# Patient Record
Sex: MALE | Race: WHITE | NOT HISPANIC OR LATINO | Employment: FULL TIME | URBAN - METROPOLITAN AREA
[De-identification: names, ages, dates, MRNs, and addresses within clinical notes are randomized per-mention and may not be internally consistent; named-entity substitution may affect disease eponyms.]

---

## 2019-08-12 ENCOUNTER — HOSPITAL ENCOUNTER (INPATIENT)
Facility: CLINIC | Age: 34
LOS: 6 days | Discharge: HOME | End: 2019-08-19
Attending: EMERGENCY MEDICINE | Admitting: INTERNAL MEDICINE
Payer: COMMERCIAL

## 2019-08-12 DIAGNOSIS — K85.20 ALCOHOL-INDUCED ACUTE PANCREATITIS, UNSPECIFIED COMPLICATION STATUS: ICD-10-CM

## 2019-08-12 DIAGNOSIS — K59.00 CONSTIPATION, UNSPECIFIED CONSTIPATION TYPE: Primary | ICD-10-CM

## 2019-08-12 DIAGNOSIS — F10.930 UNCOMPLICATED ALCOHOL WITHDRAWAL (H): ICD-10-CM

## 2019-08-12 DIAGNOSIS — S90.512D ABRASION OF LEFT ANKLE, SUBSEQUENT ENCOUNTER: ICD-10-CM

## 2019-08-12 DIAGNOSIS — F32.9 MAJOR DEPRESSIVE DISORDER WITH SINGLE EPISODE, REMISSION STATUS UNSPECIFIED: ICD-10-CM

## 2019-08-12 DIAGNOSIS — F10.229 ACUTE ALCOHOLIC INTOXICATION IN ALCOHOLISM WITH COMPLICATION, CONTINUOUS DRINKING BEHAVIOR (H): ICD-10-CM

## 2019-08-12 LAB — ALCOHOL BREATH TEST: 0.29 (ref 0–0.01)

## 2019-08-12 PROCEDURE — 82075 ASSAY OF BREATH ETHANOL: CPT | Performed by: EMERGENCY MEDICINE

## 2019-08-12 PROCEDURE — 99285 EMERGENCY DEPT VISIT HI MDM: CPT | Mod: Z6 | Performed by: EMERGENCY MEDICINE

## 2019-08-12 PROCEDURE — 96365 THER/PROPH/DIAG IV INF INIT: CPT | Performed by: EMERGENCY MEDICINE

## 2019-08-12 PROCEDURE — 99285 EMERGENCY DEPT VISIT HI MDM: CPT | Mod: 25 | Performed by: EMERGENCY MEDICINE

## 2019-08-12 PROCEDURE — 96361 HYDRATE IV INFUSION ADD-ON: CPT | Performed by: EMERGENCY MEDICINE

## 2019-08-12 PROCEDURE — HZ2ZZZZ DETOXIFICATION SERVICES FOR SUBSTANCE ABUSE TREATMENT: ICD-10-PCS | Performed by: EMERGENCY MEDICINE

## 2019-08-12 PROCEDURE — 96375 TX/PRO/DX INJ NEW DRUG ADDON: CPT | Performed by: EMERGENCY MEDICINE

## 2019-08-12 RX ORDER — GABAPENTIN 400 MG/1
400 CAPSULE ORAL 3 TIMES DAILY
Status: ON HOLD | COMMUNITY
Start: 2019-07-01 | End: 2019-08-18

## 2019-08-12 RX ORDER — DULOXETIN HYDROCHLORIDE 30 MG/1
30 CAPSULE, DELAYED RELEASE ORAL DAILY
Status: ON HOLD | COMMUNITY
Start: 2019-07-01 | End: 2019-08-18

## 2019-08-12 RX ORDER — MIRTAZAPINE 15 MG/1
15 TABLET, FILM COATED ORAL AT BEDTIME
Status: ON HOLD | COMMUNITY
Start: 2019-07-01 | End: 2019-08-18

## 2019-08-13 PROBLEM — K85.90 PANCREATITIS: Status: ACTIVE | Noted: 2019-08-13

## 2019-08-13 LAB
ALBUMIN SERPL-MCNC: 2.7 G/DL (ref 3.4–5)
ALP SERPL-CCNC: 156 U/L (ref 40–150)
ALT SERPL W P-5'-P-CCNC: 152 U/L (ref 0–70)
AMPHETAMINES UR QL SCN: NEGATIVE
ANION GAP SERPL CALCULATED.3IONS-SCNC: 10 MMOL/L (ref 3–14)
ANION GAP SERPL CALCULATED.3IONS-SCNC: 12 MMOL/L (ref 3–14)
ANION GAP SERPL CALCULATED.3IONS-SCNC: 12 MMOL/L (ref 3–14)
ANION GAP SERPL CALCULATED.3IONS-SCNC: 6 MMOL/L (ref 3–14)
AST SERPL W P-5'-P-CCNC: ABNORMAL U/L (ref 0–45)
BARBITURATES UR QL: NEGATIVE
BASOPHILS # BLD AUTO: 0 10E9/L (ref 0–0.2)
BASOPHILS NFR BLD AUTO: 0.2 %
BENZODIAZ UR QL: POSITIVE
BILIRUB SERPL-MCNC: 1.4 MG/DL (ref 0.2–1.3)
BUN SERPL-MCNC: 14 MG/DL (ref 7–30)
BUN SERPL-MCNC: 15 MG/DL (ref 7–30)
BUN SERPL-MCNC: 15 MG/DL (ref 7–30)
BUN SERPL-MCNC: 16 MG/DL (ref 7–30)
CALCIUM SERPL-MCNC: 7 MG/DL (ref 8.5–10.1)
CALCIUM SERPL-MCNC: 7.1 MG/DL (ref 8.5–10.1)
CALCIUM SERPL-MCNC: 7.4 MG/DL (ref 8.5–10.1)
CALCIUM SERPL-MCNC: 7.4 MG/DL (ref 8.5–10.1)
CANNABINOIDS UR QL SCN: NEGATIVE
CHLORIDE SERPL-SCNC: 101 MMOL/L (ref 94–109)
CHLORIDE SERPL-SCNC: 101 MMOL/L (ref 94–109)
CHLORIDE SERPL-SCNC: 103 MMOL/L (ref 94–109)
CHLORIDE SERPL-SCNC: 104 MMOL/L (ref 94–109)
CO2 SERPL-SCNC: 19 MMOL/L (ref 20–32)
CO2 SERPL-SCNC: 19 MMOL/L (ref 20–32)
CO2 SERPL-SCNC: 22 MMOL/L (ref 20–32)
CO2 SERPL-SCNC: 24 MMOL/L (ref 20–32)
COCAINE UR QL: NEGATIVE
CREAT SERPL-MCNC: 0.82 MG/DL (ref 0.66–1.25)
CREAT SERPL-MCNC: 0.91 MG/DL (ref 0.66–1.25)
CREAT SERPL-MCNC: ABNORMAL MG/DL (ref 0.66–1.25)
CREAT SERPL-MCNC: ABNORMAL MG/DL (ref 0.66–1.25)
DIFFERENTIAL METHOD BLD: ABNORMAL
EOSINOPHIL # BLD AUTO: 0 10E9/L (ref 0–0.7)
EOSINOPHIL NFR BLD AUTO: 0.2 %
ERYTHROCYTE [DISTWIDTH] IN BLOOD BY AUTOMATED COUNT: 13.6 % (ref 10–15)
ETHANOL UR QL SCN: POSITIVE
GFR SERPL CREATININE-BSD FRML MDRD: >90 ML/MIN/{1.73_M2}
GFR SERPL CREATININE-BSD FRML MDRD: >90 ML/MIN/{1.73_M2}
GFR SERPL CREATININE-BSD FRML MDRD: ABNORMAL ML/MIN/{1.73_M2}
GFR SERPL CREATININE-BSD FRML MDRD: ABNORMAL ML/MIN/{1.73_M2}
GLUCOSE SERPL-MCNC: 101 MG/DL (ref 70–99)
GLUCOSE SERPL-MCNC: 113 MG/DL (ref 70–99)
GLUCOSE SERPL-MCNC: 114 MG/DL (ref 70–99)
GLUCOSE SERPL-MCNC: 114 MG/DL (ref 70–99)
HCT VFR BLD AUTO: 45 % (ref 40–53)
HGB BLD-MCNC: 16 G/DL (ref 13.3–17.7)
IMM GRANULOCYTES # BLD: 0 10E9/L (ref 0–0.4)
IMM GRANULOCYTES NFR BLD: 0.2 %
INR PPP: 1 (ref 0.86–1.14)
LACTATE BLD-SCNC: 1.6 MMOL/L (ref 0.7–2)
LACTATE BLD-SCNC: 2 MMOL/L (ref 0.7–2)
LACTATE BLD-SCNC: 2.4 MMOL/L (ref 0.7–2)
LIPASE SERPL-CCNC: 2835 U/L (ref 73–393)
LYMPHOCYTES # BLD AUTO: 1.9 10E9/L (ref 0.8–5.3)
LYMPHOCYTES NFR BLD AUTO: 15.3 %
MAGNESIUM SERPL-MCNC: 2.2 MG/DL (ref 1.6–2.3)
MCH RBC QN AUTO: 30.4 PG (ref 26.5–33)
MCHC RBC AUTO-ENTMCNC: 35.6 G/DL (ref 31.5–36.5)
MCV RBC AUTO: 86 FL (ref 78–100)
MONOCYTES # BLD AUTO: 0.3 10E9/L (ref 0–1.3)
MONOCYTES NFR BLD AUTO: 2.7 %
NEUTROPHILS # BLD AUTO: 10.1 10E9/L (ref 1.6–8.3)
NEUTROPHILS NFR BLD AUTO: 81.4 %
NRBC # BLD AUTO: 0.1 10*3/UL
NRBC BLD AUTO-RTO: 1 /100
OPIATES UR QL SCN: NEGATIVE
PHOSPHATE SERPL-MCNC: 1.7 MG/DL (ref 2.5–4.5)
PHOSPHATE SERPL-MCNC: 2 MG/DL (ref 2.5–4.5)
PHOSPHATE SERPL-MCNC: NORMAL MG/DL (ref 2.5–4.5)
PLATELET # BLD AUTO: 198 10E9/L (ref 150–450)
POTASSIUM SERPL-SCNC: 4.6 MMOL/L (ref 3.4–5.3)
POTASSIUM SERPL-SCNC: 4.7 MMOL/L (ref 3.4–5.3)
POTASSIUM SERPL-SCNC: 6.2 MMOL/L (ref 3.4–5.3)
POTASSIUM SERPL-SCNC: ABNORMAL MMOL/L (ref 3.4–5.3)
PROT SERPL-MCNC: 8.3 G/DL (ref 6.8–8.8)
RBC # BLD AUTO: 5.26 10E12/L (ref 4.4–5.9)
SODIUM SERPL-SCNC: 131 MMOL/L (ref 133–144)
SODIUM SERPL-SCNC: 133 MMOL/L (ref 133–144)
SODIUM SERPL-SCNC: 134 MMOL/L (ref 133–144)
SODIUM SERPL-SCNC: 135 MMOL/L (ref 133–144)
WBC # BLD AUTO: 12.4 10E9/L (ref 4–11)

## 2019-08-13 PROCEDURE — 25000128 H RX IP 250 OP 636: Performed by: PHYSICIAN ASSISTANT

## 2019-08-13 PROCEDURE — 90791 PSYCH DIAGNOSTIC EVALUATION: CPT

## 2019-08-13 PROCEDURE — 82247 BILIRUBIN TOTAL: CPT

## 2019-08-13 PROCEDURE — 25000125 ZZHC RX 250: Performed by: PHYSICIAN ASSISTANT

## 2019-08-13 PROCEDURE — 84155 ASSAY OF PROTEIN SERUM: CPT

## 2019-08-13 PROCEDURE — 87040 BLOOD CULTURE FOR BACTERIA: CPT | Performed by: INTERNAL MEDICINE

## 2019-08-13 PROCEDURE — 83605 ASSAY OF LACTIC ACID: CPT | Performed by: INTERNAL MEDICINE

## 2019-08-13 PROCEDURE — 25000132 ZZH RX MED GY IP 250 OP 250 PS 637: Performed by: EMERGENCY MEDICINE

## 2019-08-13 PROCEDURE — 12000001 ZZH R&B MED SURG/OB UMMC

## 2019-08-13 PROCEDURE — 25000131 ZZH RX MED GY IP 250 OP 636 PS 637: Performed by: PHYSICIAN ASSISTANT

## 2019-08-13 PROCEDURE — 25000132 ZZH RX MED GY IP 250 OP 250 PS 637: Performed by: PHYSICIAN ASSISTANT

## 2019-08-13 PROCEDURE — 83735 ASSAY OF MAGNESIUM: CPT | Performed by: EMERGENCY MEDICINE

## 2019-08-13 PROCEDURE — 82947 ASSAY GLUCOSE BLOOD QUANT: CPT

## 2019-08-13 PROCEDURE — 84460 ALANINE AMINO (ALT) (SGPT): CPT

## 2019-08-13 PROCEDURE — 80048 BASIC METABOLIC PNL TOTAL CA: CPT | Performed by: EMERGENCY MEDICINE

## 2019-08-13 PROCEDURE — 83690 ASSAY OF LIPASE: CPT | Performed by: EMERGENCY MEDICINE

## 2019-08-13 PROCEDURE — 82565 ASSAY OF CREATININE: CPT

## 2019-08-13 PROCEDURE — 84100 ASSAY OF PHOSPHORUS: CPT | Performed by: EMERGENCY MEDICINE

## 2019-08-13 PROCEDURE — 25000132 ZZH RX MED GY IP 250 OP 250 PS 637: Performed by: INTERNAL MEDICINE

## 2019-08-13 PROCEDURE — 36415 COLL VENOUS BLD VENIPUNCTURE: CPT | Performed by: PHYSICIAN ASSISTANT

## 2019-08-13 PROCEDURE — 84295 ASSAY OF SERUM SODIUM: CPT

## 2019-08-13 PROCEDURE — 84100 ASSAY OF PHOSPHORUS: CPT | Performed by: PHYSICIAN ASSISTANT

## 2019-08-13 PROCEDURE — 25000128 H RX IP 250 OP 636: Performed by: EMERGENCY MEDICINE

## 2019-08-13 PROCEDURE — 84520 ASSAY OF UREA NITROGEN: CPT

## 2019-08-13 PROCEDURE — 85610 PROTHROMBIN TIME: CPT | Performed by: PHYSICIAN ASSISTANT

## 2019-08-13 PROCEDURE — 84075 ASSAY ALKALINE PHOSPHATASE: CPT

## 2019-08-13 PROCEDURE — 82374 ASSAY BLOOD CARBON DIOXIDE: CPT

## 2019-08-13 PROCEDURE — 83605 ASSAY OF LACTIC ACID: CPT | Performed by: PHYSICIAN ASSISTANT

## 2019-08-13 PROCEDURE — 25800030 ZZH RX IP 258 OP 636: Performed by: PHYSICIAN ASSISTANT

## 2019-08-13 PROCEDURE — 99223 1ST HOSP IP/OBS HIGH 75: CPT | Mod: AI | Performed by: INTERNAL MEDICINE

## 2019-08-13 PROCEDURE — 82310 ASSAY OF CALCIUM: CPT

## 2019-08-13 PROCEDURE — 36415 COLL VENOUS BLD VENIPUNCTURE: CPT | Performed by: EMERGENCY MEDICINE

## 2019-08-13 PROCEDURE — C9113 INJ PANTOPRAZOLE SODIUM, VIA: HCPCS | Performed by: EMERGENCY MEDICINE

## 2019-08-13 PROCEDURE — 80320 DRUG SCREEN QUANTALCOHOLS: CPT | Performed by: FAMILY MEDICINE

## 2019-08-13 PROCEDURE — C9113 INJ PANTOPRAZOLE SODIUM, VIA: HCPCS | Performed by: PHYSICIAN ASSISTANT

## 2019-08-13 PROCEDURE — 99207 ZZC APP CREDIT; MD BILLING SHARED VISIT: CPT | Performed by: PHYSICIAN ASSISTANT

## 2019-08-13 PROCEDURE — 84100 ASSAY OF PHOSPHORUS: CPT | Performed by: INTERNAL MEDICINE

## 2019-08-13 PROCEDURE — 82435 ASSAY OF BLOOD CHLORIDE: CPT

## 2019-08-13 PROCEDURE — 84132 ASSAY OF SERUM POTASSIUM: CPT

## 2019-08-13 PROCEDURE — 85025 COMPLETE CBC W/AUTO DIFF WBC: CPT | Performed by: EMERGENCY MEDICINE

## 2019-08-13 PROCEDURE — 82040 ASSAY OF SERUM ALBUMIN: CPT

## 2019-08-13 PROCEDURE — 80053 COMPREHEN METABOLIC PANEL: CPT | Performed by: EMERGENCY MEDICINE

## 2019-08-13 PROCEDURE — 36415 COLL VENOUS BLD VENIPUNCTURE: CPT | Performed by: INTERNAL MEDICINE

## 2019-08-13 PROCEDURE — 80307 DRUG TEST PRSMV CHEM ANLYZR: CPT | Performed by: FAMILY MEDICINE

## 2019-08-13 RX ORDER — DIAZEPAM 5 MG
5-20 TABLET ORAL EVERY 30 MIN PRN
Status: DISCONTINUED | OUTPATIENT
Start: 2019-08-13 | End: 2019-08-17

## 2019-08-13 RX ORDER — AMOXICILLIN 250 MG
1 CAPSULE ORAL 2 TIMES DAILY
Status: DISCONTINUED | OUTPATIENT
Start: 2019-08-13 | End: 2019-08-19 | Stop reason: HOSPADM

## 2019-08-13 RX ORDER — LANOLIN ALCOHOL/MO/W.PET/CERES
100 CREAM (GRAM) TOPICAL DAILY
Status: DISCONTINUED | OUTPATIENT
Start: 2019-08-14 | End: 2019-08-19 | Stop reason: HOSPADM

## 2019-08-13 RX ORDER — ALBUTEROL SULFATE 90 UG/1
2 AEROSOL, METERED RESPIRATORY (INHALATION) EVERY 6 HOURS PRN
Status: ON HOLD | COMMUNITY
End: 2023-07-05

## 2019-08-13 RX ORDER — ONDANSETRON 2 MG/ML
4 INJECTION INTRAMUSCULAR; INTRAVENOUS EVERY 6 HOURS PRN
Status: DISCONTINUED | OUTPATIENT
Start: 2019-08-13 | End: 2019-08-19 | Stop reason: HOSPADM

## 2019-08-13 RX ORDER — LANOLIN ALCOHOL/MO/W.PET/CERES
100 CREAM (GRAM) TOPICAL DAILY
Status: DISCONTINUED | OUTPATIENT
Start: 2019-08-13 | End: 2019-08-13

## 2019-08-13 RX ORDER — ONDANSETRON 2 MG/ML
4 INJECTION INTRAMUSCULAR; INTRAVENOUS EVERY 6 HOURS PRN
Status: DISCONTINUED | OUTPATIENT
Start: 2019-08-13 | End: 2019-08-13

## 2019-08-13 RX ORDER — SODIUM CHLORIDE, SODIUM LACTATE, POTASSIUM CHLORIDE, CALCIUM CHLORIDE 600; 310; 30; 20 MG/100ML; MG/100ML; MG/100ML; MG/100ML
INJECTION, SOLUTION INTRAVENOUS CONTINUOUS
Status: ACTIVE | OUTPATIENT
Start: 2019-08-13 | End: 2019-08-13

## 2019-08-13 RX ORDER — SODIUM CHLORIDE, SODIUM LACTATE, POTASSIUM CHLORIDE, CALCIUM CHLORIDE 600; 310; 30; 20 MG/100ML; MG/100ML; MG/100ML; MG/100ML
INJECTION, SOLUTION INTRAVENOUS CONTINUOUS
Status: DISCONTINUED | OUTPATIENT
Start: 2019-08-13 | End: 2019-08-16

## 2019-08-13 RX ORDER — ONDANSETRON 4 MG/1
4 TABLET, ORALLY DISINTEGRATING ORAL EVERY 6 HOURS PRN
Status: DISCONTINUED | OUTPATIENT
Start: 2019-08-13 | End: 2019-08-19 | Stop reason: HOSPADM

## 2019-08-13 RX ORDER — HYDROMORPHONE HYDROCHLORIDE 1 MG/ML
0.3 INJECTION, SOLUTION INTRAMUSCULAR; INTRAVENOUS; SUBCUTANEOUS
Status: DISCONTINUED | OUTPATIENT
Start: 2019-08-13 | End: 2019-08-13

## 2019-08-13 RX ORDER — NALOXONE HYDROCHLORIDE 0.4 MG/ML
.1-.4 INJECTION, SOLUTION INTRAMUSCULAR; INTRAVENOUS; SUBCUTANEOUS
Status: DISCONTINUED | OUTPATIENT
Start: 2019-08-13 | End: 2019-08-19 | Stop reason: HOSPADM

## 2019-08-13 RX ORDER — DIAZEPAM 5 MG
5 TABLET ORAL ONCE
Status: COMPLETED | OUTPATIENT
Start: 2019-08-13 | End: 2019-08-13

## 2019-08-13 RX ORDER — MIRTAZAPINE 15 MG/1
15 TABLET, FILM COATED ORAL AT BEDTIME
Status: DISCONTINUED | OUTPATIENT
Start: 2019-08-13 | End: 2019-08-19 | Stop reason: HOSPADM

## 2019-08-13 RX ORDER — MULTIPLE VITAMINS W/ MINERALS TAB 9MG-400MCG
1 TAB ORAL DAILY
Status: DISCONTINUED | OUTPATIENT
Start: 2019-08-14 | End: 2019-08-19 | Stop reason: HOSPADM

## 2019-08-13 RX ORDER — AMOXICILLIN 250 MG
2 CAPSULE ORAL 2 TIMES DAILY
Status: DISCONTINUED | OUTPATIENT
Start: 2019-08-13 | End: 2019-08-16

## 2019-08-13 RX ORDER — ACETAMINOPHEN 325 MG/1
650 TABLET ORAL EVERY 4 HOURS PRN
Status: DISCONTINUED | OUTPATIENT
Start: 2019-08-13 | End: 2019-08-19 | Stop reason: HOSPADM

## 2019-08-13 RX ORDER — HYDROMORPHONE HYDROCHLORIDE 1 MG/ML
.3-.5 INJECTION, SOLUTION INTRAMUSCULAR; INTRAVENOUS; SUBCUTANEOUS
Status: DISCONTINUED | OUTPATIENT
Start: 2019-08-13 | End: 2019-08-13

## 2019-08-13 RX ORDER — FOLIC ACID 1 MG/1
1 TABLET ORAL DAILY
Status: DISCONTINUED | OUTPATIENT
Start: 2019-08-13 | End: 2019-08-13

## 2019-08-13 RX ORDER — POLYETHYLENE GLYCOL 3350 17 G/17G
17 POWDER, FOR SOLUTION ORAL DAILY PRN
Status: DISCONTINUED | OUTPATIENT
Start: 2019-08-13 | End: 2019-08-15

## 2019-08-13 RX ORDER — MULTIVITAMIN,THERAPEUTIC
1 TABLET ORAL DAILY
Status: DISCONTINUED | OUTPATIENT
Start: 2019-08-13 | End: 2019-08-13

## 2019-08-13 RX ORDER — DIAZEPAM 5 MG
5-20 TABLET ORAL EVERY 30 MIN PRN
Status: DISCONTINUED | OUTPATIENT
Start: 2019-08-13 | End: 2019-08-13

## 2019-08-13 RX ORDER — THIAMINE HYDROCHLORIDE 100 MG/ML
100 INJECTION, SOLUTION INTRAMUSCULAR; INTRAVENOUS ONCE
Status: COMPLETED | OUTPATIENT
Start: 2019-08-13 | End: 2019-08-13

## 2019-08-13 RX ORDER — HYDROMORPHONE HYDROCHLORIDE 1 MG/ML
0.3 INJECTION, SOLUTION INTRAMUSCULAR; INTRAVENOUS; SUBCUTANEOUS
Status: DISCONTINUED | OUTPATIENT
Start: 2019-08-13 | End: 2019-08-14

## 2019-08-13 RX ORDER — PROCHLORPERAZINE 25 MG
25 SUPPOSITORY, RECTAL RECTAL EVERY 12 HOURS PRN
Status: DISCONTINUED | OUTPATIENT
Start: 2019-08-13 | End: 2019-08-19 | Stop reason: HOSPADM

## 2019-08-13 RX ORDER — PANTOPRAZOLE SODIUM 40 MG/1
40 TABLET, DELAYED RELEASE ORAL DAILY
COMMUNITY
End: 2020-02-15

## 2019-08-13 RX ORDER — FOLIC ACID 1 MG/1
1 TABLET ORAL DAILY
Status: DISCONTINUED | OUTPATIENT
Start: 2019-08-14 | End: 2019-08-19 | Stop reason: HOSPADM

## 2019-08-13 RX ORDER — GINSENG 100 MG
CAPSULE ORAL 2 TIMES DAILY
Status: DISCONTINUED | OUTPATIENT
Start: 2019-08-13 | End: 2019-08-19 | Stop reason: HOSPADM

## 2019-08-13 RX ORDER — SODIUM CHLORIDE, SODIUM LACTATE, POTASSIUM CHLORIDE, CALCIUM CHLORIDE 600; 310; 30; 20 MG/100ML; MG/100ML; MG/100ML; MG/100ML
INJECTION, SOLUTION INTRAVENOUS CONTINUOUS
Status: DISCONTINUED | OUTPATIENT
Start: 2019-08-13 | End: 2019-08-13

## 2019-08-13 RX ORDER — MAGNESIUM SULFATE HEPTAHYDRATE 40 MG/ML
4 INJECTION, SOLUTION INTRAVENOUS EVERY 4 HOURS PRN
Status: DISCONTINUED | OUTPATIENT
Start: 2019-08-13 | End: 2019-08-18

## 2019-08-13 RX ORDER — ALBUTEROL SULFATE 90 UG/1
2 AEROSOL, METERED RESPIRATORY (INHALATION) EVERY 6 HOURS
Status: DISCONTINUED | OUTPATIENT
Start: 2019-08-13 | End: 2019-08-13

## 2019-08-13 RX ORDER — OXYCODONE HYDROCHLORIDE 5 MG/1
5-10 TABLET ORAL
Status: CANCELLED | OUTPATIENT
Start: 2019-08-13

## 2019-08-13 RX ORDER — BISACODYL 10 MG
10 SUPPOSITORY, RECTAL RECTAL DAILY PRN
Status: DISCONTINUED | OUTPATIENT
Start: 2019-08-13 | End: 2019-08-18

## 2019-08-13 RX ORDER — ONDANSETRON 2 MG/ML
4 INJECTION INTRAMUSCULAR; INTRAVENOUS ONCE
Status: COMPLETED | OUTPATIENT
Start: 2019-08-13 | End: 2019-08-13

## 2019-08-13 RX ORDER — LIDOCAINE 40 MG/G
CREAM TOPICAL
Status: DISCONTINUED | OUTPATIENT
Start: 2019-08-13 | End: 2019-08-19 | Stop reason: HOSPADM

## 2019-08-13 RX ORDER — FOLIC ACID 5 MG/ML
1 INJECTION, SOLUTION INTRAMUSCULAR; INTRAVENOUS; SUBCUTANEOUS ONCE
Status: COMPLETED | OUTPATIENT
Start: 2019-08-13 | End: 2019-08-13

## 2019-08-13 RX ORDER — SODIUM CHLORIDE 9 MG/ML
1000 INJECTION, SOLUTION INTRAVENOUS CONTINUOUS
Status: DISCONTINUED | OUTPATIENT
Start: 2019-08-13 | End: 2019-08-13

## 2019-08-13 RX ORDER — PROCHLORPERAZINE MALEATE 5 MG
10 TABLET ORAL EVERY 6 HOURS PRN
Status: DISCONTINUED | OUTPATIENT
Start: 2019-08-13 | End: 2019-08-19 | Stop reason: HOSPADM

## 2019-08-13 RX ORDER — MORPHINE SULFATE 4 MG/ML
4 INJECTION, SOLUTION INTRAMUSCULAR; INTRAVENOUS
Status: DISCONTINUED | OUTPATIENT
Start: 2019-08-13 | End: 2019-08-13

## 2019-08-13 RX ORDER — MULTIPLE VITAMINS W/ MINERALS TAB 9MG-400MCG
1 TAB ORAL DAILY
Status: DISCONTINUED | OUTPATIENT
Start: 2019-08-13 | End: 2019-08-13

## 2019-08-13 RX ORDER — ALBUTEROL SULFATE 90 UG/1
2 AEROSOL, METERED RESPIRATORY (INHALATION) EVERY 6 HOURS PRN
Status: DISCONTINUED | OUTPATIENT
Start: 2019-08-13 | End: 2019-08-19 | Stop reason: HOSPADM

## 2019-08-13 RX ORDER — NALTREXONE HYDROCHLORIDE 50 MG/1
50 TABLET, FILM COATED ORAL DAILY
Status: ON HOLD | COMMUNITY
End: 2021-03-07

## 2019-08-13 RX ORDER — CARBAMAZEPINE 200 MG/1
200 TABLET, EXTENDED RELEASE ORAL AT BEDTIME
Status: ON HOLD | COMMUNITY
End: 2019-08-18

## 2019-08-13 RX ADMIN — DIAZEPAM 5 MG: 5 TABLET ORAL at 13:27

## 2019-08-13 RX ADMIN — DIAZEPAM 5 MG: 5 TABLET ORAL at 19:57

## 2019-08-13 RX ADMIN — SODIUM CHLORIDE, POTASSIUM CHLORIDE, SODIUM LACTATE AND CALCIUM CHLORIDE 1000 ML: 600; 310; 30; 20 INJECTION, SOLUTION INTRAVENOUS at 13:13

## 2019-08-13 RX ADMIN — SENNOSIDES AND DOCUSATE SODIUM 1 TABLET: 8.6; 5 TABLET ORAL at 19:40

## 2019-08-13 RX ADMIN — ACETAMINOPHEN 650 MG: 325 TABLET, FILM COATED ORAL at 20:59

## 2019-08-13 RX ADMIN — ONDANSETRON 4 MG: 2 INJECTION INTRAMUSCULAR; INTRAVENOUS at 09:38

## 2019-08-13 RX ADMIN — HYDROMORPHONE HYDROCHLORIDE 0.3 MG: 1 INJECTION, SOLUTION INTRAMUSCULAR; INTRAVENOUS; SUBCUTANEOUS at 20:59

## 2019-08-13 RX ADMIN — SODIUM CHLORIDE, POTASSIUM CHLORIDE, SODIUM LACTATE AND CALCIUM CHLORIDE: 600; 310; 30; 20 INJECTION, SOLUTION INTRAVENOUS at 18:34

## 2019-08-13 RX ADMIN — ONDANSETRON 4 MG: 4 TABLET, ORALLY DISINTEGRATING ORAL at 19:56

## 2019-08-13 RX ADMIN — MORPHINE SULFATE 4 MG: 4 INJECTION INTRAVENOUS at 11:37

## 2019-08-13 RX ADMIN — BACITRACIN: 500 OINTMENT TOPICAL at 19:47

## 2019-08-13 RX ADMIN — SODIUM CHLORIDE, POTASSIUM CHLORIDE, SODIUM LACTATE AND CALCIUM CHLORIDE: 600; 310; 30; 20 INJECTION, SOLUTION INTRAVENOUS at 15:41

## 2019-08-13 RX ADMIN — SODIUM PHOSPHATE, MONOBASIC, MONOHYDRATE AND SODIUM PHOSPHATE, DIBASIC, ANHYDROUS 20 MMOL: 276; 142 INJECTION, SOLUTION INTRAVENOUS at 15:45

## 2019-08-13 RX ADMIN — POLYETHYLENE GLYCOL 3350 17 G: 17 POWDER, FOR SOLUTION ORAL at 19:40

## 2019-08-13 RX ADMIN — PANTOPRAZOLE SODIUM 40 MG: 40 INJECTION, POWDER, LYOPHILIZED, FOR SOLUTION INTRAVENOUS at 19:41

## 2019-08-13 RX ADMIN — THIAMINE HYDROCHLORIDE 100 MG: 100 INJECTION, SOLUTION INTRAMUSCULAR; INTRAVENOUS at 13:42

## 2019-08-13 RX ADMIN — FOLIC ACID 1 MG: 5 INJECTION, SOLUTION INTRAMUSCULAR; INTRAVENOUS; SUBCUTANEOUS at 13:46

## 2019-08-13 RX ADMIN — MIRTAZAPINE 15 MG: 15 TABLET, FILM COATED ORAL at 21:01

## 2019-08-13 RX ADMIN — SODIUM CHLORIDE 1000 ML: 9 INJECTION, SOLUTION INTRAVENOUS at 11:36

## 2019-08-13 RX ADMIN — PANTOPRAZOLE SODIUM 40 MG: 40 INJECTION, POWDER, FOR SOLUTION INTRAVENOUS at 09:38

## 2019-08-13 RX ADMIN — THERA TABS 1 TABLET: TAB at 13:42

## 2019-08-13 ASSESSMENT — ENCOUNTER SYMPTOMS
COLOR CHANGE: 0
ARTHRALGIAS: 0
DYSPHORIC MOOD: 1
SHORTNESS OF BREATH: 0
CONFUSION: 0
DIFFICULTY URINATING: 0
EYE REDNESS: 0
NECK STIFFNESS: 0
NERVOUS/ANXIOUS: 1
ABDOMINAL PAIN: 0
FEVER: 0
HEADACHES: 0

## 2019-08-13 ASSESSMENT — ACTIVITIES OF DAILY LIVING (ADL)
RETIRED_COMMUNICATION: 0-->UNDERSTANDS/COMMUNICATES WITHOUT DIFFICULTY
BATHING: 0-->INDEPENDENT
COGNITION: 0 - NO COGNITION ISSUES REPORTED
RETIRED_EATING: 0-->INDEPENDENT
FALL_HISTORY_WITHIN_LAST_SIX_MONTHS: NO
TRANSFERRING: 0-->INDEPENDENT
ADLS_ACUITY_SCORE: 12
TOILETING: 0-->INDEPENDENT
DRESS: 0-->INDEPENDENT
AMBULATION: 0-->INDEPENDENT
SWALLOWING: 0-->SWALLOWS FOODS/LIQUIDS WITHOUT DIFFICULTY

## 2019-08-13 NOTE — ED NOTES
"Patient reports coming in due to \"mom told me to.\" Patient not sure what he wants from the ED. Calm and cooperative with RN.   "

## 2019-08-13 NOTE — PROGRESS NOTES
[facility], [place]    Sepsis Evaluation Progress Note    Date of Service: 08/13/2019    I was called to see Xavier Odell due to abnormal vital signs triggering the Sepsis SIRS screening alert. He is not known to have an infection.     Physical Exam    Vital Signs:  Temp: 99.2  F (37.3  C) Temp src: Oral BP: (!) 158/97 Pulse: 100 Heart Rate: 102 Resp: 18 SpO2: 97 % O2 Device: None (Room air)      Lab:  Lactic Acid   Date Value Ref Range Status   08/13/2019 2.4 (H) 0.7 - 2.0 mmol/L Final       The patient is at baseline mental status.  CVS: Normal Heart sounds   RS: Clear breath sounds bilaterally   Abdomen: Soft . Tenderness on deep palpation . Normal bowel sounds.   euro: Alert and orientated X 3     The rest of their physical exam is significant for signs of alcohol withdrawal     Assessment and Plan    The SIRS and exam findings are likely due to acute pancreatitis and alcohol withdrawal , there is no sign of sepsis at this time.    Continue IV fluids currently running at at total of 200 mls/ hr  Recheck lactate at 2100 hrs     Disposition: The patient will remain on the current unit. We will continue to monitor this patient closely.    Brian Pagan MD
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
## 2019-08-13 NOTE — ED NOTES
Patient was signed out to overnight physician with plan for behavioral health assessment when he is more sober.      Will Rock, DO  08/13/19 0201
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
## 2019-08-13 NOTE — ED NOTES
Signout received from overnight doc.  Please see Dr. Martínez note for original HPI and physical exam.  After being observed in the ER, patient cleared and was clinically sober in the morning and was able to have a mental health assessment.  Per the mental health  in the past he has lost job due to alcohol, new to [place] (via [place]) to be with family; recently quit his job a week ago. Feels worthless, hopeless, helpless, isolating himself, guily but denies suicidal/homicidal ideations. No hx of inpatient for mental health, but yes for alcohol withdrawal.  Patient is amenable to outpatient anxiety and chem dep treatment, the mental health  is trying to make arrangements for this. Initally patient was interested in detox however while in the ER developed epigastric abdominal pain.      On assessment this morning patient endorses some epigastric abdominal pain.  Does report he has a history of gastric ulcers denies any hematemesis, black or tarry stools.  Reports he has not been taking any of his PPIs.  On exam he has epigastric tenderness but no guarding or peritoneal signs.  We will add on LFTs, lipase and a basic CBC and electrolyte.  Patient is feeling nauseated, was given Zofran and IV Protonix at this time.    Lipase significantly elevated, patient also has elevated LFTs.  Of note he has an elevated potassium but this is hemolyzed will recheck a potassium.  Patient was made n.p.o.  He was given morphine and IV fluids and will be admitted to the hospitalist for alcoholic pancreatitis.  Of note he also required 1 dose of Valium for alcohol withdrawals.    Marcos Navarro, Marcos Mcmahan MD  08/13/19 3651
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
## 2019-08-13 NOTE — PHARMACY-ADMISSION MEDICATION HISTORY
"Admission medication history for the August 13, 2019 admission is complete.     Interview sources:  patient, SureScripts    Reliability of source: good - patient knew names of medications, doses confirmed with SureScripts    Medication compliance: poor - patient states he stopped taking his medications 4-5 days ago because he started drinking. In general, he doesn't take medications when he drinks.    Changes made to PTA medication list (reason)  Added (per pt, confirmed with SureScripts):   - naltrexone 50 mg daily  - pantoprazole 40 mg daily  - carbamazepine  mg at bedtime (see additional information below)  Deleted: N/A  Changed (clarified frequency, doses unchanged):   - duloxetine (added once daily directions)  - gabapentin (added TID directions)  - mirtazapine (add HS directions)    Additional medication history information:   - Patient states he thinks he was prescribed carbamazepine to prevent withdrawal seizures. Rx written on 7/3/19 for 30 days supply and has been filling regularly since 5/28/19 - patient states he hasn't taken it in \"at least a couple weeks.\" He states that he has never had a seizure before.    Per [place] :  - diazepam 10 mg tabs #5 for 5 days supply filled 8/1/19 (patient states prescribed PRN alcohol withdrawal, but hasn't taken it so opted not to added to PTA medication list).  - gabapentin 400 mg caps #90 for 30 days supply last filled 7/2/19    Prior to Admission medications    Medication Sig Last Dose Taking? Auth Provider   albuterol (PROAIR HFA/PROVENTIL HFA/VENTOLIN HFA) 108 (90 Base) MCG/ACT inhaler Inhale 2 puffs into the lungs every 6 hours Past Week Yes Unknown, Entered By History   carBAMazepine (TEGRETOL XR) 200 MG 12 hr tablet Take 200 mg by mouth At Bedtime  >2 weeks Yes Unknown, Entered By History   DULoxetine (CYMBALTA) 30 MG capsule Take 30 mg by mouth daily  Past Week Yes Reported, Patient   gabapentin (NEURONTIN) 400 MG capsule Take 400 mg by mouth 3 times daily "  Past Week Yes Reported, Patient   mirtazapine (REMERON) 15 MG tablet Take 15 mg by mouth At Bedtime  Past Week Yes Reported, Patient   naltrexone (DEPADE/REVIA) 50 MG tablet Take 50 mg by mouth daily Past Week Yes Unknown, Entered By History   pantoprazole (PROTONIX) 40 MG EC tablet Take 40 mg by mouth daily Past Week Yes Unknown, Entered By History       Time spent: 30 minutes    Medication history completed by:   Christa HerreraD

## 2019-08-13 NOTE — ED NOTES
Mom reports patient has increased depression and unable to care for self. Mom went and brought patient back to [place] in May due to friend reporting patient not taking care of self, apartment full or trash including dog feces, not taking care of animal. Patient quite his job yesterday per mom. Per mom patient only wants to sleep and has told mom that he drinks until he sleeps because that is the only time he is at peace. Patient has not showered in days per mom. Per mom patient will talk his way out of this and she is highly concerned. Dr. Rock informed of this information.

## 2019-08-13 NOTE — ED NOTES
Pt's mom, Venessa Carlson, called asking about the pt. Pt gave verbal permission to speak with her. Pt's mom stated that she is very concerned with his mental health and that pt had a massive GI bleed back in May while in [place], [place].      Venessa Carlson said she can be reached at [phone] if a provider would like to speak with her. She stated that pt is not always very forth coming with his medical issues.

## 2019-08-14 LAB
ALBUMIN SERPL-MCNC: 2.5 G/DL (ref 3.4–5)
ALP SERPL-CCNC: 171 U/L (ref 40–150)
ALT SERPL W P-5'-P-CCNC: 100 U/L (ref 0–70)
ANION GAP SERPL CALCULATED.3IONS-SCNC: 11 MMOL/L (ref 3–14)
AST SERPL W P-5'-P-CCNC: ABNORMAL U/L (ref 0–45)
BASOPHILS # BLD AUTO: 0 10E9/L (ref 0–0.2)
BASOPHILS NFR BLD AUTO: 0.1 %
BILIRUB SERPL-MCNC: 2.5 MG/DL (ref 0.2–1.3)
BUN SERPL-MCNC: 7 MG/DL (ref 7–30)
CALCIUM SERPL-MCNC: 7.4 MG/DL (ref 8.5–10.1)
CHLORIDE SERPL-SCNC: 99 MMOL/L (ref 94–109)
CO2 SERPL-SCNC: 21 MMOL/L (ref 20–32)
CREAT SERPL-MCNC: 0.87 MG/DL (ref 0.66–1.25)
DIFFERENTIAL METHOD BLD: ABNORMAL
EOSINOPHIL # BLD AUTO: 0 10E9/L (ref 0–0.7)
EOSINOPHIL NFR BLD AUTO: 0.3 %
ERYTHROCYTE [DISTWIDTH] IN BLOOD BY AUTOMATED COUNT: 13 % (ref 10–15)
GFR SERPL CREATININE-BSD FRML MDRD: >90 ML/MIN/{1.73_M2}
GLUCOSE SERPL-MCNC: 144 MG/DL (ref 70–99)
HCT VFR BLD AUTO: 41.4 % (ref 40–53)
HGB BLD-MCNC: 13.8 G/DL (ref 13.3–17.7)
IMM GRANULOCYTES # BLD: 0 10E9/L (ref 0–0.4)
IMM GRANULOCYTES NFR BLD: 0.3 %
INTERPRETATION ECG - MUSE: NORMAL
LYMPHOCYTES # BLD AUTO: 1.1 10E9/L (ref 0.8–5.3)
LYMPHOCYTES NFR BLD AUTO: 14.3 %
MAGNESIUM SERPL-MCNC: 1.4 MG/DL (ref 1.6–2.3)
MAGNESIUM SERPL-MCNC: 2.6 MG/DL (ref 1.6–2.3)
MCH RBC QN AUTO: 28.8 PG (ref 26.5–33)
MCHC RBC AUTO-ENTMCNC: 33.3 G/DL (ref 31.5–36.5)
MCV RBC AUTO: 86 FL (ref 78–100)
MONOCYTES # BLD AUTO: 0.2 10E9/L (ref 0–1.3)
MONOCYTES NFR BLD AUTO: 2.7 %
NEUTROPHILS # BLD AUTO: 6.5 10E9/L (ref 1.6–8.3)
NEUTROPHILS NFR BLD AUTO: 82.3 %
NRBC # BLD AUTO: 0 10*3/UL
NRBC BLD AUTO-RTO: 0 /100
PHOSPHATE SERPL-MCNC: 2 MG/DL (ref 2.5–4.5)
PLATELET # BLD AUTO: 75 10E9/L (ref 150–450)
POTASSIUM SERPL-SCNC: 3.6 MMOL/L (ref 3.4–5.3)
PROT SERPL-MCNC: 6.9 G/DL (ref 6.8–8.8)
RBC # BLD AUTO: 4.8 10E12/L (ref 4.4–5.9)
SODIUM SERPL-SCNC: 131 MMOL/L (ref 133–144)
WBC # BLD AUTO: 7.9 10E9/L (ref 4–11)

## 2019-08-14 PROCEDURE — 84100 ASSAY OF PHOSPHORUS: CPT | Performed by: PHYSICIAN ASSISTANT

## 2019-08-14 PROCEDURE — 83735 ASSAY OF MAGNESIUM: CPT | Performed by: INTERNAL MEDICINE

## 2019-08-14 PROCEDURE — 36415 COLL VENOUS BLD VENIPUNCTURE: CPT | Performed by: PHYSICIAN ASSISTANT

## 2019-08-14 PROCEDURE — G0463 HOSPITAL OUTPT CLINIC VISIT: HCPCS | Mod: 25

## 2019-08-14 PROCEDURE — 25800030 ZZH RX IP 258 OP 636: Performed by: PHYSICIAN ASSISTANT

## 2019-08-14 PROCEDURE — C9113 INJ PANTOPRAZOLE SODIUM, VIA: HCPCS | Performed by: PHYSICIAN ASSISTANT

## 2019-08-14 PROCEDURE — 25000128 H RX IP 250 OP 636: Performed by: PHYSICIAN ASSISTANT

## 2019-08-14 PROCEDURE — 25000132 ZZH RX MED GY IP 250 OP 250 PS 637: Performed by: INTERNAL MEDICINE

## 2019-08-14 PROCEDURE — 99221 1ST HOSP IP/OBS SF/LOW 40: CPT | Performed by: PSYCHIATRY & NEUROLOGY

## 2019-08-14 PROCEDURE — 99233 SBSQ HOSP IP/OBS HIGH 50: CPT | Performed by: INTERNAL MEDICINE

## 2019-08-14 PROCEDURE — 83735 ASSAY OF MAGNESIUM: CPT | Performed by: PHYSICIAN ASSISTANT

## 2019-08-14 PROCEDURE — 80053 COMPREHEN METABOLIC PANEL: CPT | Performed by: PHYSICIAN ASSISTANT

## 2019-08-14 PROCEDURE — 25000132 ZZH RX MED GY IP 250 OP 250 PS 637: Performed by: PHYSICIAN ASSISTANT

## 2019-08-14 PROCEDURE — 36415 COLL VENOUS BLD VENIPUNCTURE: CPT | Performed by: INTERNAL MEDICINE

## 2019-08-14 PROCEDURE — 85025 COMPLETE CBC W/AUTO DIFF WBC: CPT | Performed by: PHYSICIAN ASSISTANT

## 2019-08-14 PROCEDURE — 12000001 ZZH R&B MED SURG/OB UMMC

## 2019-08-14 PROCEDURE — 25000125 ZZHC RX 250: Performed by: PHYSICIAN ASSISTANT

## 2019-08-14 PROCEDURE — 97602 WOUND(S) CARE NON-SELECTIVE: CPT

## 2019-08-14 RX ORDER — METOPROLOL TARTRATE 25 MG/1
25 TABLET, FILM COATED ORAL 2 TIMES DAILY
Status: DISCONTINUED | OUTPATIENT
Start: 2019-08-14 | End: 2019-08-18

## 2019-08-14 RX ORDER — POLYETHYLENE GLYCOL 3350 17 G/17G
17 POWDER, FOR SOLUTION ORAL 2 TIMES DAILY
Status: DISCONTINUED | OUTPATIENT
Start: 2019-08-14 | End: 2019-08-16

## 2019-08-14 RX ORDER — DULOXETIN HYDROCHLORIDE 20 MG/1
20 CAPSULE, DELAYED RELEASE ORAL DAILY
Status: DISCONTINUED | OUTPATIENT
Start: 2019-08-14 | End: 2019-08-19 | Stop reason: HOSPADM

## 2019-08-14 RX ORDER — SENNOSIDES 8.6 MG
8.6 TABLET ORAL 2 TIMES DAILY PRN
Status: DISCONTINUED | OUTPATIENT
Start: 2019-08-14 | End: 2019-08-19 | Stop reason: HOSPADM

## 2019-08-14 RX ADMIN — MIRTAZAPINE 15 MG: 15 TABLET, FILM COATED ORAL at 21:32

## 2019-08-14 RX ADMIN — BACITRACIN: 500 OINTMENT TOPICAL at 11:25

## 2019-08-14 RX ADMIN — DULOXETINE HYDROCHLORIDE 20 MG: 20 CAPSULE, DELAYED RELEASE ORAL at 11:34

## 2019-08-14 RX ADMIN — HYDROMORPHONE HYDROCHLORIDE 0.3 MG: 1 INJECTION, SOLUTION INTRAMUSCULAR; INTRAVENOUS; SUBCUTANEOUS at 00:17

## 2019-08-14 RX ADMIN — HYDROMORPHONE HYDROCHLORIDE 0.3 MG: 1 INJECTION, SOLUTION INTRAMUSCULAR; INTRAVENOUS; SUBCUTANEOUS at 08:43

## 2019-08-14 RX ADMIN — THIAMINE HCL TAB 100 MG 100 MG: 100 TAB at 08:42

## 2019-08-14 RX ADMIN — METOPROLOL TARTRATE 25 MG: 25 TABLET, FILM COATED ORAL at 11:21

## 2019-08-14 RX ADMIN — DIAZEPAM 5 MG: 5 TABLET ORAL at 08:42

## 2019-08-14 RX ADMIN — DIAZEPAM 5 MG: 5 TABLET ORAL at 15:47

## 2019-08-14 RX ADMIN — PROCHLORPERAZINE EDISYLATE 10 MG: 5 INJECTION INTRAMUSCULAR; INTRAVENOUS at 00:57

## 2019-08-14 RX ADMIN — MULTIPLE VITAMINS W/ MINERALS TAB 1 TABLET: TAB at 08:42

## 2019-08-14 RX ADMIN — DIAZEPAM 5 MG: 5 TABLET ORAL at 00:17

## 2019-08-14 RX ADMIN — DIAZEPAM 5 MG: 5 TABLET ORAL at 11:34

## 2019-08-14 RX ADMIN — SODIUM PHOSPHATE, MONOBASIC, MONOHYDRATE AND SODIUM PHOSPHATE, DIBASIC, ANHYDROUS 15 MMOL: 276; 142 INJECTION, SOLUTION INTRAVENOUS at 00:43

## 2019-08-14 RX ADMIN — OMEPRAZOLE 40 MG: 20 CAPSULE, DELAYED RELEASE ORAL at 15:47

## 2019-08-14 RX ADMIN — ACETAMINOPHEN 650 MG: 325 TABLET, FILM COATED ORAL at 11:34

## 2019-08-14 RX ADMIN — POLYETHYLENE GLYCOL 3350 17 G: 17 POWDER, FOR SOLUTION ORAL at 19:16

## 2019-08-14 RX ADMIN — DIAZEPAM 5 MG: 5 TABLET ORAL at 19:16

## 2019-08-14 RX ADMIN — ONDANSETRON 4 MG: 2 INJECTION INTRAMUSCULAR; INTRAVENOUS at 00:16

## 2019-08-14 RX ADMIN — HYDROMORPHONE HYDROCHLORIDE 0.3 MG: 1 INJECTION, SOLUTION INTRAMUSCULAR; INTRAVENOUS; SUBCUTANEOUS at 04:52

## 2019-08-14 RX ADMIN — PANTOPRAZOLE SODIUM 40 MG: 40 INJECTION, POWDER, LYOPHILIZED, FOR SOLUTION INTRAVENOUS at 08:44

## 2019-08-14 RX ADMIN — ACETAMINOPHEN 650 MG: 325 TABLET, FILM COATED ORAL at 15:47

## 2019-08-14 RX ADMIN — SENNOSIDES AND DOCUSATE SODIUM 1 TABLET: 8.6; 5 TABLET ORAL at 08:41

## 2019-08-14 RX ADMIN — SODIUM CHLORIDE, POTASSIUM CHLORIDE, SODIUM LACTATE AND CALCIUM CHLORIDE: 600; 310; 30; 20 INJECTION, SOLUTION INTRAVENOUS at 00:43

## 2019-08-14 RX ADMIN — SENNOSIDES AND DOCUSATE SODIUM 2 TABLET: 8.6; 5 TABLET ORAL at 19:16

## 2019-08-14 RX ADMIN — DIAZEPAM 5 MG: 5 TABLET ORAL at 04:52

## 2019-08-14 RX ADMIN — POLYETHYLENE GLYCOL 3350 17 G: 17 POWDER, FOR SOLUTION ORAL at 11:21

## 2019-08-14 RX ADMIN — SODIUM PHOSPHATE, MONOBASIC, MONOHYDRATE AND SODIUM PHOSPHATE, DIBASIC, ANHYDROUS 15 MMOL: 276; 142 INJECTION, SOLUTION INTRAVENOUS at 11:35

## 2019-08-14 RX ADMIN — FOLIC ACID 1 MG: 1 TABLET ORAL at 08:42

## 2019-08-14 RX ADMIN — METOPROLOL TARTRATE 25 MG: 25 TABLET, FILM COATED ORAL at 19:15

## 2019-08-14 RX ADMIN — MAGNESIUM SULFATE IN WATER 4 G: 40 INJECTION, SOLUTION INTRAVENOUS at 09:06

## 2019-08-14 RX ADMIN — BACITRACIN: 500 OINTMENT TOPICAL at 19:20

## 2019-08-14 RX ADMIN — ACETAMINOPHEN 650 MG: 325 TABLET, FILM COATED ORAL at 19:15

## 2019-08-14 ASSESSMENT — ACTIVITIES OF DAILY LIVING (ADL)
ADLS_ACUITY_SCORE: 10
ADLS_ACUITY_SCORE: 11
ADLS_ACUITY_SCORE: 10
ADLS_ACUITY_SCORE: 10

## 2019-08-14 NOTE — CONSULTS
Psychiatry consult:  Patient seen; full note to follow.    Dx: Major Depressive Disorder - recurrent, moderate  Generalized Anxiety Disorder  Alcohol use disorder, severe    Recommendations:   1. Continue MSSA protocol with valium to minimize withdrawal symptoms. He was educated regrding additional treatment options including medications that could help optimize his recovery plan (naltrexone, Campral,  antabuse). He will consider these options if needed however is not interested in starting them at this time. A chemical health assessment is pending and he was encouraged to cooperate with the assessment. He is not seeking residential treatment however may be open to an outpatient MI/CD treatment program.     2. Restart Cymbalta at 20mg daily and Remeron at 15mg at bedtime to address mood/anxiety symptoms. After 3 days of compliance, increase Cymbalta to 30mg/daily. He will follow up with his outpatient psychiatrist for future medication management. Unit SW consult placed to assist him in gaining referrals for individual psychotherapy to augment medication treatment.     3. His acute suicide risk is currently low and he may be discharged home once medically stable.

## 2019-08-14 NOTE — PROGRESS NOTES
CD consult acknowledged. Due to limited availability, there is no time frame on CD assessment at current time.  Writer will attempt to meet with pt tomorrow (8/15) afternoon however, pt may not be seen until early next week.          Aziza Fernandes [facility]  [phone]

## 2019-08-14 NOTE — CONSULTS
Social Work: Assessment with Discharge Plan    Patient Name:  Xavier Odell  :  1985  Age:  34 year old  MRN:  [id number]  Risk/Complexity Score:  Filed Complexity Screen Score: 2  Completed assessment with:  Pt, chart review    Presenting Information   Reason for Referral:  Substance abuse concerns  Date of Intake:  2019  Referral Source:  Physician  Decision Maker:  Patient  Alternate Decision Maker:  Mother per [facility] policy  Health Care Directive:  Provided education and Declined completing  Living Situation:  House  Previous Functional Status:  Independent  Patient and family understanding of hospitalization:  Alcohol use  Cultural/Language/Spiritual Considerations:  None identified  Adjustment to Illness:  Contemplation phase of change    Physical Health  Reason for Admission:    1. Alcohol-induced acute pancreatitis, unspecified complication status    2. Uncomplicated alcohol withdrawal (H)    3. Acute alcoholic intoxication in alcoholism with complication, continuous drinking behavior (H)    4. Major depressive disorder with single episode, remission status unspecified      Services Needed/Recommended:  Other:  substance use treatment. Pt wants to follow up outpatient.    Mental Health/Chemical Dependency  Diagnosis:  Alcohol use disorder, Major Depressive Disorder, Generalized Anxiety Disorder  Support/Services in Place:  Medication management  Services Needed/Recommended:  Outpatient treatment    Support System  Significant relationship at present time:  Mother  Family of origin is available for support:  Yes  Other support available:  None identified  Gaps in support system:  None identified  Patient is caregiver to:  None     Provider Information   Primary Care Physician:  Keron Silva Jr.   [phone]   Clinic:  [address] / [place] [address]      :  N/A    Financial   Income Source:  Pt currently living off savings  Financial Concerns:  None. Pt states he  "doesn't think he'll have trouble finding a new job.  Insurance:    Payor/Plan Subscriber Name Rel Member # Group #   [employer] - [employer] MAGO MATHEWS [facility] [id number] [address]       Discharge Plan   Patient and family discharge goal:  Home with outpatient substance use support. Pt had a DEC assessment in the ED and DEC contacted [facility] to provide contact number for pt to schedule outpatient treatment. [facility] provided number ([phone]) to pt and pt stated he would call to make appointments.  Provided education on discharge plan:  YES  Patient agreeable to discharge plan:  YES  Barriers to discharge:  Medical readiness.    Discharge Recommendations   Anticipated Disposition:  Home, no needs identified  Transportation Needs:  Family:  Mom  Name of Transportation Company and Phone:  N/A    Additional comments   [facility] met with pt to discuss discharge planning. Pt appears in good spirits, pleasant and cooperative, some minimizing of substance use and suicidal statements made previously. Denies current SI. Denies interest in inpatient CD treatment, requests \"an addictions counselor\" at discharge.  provided pt with contact number for DEC, who can assist with appointments. No additional psychosocial concerns identified at this time,  will remain available for resources and support while pt is admitted.    KIKO Chandler, [facility]  Float   Covering 8A & 10A  P: [phone]  Pager: [phone] or [phone]      "

## 2019-08-14 NOTE — PLAN OF CARE
ED admit at 1700. Pt is alert and oriented X4. MSSA 7, 11 and 7 during this shift. Has low grade fever. Lungs- Clear bilaterally with both anterior and posterior. IS encouraged. Bowels- active in all four quadrants, LBM this shift. CMS and Neuro's are intact to baseline. Denies numbness and tingling in all extremities. Denies shortness of breath and chest pain. Given zofran for nausea X1, partially effetive. Has pain in the lower abdomen and given dilaudid, tolerating with discomfort. Voids spontaneously without difficulty in toilet. Is on a clear liquid diet and appetite was poor this shift. Abrasion to left calf, scant yellow drainage, site care done per POC, new dressing applied. Pt refused full skin check. PIV is patent in the left hand and LR infusing @150 ml/hr. Pt is able to make needs known and the call light is within the pt's reach. Continue to monitor.
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
## 2019-08-14 NOTE — PLAN OF CARE
VS:   BP (!) 166/95 (BP Location: Left arm)   Pulse 100   Temp 98.4  F (36.9  C) (Oral)   Resp 16   Wt 95.9 kg (211 lb 6.4 oz)   SpO2 96%  .Telemonitor showing sinus start of shift,now NSR with HR on the 75-95 range.MSSA scored 11,was given 5mg valium.Rescored for 5.   Output:   Voiding spontaneously.Passing gas,last BM last night.+ smear blood on wiping after BM per pt.No active bleeding overnight.   Activity:   Up SBA1 to BR.   Skin: L calf drsg,CDI.   Pain:   Lower abdominal pain managed with IV dilaudid.   Neuro/CMS:   intact   Dressing(s):   intact   Diet:   Clear liquid.Still with nausea inspite IV zofran.IV compazine given,effective.   LDA:   PIV line L hand for IVF at 125ml/hr   Equipment:   Bed alarm.Tele   Plan:   Phos replaced this shift.Recheck level this AM.Continue with POC.   Additional Info:

## 2019-08-14 NOTE — PROGRESS NOTES
Patient was seen, course reviewed with nursing staff.  Discussed with .    Patient states he feels a little better this morning.  Notes decreased nausea, would like to progress his diet.  He has mild upper abdominal pain he primarily has bilateral lower abdominal pain, worse with coughing and movements.  Has been receiving IV Dilaudid for this.  He did have one hard black stool last night, continues to feel bloated and constipated.  He remains interested in seeing psychiatry and chemical dependency assessment.      Low-grade temperature  Blood pressure 150s to 160s over 90s to 100s  Heart rate 70s to low 100s  O2 saturations upper 90s room air      Alert, fully oriented, pleasant and cooperative.  Oral mucosa moist  Neck supple  Lungs clear  CV RRR  Abdomen soft, protuberant.  Mild epigastric tenderness without rebound or guarding.  Moderate tenderness bilateral lower abdominal area, increased with coughing.  Rectal exam not performed  Neuro: Alert, fully oriented, pleasant.  Slight tremors both hands.      Results for MAGO BATEMAN (MRN [id number]) as of 8/14/2019 10:30   Ref. Range 8/14/2019 06:25   Sodium Latest Ref Range: 133 - 144 mmol/L 131 (L)   Potassium Latest Ref Range: 3.4 - 5.3 mmol/L 3.6   Chloride Latest Ref Range: 94 - 109 mmol/L 99   Carbon Dioxide Latest Ref Range: 20 - 32 mmol/L 21   Urea Nitrogen Latest Ref Range: 7 - 30 mg/dL 7   Creatinine Latest Ref Range: 0.66 - 1.25 mg/dL 0.87   GFR Estimate Latest Ref Range: >60 mL/min/1.73_m2 >90   GFR Estimate If Black Latest Ref Range: >60 mL/min/1.73_m2 >90   Calcium Latest Ref Range: 8.5 - 10.1 mg/dL 7.4 (L)   Anion Gap Latest Ref Range: 3 - 14 mmol/L 11   Magnesium Latest Ref Range: 1.6 - 2.3 mg/dL 1.4 (L)   Phosphorus Latest Ref Range: 2.5 - 4.5 mg/dL 2.0 (L)   Albumin Latest Ref Range: 3.4 - 5.0 g/dL 2.5 (L)   Protein Total Latest Ref Range: 6.8 - 8.8 g/dL 6.9   Bilirubin Total Latest Ref Range: 0.2 - 1.3 mg/dL 2.5 (H)   Alkaline  Phosphatase Latest Ref Range: 40 - 150 U/L 171 (H)   ALT Latest Ref Range: 0 - 70 U/L 100 (H)   AST Latest Ref Range: 0 - 45 U/L Unsatisfactory specimen - hemolyzed   Glucose Latest Ref Range: 70 - 99 mg/dL 144 (H)   WBC Latest Ref Range: 4.0 - 11.0 10e9/L 7.9   Hemoglobin Latest Ref Range: 13.3 - 17.7 g/dL 13.8   Hematocrit Latest Ref Range: 40.0 - 53.0 % 41.4   Platelet Count Latest Ref Range: 150 - 450 10e9/L 75 (L)   RBC Count Latest Ref Range: 4.4 - 5.9 10e12/L 4.80   MCV Latest Ref Range: 78 - 100 fl 86   MCH Latest Ref Range: 26.5 - 33.0 pg 28.8   MCHC Latest Ref Range: 31.5 - 36.5 g/dL 33.3   RDW Latest Ref Range: 10.0 - 15.0 % 13.0   Diff Method Unknown Automated Method   % Neutrophils Latest Units: % 82.3         Assessment    Alcohol dependency, with continuous use prior to admission.  Patient presented with abdominal pain nausea vomiting.  Recent history of gastric ulcer with bleeding.  Lab studies remarkable for mild transaminase elevation which is stable, as well as elevated lipase, consistent with alcoholic pancreatitis.  Nausea and upper abdominal pain improved this morning.  Main complaint of lower abdominal discomfort which seems to be muscular in etiology.  Abdominal exam is benign.  He has received fairly small amounts of diazepam since admission, appears to have mild withdrawal on exam manifested by tremors, anxiety and elevated blood pressure     Acute alcoholic pancreatitis, clinically stable    Elevated transaminases, consistent with alcoholic hepatitis, mild    Recent hospitalization for bleeding gastric ulcer in the setting of alcohol use.  GI status appears stable in this regard.    Patient report of hard black stool, consistent with constipation, doubt ongoing GI bleed clinically with stable hemoglobin    Thrombocytopenia, likely secondary to alcohol use    Chronic depression and anxiety.  Patient has been started back on Remeron, psychiatry will be seeing him later today as  well.    Plan  Continue [facility] protocol.  Metoprolol for hypertension for at least the short-term  Oral twice daily PPI therapy  Bowel regimen  Monitor liver function tests  Mobilize  Tylenol for lower abdominal discomfort skeletal  Pneumatic cuff device for DVT prophylaxis.  Psychiatry and CD consultation  Anticipate 1 to 3-day long stay in the hospital, based on clinical status and disposition.

## 2019-08-14 NOTE — CONSULTS
[facility], [place]  Psychiatry Consultation      Patient name: Xavier Odell   MRN: [id number]    Age: 34 year old    YOB: 1985    Identifying information:   The patient is a 34 year old  male who is currently admitted to the medical unit on 10 A.    Reason for consult: Mental health evaluation to assess mood symptoms, comanage alcohol withdrawal symptoms, and further evaluate reports of suicidal ideation.    History of present illness:   The patient is a history of major depressive disorder, generalized anxiety disorder, and alcohol use disorder. The patient presented to the emergency room with family seeking detox from alcohol.  He was transferred to the medical unit after noting elevated lipase and LFTs prompting further evaluation of possible alcoholic pancreatitis.  He was also noted to have a history of gastric ulcers and hematemesis in the past.  Psychiatry consultation was requested to assist in managing his mood symptoms while noting that he had also endorsed passive suicidal ideation and feelings of helplessness on initial examinations.  On examination today, the patient explains that he was residing in [place] however was experiencing various psychosocial stressors in the context of baseline mood and anxiety symptoms which were further complicated by frequent alcohol usage.  He was also going through a relational discord with his girlfriend at the time.  He was working in the aviation field and hesitated to seek mental health treatment until symptom severity have intensified.  His parents noted that the patient was isolating to himself and not caring for himself.  They assisted in transporting him to [place] in hopes of regaining stability and sobriety.  Shortly after his arrival, he was able to maintain approximately 2 months of sobriety after detoxing himself at home.  Approximately 1 month ago, he relapsed on alcohol and stopped taking his  psychotropic medications intended to manage his mood and anxiety symptoms.  As his mental health symptoms worsen, his usage of alcohol increased in hopes of alleviating the intensity he was experiencing.  He began to experience more prominent withdrawal symptoms from alcohol further adding to a sense of helplessness and hopelessness and eventually giving rise to passive suicidal thoughts which she admitted to in the emergency room.  He confidently states that he has never harbored any actual plan or intent for suicide and currently denies any suicidal ideation.  He admits that when he is compliant with his medications and abstains from alcohol, he is able to maintain mood stability and adequate functionality.  Today, his treatment goals are to safely detox from alcohol, resume psychotropic medications, acquire resources to help maintain sobriety in the community, and exploreproviders for individual psychotherapy.    Psychiatric Review of Systems:    -- Depressive episode: Mood is been depressed, characterized as moderate, accompanied with low energy, low appetite, anhedonia, moments where he feels helpless and hopeless although he does not feel this way today.  He has experienced fleeting suicidal thoughts with no active plan or intent.  He denied any active suicidal ideation today.  No homicidal ideation reported.  -- Marjan:  denies symptoms  -- Psychosis:  denies symptoms  -- Anxiety: He identified himself as a worrier, generalized, present as far back as he can recall, with rare escalation to mild panic without agoraphobia.  -- PTSD: denies symptoms  -- OCD: denies  symptoms  -- Eating disorder: denies symptoms    Psychiatric History:    No prior inpatient hospitalizations reported.  No prior suicide attempts.  No history of involuntary civil commitments.  Over the past year, he has been pursuing treatment of mood and anxiety symptoms, having tried various antidepressant medications until finding that a  combination of Cymbalta and Remeron provided him with the most benefit.  His outpatient care is managed by his psychiatrist who has recommended that he see a therapist however he has hesitated to accept the referral.  He is now interested in pursuing psychotherapy.    Substance Use History:    Drug of choice is alcohol with pattern of usage corresponding to dependence further reporting progressive use, loss of control over usage, withdrawal symptoms, and medical complications related to his use.  He identified functional, occupational, and relational complications related to his use.  No history of withdrawal seizures or DTs.  No history of admission to detox or treatment.  No reported illicit substance usage.    Medical History: History of gastric ulcers thought to be secondary to heavy alcohol usage.      Current Facility-Administered Medications:      acetaminophen (TYLENOL) tablet 650 mg, 650 mg, Oral, Q4H PRN, Alex Rojas MD, 650 mg at 19 1547     albuterol (PROAIR HFA/PROVENTIL HFA/VENTOLIN HFA) 108 (90 Base) MCG/ACT inhaler 2 puff, 2 puff, Inhalation, Q6H PRN, Mary Ellen Morel PA-C     bacitracin ointment, , Topical, BID, Mary Ellen Morel PA-C     bisacodyl (DULCOLAX) Suppository 10 mg, 10 mg, Rectal, Daily PRN, Mary Ellen Morel PA-C     diazepam (VALIUM) tablet 5-20 mg, 5-20 mg, Oral, Q30 Min PRN, Mary Ellen Morel PA-C, 5 mg at 19 1547     DULoxetine (CYMBALTA) DR capsule 20 mg, 20 mg, Oral, Daily, Jimbo Jin MD, 20 mg at 19 1134     folic acid (FOLVITE) tablet 1 mg, 1 mg, Oral, Daily, Mary Ellen Morel PA-C, 1 mg at 19 0842     [] lactated ringers infusion, , Intravenous, Continuous, Last Rate: 150 mL/hr at 19 1834 **FOLLOWED BY** lactated ringers infusion, , Intravenous, Continuous, Jimbo Jin MD, Last Rate: 75 mL/hr at 19 0850     lidocaine (LMX4) cream, , Topical, Q1H PRN, Mary Ellen Morel PA-C     lidocaine 1 %  0.1-1 mL, 0.1-1 mL, Other, Q1H PRN, Mary Ellen Morel PA-C     magnesium sulfate 4 g in 100 mL sterile water (premade), 4 g, Intravenous, Q4H PRN, Mary Ellen Morel PA-C, Last Rate: 50 mL/hr at 08/14/19 0906, 4 g at 08/14/19 0906     melatonin tablet 1 mg, 1 mg, Oral, At Bedtime PRN, Mary Ellen Morel PA-C     metoprolol tartrate (LOPRESSOR) tablet 25 mg, 25 mg, Oral, BID, Jimbo Jin MD, 25 mg at 08/14/19 1121     mirtazapine (REMERON) tablet 15 mg, 15 mg, Oral, At Bedtime, Mary Ellen Morel PA-C, 15 mg at 08/13/19 2101     multivitamin w/minerals (THERA-VIT-M) tablet 1 tablet, 1 tablet, Oral, Daily, Mary Ellen Morel PA-C, 1 tablet at 08/14/19 0842     naloxone (NARCAN) injection 0.1-0.4 mg, 0.1-0.4 mg, Intravenous, Q2 Min PRN, Mary Ellen Morel PA-C     omeprazole (priLOSEC) CR capsule 40 mg, 40 mg, Oral, BID AC, Jimbo Jin MD, 40 mg at 08/14/19 1547     ondansetron (ZOFRAN-ODT) ODT tab 4 mg, 4 mg, Oral, Q6H PRN, 4 mg at 08/13/19 1956 **OR** ondansetron (ZOFRAN) injection 4 mg, 4 mg, Intravenous, Q6H PRN, Mary Ellen Morel PA-C, 4 mg at 08/14/19 0016     polyethylene glycol (MIRALAX/GLYCOLAX) Packet 17 g, 17 g, Oral, BID, Jimbo Jin MD, 17 g at 08/14/19 1121     polyethylene glycol (MIRALAX/GLYCOLAX) Packet 17 g, 17 g, Oral, Daily PRN, Mary Ellen Morel PA-C, 17 g at 08/13/19 1940     prochlorperazine (COMPAZINE) injection 10 mg, 10 mg, Intravenous, Q6H PRN, 10 mg at 08/14/19 0057 **OR** prochlorperazine (COMPAZINE) tablet 10 mg, 10 mg, Oral, Q6H PRN **OR** prochlorperazine (COMPAZINE) Suppository 25 mg, 25 mg, Rectal, Q12H PRN, Mary Ellen Morel PA-C     senna-docusate (SENOKOT-S/PERICOLACE) 8.6-50 MG per tablet 1 tablet, 1 tablet, Oral, BID, 1 tablet at 08/14/19 0841 **OR** senna-docusate (SENOKOT-S/PERICOLACE) 8.6-50 MG per tablet 2 tablet, 2 tablet, Oral, BID, Mary Ellen Morel PA-C     sennosides (SENOKOT) tablet 8.6 mg, 8.6 mg, Oral, BID PRN, Jimbo Jin,  MD     sodium chloride (PF) 0.9% PF flush 3 mL, 3 mL, Intracatheter, q1 min prn, Mary Ellen Morel PA-C     sodium chloride (PF) 0.9% PF flush 3 mL, 3 mL, Intracatheter, Q8H, Mary Ellen Morel PA-C, 3 mL at 08/14/19 0849     sodium phosphate 15 mmol in D5W intermittent infusion, 15 mmol, Intravenous, Daily PRN, Mary Ellen Morel PA-CHANELL, Last Rate: 62.5 mL/hr at 08/14/19 1135, 15 mmol at 08/14/19 1135     sodium phosphate 20 mmol in D5W intermittent infusion, 20 mmol, Intravenous, Q6H PRN, Mary Ellen Morel PA-C     sodium phosphate 25 mmol in D5W intermittent infusion, 25 mmol, Intravenous, Q8H PRN, Mary Ellen Morel PA-C     vitamin B1 (THIAMINE) tablet 100 mg, 100 mg, Oral, Daily, Mary Ellen Morel PA-CHANELL, 100 mg at 08/14/19 0842     Social History:  Refer to the psychosocial assessment completed by the .       Family History:    He suspects that depression and anxiety run in the family.No knowledge of bipolar disorder or suicides in the family.      Vital Signs:    B/P: 155/88, T: 100.2, P: 94, R: 18  There is no height or weight on file to calculate BMI.       Mental status examination:  Appearance:  Alert, fair hygiene, no acute distress  Attitude:  Attempts to be cooperative  Eye Contact: Fair  Mood: Mild to moderately depressed  Affect: Mood congruent and blunted  Speech:  Normal rates, tone, latency, volume. Not pushed or pressured.  Psychomotor Behavior:  No psychomotor abnormalities noted  Thought Process: Linear and logical; not tangential or circumstantial or disorganized  Associations:  Logical; no loose associations Noted  Thought Content:  No obvious paranoia, delusions, ideas of reference, or grandiosity noted. Denies auditory or visual hallucinations. Denies suicidal Ideations. Denies homicidal ideations.  Insight:  Fair  Judgment:  Fair  Oriented to:  time, person, and place  Attention Span and Concentration:  Intact  Recent and Remote Memory: Intact based on interviewing and details  provided  Language: Appropriate based on interviewing  Fund of Knowledge: Appropriate based on interviewing  Muscle Strength and Tone: Normal upon visual inspection  Gait and Station: Normal upon visual inspection            Diagnoses:    Major Depressive Disorder - recurrent, moderate  Generalized Anxiety Disorder  Alcohol use disorder, severe     Recommendations:  1. Continue MSSA protocol with valium to minimize withdrawal symptoms. He was educated regrding additional treatment options including medications that could help optimize his recovery plan (naltrexone, Campral,  antabuse). He will consider these options if needed however is not interested in starting them at this time. A chemical health assessment is pending and he was encouraged to cooperate with the assessment. He is not seeking residential treatment however may be open to an outpatient MI/CD treatment program.      2. Restart Cymbalta at 20mg daily and Remeron at 15mg at bedtime to address mood/anxiety symptoms. After 3 days of compliance, increase Cymbalta to 30mg/daily. He will follow up with his outpatient psychiatrist for future medication management.  Social work consultation placed to assist him in gaining referrals for individual psychotherapy to augment medication treatment.      3. His acute suicide risk is currently low and he may be discharged home once medically stable.     Please reconsult with psychiatry as needed.

## 2019-08-14 NOTE — PLAN OF CARE
VS:   BP (!) 160/99   Pulse 96   Temp 100.5  F (38.1  C) (Oral)   Resp 17   Wt 95.9 kg (211 lb 6.4 oz)   SpO2 97%   Hypertensive and tachycardic- Metoprolol initiated  Low grade fever-Tylenol given  LS Clear. Denies chest pain and SOB.    Output:   Voiding without difficulty in bathroom. Passing gas. LBM 8/13   Activity:   Pt ambulating independently to bathroom, steady on feet.       Skin: Intact ex for abrasion/burn on LLE.    Pain:   Pt received one dose of IV dilaudid for pain this morning, the order has since been discontinued.    Neuro/CMS:   Intact. Denies n/t. A&Ox4   Dressing(s):   New dressing applied, Polymem with 4x4 in place.    Diet:   Regular diet, tolerating well.    LDA:   PIV infusing.    Equipment:      Plan:   Continue to monitor. Pt is able to make needs known, call light is within reach.    Additional Info:   Replaced MG (rechecked after 2 hours was 2.6) and phos, rechecks for next am.   MSSA scores  0830-14- 5 mg Valium given  1130- 12 5 mg Valium given

## 2019-08-15 LAB
ALBUMIN SERPL-MCNC: 2.4 G/DL (ref 3.4–5)
ALP SERPL-CCNC: 173 U/L (ref 40–150)
ALT SERPL W P-5'-P-CCNC: 81 U/L (ref 0–70)
ANION GAP SERPL CALCULATED.3IONS-SCNC: 7 MMOL/L (ref 3–14)
AST SERPL W P-5'-P-CCNC: 151 U/L (ref 0–45)
BILIRUB SERPL-MCNC: 2.5 MG/DL (ref 0.2–1.3)
BUN SERPL-MCNC: 2 MG/DL (ref 7–30)
CALCIUM SERPL-MCNC: 8.3 MG/DL (ref 8.5–10.1)
CHLORIDE SERPL-SCNC: 102 MMOL/L (ref 94–109)
CO2 SERPL-SCNC: 26 MMOL/L (ref 20–32)
CREAT SERPL-MCNC: 0.8 MG/DL (ref 0.66–1.25)
ERYTHROCYTE [DISTWIDTH] IN BLOOD BY AUTOMATED COUNT: 13.2 % (ref 10–15)
GFR SERPL CREATININE-BSD FRML MDRD: >90 ML/MIN/{1.73_M2}
GLUCOSE SERPL-MCNC: 135 MG/DL (ref 70–99)
HCT VFR BLD AUTO: 39 % (ref 40–53)
HGB BLD-MCNC: 12.9 G/DL (ref 13.3–17.7)
MAGNESIUM SERPL-MCNC: 2 MG/DL (ref 1.6–2.3)
MCH RBC QN AUTO: 28.6 PG (ref 26.5–33)
MCHC RBC AUTO-ENTMCNC: 33.1 G/DL (ref 31.5–36.5)
MCV RBC AUTO: 87 FL (ref 78–100)
PHOSPHATE SERPL-MCNC: 1.2 MG/DL (ref 2.5–4.5)
PLATELET # BLD AUTO: 66 10E9/L (ref 150–450)
POTASSIUM SERPL-SCNC: 3.3 MMOL/L (ref 3.4–5.3)
PROT SERPL-MCNC: 6.9 G/DL (ref 6.8–8.8)
RBC # BLD AUTO: 4.51 10E12/L (ref 4.4–5.9)
SODIUM SERPL-SCNC: 135 MMOL/L (ref 133–144)
WBC # BLD AUTO: 11.1 10E9/L (ref 4–11)

## 2019-08-15 PROCEDURE — 99232 SBSQ HOSP IP/OBS MODERATE 35: CPT | Performed by: INTERNAL MEDICINE

## 2019-08-15 PROCEDURE — 25000125 ZZHC RX 250: Performed by: PHYSICIAN ASSISTANT

## 2019-08-15 PROCEDURE — 25000132 ZZH RX MED GY IP 250 OP 250 PS 637: Performed by: INTERNAL MEDICINE

## 2019-08-15 PROCEDURE — 25000132 ZZH RX MED GY IP 250 OP 250 PS 637: Performed by: PHYSICIAN ASSISTANT

## 2019-08-15 PROCEDURE — 83735 ASSAY OF MAGNESIUM: CPT | Performed by: INTERNAL MEDICINE

## 2019-08-15 PROCEDURE — 84100 ASSAY OF PHOSPHORUS: CPT | Performed by: INTERNAL MEDICINE

## 2019-08-15 PROCEDURE — 25800030 ZZH RX IP 258 OP 636: Performed by: PHYSICIAN ASSISTANT

## 2019-08-15 PROCEDURE — 25000131 ZZH RX MED GY IP 250 OP 636 PS 637: Performed by: PHYSICIAN ASSISTANT

## 2019-08-15 PROCEDURE — 36415 COLL VENOUS BLD VENIPUNCTURE: CPT | Performed by: INTERNAL MEDICINE

## 2019-08-15 PROCEDURE — 80053 COMPREHEN METABOLIC PANEL: CPT | Performed by: INTERNAL MEDICINE

## 2019-08-15 PROCEDURE — 25800030 ZZH RX IP 258 OP 636: Performed by: INTERNAL MEDICINE

## 2019-08-15 PROCEDURE — 85027 COMPLETE CBC AUTOMATED: CPT | Performed by: INTERNAL MEDICINE

## 2019-08-15 PROCEDURE — 12000001 ZZH R&B MED SURG/OB UMMC

## 2019-08-15 RX ORDER — POTASSIUM CHLORIDE 750 MG/1
40 TABLET, EXTENDED RELEASE ORAL ONCE
Status: COMPLETED | OUTPATIENT
Start: 2019-08-15 | End: 2019-08-15

## 2019-08-15 RX ORDER — POLYETHYLENE GLYCOL 3350 17 G/17G
17 POWDER, FOR SOLUTION ORAL 2 TIMES DAILY
Status: DISCONTINUED | OUTPATIENT
Start: 2019-08-15 | End: 2019-08-15

## 2019-08-15 RX ORDER — BISACODYL 10 MG
10 SUPPOSITORY, RECTAL RECTAL DAILY PRN
Status: DISCONTINUED | OUTPATIENT
Start: 2019-08-15 | End: 2019-08-15

## 2019-08-15 RX ADMIN — Medication 10 MG: at 08:34

## 2019-08-15 RX ADMIN — DIAZEPAM 5 MG: 5 TABLET ORAL at 08:34

## 2019-08-15 RX ADMIN — BACITRACIN: 500 OINTMENT TOPICAL at 08:25

## 2019-08-15 RX ADMIN — DIAZEPAM 5 MG: 5 TABLET ORAL at 16:08

## 2019-08-15 RX ADMIN — ONDANSETRON 4 MG: 4 TABLET, ORALLY DISINTEGRATING ORAL at 02:06

## 2019-08-15 RX ADMIN — THIAMINE HCL TAB 100 MG 100 MG: 100 TAB at 08:23

## 2019-08-15 RX ADMIN — SENNOSIDES AND DOCUSATE SODIUM 2 TABLET: 8.6; 5 TABLET ORAL at 20:32

## 2019-08-15 RX ADMIN — DIAZEPAM 5 MG: 5 TABLET ORAL at 13:35

## 2019-08-15 RX ADMIN — POTASSIUM CHLORIDE 40 MEQ: 10 TABLET, EXTENDED RELEASE ORAL at 08:34

## 2019-08-15 RX ADMIN — METOPROLOL TARTRATE 25 MG: 25 TABLET, FILM COATED ORAL at 08:23

## 2019-08-15 RX ADMIN — OMEPRAZOLE 40 MG: 20 CAPSULE, DELAYED RELEASE ORAL at 08:23

## 2019-08-15 RX ADMIN — SODIUM PHOSPHATE, MONOBASIC, MONOHYDRATE AND SODIUM PHOSPHATE, DIBASIC, ANHYDROUS 20 MMOL: 276; 142 INJECTION, SOLUTION INTRAVENOUS at 08:27

## 2019-08-15 RX ADMIN — FOLIC ACID 1 MG: 1 TABLET ORAL at 08:23

## 2019-08-15 RX ADMIN — DULOXETINE HYDROCHLORIDE 20 MG: 20 CAPSULE, DELAYED RELEASE ORAL at 08:23

## 2019-08-15 RX ADMIN — MULTIPLE VITAMINS W/ MINERALS TAB 1 TABLET: TAB at 08:23

## 2019-08-15 RX ADMIN — MIRTAZAPINE 15 MG: 15 TABLET, FILM COATED ORAL at 20:32

## 2019-08-15 RX ADMIN — DIAZEPAM 5 MG: 5 TABLET ORAL at 20:32

## 2019-08-15 RX ADMIN — METOPROLOL TARTRATE 25 MG: 25 TABLET, FILM COATED ORAL at 20:32

## 2019-08-15 RX ADMIN — ACETAMINOPHEN 650 MG: 325 TABLET, FILM COATED ORAL at 13:35

## 2019-08-15 RX ADMIN — SENNOSIDES AND DOCUSATE SODIUM 2 TABLET: 8.6; 5 TABLET ORAL at 08:22

## 2019-08-15 RX ADMIN — SODIUM CHLORIDE, POTASSIUM CHLORIDE, SODIUM LACTATE AND CALCIUM CHLORIDE: 600; 310; 30; 20 INJECTION, SOLUTION INTRAVENOUS at 04:45

## 2019-08-15 RX ADMIN — BACITRACIN: 500 OINTMENT TOPICAL at 20:33

## 2019-08-15 RX ADMIN — POLYETHYLENE GLYCOL 3350 17 G: 17 POWDER, FOR SOLUTION ORAL at 20:32

## 2019-08-15 RX ADMIN — POLYETHYLENE GLYCOL 3350 17 G: 17 POWDER, FOR SOLUTION ORAL at 08:25

## 2019-08-15 RX ADMIN — ACETAMINOPHEN 650 MG: 325 TABLET, FILM COATED ORAL at 22:55

## 2019-08-15 RX ADMIN — OMEPRAZOLE 40 MG: 20 CAPSULE, DELAYED RELEASE ORAL at 16:08

## 2019-08-15 RX ADMIN — SODIUM CHLORIDE, POTASSIUM CHLORIDE, SODIUM LACTATE AND CALCIUM CHLORIDE: 600; 310; 30; 20 INJECTION, SOLUTION INTRAVENOUS at 22:55

## 2019-08-15 RX ADMIN — DIAZEPAM 5 MG: 5 TABLET ORAL at 02:06

## 2019-08-15 ASSESSMENT — ACTIVITIES OF DAILY LIVING (ADL)
ADLS_ACUITY_SCORE: 10

## 2019-08-15 NOTE — CONSULTS
Writer spoke with [facility]. Pt is following up with outpatient treatment on his own, CD consult no longer needed.    Aziza Fernandes, [facility]  [phone]

## 2019-08-15 NOTE — PROGRESS NOTES
Pt was seen, case reviewed with team.    Pt notes continued abd distension, nausea, decreased appetite. No flatus, no BM  No cough or SOB.  He notes left lower abdominal and left side pain, worsened with coughing.    He is used approximately 30 mg of diazepam over the last 24 hours.    He remains interested in pursuing outpatient chemical dependency and counseling.  He was seen by psychiatry yesterday who recommended the resumption of Cymbalta in addition to Remeron.    Low-grade temperature  Blood pressure 150s to 160s over 90s  Heart rate 80s to 100s  O2 sats upper 90s room air    Alert, fully oriented, no distress.  Oral mucosa moist  Lungs clear  CV RRR  Abdomen soft, protuberant, mild left lower abdominal tenderness without rebound or guarding.  Faint bowel sounds.      Results for MAGO BATEMAN (MRN [id number]) as of 8/15/2019 11:44   Ref. Range 8/15/2019 06:29   Sodium Latest Ref Range: 133 - 144 mmol/L 135   Potassium Latest Ref Range: 3.4 - 5.3 mmol/L 3.3 (L)   Chloride Latest Ref Range: 94 - 109 mmol/L 102   Carbon Dioxide Latest Ref Range: 20 - 32 mmol/L 26   Urea Nitrogen Latest Ref Range: 7 - 30 mg/dL 2 (L)   Creatinine Latest Ref Range: 0.66 - 1.25 mg/dL 0.80   GFR Estimate Latest Ref Range: >60 mL/min/1.73_m2 >90   GFR Estimate If Black Latest Ref Range: >60 mL/min/1.73_m2 >90   Calcium Latest Ref Range: 8.5 - 10.1 mg/dL 8.3 (L)   Anion Gap Latest Ref Range: 3 - 14 mmol/L 7   Magnesium Latest Ref Range: 1.6 - 2.3 mg/dL 2.0   Phosphorus Latest Ref Range: 2.5 - 4.5 mg/dL 1.2 (L)   Albumin Latest Ref Range: 3.4 - 5.0 g/dL 2.4 (L)   Protein Total Latest Ref Range: 6.8 - 8.8 g/dL 6.9   Bilirubin Total Latest Ref Range: 0.2 - 1.3 mg/dL 2.5 (H)   Alkaline Phosphatase Latest Ref Range: 40 - 150 U/L 173 (H)   ALT Latest Ref Range: 0 - 70 U/L 81 (H)   AST Latest Ref Range: 0 - 45 U/L 151 (H)   Glucose Latest Ref Range: 70 - 99 mg/dL 135 (H)   WBC Latest Ref Range: 4.0 - 11.0 10e9/L 11.1 (H)   Hemoglobin  Latest Ref Range: 13.3 - 17.7 g/dL 12.9 (L)   Hematocrit Latest Ref Range: 40.0 - 53.0 % 39.0 (L)   Platelet Count Latest Ref Range: 150 - 450 10e9/L 66 (L)   RBC Count Latest Ref Range: 4.4 - 5.9 10e12/L 4.51   MCV Latest Ref Range: 78 - 100 fl 87   MCH Latest Ref Range: 26.5 - 33.0 pg 28.6   MCHC Latest Ref Range: 31.5 - 36.5 g/dL 33.1   RDW Latest Ref Range: 10.0 - 15.0 % 13.2     Assessment    Alcohol dependency, with continuous use prior to admission.  Patient presented with abdominal pain nausea vomiting.  Recent history of gastric ulcer with bleeding.  Lab studies remarkable for mild transaminase elevation, consistent with alcoholic hepatitis,  elevated lipase, consistent with alcoholic pancreatitis.  Patient continues to have nausea, abdominal distention, likely secondary to alcoholic gastritis versus pancreatitis.  The etiology of his lower abdominal pain is not clear, seems clinically most likely to be muscular.  It may also be secondary to constipation, less likely a  etiology    Patient has received moderate amounts of diazepam since admission, appears to have mild withdrawal on exam manifested by tremors, anxiety and elevated blood pressure    Elevated transaminases, consistent with alcoholic hepatitis, mild, stable     Recent hospitalization for bleeding gastric ulcer in the setting of alcohol use.  Hemoglobin decreased from 16-12.9 since admission, likely based on dilution.    Constipation, may be contributing to patient's abdominal complaints    Hypertension, no formal history of such, likely related to withdrawal.  Metoprolol has been started.    Thrombocytopenia, likely secondary to alcohol use     Chronic depression and anxiety.  Patient has been started back on Remeron and Cymbalta.  Will need outpatient follow-up for depression as well as chemical dependency concerns.    Plan  Bowel regimen  Monitor abdominal exam  Continue PPI  Urinalysis  If persistent or worsening abdominal pain, will  obtain abdominal flatplate consider further radiographic evaluation..  Mobilize  Continue MSSA protocol  Continue metoprolol for hypertension.  Repeat platelet count  Discharge in 2 to 3 days based on clinical status.

## 2019-08-15 NOTE — PLAN OF CARE
VS:   BP (!) 163/100 (BP Location: Left arm)   Pulse 83   Temp 99.3  F (37.4  C) (Oral)   Resp 17   Wt 95.9 kg (211 lb 6.4 oz)   SpO2 98%      Output:   Using bathroom, loose BM 8/15   Activity:   Up independently in room   Skin: Burn on LLE   Pain:   Complains of abdominal pain    Neuro/CMS:   A&Ox4, CMS intact   Dressing(s):   LLE CDI   Diet:   Regular- intermittent nausea    LDA:   PIV infusing   Plan:   Continue with plan of care   Additional Info:   MSSA score 9- 5 mg valium given

## 2019-08-15 NOTE — PLAN OF CARE
VS:    BP (!) 156/97 (BP Location: Left arm)   Pulse 83   Temp 99.3  F (37.4  C) (Oral)   Resp 16   Wt 95.9 kg (211 lb 6.4 oz)   SpO2 96%   Hypertensive and tachycardic- Metoprolol initiated yesterday  LS Clear. Denies chest pain and SOB.    Output:    Voiding without difficulty in bathroom. Passing gas. LBM 8/15 (small)x2   Activity:    Pt ambulating independently to bathroom, steady on feet.       Skin: Intact ex for abrasion/burn on LLE.    Pain:    Pain in abdomen tolerable with tylenol PRN.    Neuro/CMS:    Intact. Denies n/t. A&Ox4   Dressing(s):    New dressing applied, Polymem with 4x4 in place.    Diet:    Regular diet, tolerating well.    LDA:    PIV infusing.    Equipment:        Plan:    Continue to monitor. Pt is able to make needs known, call light is within reach.    Additional Info:    Replaced phos, recheck for next am.   MSSA scores  0830: 15 - 5 mg Valium given  12:30: 14- 5 mg Valium given

## 2019-08-16 LAB
ALBUMIN UR-MCNC: NEGATIVE MG/DL
ANION GAP SERPL CALCULATED.3IONS-SCNC: 6 MMOL/L (ref 3–14)
APPEARANCE UR: CLEAR
BILIRUB UR QL STRIP: NEGATIVE
BUN SERPL-MCNC: 2 MG/DL (ref 7–30)
CALCIUM SERPL-MCNC: 8.5 MG/DL (ref 8.5–10.1)
CHLORIDE SERPL-SCNC: 105 MMOL/L (ref 94–109)
CO2 SERPL-SCNC: 28 MMOL/L (ref 20–32)
COLOR UR AUTO: YELLOW
CREAT SERPL-MCNC: 0.95 MG/DL (ref 0.66–1.25)
GFR SERPL CREATININE-BSD FRML MDRD: >90 ML/MIN/{1.73_M2}
GLUCOSE SERPL-MCNC: 112 MG/DL (ref 70–99)
GLUCOSE UR STRIP-MCNC: NEGATIVE MG/DL
HGB UR QL STRIP: NEGATIVE
KETONES UR STRIP-MCNC: NEGATIVE MG/DL
LEUKOCYTE ESTERASE UR QL STRIP: NEGATIVE
NITRATE UR QL: NEGATIVE
PH UR STRIP: 7.5 PH (ref 5–7)
PHOSPHATE SERPL-MCNC: 2.5 MG/DL (ref 2.5–4.5)
PLATELET # BLD AUTO: 78 10E9/L (ref 150–450)
POTASSIUM SERPL-SCNC: 3.7 MMOL/L (ref 3.4–5.3)
SODIUM SERPL-SCNC: 139 MMOL/L (ref 133–144)
SOURCE: ABNORMAL
SP GR UR STRIP: 1 (ref 1–1.03)
UROBILINOGEN UR STRIP-MCNC: NORMAL MG/DL (ref 0–2)

## 2019-08-16 PROCEDURE — 80048 BASIC METABOLIC PNL TOTAL CA: CPT | Performed by: INTERNAL MEDICINE

## 2019-08-16 PROCEDURE — 81003 URINALYSIS AUTO W/O SCOPE: CPT | Performed by: INTERNAL MEDICINE

## 2019-08-16 PROCEDURE — 84100 ASSAY OF PHOSPHORUS: CPT | Performed by: INTERNAL MEDICINE

## 2019-08-16 PROCEDURE — 12000001 ZZH R&B MED SURG/OB UMMC

## 2019-08-16 PROCEDURE — 25000132 ZZH RX MED GY IP 250 OP 250 PS 637: Performed by: INTERNAL MEDICINE

## 2019-08-16 PROCEDURE — 25000132 ZZH RX MED GY IP 250 OP 250 PS 637: Performed by: PHYSICIAN ASSISTANT

## 2019-08-16 PROCEDURE — 99232 SBSQ HOSP IP/OBS MODERATE 35: CPT | Performed by: INTERNAL MEDICINE

## 2019-08-16 PROCEDURE — 36415 COLL VENOUS BLD VENIPUNCTURE: CPT | Performed by: INTERNAL MEDICINE

## 2019-08-16 PROCEDURE — 85049 AUTOMATED PLATELET COUNT: CPT | Performed by: INTERNAL MEDICINE

## 2019-08-16 RX ORDER — POLYETHYLENE GLYCOL 3350 17 G/17G
17 POWDER, FOR SOLUTION ORAL DAILY
Status: DISCONTINUED | OUTPATIENT
Start: 2019-08-17 | End: 2019-08-19 | Stop reason: HOSPADM

## 2019-08-16 RX ADMIN — OMEPRAZOLE 40 MG: 20 CAPSULE, DELAYED RELEASE ORAL at 16:21

## 2019-08-16 RX ADMIN — POLYETHYLENE GLYCOL 3350 17 G: 17 POWDER, FOR SOLUTION ORAL at 08:01

## 2019-08-16 RX ADMIN — METOPROLOL TARTRATE 25 MG: 25 TABLET, FILM COATED ORAL at 08:00

## 2019-08-16 RX ADMIN — BACITRACIN: 500 OINTMENT TOPICAL at 20:00

## 2019-08-16 RX ADMIN — DIAZEPAM 5 MG: 5 TABLET ORAL at 08:00

## 2019-08-16 RX ADMIN — SENNOSIDES AND DOCUSATE SODIUM 2 TABLET: 8.6; 5 TABLET ORAL at 08:02

## 2019-08-16 RX ADMIN — DIAZEPAM 5 MG: 5 TABLET ORAL at 01:10

## 2019-08-16 RX ADMIN — MULTIPLE VITAMINS W/ MINERALS TAB 1 TABLET: TAB at 08:00

## 2019-08-16 RX ADMIN — FOLIC ACID 1 MG: 1 TABLET ORAL at 08:01

## 2019-08-16 RX ADMIN — THIAMINE HCL TAB 100 MG 100 MG: 100 TAB at 08:00

## 2019-08-16 RX ADMIN — METOPROLOL TARTRATE 25 MG: 25 TABLET, FILM COATED ORAL at 21:00

## 2019-08-16 RX ADMIN — SENNOSIDES AND DOCUSATE SODIUM 1 TABLET: 8.6; 5 TABLET ORAL at 21:00

## 2019-08-16 RX ADMIN — MIRTAZAPINE 15 MG: 15 TABLET, FILM COATED ORAL at 21:00

## 2019-08-16 RX ADMIN — OMEPRAZOLE 40 MG: 20 CAPSULE, DELAYED RELEASE ORAL at 10:22

## 2019-08-16 RX ADMIN — DIAZEPAM 5 MG: 5 TABLET ORAL at 12:26

## 2019-08-16 RX ADMIN — DULOXETINE HYDROCHLORIDE 20 MG: 20 CAPSULE, DELAYED RELEASE ORAL at 08:00

## 2019-08-16 ASSESSMENT — ACTIVITIES OF DAILY LIVING (ADL)
ADLS_ACUITY_SCORE: 10

## 2019-08-16 NOTE — PLAN OF CARE
VS:    BP (!) 153/93 (BP Location: Right arm)   Pulse 83   Temp 98.5  F (36.9  C)   Resp 18   Wt 95.9 kg (211 lb 6.4 oz)   SpO2 97%      Output:    Using bathroom independently   Activity:    Up independently in room   Skin: Burn on LLE   Pain:    Complains of abdominal pain    Neuro/CMS:    A&Ox4, CMS intact   Dressing(s):    LLE CDI   Diet:    Regular- intermittent nausea    LDA:    PIV infusing   Plan:    Continue with plan of care   Additional Info:    MSSA score 12- 5 mg valium given

## 2019-08-16 NOTE — PROGRESS NOTES
Pt was seen, course reviewed with team    Pt notes some improvement in lower abd pain. Nausea improved. Feels less bloated. Continues to feel shaky. + small BM and loose stools yesterday. Appetite improved.    Intermittent low grade T  BP 130s-150s/90s  HR 90s  02 sats upper 90s RA    Alert, fully oriented, appears comfortable, in good spirits  Lungs clear  CV rrr  Abd soft, sl distended, mild bilateral lower abd tenderness with palpation, appears less than yesterday  No LE edema  Sl tremors of hands    Results for MAGO BATEMAN (MRN [id number]) as of 8/16/2019 15:18   Ref. Range 8/16/2019 07:41   Sodium Latest Ref Range: 133 - 144 mmol/L 139   Potassium Latest Ref Range: 3.4 - 5.3 mmol/L 3.7   Chloride Latest Ref Range: 94 - 109 mmol/L 105   Carbon Dioxide Latest Ref Range: 20 - 32 mmol/L 28   Urea Nitrogen Latest Ref Range: 7 - 30 mg/dL 2 (L)   Creatinine Latest Ref Range: 0.66 - 1.25 mg/dL 0.95   GFR Estimate Latest Ref Range: >60 mL/min/1.73_m2 >90   GFR Estimate If Black Latest Ref Range: >60 mL/min/1.73_m2 >90   Calcium Latest Ref Range: 8.5 - 10.1 mg/dL 8.5   Anion Gap Latest Ref Range: 3 - 14 mmol/L 6   Glucose Latest Ref Range: 70 - 99 mg/dL 112 (H)   Platelet Count Latest Ref Range: 150 - 450 10e9/L 78 (L)       Assessment       Alcohol dependency, with continuous use prior to admission.  Patient presented with abdominal pain nausea vomiting.  Recent history of gastric ulcer with bleeding.  Lab studies remarkable for mild transaminase elevation, consistent with alcoholic hepatitis,  elevated lipase, consistent with alcoholic pancreatitis. Nausea, abd bloating improved. Lower abd pain may be secondary to constipation vs abd wall. Improved this am with improvement in constipation.  source unlikely given symptoms, exam, neg UA. Mild withdrawal, Pt continuing to require diazepam     Elevated transaminases, consistent with alcoholic hepatitis, mild, stable    Low grade T, likely secondary to ETOH  withdrawal, ETOH hepatitis, doubt infection.     Recent hospitalization for bleeding gastric ulcer in the setting of alcohol use.  Hemoglobin stable on PPI.    Hypertension, no formal history of such, likely related to withdrawal.  Metoprolol 25 mg bid ordered.     Thrombocytopenia, likely secondary to alcohol use, sl improved.     Chronic depression and anxiety.  Patient has been started back on Remeron and Cymbalta    Plan  Continue MSSA  Continue bowel regimen  Monitor GI status, no indication to image in view of interval improvement  Mobilize  Continue Metoprolol 25 mg bid for now  Discharge in 1-2 days to home
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
## 2019-08-16 NOTE — PLAN OF CARE
A/O x 4. VSS. Tolerating diet, no nausea. Continues to c/o lower abd discomfort, especially on the L side, declined intervention. BS +, passing flatus. Up indep in room, ambulated x 1 in [place]. Dsg to LLE shin CDI. MSSA = 10/10, medicated per protocol. Plan for discharge to home when medically stable. Continue POC.

## 2019-08-17 PROCEDURE — 12000001 ZZH R&B MED SURG/OB UMMC

## 2019-08-17 PROCEDURE — 25000132 ZZH RX MED GY IP 250 OP 250 PS 637: Performed by: INTERNAL MEDICINE

## 2019-08-17 PROCEDURE — 99232 SBSQ HOSP IP/OBS MODERATE 35: CPT | Performed by: INTERNAL MEDICINE

## 2019-08-17 PROCEDURE — 25000132 ZZH RX MED GY IP 250 OP 250 PS 637: Performed by: PHYSICIAN ASSISTANT

## 2019-08-17 RX ORDER — DIAZEPAM 5 MG
5 TABLET ORAL EVERY 6 HOURS PRN
Status: DISCONTINUED | OUTPATIENT
Start: 2019-08-17 | End: 2019-08-18

## 2019-08-17 RX ADMIN — MULTIPLE VITAMINS W/ MINERALS TAB 1 TABLET: TAB at 09:34

## 2019-08-17 RX ADMIN — SENNOSIDES AND DOCUSATE SODIUM 1 TABLET: 8.6; 5 TABLET ORAL at 09:33

## 2019-08-17 RX ADMIN — METOPROLOL TARTRATE 25 MG: 25 TABLET, FILM COATED ORAL at 09:33

## 2019-08-17 RX ADMIN — DULOXETINE HYDROCHLORIDE 20 MG: 20 CAPSULE, DELAYED RELEASE ORAL at 09:34

## 2019-08-17 RX ADMIN — BACITRACIN: 500 OINTMENT TOPICAL at 21:39

## 2019-08-17 RX ADMIN — POLYETHYLENE GLYCOL 3350 17 G: 17 POWDER, FOR SOLUTION ORAL at 09:33

## 2019-08-17 RX ADMIN — SENNOSIDES AND DOCUSATE SODIUM 1 TABLET: 8.6; 5 TABLET ORAL at 21:37

## 2019-08-17 RX ADMIN — MIRTAZAPINE 15 MG: 15 TABLET, FILM COATED ORAL at 21:37

## 2019-08-17 RX ADMIN — OMEPRAZOLE 40 MG: 20 CAPSULE, DELAYED RELEASE ORAL at 09:33

## 2019-08-17 RX ADMIN — DIAZEPAM 5 MG: 5 TABLET ORAL at 17:06

## 2019-08-17 RX ADMIN — METOPROLOL TARTRATE 25 MG: 25 TABLET, FILM COATED ORAL at 21:37

## 2019-08-17 RX ADMIN — FOLIC ACID 1 MG: 1 TABLET ORAL at 09:33

## 2019-08-17 RX ADMIN — DIAZEPAM 5 MG: 5 TABLET ORAL at 23:27

## 2019-08-17 RX ADMIN — SENNOSIDES 8.6 MG: 8.6 TABLET, FILM COATED ORAL at 09:34

## 2019-08-17 RX ADMIN — OMEPRAZOLE 40 MG: 20 CAPSULE, DELAYED RELEASE ORAL at 17:06

## 2019-08-17 RX ADMIN — THIAMINE HCL TAB 100 MG 100 MG: 100 TAB at 09:34

## 2019-08-17 ASSESSMENT — ACTIVITIES OF DAILY LIVING (ADL)
ADLS_ACUITY_SCORE: 10

## 2019-08-17 ASSESSMENT — PAIN DESCRIPTION - DESCRIPTORS: DESCRIPTORS: SORE

## 2019-08-17 NOTE — PROGRESS NOTES
Patient was seen, case reviewed with nursing staff and with patient's mother who was called by me, with patient's consent.    Patient continues to feel that he is gradually improving.  Appetite is much improved.  He still has some sense of abdominal bloating.  He continues to have pain in his lower abdomen, left worse than right, though these is improving.  He is ambulating more on the unit.  Last bowel movement was yesterday and was loose.  He did have an episode of incontinence of urine during the night.    Patient acknowledges that his mother is very concerned that he will not follow through with his intention to pursue CD treatment as an outpatient without assistance.  Patient acknowledges a history of not following through with these plans as well.  While he does not necessarily desired inpatient CD treatment, he does agree that he should have more firm plans for CD treatment in hand, before he discharges from the hospital.    Minimal diazepam use with last 48 hours    Intermittent low-grade temperature, T-max 99.4 yesterday afternoon  Blood pressure 130s to 140s over 70s to 80s  Room air sats upper 90s    Sleepy, easily alerted, fully oriented, pleasant, appropriate  Oral mucosa moist  Lungs clear  CV RRR  Abdomen soft, protuberant.  No right upper quadrant or epigastric pain.  Mild to moderate lower abdominal pain, left worse than right.  No rebound or guarding.  No tremors of hands.  No LE edema      Assessment    Alcohol dependency, with continuous use prior to admission.  Patient presented with abdominal pain nausea vomiting.  Recent history of gastric ulcer with bleeding.  Lab studies remarkable for mild transaminase elevation, consistent with alcoholic hepatitis,  elevated lipase, consistent with alcoholic pancreatitis. Nausea, abd bloating and lower abd pain improved, though Pt still with some lower abd discomfort.  Minimal diazepam use.    Elevated transaminases, consistent with alcoholic hepatitis,  mild, stable     Low grade T, likely secondary to ETOH withdrawal, ETOH hepatitis, doubt infection.     Recent hospitalization for bleeding gastric ulcer in the setting of alcohol use.  Hemoglobin stable on PPI.     Hypertension, no formal history of such, likely related to withdrawal.  Metoprolol 25 mg bid ordered.     Thrombocytopenia, likely secondary to alcohol use, sl improved.     Chronic depression and anxiety.  Patient has been started back on Remeron and Cymbalta    CD, history of frequent relapses and noncompliance with plans to pursue CD treatment.  Patient concurs with mother's concerns in this regard.  He agrees to meet with the CD counselor again to hopefully establish more firm plans for inpatient or outpatient treatment does not click    Plan  Continue supportive treatments consistent with  Continue bowel regimen.  Reduce diazepam  Anticipate discharge to home in 2 days, after CD reassessment.
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
## 2019-08-17 NOTE — PLAN OF CARE
Nurinsg    Pancreatits, ETOH  S- States feels etoh withdrawal sypmpt much bettr,  Minimal tremor, yady to eat, steady when uip , vss  States still has L at abd and down along low ad tenderness soreness,  Tender to touch, sre feeling full. Had BM yest- states feels like needs to have more BM.   tolerated early brfst well- end pt to try hot beverage, prune denise, and activity.    Bowel meds given, has not drank all the miralx yet.   Pt walked gorman x1 then slept the rest of am til awakened at 1330.      Pt going to take a shower now.   feeling sl sweaty  Plans to try lunch after.   Pt states would like to talk w CD  Or SW about getting 1 on 1 CD treatment,  States he is nervous about inpt treatment. States he did not like AA meeting he went to once.    States he knows his mother thinks he should have inpt so he wont relapse.     A- stable, minimal etoh withdrawal sypt,  Still some abd discomfort  R- enc activity,  Enc po fluids,   SW can not see til monday

## 2019-08-17 NOTE — PLAN OF CARE
VSS, Lungs CTA, Patient reports abdominal pain which is better today than in days past. BS+, Abdomen is distended. Patient would like to se a suppository again today. Patient woke up after 0600 after being incontinent of urine. Patient reported this has never happened before and he initially thought he was just sweaty. Patient also reported he had not slept for 3 days prior to last evening. Patient slept throughout night shift. Patient reminded to change positions slowly from laying to sitting to standing. Patient able to make needs known.

## 2019-08-18 LAB
ALBUMIN SERPL-MCNC: 2.3 G/DL (ref 3.4–5)
ALP SERPL-CCNC: 164 U/L (ref 40–150)
ALT SERPL W P-5'-P-CCNC: 73 U/L (ref 0–70)
ANION GAP SERPL CALCULATED.3IONS-SCNC: 6 MMOL/L (ref 3–14)
AST SERPL W P-5'-P-CCNC: 83 U/L (ref 0–45)
BILIRUB SERPL-MCNC: 0.9 MG/DL (ref 0.2–1.3)
BUN SERPL-MCNC: 8 MG/DL (ref 7–30)
CALCIUM SERPL-MCNC: 8.7 MG/DL (ref 8.5–10.1)
CHLORIDE SERPL-SCNC: 105 MMOL/L (ref 94–109)
CO2 SERPL-SCNC: 27 MMOL/L (ref 20–32)
CREAT SERPL-MCNC: 0.83 MG/DL (ref 0.66–1.25)
ERYTHROCYTE [DISTWIDTH] IN BLOOD BY AUTOMATED COUNT: 14 % (ref 10–15)
GFR SERPL CREATININE-BSD FRML MDRD: >90 ML/MIN/{1.73_M2}
GLUCOSE SERPL-MCNC: 115 MG/DL (ref 70–99)
HCT VFR BLD AUTO: 37.6 % (ref 40–53)
HGB BLD-MCNC: 11.9 G/DL (ref 13.3–17.7)
MCH RBC QN AUTO: 28.6 PG (ref 26.5–33)
MCHC RBC AUTO-ENTMCNC: 31.6 G/DL (ref 31.5–36.5)
MCV RBC AUTO: 90 FL (ref 78–100)
PLATELET # BLD AUTO: 157 10E9/L (ref 150–450)
POTASSIUM SERPL-SCNC: 4 MMOL/L (ref 3.4–5.3)
PROT SERPL-MCNC: 7 G/DL (ref 6.8–8.8)
RBC # BLD AUTO: 4.16 10E12/L (ref 4.4–5.9)
SODIUM SERPL-SCNC: 138 MMOL/L (ref 133–144)
WBC # BLD AUTO: 5.6 10E9/L (ref 4–11)

## 2019-08-18 PROCEDURE — 25000132 ZZH RX MED GY IP 250 OP 250 PS 637: Performed by: PHYSICIAN ASSISTANT

## 2019-08-18 PROCEDURE — 85027 COMPLETE CBC AUTOMATED: CPT | Performed by: INTERNAL MEDICINE

## 2019-08-18 PROCEDURE — 99232 SBSQ HOSP IP/OBS MODERATE 35: CPT | Performed by: INTERNAL MEDICINE

## 2019-08-18 PROCEDURE — 12000001 ZZH R&B MED SURG/OB UMMC

## 2019-08-18 PROCEDURE — 36415 COLL VENOUS BLD VENIPUNCTURE: CPT | Performed by: INTERNAL MEDICINE

## 2019-08-18 PROCEDURE — 80053 COMPREHEN METABOLIC PANEL: CPT | Performed by: INTERNAL MEDICINE

## 2019-08-18 PROCEDURE — 25000132 ZZH RX MED GY IP 250 OP 250 PS 637: Performed by: INTERNAL MEDICINE

## 2019-08-18 RX ORDER — GINSENG 100 MG
CAPSULE ORAL 2 TIMES DAILY
Qty: 15 G | Refills: 0 | Status: ON HOLD | OUTPATIENT
Start: 2019-08-18 | End: 2021-03-07

## 2019-08-18 RX ORDER — MIRTAZAPINE 15 MG/1
15 TABLET, FILM COATED ORAL AT BEDTIME
Qty: 30 TABLET | Refills: 1 | Status: ON HOLD | OUTPATIENT
Start: 2019-08-18 | End: 2021-03-08

## 2019-08-18 RX ORDER — MULTIPLE VITAMINS W/ MINERALS TAB 9MG-400MCG
1 TAB ORAL DAILY
Qty: 30 TABLET | Refills: 0 | Status: ON HOLD | OUTPATIENT
Start: 2019-08-19 | End: 2021-03-07

## 2019-08-18 RX ORDER — FOLIC ACID 1 MG/1
1 TABLET ORAL DAILY
Qty: 30 TABLET | Refills: 0 | Status: ON HOLD | OUTPATIENT
Start: 2019-08-19 | End: 2021-03-07

## 2019-08-18 RX ORDER — POLYETHYLENE GLYCOL 3350 17 G/17G
17 POWDER, FOR SOLUTION ORAL DAILY
Qty: 30 PACKET | Refills: 3 | Status: ON HOLD | OUTPATIENT
Start: 2019-08-19 | End: 2021-03-07

## 2019-08-18 RX ORDER — DULOXETIN HYDROCHLORIDE 30 MG/1
30 CAPSULE, DELAYED RELEASE ORAL DAILY
Qty: 30 CAPSULE | Refills: 1 | Status: ON HOLD | OUTPATIENT
Start: 2019-08-18 | End: 2021-03-08

## 2019-08-18 RX ORDER — LANOLIN ALCOHOL/MO/W.PET/CERES
100 CREAM (GRAM) TOPICAL DAILY
Qty: 30 TABLET | Refills: 1 | Status: ON HOLD | OUTPATIENT
Start: 2019-08-19 | End: 2021-03-07

## 2019-08-18 RX ADMIN — SENNOSIDES AND DOCUSATE SODIUM 1 TABLET: 8.6; 5 TABLET ORAL at 22:23

## 2019-08-18 RX ADMIN — MULTIPLE VITAMINS W/ MINERALS TAB 1 TABLET: TAB at 10:19

## 2019-08-18 RX ADMIN — FOLIC ACID 1 MG: 1 TABLET ORAL at 10:19

## 2019-08-18 RX ADMIN — THIAMINE HCL TAB 100 MG 100 MG: 100 TAB at 10:19

## 2019-08-18 RX ADMIN — BACITRACIN: 500 OINTMENT TOPICAL at 11:30

## 2019-08-18 RX ADMIN — OMEPRAZOLE 40 MG: 20 CAPSULE, DELAYED RELEASE ORAL at 16:38

## 2019-08-18 RX ADMIN — METOPROLOL TARTRATE 25 MG: 25 TABLET, FILM COATED ORAL at 10:19

## 2019-08-18 RX ADMIN — MIRTAZAPINE 15 MG: 15 TABLET, FILM COATED ORAL at 22:23

## 2019-08-18 RX ADMIN — OMEPRAZOLE 40 MG: 20 CAPSULE, DELAYED RELEASE ORAL at 10:19

## 2019-08-18 RX ADMIN — BACITRACIN: 500 OINTMENT TOPICAL at 22:24

## 2019-08-18 RX ADMIN — DULOXETINE HYDROCHLORIDE 20 MG: 20 CAPSULE, DELAYED RELEASE ORAL at 10:19

## 2019-08-18 RX ADMIN — SENNOSIDES AND DOCUSATE SODIUM 1 TABLET: 8.6; 5 TABLET ORAL at 10:19

## 2019-08-18 RX ADMIN — POLYETHYLENE GLYCOL 3350 17 G: 17 POWDER, FOR SOLUTION ORAL at 10:18

## 2019-08-18 ASSESSMENT — ACTIVITIES OF DAILY LIVING (ADL)
ADLS_ACUITY_SCORE: 10
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
## 2019-08-18 NOTE — PLAN OF CARE
Pt up independently. Abd pain improving. Passing flatus. Tolerating regular diet. MSSA discontinued. Showered this morning and dressing changed. Plan for CD reassessment tomorrow to help determine discharge plan. Pt able to make needs known.

## 2019-08-18 NOTE — PROGRESS NOTES
"Patient was seen, case reviewed with nursing staff.    Patient continues to feel improved.  Left lower abdominal pain significantly improved.  He still having \"normal\" bowel movements.  Appetite is good.  He is anxious to discharge tomorrow after meeting with  and chemical dependency counselor.    He denies cough, chest pain, shortness of breath, abdominal pain, nausea.    No diazepam use for over 24 hours.    Vital signs stable  Blood pressure 130s to 140s over 80s.  Heart rate 80s.  Afebrile    Alert, fully oriented, mildly anxious  Lungs clear  CV RRR  Abdomen soft protuberant very mild left lower abdominal tenderness without rebound or guarding.  No tremors.  Abrasion left shin covered with dressing.      Results for MAGO BATEMAN (MRN [id number]) as of 8/18/2019 11:06   Ref. Range 8/18/2019 06:23   Sodium Latest Ref Range: 133 - 144 mmol/L 138   Potassium Latest Ref Range: 3.4 - 5.3 mmol/L 4.0   Chloride Latest Ref Range: 94 - 109 mmol/L 105   Carbon Dioxide Latest Ref Range: 20 - 32 mmol/L 27   Urea Nitrogen Latest Ref Range: 7 - 30 mg/dL 8   Creatinine Latest Ref Range: 0.66 - 1.25 mg/dL 0.83   GFR Estimate Latest Ref Range: >60 mL/min/1.73_m2 >90   GFR Estimate If Black Latest Ref Range: >60 mL/min/1.73_m2 >90   Calcium Latest Ref Range: 8.5 - 10.1 mg/dL 8.7   Anion Gap Latest Ref Range: 3 - 14 mmol/L 6   Albumin Latest Ref Range: 3.4 - 5.0 g/dL 2.3 (L)   Protein Total Latest Ref Range: 6.8 - 8.8 g/dL 7.0   Bilirubin Total Latest Ref Range: 0.2 - 1.3 mg/dL 0.9   Alkaline Phosphatase Latest Ref Range: 40 - 150 U/L 164 (H)   ALT Latest Ref Range: 0 - 70 U/L 73 (H)   AST Latest Ref Range: 0 - 45 U/L 83 (H)   Glucose Latest Ref Range: 70 - 99 mg/dL 115 (H)   WBC Latest Ref Range: 4.0 - 11.0 10e9/L 5.6   Hemoglobin Latest Ref Range: 13.3 - 17.7 g/dL 11.9 (L)   Hematocrit Latest Ref Range: 40.0 - 53.0 % 37.6 (L)   Platelet Count Latest Ref Range: 150 - 450 10e9/L 157   RBC Count Latest Ref " Range: 4.4 - 5.9 10e12/L 4.16 (L)   MCV Latest Ref Range: 78 - 100 fl 90   MCH Latest Ref Range: 26.5 - 33.0 pg 28.6   MCHC Latest Ref Range: 31.5 - 36.5 g/dL 31.6   RDW Latest Ref Range: 10.0 - 15.0 % 14.0         Assessment       Alcohol dependency, with continuous use prior to admission.  Patient presented with abdominal pain nausea vomiting.  Recent history of gastric ulcer with bleeding.  Lab studies remarkable for mild transaminase elevation, consistent with alcoholic hepatitis,  elevated lipase, consistent with alcoholic pancreatitis.  Abdominal discomfort markedly improved.  Bowel movements now regular.  Patient is eating well.    Elevated transaminases, consistent with alcoholic hepatitis, mild, improved     Low grade T, likely secondary to ETOH withdrawal, ETOH hepatitis, doubt infection.  Has been afebrile for over 24 hours.     Recent hospitalization for bleeding gastric ulcer in the setting of alcohol use.  Hemoglobin stable on PPI.     Hypertension, no formal history of such, likely related to withdrawal.  Metoprolol 25 mg bid has been utilized for the last 48 hours.     Thrombocytopenia, likely secondary to alcohol use, sl improved.     Chronic depression and anxiety.  Patient has been started back on Remeron and Cymbalta     CD, history of frequent relapses and noncompliance with plans to pursue CD treatment.  Patient concurs with mother's concerns in this regard.  He agrees to meet with the CD counselor again to hopefully establish more firm plans for inpatient or outpatient treatment.    Plan  Continue supportive treatments.  Discontinue metoprolol, monitor blood pressures with next 24 hours.  Increase Cymbalta to 30 mg daily on discharge, per psychiatry recommendations.  Resume naltrexone on discharge  Anticipate discharge tomorrow, likely to home, after he is seen by  and by becomes dependency counselor.  Patient acknowledges that he would need to have firm plans for comprehensive  follow-up prior to discharge in the hospital.    Discharge summary dictated.
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
## 2019-08-19 VITALS
TEMPERATURE: 97.9 F | WEIGHT: 211.4 LBS | SYSTOLIC BLOOD PRESSURE: 124 MMHG | OXYGEN SATURATION: 99 % | DIASTOLIC BLOOD PRESSURE: 78 MMHG | RESPIRATION RATE: 16 BRPM | HEART RATE: 89 BPM

## 2019-08-19 LAB
BACTERIA SPEC CULT: NO GROWTH
BACTERIA SPEC CULT: NO GROWTH
BASOPHILS # BLD AUTO: 0.1 10E9/L (ref 0–0.2)
BASOPHILS NFR BLD AUTO: 1.8 %
DIFFERENTIAL METHOD BLD: ABNORMAL
EOSINOPHIL # BLD AUTO: 0.2 10E9/L (ref 0–0.7)
EOSINOPHIL NFR BLD AUTO: 2.6 %
ERYTHROCYTE [DISTWIDTH] IN BLOOD BY AUTOMATED COUNT: 13.9 % (ref 10–15)
HCT VFR BLD AUTO: 36.7 % (ref 40–53)
HGB BLD-MCNC: 11.8 G/DL (ref 13.3–17.7)
LYMPHOCYTES # BLD AUTO: 1.4 10E9/L (ref 0.8–5.3)
LYMPHOCYTES NFR BLD AUTO: 20.2 %
Lab: NORMAL
Lab: NORMAL
MCH RBC QN AUTO: 28.9 PG (ref 26.5–33)
MCHC RBC AUTO-ENTMCNC: 32.2 G/DL (ref 31.5–36.5)
MCV RBC AUTO: 90 FL (ref 78–100)
METAMYELOCYTES # BLD: 0.1 10E9/L
METAMYELOCYTES NFR BLD MANUAL: 0.9 %
MONOCYTES # BLD AUTO: 0.8 10E9/L (ref 0–1.3)
MONOCYTES NFR BLD AUTO: 12.3 %
MYELOCYTES # BLD: 0.1 10E9/L
MYELOCYTES NFR BLD MANUAL: 1.8 %
NEUTROPHILS # BLD AUTO: 4.1 10E9/L (ref 1.6–8.3)
NEUTROPHILS NFR BLD AUTO: 60.4 %
PLATELET # BLD AUTO: 250 10E9/L (ref 150–450)
PLATELET # BLD EST: NORMAL 10*3/UL
PROCALCITONIN SERPL-MCNC: 0.32 NG/ML
RBC # BLD AUTO: 4.09 10E12/L (ref 4.4–5.9)
RBC MORPH BLD: NORMAL
SPECIMEN SOURCE: NORMAL
SPECIMEN SOURCE: NORMAL
WBC # BLD AUTO: 6.8 10E9/L (ref 4–11)

## 2019-08-19 PROCEDURE — 99231 SBSQ HOSP IP/OBS SF/LOW 25: CPT | Performed by: PSYCHIATRY & NEUROLOGY

## 2019-08-19 PROCEDURE — 36415 COLL VENOUS BLD VENIPUNCTURE: CPT | Performed by: INTERNAL MEDICINE

## 2019-08-19 PROCEDURE — 25000132 ZZH RX MED GY IP 250 OP 250 PS 637: Performed by: INTERNAL MEDICINE

## 2019-08-19 PROCEDURE — 25000132 ZZH RX MED GY IP 250 OP 250 PS 637: Performed by: PHYSICIAN ASSISTANT

## 2019-08-19 PROCEDURE — 84145 PROCALCITONIN (PCT): CPT | Performed by: INTERNAL MEDICINE

## 2019-08-19 PROCEDURE — 99233 SBSQ HOSP IP/OBS HIGH 50: CPT | Performed by: INTERNAL MEDICINE

## 2019-08-19 PROCEDURE — 85025 COMPLETE CBC W/AUTO DIFF WBC: CPT | Performed by: INTERNAL MEDICINE

## 2019-08-19 RX ADMIN — THIAMINE HCL TAB 100 MG 100 MG: 100 TAB at 08:24

## 2019-08-19 RX ADMIN — FOLIC ACID 1 MG: 1 TABLET ORAL at 08:24

## 2019-08-19 RX ADMIN — DULOXETINE HYDROCHLORIDE 20 MG: 20 CAPSULE, DELAYED RELEASE ORAL at 08:24

## 2019-08-19 RX ADMIN — BACITRACIN: 500 OINTMENT TOPICAL at 08:23

## 2019-08-19 RX ADMIN — OMEPRAZOLE 40 MG: 20 CAPSULE, DELAYED RELEASE ORAL at 08:24

## 2019-08-19 RX ADMIN — SENNOSIDES AND DOCUSATE SODIUM 1 TABLET: 8.6; 5 TABLET ORAL at 08:24

## 2019-08-19 RX ADMIN — MULTIPLE VITAMINS W/ MINERALS TAB 1 TABLET: TAB at 08:24

## 2019-08-19 RX ADMIN — POLYETHYLENE GLYCOL 3350 17 G: 17 POWDER, FOR SOLUTION ORAL at 08:24

## 2019-08-19 ASSESSMENT — ACTIVITIES OF DAILY LIVING (ADL)
ADLS_ACUITY_SCORE: 10

## 2019-08-19 NOTE — DISCHARGE INSTRUCTIONS
[facility], [place] [phone]  [address] [place] [address]    CD Assessment 8/20/19 @ 8am with Boo. Please arrive a few minutes early. Please bring insurance card with appointment.     Carlos Lugo   [address] [place] [address], [address]  [place] [address]  [phone]    [facility] [phone]    [facility] ([facility]) [phone] (Evening outpatient program in [place], [phone])
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
## 2019-08-19 NOTE — CONSULTS
[facility], [place]   Psychiatry Consultation - Follow-up note      Interim History:   Reason for consultation: Follow-up to assess response to treatment and mood stability.    Depression severity scale 0-10 (10=most severe):  Today: 2    Since our last meeting, he reports resolution of withdrawal symptoms and improvement in his mood.  Energy is improving, concentration is better, appetite is normal.  He is sleeping well at night.  He denied suicidal and homicidal thoughts.  He denied psychotic symptoms.  Tolerating medications without side effects.  He is looking forward to completing his chemical health assessment and is comfortable doing this on an outpatient basis.  No cravings for alcohol reported.  No intent or plan to relapse on alcohol or substances reported.  He anticipates being discharged home today.           Medications:       bacitracin   Topical BID     DULoxetine  20 mg Oral Daily     folic acid  1 mg Oral Daily     mirtazapine  15 mg Oral At Bedtime     multivitamin w/minerals  1 tablet Oral Daily     omeprazole  40 mg Oral BID AC     polyethylene glycol  17 g Oral Daily     senna-docusate  1 tablet Oral BID     vitamin B1  100 mg Oral Daily          Allergies:     Allergies   Allergen Reactions     Banana      Poss Anaphlaxis     Chicken Allergy      Anaphalxis     Peanut (Diagnostic)      Anaphalxis          Labs:     Recent Results (from the past 24 hour(s))   CBC with platelets differential    Collection Time: 08/19/19  9:52 AM   Result Value Ref Range    WBC 6.8 4.0 - 11.0 10e9/L    RBC Count 4.09 (L) 4.4 - 5.9 10e12/L    Hemoglobin 11.8 (L) 13.3 - 17.7 g/dL    Hematocrit 36.7 (L) 40.0 - 53.0 %    MCV 90 78 - 100 fl    MCH 28.9 26.5 - 33.0 pg    MCHC 32.2 31.5 - 36.5 g/dL    RDW 13.9 10.0 - 15.0 %    Platelet Count 250 150 - 450 10e9/L    Diff Method Manual Differential     % Neutrophils 60.4 %    % Lymphocytes 20.2 %    % Monocytes 12.3 %    % Eosinophils 2.6 %     % Basophils 1.8 %    % Metamyelocytes 0.9 %    % Myelocytes 1.8 %    Absolute Neutrophil 4.1 1.6 - 8.3 10e9/L    Absolute Lymphocytes 1.4 0.8 - 5.3 10e9/L    Absolute Monocytes 0.8 0.0 - 1.3 10e9/L    Absolute Eosinophils 0.2 0.0 - 0.7 10e9/L    Absolute Basophils 0.1 0.0 - 0.2 10e9/L    Absolute Metamyelocytes 0.1 (H) 0 10e9/L    Absolute Myelocytes 0.1 (H) 0 10e9/L    RBC Morphology Normal     Platelet Estimate Normal    Procalcitonin    Collection Time: 08/19/19  9:52 AM   Result Value Ref Range    Procalcitonin 0.32 ng/ml          Psychiatric Examination:     /78 (BP Location: Right arm)   Pulse 89   Temp 97.9  F (36.6  C) (Oral)   Resp 16   Wt 95.9 kg (211 lb 6.4 oz)   SpO2 99%   Weight is 211 lbs 6.4 oz  There is no height or weight on file to calculate BMI.  Orthostatic Vitals     None            Appearance: awake, alert  Attitude:  cooperative  Eye Contact:  fair  Mood:  better  Affect:  appropriate and in normal range  Speech:  clear, coherent  Psychomotor Behavior:  no evidence of tardive dyskinesia, dystonia, or tics  Throught Process:  logical and linear  Associations:  no loose associations  Thought Content:  no evidence of suicidal ideation or homicidal ideation and no evidence of psychotic thought  Insight:  fair  Judgement:  intact  Oriented to:  time, person, and place  Attention Span and Concentration:  intact  Recent and Remote Memory:  intact           DIagnoses:     Major Depressive Disorder - recurrent, moderate  Generalized Anxiety Disorder  Alcohol use disorder, severe      Recommendations:     Continue current medications.  He appears to be tolerating Cymbalta and Remeron well with improvements noted in his mood.  He was educated regarding future treatment options such as optimizing the dosing of these medications if mood or anxiety symptoms were to recur.    He has a chemical health assessment scheduled and is looking forward to exploring residential versus outpatient options.   He feels confident returning home and feels capable of maintaining sobriety until then.  No cravings or intent to use reported.  He is not interested in additional pharmacological options to address alcohol addiction at this time.    The patient will be discharging home today.  Psychiatry will sign off.
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
## 2019-08-19 NOTE — PROGRESS NOTES
8/17/19 CD consult acknowledged. [facility] plans to meet with pt this afternoon.  If patient is appropriate for discharge prior to meeting with CD, and he has active insurance, an outpatient evaluation can be scheduled by calling [facility] at [phone].     Aziza Fernandes, [facility]  [phone]
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
## 2019-08-20 ENCOUNTER — TRANSFERRED RECORDS (OUTPATIENT)
Dept: HEALTH INFORMATION MANAGEMENT | Facility: CLINIC | Age: 34
End: 2019-08-20

## 2019-08-20 ENCOUNTER — PATIENT OUTREACH (OUTPATIENT)
Dept: CARE COORDINATION | Facility: CLINIC | Age: 34
End: 2019-08-20

## 2019-08-20 NOTE — PROGRESS NOTES
Attempted to make post discharge call and the phone number listed is disconnected  No further post discharge calls were made

## 2020-01-07 ENCOUNTER — TRANSFERRED RECORDS (OUTPATIENT)
Dept: HEALTH INFORMATION MANAGEMENT | Facility: CLINIC | Age: 35
End: 2020-01-07

## 2020-01-23 ENCOUNTER — HOSPITAL ENCOUNTER (OUTPATIENT)
Dept: BEHAVIORAL HEALTH | Facility: CLINIC | Age: 35
End: 2020-01-23
Attending: FAMILY MEDICINE
Payer: COMMERCIAL

## 2020-01-23 ENCOUNTER — BEH TREATMENT PLAN (OUTPATIENT)
Dept: BEHAVIORAL HEALTH | Facility: CLINIC | Age: 35
End: 2020-01-23
Attending: FAMILY MEDICINE

## 2020-01-23 VITALS — HEART RATE: 130 BPM | DIASTOLIC BLOOD PRESSURE: 89 MMHG | SYSTOLIC BLOOD PRESSURE: 139 MMHG

## 2020-01-23 DIAGNOSIS — F19.20 CHEMICAL DEPENDENCY (H): ICD-10-CM

## 2020-01-23 PROCEDURE — H2035 A/D TX PROGRAM, PER HOUR: HCPCS

## 2020-01-23 PROCEDURE — 10020000 ZZH LODGING PLUS FACILITY CHARGE ADULT

## 2020-01-23 RX ORDER — MAGNESIUM HYDROXIDE/ALUMINUM HYDROXICE/SIMETHICONE 120; 1200; 1200 MG/30ML; MG/30ML; MG/30ML
30 SUSPENSION ORAL EVERY 6 HOURS PRN
COMMUNITY
End: 2020-02-14

## 2020-01-23 RX ORDER — LANOLIN ALCOHOL/MO/W.PET/CERES
3 CREAM (GRAM) TOPICAL
COMMUNITY
End: 2020-02-14

## 2020-01-23 RX ORDER — AMOXICILLIN 250 MG
2 CAPSULE ORAL DAILY PRN
COMMUNITY
End: 2020-02-14

## 2020-01-23 RX ORDER — LISINOPRIL 10 MG/1
10 TABLET ORAL DAILY
COMMUNITY
End: 2020-02-15

## 2020-01-23 RX ORDER — ACETAMINOPHEN 325 MG/1
325-650 TABLET ORAL EVERY 4 HOURS PRN
COMMUNITY
End: 2020-02-14

## 2020-01-23 RX ORDER — IBUPROFEN 200 MG
200-400 TABLET ORAL EVERY 6 HOURS PRN
COMMUNITY
End: 2020-02-14

## 2020-01-23 RX ORDER — LORATADINE 10 MG/1
10 TABLET ORAL DAILY PRN
COMMUNITY
End: 2020-02-14

## 2020-01-23 ASSESSMENT — ANXIETY QUESTIONNAIRES
7. FEELING AFRAID AS IF SOMETHING AWFUL MIGHT HAPPEN: NEARLY EVERY DAY
IF YOU CHECKED OFF ANY PROBLEMS ON THIS QUESTIONNAIRE, HOW DIFFICULT HAVE THESE PROBLEMS MADE IT FOR YOU TO DO YOUR WORK, TAKE CARE OF THINGS AT HOME, OR GET ALONG WITH OTHER PEOPLE: EXTREMELY DIFFICULT
2. NOT BEING ABLE TO STOP OR CONTROL WORRYING: NEARLY EVERY DAY
5. BEING SO RESTLESS THAT IT IS HARD TO SIT STILL: NEARLY EVERY DAY
3. WORRYING TOO MUCH ABOUT DIFFERENT THINGS: NEARLY EVERY DAY
6. BECOMING EASILY ANNOYED OR IRRITABLE: NEARLY EVERY DAY
1. FEELING NERVOUS, ANXIOUS, OR ON EDGE: NEARLY EVERY DAY
GAD7 TOTAL SCORE: 21

## 2020-01-23 ASSESSMENT — PATIENT HEALTH QUESTIONNAIRE - PHQ9
SUM OF ALL RESPONSES TO PHQ QUESTIONS 1-9: 21
5. POOR APPETITE OR OVEREATING: NEARLY EVERY DAY

## 2020-01-23 NOTE — PROGRESS NOTES
Name: Xavier Odell  Date: 1/23/2020  Medical Record: [id number]    Envelope Number: [id number]    List of Contents (List each item separately in new row):   Cell phone     Admission:  I am responsible for any personal items that are not sent to the safe or pharmacy.  [facility] is not responsible for loss, theft or damage of any property in my possession.      Patient Signature:  ___________________________________________       Date/Time:__________________________    Staff Signature: __________________________________       Date/Time:__________________________    2nd Staff person, if patient is unable/unwilling to sign:      __________________________________________________________       Date/Time: __________________________      Discharge:  [facility] has returned all of my personal belongings:    Patient Signature: ________________________________________     Date/Time: ____________________________________    Staff Signature: ______________________________________     Date/Time:_____________________________________

## 2020-01-23 NOTE — PROGRESS NOTES
Name: Xavier Odell  Date: 1/23/2020  Medical Record: [id number]  Envelope Number: [id number]  List of Contents (List each item separately in new row): nicotine gum 4mg(20pcs)  Admission:  I am responsible for any personal items that are not sent to the safe or pharmacy.  [facility] is not responsible for loss, theft or damage of any property in my possession.  Patient Signature:  ___________________________________________       Date/Time:__________________________    Staff Signature: __________________________________       Date/Time:__________________________    2nd Staff person, if patient is unable/unwilling to sign:    _______________________________________________________       Date/Time: __________________________  Discharge:  [facility] has returned all of my personal belongings:    Patient Signature: ________________________________________     Date/Time: ____________________________________    Staff Signature: ______________________________________     Date/Time:_____________________________________

## 2020-01-23 NOTE — PROGRESS NOTES
[facility] Nursing Health Assessment      Vital signs:     There were no vitals taken for this visit.      Direct admission    Counselor: Allan  Drug of Choice: Alcohol  Last use: 1/22/2020  Home clinic/MD: [facility]  Dr. 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 Writer spoke with PCP [facility] asking for EPI pen for pt to have on his person at all times.  Pt understands instruction. Writer verified that dietary has this allergy on file  Mild reaction to banana.  PT denies reaction to peanuts    H&P Screen:  H&P within the last 90 days: Yes.  Date: 12/23/2019 Location: Emergency Room / Hospital      Mental Health diagnosis: anxiety and depression  Medication compliant?: yes (Remeron / Cymbalta)   Recent sucidal thoughts? no     When? na  Current thought of self-harm? no    Plan? na    Pain assessment:   Pt. Experiencing pain at this time?  No      Nursing Assessment Summary:  Pt has been prescribed Benzo's by his community primary provider.  Xanax.  Pt admitting to take 1-2 mg daily since 1/1/2020. Writer reaching out to Dr.Hubbell ELAM asking if it ok for the pt to stop abruptly for admission to [facility]?  Pt reports no hx of benzo withdrawal seizure.  DOC alcohol.  Will await Dr. Granados reply    2:32pm - Dr. Granados approved pt to stop using Xanax    Pt does not use tobacco products  Pt does not went to get the flu vaccination at this time.  Pt is informed that he can get flu vaccination here if he wants      On-going nursing intervention required?   No    Acute care visit recommended: no

## 2020-01-23 NOTE — PROGRESS NOTES
Progress Note    This patient had a Assessment and Placement Summary Update on 1/7/20 completed by Selwyn Haynes.  This patient was seen for a face to face update of the Assessment and Placement Summary Update on 1/23/2020 by GUMARO Armstrong.  A scanned copy of the Assessment and Placement Summary Update is in the patient's electronic medical record in Epic under the Media tab.    Alcohol/Drug use since the last CD evaluation (include date of last use):     1/22/20     Please note any other clinical changes since the last CD evaluation (such as medication changes, additional legal charges, detoxification admissions, overdoses, etc.)     No significant changes since the last CD evaluation       ASAM Dimensions Original scores Current Scores   I.) Intoxication and Withdrawal: 0 0   II.) Biomedical:  0 0   III.) Emotional and Behavioral:  2 2   IV.) Readiness to Change:  1 1   V.) Relapse Potential: 4 4   VI.) Recovery Environmental: 2 2     Please list clinical justifications for the above ASAM score changes since the original comprehensive assessment:     None of the ASAM scores on the six dimensions had changed since the Assessment and Placement Summary Update was completed on 1/7/20.       Current KATIA: Current UA:     0.000     Positive for Benzodiazepines and negative for all other screened drugs.       PHQ-9, ADINA-7   PHQ-9 on 1/23/2020 ADINA-7 on 1/23/2020   The patient's PHQ-9 score was 21 out of 27, indicating severe depression.   The patient's ADINA-7 score was 21 out of 21, indicating severe anxiety.       [place]-Suicide Severity Rating Scale Reassessment   Have you ever wished you were dead or that you could go to sleep and not wake up?  Past Month:  No     Have you actually had any thoughts of killing yourself?  Past Month:  No     Have you been thinking about how you might do this?     Past Month:  No   Lifetime:  No   Have you had these thoughts and had some intention of acting on them?     Past  Month:  No   Lifetime:  No   Have you started to work out the details of how to kill yourself?   Past Month:  No   Lifetime:  No   Do you intend to carry out this plan?   No     When you have the thoughts how long do they last?  The patient denied ever having any suicidal thoughts in life.     Are there things - anyone or anything (i.e. family, [employer], pain of death) that stopped you from wanting to die or acting on thoughts of suicide?  Does not apply       2008  [employer] [facility] [employer]  Used with permission by Cayla Sun, PhD.       Guide to C-SSRS Risk Ratings   NO IDEATION:  with no active thoughts IDEATION: with a wish to die. IDEATION: with active thoughts. Risk Ratings   If Yes No No 0 - Very Low Risk   If NA Yes No 1 - Low Risk   If NA Yes Yes 2 - Low/moderate risk   IDEATION: associated thoughts of methods without intent or plan INTENT: Intent to follow through on suicide PLAN: Plan to follow through on suicide Risk Ratings cont...   If Yes No No 3 - Moderate Risk   If Yes Yes No 4 - High Risk   If Yes Yes Yes 5 - High Risk   The patient's ADDITIONAL RISK FACTORS and lack of PROTECTIVE FACTORS may increase their overall suicide risk ratings.     Additional Risk Factors:    Significant history of having untreated or poorly treated mental health symptoms     Tendency to be socially isolated and/or cut off from the support of others     A recent loss that was significant to the patient, i.e. loss of job, loss of home, divorce, break-up, etc.   Protective Factors:    Having people in his/her life that would prevent the patient from considering a suicide attempt (i.e. young children, spouse, parents, etc.)     An absence of chronic health problems or stable and well treated chronic health issues     Having easy access to supportive family members     Having restricted access to highly lethal means of suicide     Risk Status   1. - Low Risk: Evaluation Counselors:  Document in [employer] /  "SBAR to counselor \"Low Risk\".      Treatment Counselors:  Reassess upon admission as applicable, assess weekly in progress notes under Dimension 3 and summarize in Discharge / Treatment summary under Dimension 3.     Additional information to support suicide risk rating: There was no additional information to provide at this time.     "

## 2020-01-23 NOTE — PROGRESS NOTES
"[address] [address]        Assessment and Placement Summary Update     Patient name:   Xavier Odell   Patient phone: [phone] (home)    Last 4 of #:   [id number]   : 1985      PMI #: [id number]   Patient address:   [address] [address]     Date of Original Assessment / Last Update: 21 Update Assessment Date: 2020   Updated by:   GUMARO Armstrong    phone number: [phone]   Referred by:   [facility] / phone number: [phone]   Referral to:   [facility] at [facility] in [place], [place]   NPI: Ham NPI #: [id number]   Summary:  This patient had a Assessment and Placement Summary Update on 20 at [facility] completed by Rigoberto Toussaint.  A scanned copy of the Assessment and Placement Summary Update is in the patient's electronic medical record in Epic under the Media tab.    Reason for today's update: Admission to [facility]       Substance Use History Update:           X = Primary Drug Used   Age of First Use Most Recent Pattern of Use and Duration   Need enough information to show pattern (both frequency and amounts) and to show tolerance for each chemical that has a diagnosis   Date of last use and time, if needed   Withdrawal Potential? Requiring special care Method of use  (oral, smoked, snort, IV, etc)      Alcohol     15 Recent use: 1 liter of whiskey/day.  Patient states that he quit his job and moved in with his mother \"because I couldn't maintain daily responsibilities\" due to his alcohol use.  He reports 8 prior hospitalizations for pancreatitis, GI bleeding and withdrawal complications from his alcohol use. 20 none     Marijuana/  Hashish    N/A Denies         Cocaine/Crack     N/A Denies         Meth/  Amphetamines   N/A Denies         Heroin     N/A Denies         Other Opiates/  Synthetics   N/A Denies         Inhalants     N/A Denies "         Benzodiazepines     26  1/2 mg/day. Both prescribed and obtained elsewhere. Uses daily. 21        Hallucinogens     N/A Denies         Barbiturates/  Sedatives/  Hypnotics N/A Denies         Over-the-Counter Drugs   N/A Denies         Other     N/A Denies         Nicotine     N/A Denies        Dimension: Severity Rating/ Reason for Changes from Previous Assessment:  Dimension I: Acute intoxication/Withdrawal potential     Previous ratin Current ratin   Comments:   No indications of intoxication or withdrawal.      Dimension II: Biomedical Conditions and Complications     Previous ratin Current ratin   Comments:   Patient reports no physical or medical issues which would impact full program participation.      Dimension III: Emotional/Behavioral/Cognitive     Previous ratin Current ratin   Comments:   Patient reports diagnosis for both depression and anxiety. PHQ-9 score was 21. ADINA-7 score was 21. Both scores are in the severe range. Patient would benefit from further clinical evaluation. He denies any SI or concerns for his safety.     Dimension IV: Readiness for Change     Previous ratin Current ratin   Comments:   Patient verbalizes his commitment to recovery but lacks insight and practice into supportive behaviors. Patient states that he has no prior treatment admissions.     Dimension V: Relapse/Continued Use/Continued problem potential     Previous ratin Current ratin   Comments:   Patient presents as a high risk for relapse. He has minimal insight into his personal relapse cues and effective prevention strategies. Patient continues to use despite serious medical issues and lifestyle consequences.     Dimension VI: Recovery environment    Previous ratin Current ratin   Comments:   Patient quit his job and moved in with his mother due to his continued alcohol use. Family is supportive and concerned for his safety/well being.  Patient expresses that he has become more isolative and lacks a sober support network.       Summary of Assessment Update and Recommendations:   What was the outcome of last referral?  Admit to residential CD treatment program     Reason for changes in the Risk Description since last assessment? No changes     Recommendation and rationale for current request and significant issues that need to be addressed: Remain abstinent.  Admit to and complete the [facility] program  Attend recovery support groups and work with a sponsor  Follow all recommendations of treatment care providers  Obtain a mental health evaluation.

## 2020-01-24 ENCOUNTER — HOSPITAL ENCOUNTER (OUTPATIENT)
Dept: BEHAVIORAL HEALTH | Facility: CLINIC | Age: 35
End: 2020-01-24
Attending: FAMILY MEDICINE
Payer: COMMERCIAL

## 2020-01-24 VITALS — BODY MASS INDEX: 32.58 KG/M2 | HEIGHT: 69 IN | WEIGHT: 220 LBS

## 2020-01-24 PROBLEM — F19.20 CHEMICAL DEPENDENCY (H): Status: ACTIVE | Noted: 2020-01-24

## 2020-01-24 PROCEDURE — 10020000 ZZH LODGING PLUS FACILITY CHARGE ADULT

## 2020-01-24 PROCEDURE — H2035 A/D TX PROGRAM, PER HOUR: HCPCS | Mod: HQ

## 2020-01-24 ASSESSMENT — MIFFLIN-ST. JEOR: SCORE: 1933.29

## 2020-01-24 ASSESSMENT — ANXIETY QUESTIONNAIRES: GAD7 TOTAL SCORE: 21
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
## 2020-01-24 NOTE — PROGRESS NOTES
Name: Xaveir Odell  Date: 1/24/2020  Medical Record: [id number]    Envelope Number: [id number]    List of Contents (List each item separately in new row):     Mirtazapine tablets USP-15 MG; Mirtazapine 30 mg tablets     Admission:  I am responsible for any personal items that are not sent to the safe or pharmacy.  [facility] is not responsible for loss, theft or damage of any property in my possession.      Patient Signature:  ___________________________________________       Date/Time:__________________________    Staff Signature: __________________________________       Date/Time:__________________________    2nd Staff person, if patient is unable/unwilling to sign:      __________________________________________________________       Date/Time: __________________________      Discharge:  [facility] has returned all of my personal belongings:    Patient Signature: ________________________________________     Date/Time: ____________________________________    Staff Signature: ______________________________________     Date/Time:_____________________________________
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
## 2020-01-24 NOTE — PROGRESS NOTES
First Group Note:    D. Pt attended their first group session on this date. They were briefed on group guidelines and gave a brief introduction of themself.   I. Counselor and group peers welcomed patient to the group and reminded them of the schedule and that a counselor will be following up with them 1:1 to begin tx planning.   A. Pt appears appropriate for group at this time.   P. Pt will continue to follow the CD programming and guidelines.

## 2020-01-24 NOTE — PROGRESS NOTES
Initial Services Plan        Service Initiation Date: 1/24/2020    Immediate health and/or safety concerns: No    Identify health and safety concern(s) below and include plan to address:    None Identified    Treatment suggestions for client during the time between intake (admit date) and completion of the individual treatment plan:     Look for a sober support network, i.e. 12 step, [facility], [facility], etc  Tour the treatment center or outpatient clinic  Introduce yourself to your treatment group. Spend time getting to know your peers  Review your patient or client handbook  Begin working on your treatment goal list    Completed by: Kunal Starr [facility]  Date completed: 1/24/2020 at 10:30 AM

## 2020-01-24 NOTE — PROGRESS NOTES
Comprehensive Assessment Summary     Based on client interview, review of previous assessments and   comprehensive assessment interview the following diagnosis and recommendations are:     Patient: Xavier Odell  MRN; [id number]   : 1985  Age: 34 year old Sex: male       Client meets criteria for:  303.90 Alcohol Dependence    Dimension One: Acute Intoxication/Withdrawal Potential     Ratin     (Consider the client's ability to cope with withdrawal symptoms and current state of intoxication)   No current withdrawal discomfort reported.     Dimension Two: Biomedical Condition and Complications    Ratin  (Consider the degree to which any physical disorder would interfere with treatment for substance abuse, and the client's ability to tolerate any related discomfort; determine the impact of continued chemical use on the unborn child if the client is pregnant)   Patient reports no physical or medical issues which would impact full program participation.     Dimension Three: Emotional/Behavioral/Cognitive Conditions & Complications  Ratin  (Determine the degree to which any condition or complications are likely to interfere with treatment for substance abuse or with functioning in significant life areas and the likelihood of risk of harm to self or others):  Patient reports diagnosis for both depression and anxiety. PHQ-9 score was 21. ADINA-7 score was 21. Both scores are in the severe range. Patient would benefit from further clinical evaluation. He denies any SI or concerns for his safety.    Dimension Four: Treatment Acceptance/Resistance     Ratin  (Consider the amount of support and encouragement necessary to keep the client involved in treatment):   Patient verbalizes his commitment to recovery but lacks insight and practice into supportive behaviors. Patient states that he has no prior treatment admissions.    Dimension Five: Continued Use/Relaspe Prevention     Ratin  (Consider  the degree to which the client's recognizes relapse issues and has the skills to prevent relapse of either substance use or mental health problems)  Patient presents as a high risk for relapse. He has minimal insight into his personal relapse cues and effective prevention strategies. Patient continues to use despite serious medical issues (8 hospitalizations due to alcohol reported) and lifestyle consequences.    Dimension Six: Recovery Environment     Rating:   3  (Consider the degree to which key areas of the client's life are supportive of or antagonistic to treatment participation and recovery)   Patient quit his job and moved in with his mother due to his continued alcohol use. Family is supportive and concerned for his safety/well being. Patient expresses that he has become more isolative and lacks a sober support network.    I have reviewed the information on the assessment, psychosocial and medical history and checklist:        it is current

## 2020-01-25 ENCOUNTER — HOSPITAL ENCOUNTER (OUTPATIENT)
Dept: BEHAVIORAL HEALTH | Facility: CLINIC | Age: 35
End: 2020-01-25
Attending: FAMILY MEDICINE
Payer: COMMERCIAL

## 2020-01-25 PROCEDURE — 10020000 ZZH LODGING PLUS FACILITY CHARGE ADULT

## 2020-01-25 PROCEDURE — H2035 A/D TX PROGRAM, PER HOUR: HCPCS | Mod: HQ

## 2020-01-26 ENCOUNTER — HOSPITAL ENCOUNTER (OUTPATIENT)
Dept: BEHAVIORAL HEALTH | Facility: CLINIC | Age: 35
End: 2020-01-26
Attending: FAMILY MEDICINE
Payer: COMMERCIAL

## 2020-01-26 PROCEDURE — 10020000 ZZH LODGING PLUS FACILITY CHARGE ADULT

## 2020-01-26 PROCEDURE — H2035 A/D TX PROGRAM, PER HOUR: HCPCS | Mod: HQ

## 2020-01-27 ENCOUNTER — HOSPITAL ENCOUNTER (OUTPATIENT)
Dept: BEHAVIORAL HEALTH | Facility: CLINIC | Age: 35
End: 2020-01-27
Attending: FAMILY MEDICINE
Payer: COMMERCIAL

## 2020-01-27 DIAGNOSIS — F17.200 NICOTINE DEPENDENCE: Primary | ICD-10-CM

## 2020-01-27 PROCEDURE — H2035 A/D TX PROGRAM, PER HOUR: HCPCS

## 2020-01-27 PROCEDURE — H2035 A/D TX PROGRAM, PER HOUR: HCPCS | Mod: HQ

## 2020-01-27 PROCEDURE — 10020000 ZZH LODGING PLUS FACILITY CHARGE ADULT

## 2020-01-27 NOTE — PROGRESS NOTES
Writer was helping pt with self admin of meds this am, pt reported he has run out of lisinopril and does not plan on continuing to take it. He reports his high blood pressure has only been associated with withdrawing from alcohol. He does not feel like he needs it at this time. Writer provided pt education around discontinuing  this medication and signs and symptoms of high blood pressure. Pt was instructed to come to staff if he was experiencing any signs and or symptoms of high blood pressure. Pt verbalized understanding.

## 2020-01-27 NOTE — PROGRESS NOTES
[]Kelly copied text    []Landon for details     [facility]  Adult Chemical Dependency Program  Treatment Plan Requirements     These services are provided by the facility for each patient/client according to the individual's treatment plan:    Individual and group counseling    Education    Transition services    Services to address any co-occurring mental illness    Service coordination     Initial Treatment Plan Goals:  1. Complete all the requirements of Program Orientation.  2. Maintain medication compliance throughout the program.  3. Complete requirements for workshop/skills groups based on identified issues on your problem list.  4. Complete the support group attendance feedback sheet weekly.  5. Gain family involvement in treatment process to address family issues from the problem list.  6. Attend and participate in all required groups per individual treatment plan.  7. Focus attention to individualized issues from the treatment plan.  8. Complete all requirements for UA's, alcohol screening tests and other testing.  9. Schedule a physical examination if recommended.     In addition to the above, complete all individual goals as specifically outlines on your treatment plan.     Criteria for discharge:  Patients/clients are discharged from the program following completion of the entire program including Phase I and II or acceptance of other post-treatment referrals such as [facility] house, or aftercare at other facilities.  Patients/clients may also be discharged for inappropriate behavior or chemical use.       Favorable Discharge - Patients/clients have completed agreed upon treatment goals, understand their diagnosis and appear motivated about the follow-up care.    Guarded Discharge - Patients/clients have demonstrated some understanding of their diagnosis and recovery process, and have completed some of their treatment goals.  This prognosis also includes patients/clients who  have completed some treatment goals but have not made commitment to community support or follow through with referrals.    Unfavorable Discharge - Patients/clients have not completed agreed upon treatment goals due to their own choice, have limited understanding of their diagnosis, and have shown minimal or inconsistent behavior conducive to recovery.  Those patients/clients discharged due to behavioral problems will also be unfavorable discharges.                                    Adult CD Treatment Plan      Xavier Odell    [id number]   1985       35 year old               -----------------------------------------------------------------------------------------------------------------           Acute Intoxication/Withdrawal Potential      DIMENSION 1  RISK FACTOR: 0             AssignmentDate Source Prob/Goal/  Intervention Target  Date Initials Outcome Completion  Date   1/24/20 Self -  Current, History -  Current and Assessment -  Current  Problem: Patient denies any withdrawal discomfort that would interfere with treatment at this time.   Goal: Be able to manage mild to moderate withdrawal symptoms.  Intervention: Report to nurse any increase in withdrawal symptoms.    1/24/20 DL                         Biomedical Conditions and Complaints               DIMENSION 2  RISK FACTOR: 0            AssignmentDate Source Prob/Goal/  Intervention Target  Date Initials Outcome Completion  Date   1/24/20 Self -  Current, History -  Current and Assessment -  Current  Problem: Pt denies any current biomedical issues.   Goal: None  Intervention: None 1/24/20 DL             -----------------------------------------------------------------------------------------------------------------               Emotional/Behavioral/Cognitive Conditions and Complications                 DIMENSION 3  RISK FACTOR: 1               AssignmentDate Source Prob/Goal/  Intervention Target  Date Initials Outcome Completion  Date   1/24/20  "Self -  Current, History -  Current and Assessment -  Current  Problem: Patient reports some anxiety, though he has no formal diagnosis.  Patient reports multiple stressors in the past that have correlated with his drinking/use.    Goal: To learn how effectively manage anxiety, and adopt healthy coping skills to help him in his recovery.   Intervention: A. Patient will complete and present \"My Anxiety Profile\" in group.   B. Patient will complete and present \"Coping with Stress\" in group.  A. 1/30     B. 2/3 DL          -------------------------------------------------------------------------------------------------------------------                Readiness to Change                  DIMENSION 4  RISK FACTOR: 2              AssignmentDate Source Prob/Goal/  Intervention Target  Date Initials Outcome Completion  Date   1/24/20 Self -  Current, History -  Current and Assessment -  Current  Problem: Patient has continued to use alcohol (benzos) despite consequences.   Goal: Understand the impact your substance use has had on you,  your family and significant relationships.  Intervention:   A. Patient will complete and present his \"First Step assignment\"  B. On the large white paper located in the group room, construct a \"Drug Use History\". Write the age, drug used, frequency, and consequences of use. Present it in group.                                            A. 1/28        B. 2/6       PABLO [place] Effective  A.1/27      -------------------------------------------------------------------------------------------------------------------                Relapse/Continues Use/Continues Problem Potential                  DIMENSION 5  RISK FACTOR: 4                  AssignmentDate Source Prob/Goal/  Intervention Target  Date Initials Outcome Completion  Date   1/24/20                                                                                                                                                                   " "                                                                                                                                                                                                                                                                                                                                                                                                                                                                                       Self -  Current, History -  Current and Assessment -  Current  Problem: Patient has had past issues with relapse.  Goal: Learn from the past and get acutely clear on what are your most likely relapse triggers/  warning signs.   Intervention: A. Patient will complete and present: A.   Relapse Prevention Planning.      B. Complete and present \"5 yrs sober versus 5 yrs using\"                                              A. 2/10        B. 2/12 DL                     Recovery Environment                  DIMENSION 6  RISK FACTOR: 4                  AssignmentDate Source Prob/Goal/  Intervention Target  Date Initials Outcome Completion  Date   1/24/20 Self -  Current, History -  Current and Assessment -  Current  Problem: Patient needs a sober support network and stay connected consistently.   Goal: Develop a sober support network.  Intervention:   A. Complete and present:   \"Building My Support Network\"  B. Attend 12 Step/recovery meetings and give thought to finding a recovery sponsor/  mentor. A. 2/14     B. ongoing DL          Individual abuse prevention plan (required for lodging plus) : specific actions, referral:   No additional protection measures required other than the Program Abuse Prevention Plan - No        Acknowledgement of Current Treatment Plan - Initial Treatment Plan      INITIAL TREATMENT PLAN:      1. I have participated in creating my treatment plan with my therapist / counselor on __________.     I agree with the plan as it is written in " the electronic health record.     Name Signature/Date   Patient      Name of Therapist / Counselor Signature/Date   Counselor/Therapist          2. I have completed and reviewed my Safety Plan with my counselor and signed this on _________. I have been given the hard copy of this plan.     Patient signature/date:       _____________________________________________________________________________     3. Last Use Date: __________     Patient signature/date:      _____________________________________________________________________________

## 2020-01-28 ENCOUNTER — HOSPITAL ENCOUNTER (OUTPATIENT)
Dept: BEHAVIORAL HEALTH | Facility: CLINIC | Age: 35
End: 2020-01-28
Attending: FAMILY MEDICINE
Payer: COMMERCIAL

## 2020-01-28 ENCOUNTER — BEH TREATMENT PLAN (OUTPATIENT)
Dept: BEHAVIORAL HEALTH | Facility: CLINIC | Age: 35
End: 2020-01-28

## 2020-01-28 PROCEDURE — H2035 A/D TX PROGRAM, PER HOUR: HCPCS

## 2020-01-28 PROCEDURE — H2035 A/D TX PROGRAM, PER HOUR: HCPCS | Mod: HQ

## 2020-01-28 PROCEDURE — 10020000 ZZH LODGING PLUS FACILITY CHARGE ADULT

## 2020-01-28 NOTE — PROGRESS NOTES
[facility]  Adult Chemical Dependency Program  Treatment Plan Requirements     These services are provided by the facility for each patient/client according to the individual's treatment plan:    Individual and group counseling    Education    Transition services    Services to address any co-occurring mental illness    Service coordination     Initial Treatment Plan Goals:  1. Complete all the requirements of Program Orientation.  2. Maintain medication compliance throughout the program.  3. Complete requirements for workshop/skills groups based on identified issues on your problem list.  4. Complete the support group attendance feedback sheet weekly.  5. Gain family involvement in treatment process to address family issues from the problem list.  6. Attend and participate in all required groups per individual treatment plan.  7. Focus attention to individualized issues from the treatment plan.  8. Complete all requirements for UA's, alcohol screening tests and other testing.  9. Schedule a physical examination if recommended.     In addition to the above, complete all individual goals as specifically outlines on your treatment plan.     Criteria for discharge:  Patients/clients are discharged from the program following completion of the entire program including Phase I and II or acceptance of other post-treatment referrals such as residential house, or aftercare at other facilities.  Patients/clients may also be discharged for inappropriate behavior or chemical use.       Favorable Discharge - Patients/clients have completed agreed upon treatment goals, understand their diagnosis and appear motivated about the follow-up care.    Guarded Discharge - Patients/clients have demonstrated some understanding of their diagnosis and recovery process, and have completed some of their treatment goals.  This prognosis also includes patients/clients who have completed some treatment goals but have not  made commitment to community support or follow through with referrals.    Unfavorable Discharge - Patients/clients have not completed agreed upon treatment goals due to their own choice, have limited understanding of their diagnosis, and have shown minimal or inconsistent behavior conducive to recovery.  Those patients/clients discharged due to behavioral problems will also be unfavorable discharges.                                    Adult CD Treatment Plan      Xavier Odell    [id number]   1985       35 year old               -----------------------------------------------------------------------------------------------------------------                                 Acute Intoxication/Withdrawal Potential      DIMENSION 1  RISK FACTOR: 0        AssignmentDate Source Prob/Goal/  Intervention Target  Date Initials Outcome Completion  Date   1/24/20 Self -  Current, History -  Current and Assessment -  Current  Problem: Patient denies any withdrawal discomfort that would interfere with treatment at this time.   Goal: Be able to manage mild to moderate withdrawal symptoms.  Intervention: Report to nurse any increase in withdrawal symptoms.    1/24/20 DL  Eff  2/20/20                                           Biomedical Conditions and Complaints                      DIMENSION 2  RISK FACTOR: 0            AssignmentDate Source Prob/Goal/  Intervention Target  Date Initials Outcome Completion  Date   1/24/20 Self -  Current, History -  Current and Assessment -  Current  Problem: Pt denies any current biomedical issues.   Goal: None  Intervention: None 1/24/20 DL  Eff   2/20/20         -----------------------------------------------------------------------------------------------------------------                                       Emotional/Behavioral/Cognitive Conditions and Complications                          DIMENSION 3  RISK FACTOR: 1                AssignmentDate Source Prob/Goal/  Intervention  "Target  Date Initials Outcome Completion  Date   1/24/20 Self -  Current, History -  Current and Assessment -  Current  Problem: Patient reports some anxiety, though he has no formal diagnosis.  Patient reports multiple stressors in the past that have correlated with his drinking/use.    Goal: To learn how effectively manage anxiety, and adopt healthy coping skills to help him in his recovery.   Intervention: A. Patient will complete and present \"My Anxiety Profile\" in group.   B. Patient will complete and present \"Coping with Stress\" in group.   C. Meet with therapist Neida Hernandez for 1:1.  A. 1/30     B. 2/3      C. 2/3  @ 3 pm PABLO [place]MAD Effective  A. 2/4/20      B 2/4/20      -------------------------------------------------------------------------------------------------------------------                                          Readiness to Change                            DIMENSION 4  RISK FACTOR: 2            AssignmentDate Source Prob/Goal/  Intervention Target  Date Initials Outcome Completion  Date   1/24/20 Self -  Current, History -  Current and Assessment -  Current  Problem: Patient has continued to use alcohol (benzos) despite consequences.   Goal: Understand the impact your substance use has had on you,  your family and significant relationships.  Intervention:   A. Patient will complete and present his \"First Step assignment\"  B. On the large white paper located in the group room, construct a \"Drug Use History\". Write the age, drug used, frequency, and consequences of use. Present it in group.                                            A. 1/28        B. 2/6       DL CASE Effective          B. Effective  A.1/28 B. 2/7      -------------------------------------------------------------------------------------------------------------------                           DIMENSION 5  RISK FACTOR: 4              AssignmentDate Source Prob/Goal/  Intervention Target  Date Initials Outcome " "Completion  Date   1/24/20                                                                                                                                                                                                                                                                                                                                                                                                                                                                                                                                                                                                                                                          Self -  Current, History -  Current and Assessment -  Current  Problem: Patient has had past issues with relapse.  Goal: Learn from the past and get acutely clear on what are your most likely relapse triggers/  warning signs.   Intervention: A. Patient will complete and present: A.   Relapse Prevention Planning.      B. Complete and present \"5 yrs sober versus 5 yrs using\"                                              A. 2/10        B. 2/12 DL  A. Eff      B. Eff  A.  2/20/20      B.  2/20/20                          Recovery Environment                            DIMENSION 6  RISK FACTOR: 4                AssignmentDate Source Prob/Goal/  Intervention Target  Date Initials Outcome Completion  Date   1/24/20 Self -  Current, History -  Current and Assessment -  Current  Problem: Patient needs a sober support network and stay connected consistently.   Goal: Develop a sober support network.  Intervention:   A. Complete and present:   \"Building My Support Network\"  B. Attend 12 Step/recovery meetings and give thought to finding a recovery sponsor/  mentor. A. 2/14     B. ongoing DL  A. Eff    B. Eff  A.  2/20/20    B.  2/20/20      Individual abuse prevention plan (required for lodging plus) : specific actions, referral:   No additional protection measures required other " than the Program Abuse Prevention Plan - No        Acknowledgement of Current Treatment Plan - Initial Treatment Plan      INITIAL TREATMENT PLAN:      1. I have participated in creating my treatment plan with my therapist / counselor on __________.     I agree with the plan as it is written in the electronic health record.     Name Signature/Date   Patient      Name of Therapist / Counselor Signature/Date   Counselor/Therapist          2. I have completed and reviewed my Safety Plan with my counselor and signed this on _________. I have been given the hard copy of this plan.     Patient signature/date:       _____________________________________________________________________________     3. Last Use Date: __________     Patient signature/date:      _____________________________________________________________________________            Individual abuse prevention plan (required for lodging plus) : specific actions, referral:   No additional protection measures required other than the Program Abuse Prevention Plan - No        Acknowledgement of Current Treatment Plan - Initial Treatment Plan      INITIAL TREATMENT PLAN:      1. I have participated in creating my treatment plan with my therapist / counselor on __________.     I agree with the plan as it is written in the electronic health record.     Name Signature/Date   Patient      Name of Therapist / Counselor Signature/Date   Counselor/Therapist          2. I have completed and reviewed my Safety Plan with my counselor and signed this on _________. I have been given the hard copy of this plan.     Patient signature/date:       _____________________________________________________________________________     3. Last Use Date: __________     Patient signature/date:      _____________________________________________________________________________

## 2020-01-28 NOTE — PROGRESS NOTES
Patient:  Xavier Odell            Adult CD Progress Note and Treatment Plan Review     Attendance  Please refer to [facility] CD Adult Attendance Record Documentation Flowsheet    Support group attended this week: yes    Reporting sobriety:  Yes    Treatment Plan     Treatment Plan Review competed on: 1/28/2020.  This review covers 1/20/2020-1/27/2020      Client preferred learning style: Visual  Verbal  Demonstration    Staff Members contributing: Will Short [facility]; Jimbo Ulloa [facility]; Mitra Arreguin [facility]     Received Supervision: Yes    Client: contributed to goals and plan.    Client received copy of plan/revised plan: Yes    Client agrees with plan/revised plan: Yes    Changes to Treatment Plan: No    New Goals added since last review No    Goals worked on since last review: Pt is new,  this is his first treatment plan review. Pt has completed his initial treatment worksheet. He is also working on developing  strategies to maintain  long term  soberity    Strategies effective: yes    Strategies need these changes: Continue to address goals.    1) Care Coordination Activities:  None  2) Medical, Mental Health and other appointments the client attended: None  3) Medication issues: None reported   4) Physical and mental health problems: see dimension 2/3  5) Any changes in Vulnerable Adult Status?  No.      6) Review and evaluation of the individual abuse prevention plan: yes      Guide to Risk Ratings for Suicidality:   IDEATION: Active thoughts of suicide? INTENT: Intent to follow on suicide? PLAN: Plan to follow through on suicide? Level of Risk:   IF Yes Yes Yes Patient = High Emergent   IF Yes Yes No Patient = High Urgent/Non-Emergent   IF Yes No No Patient = Moderate Non-Urgent   IF No No   No Patient = Low Risk   The patient's ADDITIONAL RISK FACTORS and lack of PROTECTIVE FACTORS may increase their overall suicide risk ratings.     Patient's/client's current risk rating:  Low Risk      ASAM Risk Rating:    " Dimension 1 Risk 0;  Pt reports last use as 1/21/2020.  Pt reports experiencing withdrawal symptoms of insomnia, anxiety, depression, shaking and boredom. this past week. Pt will be monitored.      Dimension 2 Risk 0;  Pt reports no medical problems this past week. Pt will attend lecture given by LP nurse on this weekend.       Dimension 3 Risk 2;  Patient reports diagnosis for both depression and anxiety.  Pt's suicide risk assessment on admission was \"Very low risk\".  Pt denies any thoughts of self-harm or suicide ideation at this time.  Pt reports feelings of low energy, higher anxiety, more irritability, feelings of hopelessness. Pt associate this feelings to withdrawal. Pt reports stress level has increased due to relationship issues, ex-gf's, family, faninacial and career,  coping skills to manage stress used were  \"Reading, and deep breathing\".      Dimension 4 Risk 1; Patient verbalizes his commitment to recovery but lacks insight and practice into supportive behaviors. Pt reports his motivation this week is \"Last liaison incident in [place]\". Pt has been attending programming.      Dimension 5 Risk  4;  Patient continues to use despite serious medical issues (8 hospitalizations due to alcohol reported) and lifestyle consequences. Pt reports craving as 2/10, ten being high this past week. The coping skill he used, he states, \"None, cold therapy\". Pt will participate in the spirituality group, facilitated by Fe Haywood and a staff counselor will be present during group.       Dimension 6  Risk   Patient quit his job and moved in with his mother due to his continued alcohol use. Pt is spending free time with male peers and attending at least 3, 12-step meetings weekly while in .  Pt will participate in weekend workshop on relationships on 2/1/2020, and complete all activities. Pt reports, his  aftercare plan, is to go for therapy. He is also requesting for referral for medication management and " therapist.    Family Involvement:   REJI signed none schedule this week    Data:   offered feedback client did participate    Intervention:   Counselor feedback  Education  Emotional management  Group feedback  Relapse prevention  Twelve Step facilitation  Client & counselor reviewed and signed ISP & assessment summary  Mental health education      Assessment:   Stages of Change Model  Preparation/Determination    Appears/Sounds:  Cooperative      Plan:  Monitor emotional/physical health  Continue to work on treatment plan goals.    Mitra Arreguin, [facility]

## 2020-01-29 ENCOUNTER — HOSPITAL ENCOUNTER (OUTPATIENT)
Dept: BEHAVIORAL HEALTH | Facility: CLINIC | Age: 35
End: 2020-01-29
Attending: FAMILY MEDICINE
Payer: COMMERCIAL

## 2020-01-29 PROCEDURE — H2035 A/D TX PROGRAM, PER HOUR: HCPCS

## 2020-01-29 PROCEDURE — H2035 A/D TX PROGRAM, PER HOUR: HCPCS | Mod: HQ

## 2020-01-29 PROCEDURE — 10020000 ZZH LODGING PLUS FACILITY CHARGE ADULT

## 2020-01-30 ENCOUNTER — HOSPITAL ENCOUNTER (OUTPATIENT)
Dept: BEHAVIORAL HEALTH | Facility: CLINIC | Age: 35
End: 2020-01-30
Attending: FAMILY MEDICINE
Payer: COMMERCIAL

## 2020-01-30 PROCEDURE — H2035 A/D TX PROGRAM, PER HOUR: HCPCS

## 2020-01-30 PROCEDURE — 10020000 ZZH LODGING PLUS FACILITY CHARGE ADULT

## 2020-01-30 PROCEDURE — H2035 A/D TX PROGRAM, PER HOUR: HCPCS | Mod: HQ

## 2020-01-31 ENCOUNTER — HOSPITAL ENCOUNTER (OUTPATIENT)
Dept: BEHAVIORAL HEALTH | Facility: CLINIC | Age: 35
End: 2020-01-31
Attending: FAMILY MEDICINE
Payer: COMMERCIAL

## 2020-01-31 PROCEDURE — H2035 A/D TX PROGRAM, PER HOUR: HCPCS | Mod: HQ

## 2020-01-31 PROCEDURE — 10020000 ZZH LODGING PLUS FACILITY CHARGE ADULT

## 2020-02-01 ENCOUNTER — HOSPITAL ENCOUNTER (OUTPATIENT)
Dept: BEHAVIORAL HEALTH | Facility: CLINIC | Age: 35
End: 2020-02-01
Attending: FAMILY MEDICINE
Payer: COMMERCIAL

## 2020-02-01 PROCEDURE — 10020000 ZZH LODGING PLUS FACILITY CHARGE ADULT

## 2020-02-01 PROCEDURE — H2035 A/D TX PROGRAM, PER HOUR: HCPCS | Mod: HQ

## 2020-02-02 ENCOUNTER — HOSPITAL ENCOUNTER (OUTPATIENT)
Dept: BEHAVIORAL HEALTH | Facility: CLINIC | Age: 35
End: 2020-02-02
Attending: FAMILY MEDICINE
Payer: COMMERCIAL

## 2020-02-02 PROCEDURE — 10020000 ZZH LODGING PLUS FACILITY CHARGE ADULT

## 2020-02-02 PROCEDURE — H2035 A/D TX PROGRAM, PER HOUR: HCPCS | Mod: HQ

## 2020-02-03 ENCOUNTER — HOSPITAL ENCOUNTER (OUTPATIENT)
Dept: BEHAVIORAL HEALTH | Facility: CLINIC | Age: 35
End: 2020-02-03
Attending: FAMILY MEDICINE
Payer: COMMERCIAL

## 2020-02-03 PROCEDURE — H2035 A/D TX PROGRAM, PER HOUR: HCPCS | Mod: HQ

## 2020-02-03 PROCEDURE — H2035 A/D TX PROGRAM, PER HOUR: HCPCS

## 2020-02-03 PROCEDURE — 10020000 ZZH LODGING PLUS FACILITY CHARGE ADULT

## 2020-02-04 ENCOUNTER — HOSPITAL ENCOUNTER (OUTPATIENT)
Dept: BEHAVIORAL HEALTH | Facility: CLINIC | Age: 35
End: 2020-02-04
Attending: FAMILY MEDICINE
Payer: COMMERCIAL

## 2020-02-04 PROCEDURE — H2035 A/D TX PROGRAM, PER HOUR: HCPCS

## 2020-02-04 PROCEDURE — 10020000 ZZH LODGING PLUS FACILITY CHARGE ADULT

## 2020-02-04 PROCEDURE — H2035 A/D TX PROGRAM, PER HOUR: HCPCS | Mod: HQ

## 2020-02-04 NOTE — PROGRESS NOTES
"Patient:  Xavier Odell            Adult CD Progress Note and Treatment Plan Review     Attendance  Please refer to OP BEH CD Adult Attendance Record Documentation Flowsheet    Support group attended this week: yes    Reporting sobriety:  Yes    Treatment Plan     Treatment Plan Review competed on: 2/4/2020     Client preferred learning style: Visual  Verbal  Demonstration    Staff Members contributing: Will Short [facility]; Jimbo Ulloa [facility]      Received Supervision: Yes    Client: contributed to goals and plan.    Client received copy of plan/revised plan: Yes    Client agrees with plan/revised plan: Yes    Changes to Treatment Plan: No    New Goals added since last review No    Goals worked on since last review:  Readiness, relapse prevention, building sober support    Strategies effective: yes    Strategies need these changes: Continue to address goals.    1) Care Coordination Activities:  None  2) Medical, Mental Health and other appointments the client attended: None  3) Medication issues: None reported   4) Physical and mental health problems: see dimension 2/3  5) Any changes in Vulnerable Adult Status?  No.      6) Review and evaluation of the individual abuse prevention plan: yes      Guide to Risk Ratings for Suicidality:   IDEATION: Active thoughts of suicide? INTENT: Intent to follow on suicide? PLAN: Plan to follow through on suicide? Level of Risk:   IF Yes Yes Yes Patient = High Emergent   IF Yes Yes No Patient = High Urgent/Non-Emergent   IF Yes No No Patient = Moderate Non-Urgent   IF No No   No Patient = Low Risk   The patient's ADDITIONAL RISK FACTORS and lack of PROTECTIVE FACTORS may increase their overall suicide risk ratings.     Patient's/client's current risk rating:  Low Risk      ASAM Risk Rating:    Dimension 1 Risk 0;  Pt reports last use as 1/21/2020.  Pt reports experiencing withdrawal symptoms of: \"shakes, and increased anxiety this past week. Pt will be monitored.      " "Dimension 2 Risk 0;  Pt reports no medical problems this past week. Pt will attended lecture given by LP nurse on this weekend on TB/HEP A, B, C       Dimension 3 Risk 2;  Patient reports diagnosis for both depression and anxiety.  Pt's suicide risk assessment on admission was \"Very low risk\".  Pt denies any thoughts of self-harm or suicide ideation at this time.  Pt reports no significant mood changes this past week.. Pt reports stress level has not increased this week.  Coping skills to manage stress used were  \"essential oils, Reading,  Breathing\". Pt attended 1:1 With Neida Hernandez this week.       Dimension 4 Risk 1; Patient verbalizes his commitment to recovery but lacks insight and practice into supportive behaviors. Pt reports his motivation this week is \"desire to make life changes\". Pt has been attending programming.      Dimension 5 Risk  4;  Patient continues to use despite serious medical issues (8 hospitalizations due to alcohol reported) and lifestyle consequences. Pt reports craving as 1/10, ten being high this past week. The coping skill he used, he states, \"reading, breathing\". Pt participated in the spirituality group, facilitated by Fe Haywood and a staff counselor will be present during group.       Dimension 6  Risk   Patient quit his job and moved in with his mother due to his continued alcohol use. Pt is spending free time with male peers and attending at least 3, 12-step meetings weekly while in .  Pt participated in weekend workshop on relationships on 2/1/2020, and complete all activities. Pt reports, his  aftercare plan, is to go for therapy. He is also requesting for referral for medication management and therapist.    Family Involvement:   REJI signed none schedule this week    Data:   offered feedback client did participate    Intervention:   Counselor feedback  Education  Emotional management  Group feedback  Relapse prevention  Twelve Step facilitation  Client & counselor " reviewed and signed ISP & assessment summary  Mental health education      Assessment:   Stages of Change Model  Preparation/Determination    Appears/Sounds:  Cooperative      Plan:  Monitor emotional/physical health  Continue to work on treatment plan goals.    GUMARO Slade

## 2020-02-04 NOTE — PROGRESS NOTES
Client did not show for his 10:00am session. Email sent to his counselors, Fanny.     BOBY Pollock

## 2020-02-05 ENCOUNTER — HOSPITAL ENCOUNTER (OUTPATIENT)
Dept: BEHAVIORAL HEALTH | Facility: CLINIC | Age: 35
End: 2020-02-05
Attending: FAMILY MEDICINE
Payer: COMMERCIAL

## 2020-02-05 PROCEDURE — H2035 A/D TX PROGRAM, PER HOUR: HCPCS

## 2020-02-05 PROCEDURE — H2035 A/D TX PROGRAM, PER HOUR: HCPCS | Mod: HQ

## 2020-02-05 PROCEDURE — 10020000 ZZH LODGING PLUS FACILITY CHARGE ADULT

## 2020-02-06 ENCOUNTER — HOSPITAL ENCOUNTER (OUTPATIENT)
Dept: BEHAVIORAL HEALTH | Facility: CLINIC | Age: 35
End: 2020-02-06
Attending: FAMILY MEDICINE
Payer: COMMERCIAL

## 2020-02-06 PROCEDURE — H2035 A/D TX PROGRAM, PER HOUR: HCPCS

## 2020-02-06 PROCEDURE — H2035 A/D TX PROGRAM, PER HOUR: HCPCS | Mod: HQ

## 2020-02-06 PROCEDURE — 10020000 ZZH LODGING PLUS FACILITY CHARGE ADULT

## 2020-02-07 ENCOUNTER — HOSPITAL ENCOUNTER (OUTPATIENT)
Dept: BEHAVIORAL HEALTH | Facility: CLINIC | Age: 35
End: 2020-02-07
Attending: FAMILY MEDICINE
Payer: COMMERCIAL

## 2020-02-07 PROCEDURE — H2035 A/D TX PROGRAM, PER HOUR: HCPCS | Mod: HQ

## 2020-02-07 PROCEDURE — 10020000 ZZH LODGING PLUS FACILITY CHARGE ADULT

## 2020-02-07 PROCEDURE — H2035 A/D TX PROGRAM, PER HOUR: HCPCS
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
## 2020-02-08 ENCOUNTER — HOSPITAL ENCOUNTER (OUTPATIENT)
Dept: BEHAVIORAL HEALTH | Facility: CLINIC | Age: 35
End: 2020-02-08
Attending: FAMILY MEDICINE
Payer: COMMERCIAL

## 2020-02-08 PROCEDURE — 10020000 ZZH LODGING PLUS FACILITY CHARGE ADULT

## 2020-02-08 PROCEDURE — H2035 A/D TX PROGRAM, PER HOUR: HCPCS | Mod: HQ

## 2020-02-09 ENCOUNTER — HOSPITAL ENCOUNTER (OUTPATIENT)
Dept: BEHAVIORAL HEALTH | Facility: CLINIC | Age: 35
End: 2020-02-09
Attending: FAMILY MEDICINE
Payer: COMMERCIAL

## 2020-02-09 PROCEDURE — 10020000 ZZH LODGING PLUS FACILITY CHARGE ADULT

## 2020-02-09 PROCEDURE — H2035 A/D TX PROGRAM, PER HOUR: HCPCS | Mod: HQ

## 2020-02-10 ENCOUNTER — HOSPITAL ENCOUNTER (OUTPATIENT)
Dept: BEHAVIORAL HEALTH | Facility: CLINIC | Age: 35
End: 2020-02-10
Attending: FAMILY MEDICINE
Payer: COMMERCIAL

## 2020-02-10 PROCEDURE — H2035 A/D TX PROGRAM, PER HOUR: HCPCS | Mod: HQ

## 2020-02-10 PROCEDURE — H2035 A/D TX PROGRAM, PER HOUR: HCPCS

## 2020-02-10 PROCEDURE — 10020000 ZZH LODGING PLUS FACILITY CHARGE ADULT

## 2020-02-10 NOTE — PROGRESS NOTES
Counselor talked to Shama at [facility]. She asked for pt to call her to set up admission for aftercare. She thought that there should be openings for the week of 2/24 at [place]. He is to call: Phone: [phone]
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
## 2020-02-11 ENCOUNTER — HOSPITAL ENCOUNTER (OUTPATIENT)
Dept: BEHAVIORAL HEALTH | Facility: CLINIC | Age: 35
End: 2020-02-11
Attending: FAMILY MEDICINE
Payer: COMMERCIAL

## 2020-02-11 PROCEDURE — H2035 A/D TX PROGRAM, PER HOUR: HCPCS

## 2020-02-11 PROCEDURE — H2035 A/D TX PROGRAM, PER HOUR: HCPCS | Mod: HQ

## 2020-02-11 PROCEDURE — 10020000 ZZH LODGING PLUS FACILITY CHARGE ADULT

## 2020-02-11 NOTE — ADDENDUM NOTE
Encounter addended by: Will Short LADC on: 2/11/2020 7:59 AM   Actions taken: Charge Capture section accepted

## 2020-02-11 NOTE — PROGRESS NOTES
"Patient:  Xavier Odell            Adult CD Progress Note and Treatment Plan Review     Attendance  Please refer to OP BEH CD Adult Attendance Record Documentation Flowsheet    Support group attended this week: Yes    Reporting sobriety: Yes     Treatment Plan     Treatment Plan Review competed on: 2/11/2020     Client preferred learning style:   Visual  Verbal  Demonstration    Staff Members contributing: Will Short [facility]; Jimbo Ulloa [facility]; Neida Hernandez LMFT                   Received Supervision: No    Client: Pt contributed to goals and plan.    Client received copy of plan/revised plan: Yes    Client agrees with plan/revised plan: Yes        Changes to Treatment Plan: None at this time.      New Goals added since last review: None.    Goals worked on since last review: Aftercare planning, sobriety, tx plan assignments, 1:1 therapy, group therapy, support network, psychoeducation.     Strategies effective: Yes    Strategies need these changes: No changes at this time.     1) Care Coordination Activities: Pt expressed interest in attending [facility] program after graduation from the [employer] program. Pt's information was faxed to [facility] on 2/7/2020.   2) Medical, Mental Health and other appointments the client attended: None  3) Medication issues: None reported   4) Physical and mental health problems: see dimension 2/3  5) Any changes in Vulnerable Adult Status?  No.      6) Review and evaluation of the individual abuse prevention plan: yes    ASAM Risk Rating:    Dimension 1 0: Pt reports last use as 1/21/2020.  Pt reports experiencing withdrawal symptoms of: \"mild shaking\" this past week. Pt will be monitored.     Dimension 2 0: Pt denied having medical or medication issues this past week. Pt did not attend any healthcare appointments and denied scheduling any appointments for the future.     Dimension 3 2: Pt denied having suicidal thoughts at this time. Pt reported no significant changes in " "his mood or changes in his stress level this past week. Pt reported using \"reading, breathing, and affirmations\" as his coping techniques for dealing with difficult emotions this past week.      Dimension 4 1: Pt expresses internal motivation for change. Pt is active in group process, accepts and provides feedback, has good insight, and appears engaged. Pt is supportive of his group peers. Pt reported that \"family and group\" are what motivated him to be sober and to stay in treatment this week.    Dimension 5 4: (Scale rating 1-10) Pt reported that his cravings were at a 1 this past week on a scale of 1 to 10, 10 being the highest/ most severe. Pt reported that \"breathing and reading\" have been his coping skills he used to manage cravings this past week.     Dimension 6 2: Pt expressed interest in attending [facility] program after graduation from the [employer] program. Pt's information was faxed to [facility] on 2/7/2020.     Guide to Risk Ratings for Suicidality:   IDEATION: Active thoughts of suicide? INTENT: Intent to follow on suicide? PLAN: Plan to follow through on suicide? Level of Risk:   IF Yes Yes Yes Patient = High Emergent   IF Yes Yes No Patient = High Urgent/Non-Emergent   IF Yes No No Patient = Moderate Non-Urgent   IF No No    No Patient = Low Risk   The patient's ADDITIONAL RISK FACTORS and lack of PROTECTIVE FACTORS may increase their overall suicide risk ratings.      Patient's/client's current risk rating:  Low Risk      Any changes in Vulnerable Adult Status? No.  If yes, add to treatment plan and individual abuse prevention plan.    Family Involvement:   Pt did not have family members or concerned persons participate in his tx programming this past week.    Data:   Pt offered feedback, had good insight, and patient actively participated in group. Pt shares openly during group check-in when asked otherwise he is quite guarded.       Intervention:   Counselor feedback  Group feedback  Relapse " prevention  Aftercare planning  Cognitive behavior therapy  Counselor feedback  Education  Emotional management  Motivational enhancement therapy   Twelve Step facilitation  Mental health education  Pt and counselor reviewed and signed ISP and assessment summary.      Assessment:   Stages of Change Model  Contemplation     Appears/ Sounds:  Cooperative  Motivated  Engaged          Plan:  Focus on recovery environment  Monitor emotional/physical health    Continue group therapy, go to AA/NA meetings, work with sponsor, build sober support network, engage in daily structured activities, and have sober fun.        GUMARO Alarcon

## 2020-02-12 ENCOUNTER — HOSPITAL ENCOUNTER (OUTPATIENT)
Dept: BEHAVIORAL HEALTH | Facility: CLINIC | Age: 35
End: 2020-02-12
Attending: FAMILY MEDICINE
Payer: COMMERCIAL

## 2020-02-12 PROCEDURE — H2035 A/D TX PROGRAM, PER HOUR: HCPCS | Mod: HQ

## 2020-02-12 PROCEDURE — 10020000 ZZH LODGING PLUS FACILITY CHARGE ADULT

## 2020-02-13 ENCOUNTER — HOSPITAL ENCOUNTER (OUTPATIENT)
Dept: BEHAVIORAL HEALTH | Facility: CLINIC | Age: 35
End: 2020-02-13
Attending: FAMILY MEDICINE
Payer: COMMERCIAL

## 2020-02-13 PROCEDURE — H2035 A/D TX PROGRAM, PER HOUR: HCPCS | Mod: HQ

## 2020-02-13 PROCEDURE — 10020000 ZZH LODGING PLUS FACILITY CHARGE ADULT

## 2020-02-14 ENCOUNTER — HOSPITAL ENCOUNTER (OUTPATIENT)
Dept: BEHAVIORAL HEALTH | Facility: CLINIC | Age: 35
End: 2020-02-14
Attending: FAMILY MEDICINE
Payer: COMMERCIAL

## 2020-02-14 PROCEDURE — 10020000 ZZH LODGING PLUS FACILITY CHARGE ADULT

## 2020-02-14 PROCEDURE — H2035 A/D TX PROGRAM, PER HOUR: HCPCS | Mod: HQ

## 2020-02-15 ENCOUNTER — HOSPITAL ENCOUNTER (OUTPATIENT)
Dept: BEHAVIORAL HEALTH | Facility: CLINIC | Age: 35
End: 2020-02-15
Attending: FAMILY MEDICINE
Payer: COMMERCIAL

## 2020-02-15 PROCEDURE — 10020000 ZZH LODGING PLUS FACILITY CHARGE ADULT

## 2020-02-15 PROCEDURE — H2035 A/D TX PROGRAM, PER HOUR: HCPCS | Mod: HQ

## 2020-02-15 NOTE — PROGRESS NOTES
Nursing Discharge Planning Meeting    Writer completed discharge planning meeting with patient. Discharge is planned for Thursday, 2/20/20.    Discussed appropriate follow up care to manage FLO, Medical , MH and to obtain medication refills. Patient given a copy of their current medications for reference. Questions were answered at this time and the patient verbalized an understanding of the post-discharge follow up plan.    Patient to schedule an appointment with their PCP:     [facility]    Dr. Girish Cevallos    Address: [address], [place] [address]     Phone: [phone]    Continue to support patient in discharge planning as needed to assure appropriate continuity of care.     Tobacco Cessation  Patient participated in the nicotine replacement therapy for tobacco cessation or reduction during their treatment programming: Yes.  Pt stopped using tobacco.  Uses nicorette gum    The patient was provided with community resources for follow-up to continue tobacco cessation support once in the community. Also the patient was encoruaged to discuss their tobacco cessation efforts with the primary care provider.

## 2020-02-16 ENCOUNTER — HOSPITAL ENCOUNTER (OUTPATIENT)
Dept: BEHAVIORAL HEALTH | Facility: CLINIC | Age: 35
End: 2020-02-16
Attending: FAMILY MEDICINE
Payer: COMMERCIAL

## 2020-02-16 PROCEDURE — 10020000 ZZH LODGING PLUS FACILITY CHARGE ADULT

## 2020-02-16 PROCEDURE — H2035 A/D TX PROGRAM, PER HOUR: HCPCS | Mod: HQ

## 2020-02-17 ENCOUNTER — OFFICE VISIT (OUTPATIENT)
Dept: BEHAVIORAL HEALTH | Facility: CLINIC | Age: 35
End: 2020-02-17
Payer: COMMERCIAL

## 2020-02-17 ENCOUNTER — HOSPITAL ENCOUNTER (OUTPATIENT)
Dept: BEHAVIORAL HEALTH | Facility: CLINIC | Age: 35
End: 2020-02-17
Attending: FAMILY MEDICINE
Payer: COMMERCIAL

## 2020-02-17 VITALS
RESPIRATION RATE: 16 BRPM | OXYGEN SATURATION: 96 % | SYSTOLIC BLOOD PRESSURE: 104 MMHG | HEART RATE: 91 BPM | BODY MASS INDEX: 34.78 KG/M2 | TEMPERATURE: 97.2 F | WEIGHT: 235.5 LBS | DIASTOLIC BLOOD PRESSURE: 76 MMHG

## 2020-02-17 DIAGNOSIS — F10.20 UNCOMPLICATED ALCOHOL DEPENDENCE (H): ICD-10-CM

## 2020-02-17 DIAGNOSIS — F41.1 GAD (GENERALIZED ANXIETY DISORDER): Primary | ICD-10-CM

## 2020-02-17 PROCEDURE — 99214 OFFICE O/P EST MOD 30 MIN: CPT | Performed by: NURSE PRACTITIONER

## 2020-02-17 PROCEDURE — H2035 A/D TX PROGRAM, PER HOUR: HCPCS

## 2020-02-17 PROCEDURE — 10020000 ZZH LODGING PLUS FACILITY CHARGE ADULT

## 2020-02-17 PROCEDURE — H2035 A/D TX PROGRAM, PER HOUR: HCPCS | Mod: HQ

## 2020-02-17 RX ORDER — HYDROXYZINE PAMOATE 25 MG/1
25-50 CAPSULE ORAL 3 TIMES DAILY PRN
Qty: 42 CAPSULE | Refills: 0 | Status: SHIPPED | OUTPATIENT
Start: 2020-02-17 | End: 2020-03-02

## 2020-02-17 NOTE — PROGRESS NOTES
SUBJECTIVE:                                                    Xavier Odell is a 35 year old male who presents to clinic today for the following health issues:     CC: Medication for anxiety and OCD.     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 Because of this his anxiety and OCD have been under-diagnosed and under-treated. For depression, he has been on duloxetine and mirtazapine for a couple of years. They work well for depression. Now he is requesting a medication adjustment to account for the OCD and anxiety. He would also like something for short-term anxiety relief such as hydroxyzine.      Looking back, anxiety and OCD have been a life-long problem although they have only recently been identified. Anxiety is at times unmanagable- shakiness, sweating, not thinking clearly- occurring almost daily but actually improving the more time he is off alcohol. To manage these episodes, drinking was the short term solution, otherwise he avoided situations that made him anxious.     Patient was admitted to  for alcohol use on 1/24/2020 and will be done on Thursday (02/20/2020). Plans to do outpatient treatment three days a week at [facility] in [place]. He plans to continue to see a therapist after discharge and he has been seeing one regularly in . He reports a strong support system, including a supportive family. He denies alcohol cravings. He feels confident that he can stay away from alcohol in the future. He is considering a career change. Patient appears motivated to stay well.    PCP is Girish [facility].     Mental Health Follow up: Anxiety, OCD.     See PHQ-9 for current symptoms.  Other associated symptoms: None    Complicating factors: treatment at   Significant life event:  No   Current substance abuse:  None  Anxiety or Panic symptoms:  Yes    Sleep - sleeping very well  Appetite - normal  Exercise - none, will rejoin [employer] when he gets  out    Smoking - no  Alcohol - last drink on 1/23/2020  Street drugs - none  Marijuana - none  Caffeine - 1 soda daily    PHQ-9  English PHQ-9   Any Language             Problems taking medications regularly: No    Medication side effects: none    Diet: regular (no restrictions)    Social History     Tobacco Use     Smoking status: Never Smoker     Smokeless tobacco: Never Used   Substance Use Topics     Alcohol use: Yes        Problem list and histories reviewed & adjusted, as indicated.  Additional history: as documented    Patient Active Problem List   Diagnosis     Pancreatitis     Chemical dependency (H)     Past Surgical History:   Procedure Laterality Date     ORTHOPEDIC SURGERY         Social History     Tobacco Use     Smoking status: Never Smoker     Smokeless tobacco: Never Used   Substance Use Topics     Alcohol use: Yes     No family history on file.        Current Outpatient Medications   Medication Sig Dispense Refill     albuterol (PROAIR HFA/PROVENTIL HFA/VENTOLIN HFA) 108 (90 Base) MCG/ACT inhaler Inhale 2 puffs into the lungs every 6 hours       bacitracin 500 UNIT/GM OINT Apply topically 2 times daily (Patient not taking: Reported on 1/23/2020) 15 g 0     DULoxetine (CYMBALTA) 30 MG capsule Take 1 capsule (30 mg) by mouth daily 30 capsule 1     folic acid (FOLVITE) 1 MG tablet Take 1 tablet (1 mg) by mouth daily (Patient not taking: Reported on 2/15/2020) 30 tablet 0     mirtazapine (REMERON) 15 MG tablet Take 1 tablet (15 mg) by mouth At Bedtime 30 tablet 1     multivitamin w/minerals (THERA-VIT-M) tablet Take 1 tablet by mouth daily (Patient not taking: Reported on 2/15/2020) 30 tablet 0     naltrexone (DEPADE/REVIA) 50 MG tablet Take 50 mg by mouth daily       nicotine (NICORETTE) 2 MG gum Place 1 each (2 mg) inside cheek as needed for smoking cessation 100 each 3     polyethylene glycol (MIRALAX/GLYCOLAX) packet Take 17 g by mouth daily 30 packet 3     vitamin B1 (THIAMINE) 100 MG tablet Take  1 tablet (100 mg) by mouth daily (Patient not taking: Reported on 2/15/2020) 30 tablet 1     Allergies   Allergen Reactions     Banana      Mild throat irritation per pt     Chicken Allergy      Anaphalxis     Peanut (Diagnostic)      Pt reports does not have allergy to this     Recent Labs   Lab Test 08/18/19  0623 08/16/19  0741 08/15/19  0629 08/14/19  0625   ALT 73*  --  81* 100*   CR 0.83 0.95 0.80 0.87   GFRESTIMATED >90 >90 >90 >90   GFRESTBLACK >90 >90 >90 >90   POTASSIUM 4.0 3.7 3.3* 3.6      BP Readings from Last 3 Encounters:   02/17/20 104/76   08/19/19 124/78    Wt Readings from Last 3 Encounters:   02/17/20 106.8 kg (235 lb 8 oz)   08/12/19 95.9 kg (211 lb 6.4 oz)          Labs reviewed in EPIC  Problem list, Medication list, Allergies, and Medical/Social/Surgical histories reviewed in [facility] and updated as appropriate.      ROS: ROS are negative, except as otherwise noted above in the HPI.    OBJECTIVE:                                                    /76   Pulse 91   Temp 97.2  F (36.2  C) (Temporal)   Resp 16   Wt 106.8 kg (235 lb 8 oz)   SpO2 96%   BMI 34.78 kg/m    Body mass index is 34.78 kg/m .    GENERAL: healthy, alert and no distress  RESP: lungs clear to auscultation - no rales, rhonchi or wheezes  CV: regular rate and rhythm, normal S1 S2, no S3 or S4, no murmur, click or rub  PSYCH: mentation appears normal, affect normal/bright    Mental Status Assessment:  Appearance:   Appropriate   Eye Contact:   Good   Psychomotor Behavior: Normal   Attitude:   Cooperative   Orientation:   All  Speech   Rate / Production: Normal    Volume:  Normal   Mood:    Normal  Affect:    Appropriate   Thought Content:  Clear   Thought Form:  Coherent  Logical   Insight:    Good   Attention Span and Concentration:  appropriate  Recent and Remote Memory:  intact  Fund of Knowledge: appropriate  Muscle Strength and Tone: normal   Suicidal Ideation: reports no thoughts, no intention  Panic-has anxiety  episodes of shakiness, sweatiness, mental fog  Self harm-No       ASSESSMENT/PLAN:                                                    1. ADINA (generalized anxiety disorder)  2. Uncomplicated alcohol dependence  Requesting prn anxiety medication for anxiety episodes. Explained to patient that to make long-term medication adjustments for anxiety and OCD he needs to speak with his PCP or psychiatry. Patient understands and is agreeable to follow-up after LP discharge.  Patient is planning to continue with outpatient treatment at [facility] and continue with supportive care to maintain his sobriety.  - hydrOXYzine (VISTARIL) 25 MG capsule; Take 1-2 capsules (25-50 mg) by mouth 3 times daily as needed for anxiety  Dispense: 42 capsule; Refill: 0      Patient Instructions:  Patient Instructions   - take hydroxyzine every 8 hours as needed for anxiety  - follow up with PCP right away   - see PCP or psychiatry to change meds    Shannon Abrahma, RN BSN  FNP student      Physician Attestation   I, AYSE Silver CNP, personally saw and evaluated Xavier CONTRERAS Cass as part of a shared visit.  I have reviewed and discussed with the advanced practice provider their plan of care.    AYSE Silver CNP  [facility]

## 2020-02-17 NOTE — PROGRESS NOTES
"INDIVIDUAL SESSION SUMMARY    D) Met with client on 2/17/20 from 1:30-1:15pm. Client reported that he will start an evening IOP group at [facility] and [facility] in [place] on 2/26. Client stated that he has not called the individual therapy referrals yet that he received last week but that he looked through them with his mother over the weekend. Client shared that he's been \"obsessing\" about sending a letter to an [employer] where he interviewed in 2019; that this [employer] offered him a job and then rescinded the offer when they learned he had recently walked off a job with no notice. 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 Client discussed an interest in going back to grad school, spending time researching other careers and wanting to find something that is stimulating and supportive of recovery. Client expressed a disinterest in AA.     I) Individual session with client. Provided client with verbal interventions including: validation, nurturing, compassion and support. Therapist pointed out black & white and magical thinking. Therapist offered a hand out on Smart Recovery meetings.     A) Client appears to lack skills for emotional regulation and stress management. Client appears to struggle with impulsive thinking when feeling discomfort. Client appears to understand the importance of a sober support network. Client appears to seek a daily routine, meaningful activities, and a sense of purpose. Client appears to have external and internal motivation at this time and would benefit from continuing support to help with processing past traumas, stress management, emotional regulation, impulse control, increasing resiliency and self-esteem.     P) No future sessions " scheduled. This therapist has provided the client with several therapist referrals to contact in the community.   Neida Hernandez, BOBY  2/17/2020

## 2020-02-17 NOTE — PATIENT INSTRUCTIONS
- take hydroxyzine every 8 hours as needed for anxiety  - follow up with PCP right away   - see PCP or psychiatry to change meds

## 2020-02-18 ENCOUNTER — HOSPITAL ENCOUNTER (OUTPATIENT)
Dept: BEHAVIORAL HEALTH | Facility: CLINIC | Age: 35
End: 2020-02-18
Attending: FAMILY MEDICINE
Payer: COMMERCIAL

## 2020-02-18 PROCEDURE — 10020000 ZZH LODGING PLUS FACILITY CHARGE ADULT

## 2020-02-18 PROCEDURE — H2035 A/D TX PROGRAM, PER HOUR: HCPCS | Mod: HQ

## 2020-02-18 NOTE — PROGRESS NOTES
"Patient:  Xavier Odell            Adult CD Progress Note and Treatment Plan Review     Attendance  Please refer to OP BEH CD Adult Attendance Record Documentation Flowsheet    Support group attended this week: Yes    Reporting sobriety: Yes     Treatment Plan     Treatment Plan Review competed on: 2/18/2020     Client preferred learning style:   Visual  Verbal  Demonstration    Staff Members contributing: Will Short [facility]; Jimbo Ulloa [facility]; Neida Hernandez LMFT                   Received Supervision: No    Client: Pt contributed to goals and plan.    Client received copy of plan/revised plan: Yes    Client agrees with plan/revised plan: Yes        Changes to Treatment Plan: None at this time.      New Goals added since last review: None.    Goals worked on since last review: Aftercare planning, sobriety, tx plan assignments, 1:1 therapy, group therapy, support network, psychoeducation.     Strategies effective: Yes    Strategies need these changes: No changes at this time.     1) Care Coordination Activities: Pt has been accepted to [facility] program after graduation from the [employer] program.   2) Medical, Mental Health and other appointments the client attended: None  3) Medication issues: None reported   4) Physical and mental health problems: see dimension 2/3  5) Any changes in Vulnerable Adult Status?  No.      6) Review and evaluation of the individual abuse prevention plan: yes    ASAM Risk Rating:    Dimension 1 0: Pt reports last use as 1/22/2020.  Pt reports denies withdrawal symptoms this past week. Pt will be monitored.     Dimension 2 0: Pt denied having medical or medication issues this past week. Pt did not attend any healthcare appointments and denied scheduling any appointments for the future.     Dimension 3 2: Pt denied having suicidal thoughts at this time. Pt reported no significant changes in his mood or changes in his stress level this past week. Pt reported using \"talking with " "peers\" as his coping techniques for dealing with difficult emotions this past week.      Dimension 4 1: Pt expresses internal motivation for change. Pt is active in group process, accepts and provides feedback, has good insight, and appears engaged. Pt is supportive of his group peers. Pt reported that \"the people around me on a daily basis\" are what motivated him to be sober and to stay in treatment this week.    Dimension 5 4: (Scale rating 1-10) Pt reported that his cravings were at a 1 this past week on a scale of 1 to 10, 10 being the highest/ most severe. Pt reported that \"talking with peers\" have been his coping skills he used to manage cravings this past week. Pt attended the Relapse prevention workshop.     Dimension 6 2: Pt is planning on attending [facility] program after graduation from the [employer] program. Pt's information was faxed to [facility] on 2/7/2020 and he has an admit date of 2/24.       Guide to Risk Ratings for Suicidality:   IDEATION: Active thoughts of suicide? INTENT: Intent to follow on suicide? PLAN: Plan to follow through on suicide? Level of Risk:   IF Yes Yes Yes Patient = High Emergent   IF Yes Yes No Patient = High Urgent/Non-Emergent   IF Yes No No Patient = Moderate Non-Urgent   IF No No    No Patient = Low Risk   The patient's ADDITIONAL RISK FACTORS and lack of PROTECTIVE FACTORS may increase their overall suicide risk ratings.      Patient's/client's current risk rating:  Low Risk      Any changes in Vulnerable Adult Status? No.  If yes, add to treatment plan and individual abuse prevention plan.    Family Involvement:   Pt did not have family members or concerned persons participate in his tx programming this past week.    Data:   Pt offered feedback, had good insight, and patient actively participated in group. Pt shares openly during group check-in when asked otherwise he is quite guarded.       Intervention:   Counselor feedback  Group feedback  Relapse " prevention  Aftercare planning  Cognitive behavior therapy  Counselor feedback  Education  Emotional management  Motivational enhancement therapy   Twelve Step facilitation  Mental health education  Pt and counselor reviewed and signed ISP and assessment summary.      Assessment:   Stages of Change Model  Contemplation     Appears/ Sounds:  Cooperative  Motivated  Engaged          Plan:  Focus on recovery environment  Monitor emotional/physical health    Continue group therapy, go to AA/NA meetings, work with sponsor, build sober support network, engage in daily structured activities, and have sober fun.        GUMARO Slade

## 2020-02-19 ENCOUNTER — HOSPITAL ENCOUNTER (OUTPATIENT)
Dept: BEHAVIORAL HEALTH | Facility: CLINIC | Age: 35
End: 2020-02-19
Attending: FAMILY MEDICINE
Payer: COMMERCIAL

## 2020-02-19 PROCEDURE — 10020000 ZZH LODGING PLUS FACILITY CHARGE ADULT

## 2020-02-19 PROCEDURE — H2035 A/D TX PROGRAM, PER HOUR: HCPCS | Mod: HQ

## 2020-02-19 NOTE — IP AVS SNAPSHOT
"                  MRN:[id number]                      After Visit Summary   2020    Xavier Odell    MRN: [id number]           Visit Information        Provider Department      2020  7:15 AM ADULT [facility]        MyChart Information    [email or web address] lets you send messages to your doctor, view your test results, renew your prescriptions, schedule appointments and more. To sign up, go to [email or web address] . Click on \"Log in\" on the left side of the screen, which will take you to the Welcome page. Then click on \"Sign up Now\" on the right side of the page.     You will be asked to enter the access code listed below, as well as some personal information. Please follow the directions to create your username and password.     Your access code is: [email or web address]  Expires: 2020  3:17 PM     Your access code will  in 60 days. If you need help or a new code, please call your [facility] clinic or [phone].       Care EveryWhere ID    This is your Care EveryWhere ID. This could be used by other organizations to access your [facility] medical records  [id number]       Equal Access to Services    DESIRAE JASMINE AH: Maynor Woods, wamartada elsa, qaybta kaalmada rachel, kyung cavazos. So [facility] [phone].    ATENCIÓN: Si habla español, tiene a ruiz disposición servicios gratuitos de asistencia lingüística. Alexy al [phone].    We comply with applicable federal and state civil rights laws, including the [place] Human Rights Act. We do not discriminate on the basis of race, color, creed, Mosque, national origin, marital status, age, disability, sex, sexual orientation, or gender identity.       "

## 2020-02-19 NOTE — IP AVS SNAPSHOT
Medication List       Patient:  MAGO BATEMAN   :  1985   Physician:  Keron Silva Jr., MD           This is your record.  Keep this with you and show to your community pharmacist(s) and physician(s) at each visit.     Allergies:  BANANA - (reactions not documented)     CHICKEN ALLERGY - (reactions not documented)     PEANUT (DIAGNOSTIC) - (reactions not documented)               Medications  Valid as of: 2020 -  7:53 AM    Generic Name Brand Name Tablet Size Instructions for use    Albuterol Sulfate PROAIR HFA/PROVENTIL HFA/VENTOLIN  (90 Base) MCG/ACT Inhale 2 puffs into the lungs every 6 hours        Bacitracin bacitracin 500 UNIT/GM Apply topically 2 times daily        DULoxetine HCl CYMBALTA 30 MG Take 1 capsule (30 mg) by mouth daily        Folic Acid FOLVITE 1 MG Take 1 tablet (1 mg) by mouth daily        hydrOXYzine Pamoate VISTARIL 25 MG Take 1-2 capsules (25-50 mg) by mouth 3 times daily as needed for anxiety        Mirtazapine REMERON 15 MG Take 1 tablet (15 mg) by mouth At Bedtime        Multiple Vitamins-Minerals THERA-VIT-M  Take 1 tablet by mouth daily        Naltrexone HCl DEPADE/REVIA 50 MG Take 50 mg by mouth daily        Nicotine Polacrilex NICORETTE 2 MG Place 1 each (2 mg) inside cheek as needed for smoking cessation        Polyethylene Glycol 3350 MIRALAX  Take 17 g by mouth daily        Thiamine HCl THIAMINE 100 MG Take 1 tablet (100 mg) by mouth daily        .           .           .           .

## 2020-02-20 NOTE — PROGRESS NOTES
[address]oors  [place] [address]              Xavier Odell,  1985, was admitted for evaluation/treatment of chemical dependency at [facility].  This person took part in these program(s):     ______ The Inpatient Program   ______ The Outpatient Program   ___X___ The Lodging Plus Program   ______ Lodging Day Outpatient         Date admitted: 1/23/2020  Date discharged: 2/20/2020     Type of discharge:   ___X___ Satisfactory - completed evaluation / treatment   __________ Discharged without completing   ______ Behavioral discharge   ______ Transferred to another chemical dependency program   ______ Transferred to another type of service   ______ Left against medical advice (AMA) / Eloped               Counselor:  GUMARO Castillo and GUMARO Alarcon      Date: 2/20/2020             Time: 7:08 AM

## 2020-02-20 NOTE — PROGRESS NOTES
CHEMICAL DEPENDENCY DISCHARGE SUMMARY    PATIENT NAME:  Xavier Odell  :  1985    EVALUATION COUNSELOR: EMMA García, [facility]  TREATMENT COUNSELORS: Will Short, [facility],  Jimbo Ulloa,  [facility]  REFERRAL SOURCE:  [facility]   PROGRAM:  [facility]  ADMISSION DATE: 2020  DATE OF LAST SESSION: 20  DISCHARGE DATE: 20  ADMISSION DIAGNOSIS:    303.90 Alcohol Dependence    DISCHARGE DIAGNOSIS:  303.90 Alcohol Dependence    DISCHARGE STATUS: Patient successfully completed [facility] program  LAST USE DATE: Patient reported last date of use as 2020.   DAYS OF TREATMENT COMPLETED:  Patient completed 28days of treatment    PRESENTING INFORMATION:  Xavier was accessed to be appropriate for the L[facility]ging Plus CD program.       SERVICES PROVIDED:  Our services included assessment, treatment planning and education regarding chemical dependency, mental illness, relationships, and relapse prevention.  The patient also participated in individual therapy, group therapy, recovery oriented workshops, spiritual care counseling, recovery skills training and aftercare planning.    ISSUES ADDRESS IN TREATMENT:    DIMENSION 1 - ACUTE INTOXICATION/WITHDRAWAL POTENTIAL  ADMISSION RISK RATIN  DISCHARGE RISK RATIN  Patient entered into [facility] on 20.  Patient reported last date of use as 2020. . Throughout treatment patient denied any withdrawal symptoms that would interfere with full participation in treatment programming.     DIMENSION 2 - BIOMEDICAL COMPLICATIONS AND CONDITIONS  ADMISSION RISK RATIN  DISCHARGE RISK RATIN  Upon admissions patient denied any medical concerns that would interfere with full participation in treatment programming. Patient maintain medication compliance throughout treatment. Upon discharge patient appeared able to access medical aid as needed.     DIMENSION 3 - EMOTIONAL, BEHAVIORAL, COGNITIVE  "CONDITIONS AND COMPLICATIONS  ADMISSION RISK RATIN  DISCHARGE RISK RATIN  Upon admission patient reported mental health diagnoses of depression and anxiety.To address this concern patient met with staff therapist Neida Hernandez. Patient presented his assignment, \"My Anxiety Profile\" while in group. He also looked at the ways addiction, anxiety, and depression go together. Upon discharge patient denied any suicidal thoughts or ideations.     DIMENSION 4 - READINESS FOR CHANGE  ADMISSION RISK RATIN  DISCHARGE RISK RATIN  Upon admission patient appeared to minimize his use of alcohol. During the course of treatment Xavier gained insight into the ways alcohol had resulted in consequences. He completed his first step assignment as well as his Drug use History assignment.    DIMENSION 5 - RELAPSE, CONTINUED USE AND CONTINUED PROBLEM POTENTIAL   ADMISSION RISK RATIN  DISCHARGE RISK RATIN  Patient entered [facility] as his first treatment experience. He initially was unaware of this relapse triggers and warning signs. He was able to identify his triggers through his \"relapse prevention planning\" assignment. He also was able to attend multiple relapse prevention workshops.     DIMENSION 6 - RECOVERY ENVIRONMENT  ADMISSION RISK RATING: 3  DISCHARGE RISK RATIN  Patient came into treatment after a period of isolation. While in treatment he was lonnie to start building a sober support network. He attended several Relationships workshops. He also completed his \"building my support network\" assignment. He attended several recovery support group meetings as well.     STRENGTHS: Patient also maintained a positive attitude while in treatment. Patient was an active participant in group therapy and was always open for feedback from their counselors and peers. Patient appears motivated for recovery at this time and willing to incorporate positive changes into their  life.     LIVING ARRANGEMENTS AT DISCHARGE: " with mom     PROGNOSIS:  Prognosis for this patient is favorable at this time.       CONTINUING CARE RECOMMENDATIONS AND REFERRALS:    1.  Abstain from all mood-altering chemicals unless prescribed by a licensed medical provider, and take all medications as prescribed.  2.  Attend a minimum of three AA/NA/Sober support groups weekly in the community.  3.  Obtain a permanent sponsor and maintain regular contact with them.  4.  Admit to [facility] program on 2/26 and follow all recommendations.  5.  Continue to invest in building a sober support network.  6.  Continue to monitor and understand relapse triggers and stressors through the use and development of healthy coping skills.  7.  Obtain a mental health therapist and attend all scheduled sessions.  8. Attend all scheduled medical appointments.  9. Take medication as prescribed       This information has been disclosed to you from records protected by Federal confidentiality rules (42 CFR part 2). The Federal rules prohibit you from making any further disclosure of this information unless further disclosure is expressly permitted by the written consent of the person to whom it pertains or as otherwise permitted by 42 CFR part 2. A general authorization for the release of medical or other information is NOT sufficient for this purpose. The Federal rules restrict any use of the information to criminally investigate or prosecute any alcohol or drug abuse patient.       GUMARO Castillo LADC

## 2020-02-20 NOTE — PROGRESS NOTES
[facility]     Patient: Xavier Odell  MRN: [id number]   : 1985 Age: 35 year old Sex: male   -  Focus of Treatment / Discharge Recommendations    Personal Safety/ Management of Symptoms    * Follow your safety plan.  Report increased symptoms to your care team and /or go to the nearest Emergency Department.    * Call crisis lines as needed    [facility] [place] [phone]                [facility] [phone]  [facility] [phone]              [facility]is [facility] [phone]  [facility] [phone]              [facility] [phone]  [facility] [phone]          National Suicide Prevention [phone]  [facility] [phone]            Suicide Prevention [phone]  [facility] [phone]    Abstinence/Relapse Prevention  * Take all medicines as directed.  Carry a current list of medicines with you.  * Use coping skills: verbalizing thoughts and feelings to other sober and supportive friends and family, exercising, meditation, reading, attending recovery meetings.   * Do not use illicit (street) drugs, controlled substances (narcotics) or alcohol.    Develop/Improve Independent Living/Socialization Skills: Continue to push outside your comfort zone, find balance, but also challenge tendency to isolate.     Community Resources/Supports and Discharge Planning:      Begin OP group therapy at [facility] the week of 20. Phone #[phone] to get a start date.     Find and begin working with a therapist, ie: ask for individual therapist at [facility]     Consult with medical doctor for med refills and as needed.        Client Signature:_______________________   Date / Time:___________  Staff Signature:________________________   Date / Time:___________

## 2021-03-05 ENCOUNTER — HOSPITAL ENCOUNTER (INPATIENT)
Facility: CLINIC | Age: 36
LOS: 2 days | Discharge: HOME | End: 2021-03-08
Attending: EMERGENCY MEDICINE | Admitting: PSYCHIATRY & NEUROLOGY
Payer: COMMERCIAL

## 2021-03-05 DIAGNOSIS — F10.120 ALCOHOL ABUSE WITH UNCOMPLICATED INTOXICATION (H): ICD-10-CM

## 2021-03-05 DIAGNOSIS — F32.9 MAJOR DEPRESSIVE DISORDER WITH SINGLE EPISODE, REMISSION STATUS UNSPECIFIED: ICD-10-CM

## 2021-03-05 DIAGNOSIS — F10.10 ALCOHOL ABUSE: ICD-10-CM

## 2021-03-05 DIAGNOSIS — Z11.52 ENCOUNTER FOR SCREENING LABORATORY TESTING FOR SEVERE ACUTE RESPIRATORY SYNDROME CORONAVIRUS 2 (SARS-COV-2): ICD-10-CM

## 2021-03-05 LAB
ALBUMIN SERPL-MCNC: 3.9 G/DL (ref 3.4–5)
ALCOHOL BREATH TEST: 0.22 (ref 0–0.01)
ALP SERPL-CCNC: 106 U/L (ref 40–150)
ALT SERPL W P-5'-P-CCNC: 168 U/L (ref 0–70)
AMPHETAMINES UR QL SCN: NEGATIVE
ANION GAP SERPL CALCULATED.3IONS-SCNC: 9 MMOL/L (ref 3–14)
AST SERPL W P-5'-P-CCNC: 70 U/L (ref 0–45)
BARBITURATES UR QL: NEGATIVE
BASOPHILS # BLD AUTO: 0.1 10E9/L (ref 0–0.2)
BASOPHILS NFR BLD AUTO: 0.9 %
BENZODIAZ UR QL: NEGATIVE
BILIRUB SERPL-MCNC: 0.3 MG/DL (ref 0.2–1.3)
BUN SERPL-MCNC: 20 MG/DL (ref 7–30)
CALCIUM SERPL-MCNC: 8.1 MG/DL (ref 8.5–10.1)
CANNABINOIDS UR QL SCN: NEGATIVE
CHLORIDE SERPL-SCNC: 104 MMOL/L (ref 94–109)
CO2 SERPL-SCNC: 27 MMOL/L (ref 20–32)
COCAINE UR QL: NEGATIVE
CREAT SERPL-MCNC: 1.06 MG/DL (ref 0.66–1.25)
DIFFERENTIAL METHOD BLD: ABNORMAL
EOSINOPHIL # BLD AUTO: 0 10E9/L (ref 0–0.7)
EOSINOPHIL NFR BLD AUTO: 0.5 %
ERYTHROCYTE [DISTWIDTH] IN BLOOD BY AUTOMATED COUNT: 14.6 % (ref 10–15)
ETHANOL UR QL SCN: POSITIVE
GFR SERPL CREATININE-BSD FRML MDRD: >90 ML/MIN/{1.73_M2}
GLUCOSE SERPL-MCNC: 129 MG/DL (ref 70–99)
HCT VFR BLD AUTO: 51.8 % (ref 40–53)
HGB BLD-MCNC: 16.7 G/DL (ref 13.3–17.7)
IMM GRANULOCYTES # BLD: 0 10E9/L (ref 0–0.4)
IMM GRANULOCYTES NFR BLD: 0.2 %
LYMPHOCYTES # BLD AUTO: 2.2 10E9/L (ref 0.8–5.3)
LYMPHOCYTES NFR BLD AUTO: 25.4 %
MCH RBC QN AUTO: 28.3 PG (ref 26.5–33)
MCHC RBC AUTO-ENTMCNC: 32.2 G/DL (ref 31.5–36.5)
MCV RBC AUTO: 88 FL (ref 78–100)
MONOCYTES # BLD AUTO: 0.5 10E9/L (ref 0–1.3)
MONOCYTES NFR BLD AUTO: 5.8 %
NEUTROPHILS # BLD AUTO: 5.8 10E9/L (ref 1.6–8.3)
NEUTROPHILS NFR BLD AUTO: 67.2 %
NRBC # BLD AUTO: 0 10*3/UL
NRBC BLD AUTO-RTO: 0 /100
OPIATES UR QL SCN: NEGATIVE
PLATELET # BLD AUTO: 492 10E9/L (ref 150–450)
POTASSIUM SERPL-SCNC: 4.2 MMOL/L (ref 3.4–5.3)
PROT SERPL-MCNC: 8.5 G/DL (ref 6.8–8.8)
RBC # BLD AUTO: 5.91 10E12/L (ref 4.4–5.9)
SODIUM SERPL-SCNC: 140 MMOL/L (ref 133–144)
WBC # BLD AUTO: 8.7 10E9/L (ref 4–11)

## 2021-03-05 PROCEDURE — 82075 ASSAY OF BREATH ETHANOL: CPT | Performed by: EMERGENCY MEDICINE

## 2021-03-05 PROCEDURE — C9803 HOPD COVID-19 SPEC COLLECT: HCPCS | Performed by: EMERGENCY MEDICINE

## 2021-03-05 PROCEDURE — 250N000013 HC RX MED GY IP 250 OP 250 PS 637: Performed by: EMERGENCY MEDICINE

## 2021-03-05 PROCEDURE — 80320 DRUG SCREEN QUANTALCOHOLS: CPT | Performed by: EMERGENCY MEDICINE

## 2021-03-05 PROCEDURE — 99285 EMERGENCY DEPT VISIT HI MDM: CPT | Mod: 25 | Performed by: EMERGENCY MEDICINE

## 2021-03-05 PROCEDURE — 90791 PSYCH DIAGNOSTIC EVALUATION: CPT

## 2021-03-05 PROCEDURE — 85025 COMPLETE CBC W/AUTO DIFF WBC: CPT | Performed by: EMERGENCY MEDICINE

## 2021-03-05 PROCEDURE — 80307 DRUG TEST PRSMV CHEM ANLYZR: CPT | Performed by: EMERGENCY MEDICINE

## 2021-03-05 PROCEDURE — 80053 COMPREHEN METABOLIC PANEL: CPT | Performed by: EMERGENCY MEDICINE

## 2021-03-05 PROCEDURE — 99285 EMERGENCY DEPT VISIT HI MDM: CPT | Performed by: EMERGENCY MEDICINE

## 2021-03-05 RX ORDER — DIAZEPAM 5 MG
5-20 TABLET ORAL EVERY 30 MIN PRN
Status: DISCONTINUED | OUTPATIENT
Start: 2021-03-05 | End: 2021-03-06

## 2021-03-05 RX ADMIN — DIAZEPAM 10 MG: 5 TABLET ORAL at 23:32

## 2021-03-06 PROBLEM — F10.10 ALCOHOL ABUSE: Status: ACTIVE | Noted: 2021-03-06

## 2021-03-06 LAB
LABORATORY COMMENT REPORT: NORMAL
SARS-COV-2 RNA RESP QL NAA+PROBE: NEGATIVE
SPECIMEN SOURCE: NORMAL

## 2021-03-06 PROCEDURE — 250N000013 HC RX MED GY IP 250 OP 250 PS 637: Performed by: PSYCHIATRY & NEUROLOGY

## 2021-03-06 PROCEDURE — 250N000013 HC RX MED GY IP 250 OP 250 PS 637: Performed by: EMERGENCY MEDICINE

## 2021-03-06 PROCEDURE — 99223 1ST HOSP IP/OBS HIGH 75: CPT | Mod: AI | Performed by: PSYCHIATRY & NEUROLOGY

## 2021-03-06 PROCEDURE — 99221 1ST HOSP IP/OBS SF/LOW 40: CPT | Performed by: PHYSICIAN ASSISTANT

## 2021-03-06 PROCEDURE — 250N000013 HC RX MED GY IP 250 OP 250 PS 637: Performed by: HOSPITALIST

## 2021-03-06 PROCEDURE — 87635 SARS-COV-2 COVID-19 AMP PRB: CPT | Performed by: EMERGENCY MEDICINE

## 2021-03-06 PROCEDURE — 99207 PR CONSULT E&M CHANGED TO INITIAL LEVEL: CPT | Performed by: PHYSICIAN ASSISTANT

## 2021-03-06 PROCEDURE — 250N000011 HC RX IP 250 OP 636: Performed by: PSYCHIATRY & NEUROLOGY

## 2021-03-06 PROCEDURE — 128N000004 HC R&B CD ADULT

## 2021-03-06 RX ORDER — DULOXETIN HYDROCHLORIDE 30 MG/1
30 CAPSULE, DELAYED RELEASE ORAL DAILY
Status: DISCONTINUED | OUTPATIENT
Start: 2021-03-06 | End: 2021-03-07

## 2021-03-06 RX ORDER — ALBUTEROL SULFATE 90 UG/1
2 AEROSOL, METERED RESPIRATORY (INHALATION) EVERY 6 HOURS PRN
Status: DISCONTINUED | OUTPATIENT
Start: 2021-03-06 | End: 2021-03-08 | Stop reason: HOSPADM

## 2021-03-06 RX ORDER — MIRTAZAPINE 15 MG/1
15 TABLET, FILM COATED ORAL AT BEDTIME
Status: DISCONTINUED | OUTPATIENT
Start: 2021-03-06 | End: 2021-03-08 | Stop reason: HOSPADM

## 2021-03-06 RX ORDER — MULTIPLE VITAMINS W/ MINERALS TAB 9MG-400MCG
1 TAB ORAL DAILY
Status: DISCONTINUED | OUTPATIENT
Start: 2021-03-06 | End: 2021-03-08 | Stop reason: HOSPADM

## 2021-03-06 RX ORDER — FOLIC ACID 1 MG/1
1 TABLET ORAL DAILY
Status: DISCONTINUED | OUTPATIENT
Start: 2021-03-06 | End: 2021-03-06

## 2021-03-06 RX ORDER — CLONIDINE HYDROCHLORIDE 0.1 MG/1
0.1 TABLET ORAL 3 TIMES DAILY PRN
Status: DISCONTINUED | OUTPATIENT
Start: 2021-03-06 | End: 2021-03-08 | Stop reason: HOSPADM

## 2021-03-06 RX ORDER — MAGNESIUM HYDROXIDE/ALUMINUM HYDROXICE/SIMETHICONE 120; 1200; 1200 MG/30ML; MG/30ML; MG/30ML
30 SUSPENSION ORAL EVERY 4 HOURS PRN
Status: DISCONTINUED | OUTPATIENT
Start: 2021-03-06 | End: 2021-03-08 | Stop reason: HOSPADM

## 2021-03-06 RX ORDER — IBUPROFEN 600 MG/1
600 TABLET, FILM COATED ORAL EVERY 6 HOURS PRN
Status: DISCONTINUED | OUTPATIENT
Start: 2021-03-06 | End: 2021-03-08 | Stop reason: HOSPADM

## 2021-03-06 RX ORDER — FOLIC ACID 1 MG/1
1 TABLET ORAL DAILY
Status: DISCONTINUED | OUTPATIENT
Start: 2021-03-06 | End: 2021-03-08 | Stop reason: HOSPADM

## 2021-03-06 RX ORDER — DIAZEPAM 5 MG
5-20 TABLET ORAL EVERY 30 MIN PRN
Status: DISCONTINUED | OUTPATIENT
Start: 2021-03-06 | End: 2021-03-06

## 2021-03-06 RX ORDER — DIAZEPAM 5 MG
5-20 TABLET ORAL EVERY 30 MIN PRN
Status: DISCONTINUED | OUTPATIENT
Start: 2021-03-06 | End: 2021-03-08 | Stop reason: HOSPADM

## 2021-03-06 RX ORDER — ONDANSETRON 4 MG/1
4 TABLET, ORALLY DISINTEGRATING ORAL EVERY 6 HOURS PRN
Status: DISCONTINUED | OUTPATIENT
Start: 2021-03-06 | End: 2021-03-08 | Stop reason: HOSPADM

## 2021-03-06 RX ORDER — TRAZODONE HYDROCHLORIDE 50 MG/1
50 TABLET, FILM COATED ORAL
Status: DISCONTINUED | OUTPATIENT
Start: 2021-03-06 | End: 2021-03-08 | Stop reason: HOSPADM

## 2021-03-06 RX ORDER — ATENOLOL 50 MG/1
50 TABLET ORAL DAILY PRN
Status: DISCONTINUED | OUTPATIENT
Start: 2021-03-06 | End: 2021-03-08 | Stop reason: HOSPADM

## 2021-03-06 RX ORDER — LANOLIN ALCOHOL/MO/W.PET/CERES
100 CREAM (GRAM) TOPICAL DAILY
Status: DISCONTINUED | OUTPATIENT
Start: 2021-03-06 | End: 2021-03-08 | Stop reason: HOSPADM

## 2021-03-06 RX ORDER — MULTIPLE VITAMINS W/ MINERALS TAB 9MG-400MCG
1 TAB ORAL DAILY
Status: DISCONTINUED | OUTPATIENT
Start: 2021-03-06 | End: 2021-03-06

## 2021-03-06 RX ADMIN — MULTIPLE VITAMINS W/ MINERALS TAB 1 TABLET: TAB at 10:16

## 2021-03-06 RX ADMIN — DIAZEPAM 10 MG: 5 TABLET ORAL at 07:37

## 2021-03-06 RX ADMIN — DIAZEPAM 10 MG: 5 TABLET ORAL at 02:32

## 2021-03-06 RX ADMIN — MIRTAZAPINE 15 MG: 15 TABLET, FILM COATED ORAL at 21:28

## 2021-03-06 RX ADMIN — IBUPROFEN 600 MG: 600 TABLET, FILM COATED ORAL at 10:28

## 2021-03-06 RX ADMIN — ONDANSETRON 4 MG: 4 TABLET, ORALLY DISINTEGRATING ORAL at 10:28

## 2021-03-06 RX ADMIN — DIAZEPAM 10 MG: 5 TABLET ORAL at 21:28

## 2021-03-06 RX ADMIN — DIAZEPAM 10 MG: 5 TABLET ORAL at 05:35

## 2021-03-06 RX ADMIN — DIAZEPAM 10 MG: 5 TABLET ORAL at 13:36

## 2021-03-06 RX ADMIN — FOLIC ACID 1 MG: 1 TABLET ORAL at 10:16

## 2021-03-06 RX ADMIN — DULOXETINE HYDROCHLORIDE 30 MG: 30 CAPSULE, DELAYED RELEASE ORAL at 10:16

## 2021-03-06 RX ADMIN — DIAZEPAM 10 MG: 5 TABLET ORAL at 10:16

## 2021-03-06 RX ADMIN — CLONIDINE HYDROCHLORIDE 0.1 MG: 0.1 TABLET ORAL at 18:12

## 2021-03-06 RX ADMIN — CLONIDINE HYDROCHLORIDE 0.1 MG: 0.1 TABLET ORAL at 21:28

## 2021-03-06 RX ADMIN — DIAZEPAM 10 MG: 5 TABLET ORAL at 16:13

## 2021-03-06 RX ADMIN — THIAMINE HCL TAB 100 MG 100 MG: 100 TAB at 10:16

## 2021-03-06 ASSESSMENT — ACTIVITIES OF DAILY LIVING (ADL)
ORAL_HYGIENE: INDEPENDENT
DRESS: INDEPENDENT
HYGIENE/GROOMING: INDEPENDENT

## 2021-03-06 ASSESSMENT — MIFFLIN-ST. JEOR: SCORE: 2009.48

## 2021-03-06 NOTE — ED NOTES
ED to Behavioral Floor Handoff    SITUATION  Xavier Odell is a 36 year old male who speaks English and lives in a home alone The patient arrived in the ED by private car from home with a complaint of Alcohol Problem (Pt seeking detox) and Suicidal (pt endorses SI)  .The patient's current symptoms started/worsened 1 Month ago and during this time the symptoms have increased.   In the ED, pt was diagnosed with   Final diagnoses:   Alcohol abuse        Initial vitals were: BP: (!) 142/80  Pulse: 134  Temp: 98.6  F (37  C)  Resp: 16  Weight: 108.9 kg (240 lb)  SpO2: 100 %   --------  Is the patient diabetic? No   If yes, last blood glucose? --     If yes, was this treated in the ED? --  --------  Is the patient inebriated (ETOH) Yes or Impaired on other substances? No  MSSA done? Yes  Last MSSA score: 10    Were withdrawal symptoms treated? Yes  Does the patient have a seizure history? No. If yes, date of most recent seizure--  --------  Is the patient patient experiencing suicidal ideation? reports the following suicide factors: Suicidal with a plan, no history of attempts    Homicidal ideation? denies current or recent homicidal ideation or behaviors.    Self-injurious behavior/urges? denies current or recent self injurious behavior or ideation.  ------  Was pt aggressive in the ED No  Was a code called No  Is the pt now cooperative? Yes  -------  Meds given in ED:   Medications   diazepam (VALIUM) tablet 5-20 mg (10 mg Oral Given 3/6/21 0082)      Family present during ED course? Yes  Family currently present? No    BACKGROUND  Does the patient have a cognitive impairment or developmental disability? No  Allergies:   Allergies   Allergen Reactions     Banana      Mild throat irritation per pt     Chicken Allergy      Anaphalxis     Peanut (Diagnostic)      Pt reports does not have allergy to this   .   Social demographics are   Social History     Socioeconomic History     Marital status: Single     Spouse name:  None     Number of children: None     Years of education: None     Highest education level: None   Occupational History     None   Social Needs     Financial resource strain: None     Food insecurity     Worry: None     Inability: None     Transportation needs     Medical: None     Non-medical: None   Tobacco Use     Smoking status: Never Smoker     Smokeless tobacco: Never Used   Substance and Sexual Activity     Alcohol use: Yes     Comment: pt drinking 10-12 alcoholic seltzers a day     Drug use: Not Currently     Sexual activity: None   Lifestyle     Physical activity     Days per week: None     Minutes per session: None     Stress: None   Relationships     Social connections     Talks on phone: None     Gets together: None     Attends Yazidi service: None     Active member of club or organization: None     Attends meetings of clubs or organizations: None     Relationship status: None     Intimate partner violence     Fear of current or ex partner: None     Emotionally abused: None     Physically abused: None     Forced sexual activity: None   Other Topics Concern     None   Social History Narrative     None        ASSESSMENT  Labs results   Labs Ordered and Resulted from Time of ED Arrival Up to the Time of Departure from the ED   CBC WITH PLATELETS DIFFERENTIAL - Abnormal; Notable for the following components:       Result Value    RBC Count 5.91 (*)     Platelet Count 492 (*)     All other components within normal limits   COMPREHENSIVE METABOLIC PANEL - Abnormal; Notable for the following components:    Glucose 129 (*)     Calcium 8.1 (*)      (*)     AST 70 (*)     All other components within normal limits   DRUG ABUSE SCREEN 6 CHEM DEP URINE ([facility]) - Abnormal; Notable for the following components:    Ethanol Qual Urine Positive (*)     All other components within normal limits   ALCOHOL BREATH TEST POCT - Abnormal; Notable for the following components:    Alcohol Breath Test 0.221 (*)     All  other components within normal limits   SARS-COV-2 (COVID-19) VIRUS RT-PCR   MSSA SCORE AND VS   NOTIFY      Imaging Studies: No results found for this or any previous visit (from the past 24 hour(s)).   Most recent vital signs BP (!) 142/80   Pulse 134   Temp 98.6  F (37  C) (Oral)   Resp 16   Wt 108.9 kg (240 lb)   SpO2 100%   BMI 35.44 kg/m     Abnormal labs/tests/findings requiring intervention:---   Pain control: pt had none  Nausea control: pt had none    RECOMMENDATION  Are any infection precautions needed (MRSA, VRE, etc.)? No If yes, what infection? --  ---  Does the patient have mobility issues? independently. If yes, what device does the pt use? ---  ---  Is patient on 72 hour hold or commitment? No If on 72 hour hold, have hold and rights been given to patient? N/A  Are admitting orders written if after 10 p.m. ?Yes  Tasks needing to be completed:---     Adis Alvarez RN   [facility]--    [phone] [facility]   [phone] [facility]

## 2021-03-06 NOTE — H&P
Xavier Odell is a 36 year old male who was referred by his mother     History was provided by ELIE who was a fair historian.   CHIEF COMPLAINT: Alcohol    HISTORY OF PRESENT ILLNESS:      Patient is a 36-year-old  male he has chronic alcoholism history of major depressive disorder generalized anxiety disorder gastric ulcer pancreatitis.  Patient came to the emergency room after he told his brother about his drinking who also involved his mother.  Patient began to drink at age of 16 x 19 it was a problem he went to treatment last year and was sober for a year and in January he relapsed he has been drinking daily from morning to evening and going to work worried about paying the bills.    He was having suicidal ideation it was just a thought he had access to gun the gun is removed but he came here to get help      Patient has tolerance, withdrawal, progressive use, loss of control, spending more time and more amount than intended. Patient has made attempts to quit, is experiencing cravings, and reports negative consequences.    He has no history of seizures but has some blotches that she is that he sees? Delirium tremens                          Denies thoughts of suicide or harming others at this time but before coming to the emergency room he had thought of suicidal ideation.      Denies auditory or visual hallucinations.     Patient smokes 0 cigarettes    Patient denied any gambling    Patient does not use any drugs now but in the past he did do several drugs he did not elaborate on them  PSYCHIATRIC REVIEW OF SYSTEMS:         Psychiatric Review of Systems:   Depression:    Patient reports he feels despair dread isolation poor sleep poor energy poor motivation and he had suicidal ideation passively with no plan or intent he had access to gun but the gun was removed as per him not confirmed by the treatment team  Marjan:    Denied sleeplessness, impulsiveness, racing thoughts, increased goal-directed  activities, pressured speech, increase in energy  Marjan Feeling euphoric,Distractible,Impulsive,Grandiose,Talking excessively,Have energy without sleeping,Mood swings,Irritability  Denies: sleeplessness, increased goal-directed activities, abrupt increase in energy, pressured speech  Psychosis:     Denies: visual hallucinations, auditory hallucinations, paranoia  Anxiety:   Reports: excessive worries that are difficult to control for the past 6 months,   Chronic anxiety , not able to stop worrying impacting sleep, poor conc, irritable , muscle tension    panic attacks sob, heart racing sweaty shaky , nausea    Denies: worries that are difficult to control for the past 6 months, panic attacks  PTSD:     Denies: re-experiencing past trauma, nightmares, increased arousal, avoidance of traumatic stimuli, impaired function.  OCD:     Denies: obsessions, checking, symmetry, cleaning, skin picking.  ED:     Denies: restriction, binging, purging.    Denied symptoms of attention deficit disorder include a failure to pay attention to detail, a pattern of careless mistakes, a pattern of inattentive listening, a failure to follow through with projects, poor personal organization, losing necessary objects, distractibility, forgetfulness.    Denied symptoms of borderline personality disorder include a fear of abandonment, unstable self-image, impulsive behavior, dissociative feeling, intense anger, unstable personal relationships, chronic feelings of boredom, periods of intense depressed mood.              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  Family Mental Health History-  DEPRESSION IN BOTH PARENTS   Substance Use Problems - fATHER HAS ALCOHOLISM              PTA Medications:     Medications Prior to Admission   Medication Sig Dispense Refill Last Dose     albuterol (PROAIR HFA/PROVENTIL HFA/VENTOLIN HFA) 108 (90 Base) MCG/ACT inhaler Inhale 2 puffs into the lungs every 6 hours   3/5/2021     bacitracin 500 UNIT/GM OINT Apply topically 2 times daily 15 g 0 3/5/2021     DULoxetine (CYMBALTA) 30 MG capsule Take 1 capsule (30 mg) by mouth daily 30 capsule 1 3/5/2021     folic acid (FOLVITE) 1 MG tablet Take 1 tablet (1 mg) by mouth daily 30 tablet 0 3/5/2021     mirtazapine (REMERON) 15 MG tablet Take 1 tablet (15 mg) by mouth At Bedtime 30 tablet 1 3/4/2021 at Unknown time     multivitamin w/minerals (THERA-VIT-M) tablet Take 1 tablet by mouth daily 30 tablet 0 3/5/2021     nicotine (NICORETTE) 2 MG gum Place 1 each (2 mg) inside cheek as needed for smoking cessation 100 each 3 3/5/2021     polyethylene glycol (MIRALAX/GLYCOLAX) packet Take 17 g by mouth daily 30 packet 3 3/5/2021     vitamin B1 (THIAMINE) 100 MG tablet Take 1 tablet (100 mg) by mouth daily 30 tablet 1 3/5/2021     naltrexone (DEPADE/REVIA) 50 MG tablet Take 50 mg by mouth daily   Unknown at Unknown time          Allergies:     Allergies   Allergen Reactions     Banana      Mild throat irritation per pt     Chicken Allergy      Anaphalxis     Peanut (Diagnostic)      Pt reports does not have allergy to this          Labs:     Recent Results (from the past 48 hour(s))   Drug abuse screen 6 urine (tox)    Collection Time: 03/05/21 10:31 PM   Result Value Ref Range    Amphetamine Qual Urine Negative NEG^Negative    Barbiturates Qual Urine Negative NEG^Negative    Benzodiazepine Qual Urine Negative NEG^Negative    Cannabinoids Qual Urine Negative NEG^Negative    Cocaine Qual Urine Negative NEG^Negative    Ethanol  Qual Urine Positive (A) NEG^Negative    Opiates Qualitative Urine Negative NEG^Negative   Alcohol breath test POCT    Collection Time: 03/05/21 11:06 PM   Result Value Ref Range    Alcohol Breath Test 0.221 (A) 0.00 - 0.01   CBC with platelets differential    Collection Time: 03/05/21 11:14 PM   Result Value Ref Range    WBC 8.7 4.0 - 11.0 10e9/L    RBC Count 5.91 (H) 4.4 - 5.9 10e12/L    Hemoglobin 16.7 13.3 - 17.7 g/dL    Hematocrit 51.8 40.0 - 53.0 %    MCV 88 78 - 100 fl    MCH 28.3 26.5 - 33.0 pg    MCHC 32.2 31.5 - 36.5 g/dL    RDW 14.6 10.0 - 15.0 %    Platelet Count 492 (H) 150 - 450 10e9/L    Diff Method Automated Method     % Neutrophils 67.2 %    % Lymphocytes 25.4 %    % Monocytes 5.8 %    % Eosinophils 0.5 %    % Basophils 0.9 %    % Immature Granulocytes 0.2 %    Nucleated RBCs 0 0 /100    Absolute Neutrophil 5.8 1.6 - 8.3 10e9/L    Absolute Lymphocytes 2.2 0.8 - 5.3 10e9/L    Absolute Monocytes 0.5 0.0 - 1.3 10e9/L    Absolute Eosinophils 0.0 0.0 - 0.7 10e9/L    Absolute Basophils 0.1 0.0 - 0.2 10e9/L    Abs Immature Granulocytes 0.0 0 - 0.4 10e9/L    Absolute Nucleated RBC 0.0    Comprehensive metabolic panel    Collection Time: 03/05/21 11:14 PM   Result Value Ref Range    Sodium 140 133 - 144 mmol/L    Potassium 4.2 3.4 - 5.3 mmol/L    Chloride 104 94 - 109 mmol/L    Carbon Dioxide 27 20 - 32 mmol/L    Anion Gap 9 3 - 14 mmol/L    Glucose 129 (H) 70 - 99 mg/dL    Urea Nitrogen 20 7 - 30 mg/dL    Creatinine 1.06 0.66 - 1.25 mg/dL    GFR Estimate >90 >60 mL/min/[1.73_m2]    GFR Estimate If Black >90 >60 mL/min/[1.73_m2]    Calcium 8.1 (L) 8.5 - 10.1 mg/dL    Bilirubin Total 0.3 0.2 - 1.3 mg/dL    Albumin 3.9 3.4 - 5.0 g/dL    Protein Total 8.5 6.8 - 8.8 g/dL    Alkaline Phosphatase 106 40 - 150 U/L     (H) 0 - 70 U/L    AST 70 (H) 0 - 45 U/L   Asymptomatic SARS-CoV-2 COVID-19 Virus (Coronavirus) by PCR    Collection Time: 03/06/21 12:48 AM    Specimen: Nasopharyngeal   Result Value Ref Range  "   SARS-CoV-2 Virus Specimen Source Nasopharyngeal     SARS-CoV-2 PCR Result NEGATIVE     SARS-CoV-2 PCR Comment (Note)          BP (!) 144/110 (BP Location: Left arm)   Pulse 122   Temp 97.7  F (36.5  C) (Temporal)   Resp 16   Ht 1.753 m (5' 9\")   Wt 108.4 kg (239 lb)   SpO2 97%   BMI 35.29 kg/m    Weight is 239 lbs 0 oz  Body mass index is 35.29 kg/m .    Physical Exam:     ROS: 10 point ROS neg other than the symptoms noted above in the HPI.            Past Medical History:   PAST MEDICAL HISTORY:   Past Medical History:   Diagnosis Date     Uncomplicated asthma        PAST SURGICAL HISTORY:   Past Surgical History:   Procedure Laterality Date     ORTHOPEDIC SURGERY         -    -           MENTAL STATUS EXAM:      Constitutional: General appearance of patient:      Appearance:  awake, alert, appeared as age stated, adequate groomed and slightly unkempt  Attitude:  cooperative  Eye Contact:  good  Mood:   Depressed  Affect:  congruent   Speech:  clear, coherent normal rate   Psychomotor Behavior:  no evidence of tardive dyskinesia, dystonia, or tics  Thought Process:  logical, linear and goal oriented  Associations:  no loose associations  Thought Content:  no evidence of psychotic thought and active suicidal ideation present  Denied any active suicidal /homicidation ideation plan intent   Insight:  fair  Judgment:  fair  Oriented to:  time, person, and place  Attention Span and Concentration:  intact  Recent and Remote Memory:  intact  Language:  english with appropriate syntax and vocabulary  Fund of Knowledge: appropriate  Muscle Strength and Tone: normal  Gait and Station: Normal     There are no abnormal or psychotic thoughts, no preoccupations, no overvalued ideas, no rumination, no obsessions, no compulsions, no somatic concerns, no hypochrondriasis, no ideas of reference, and no delusions.  Patient denies homicidal thoughts.   Patient denies suicidal thoughts.  Patient appears to have good judgment " and good insight.     Musculoskeletal: Patient shows no abnormalities of motor activity: there is no tremor, no tic, and no dystonia.  There is no apparent muscle atrophy, strength and tone appear normal, and there are no abnormal movements.  Patient has normal gait and stance.    DISCUSSION:         Assessment:   Inpatient psychiatric hospitalization is warranted at this time for safety, stabilization, and possible adjustment in medications.    Patient has a biological predisposition with family history positive for alcoholism in father suspects depression in both parents  Psychologically patient is experiencing alcohol use disorder with tolerance withdrawal progress loss of control increasing depression increasing anxiety having passive suicidal ideation  Patient has these particular stressors not able to go to work money problems he worked as a  and he says it was hard for him to do the job  Patient has chronic illness exacerbation leading to hospitalization progression as described.     Patient has been unable to stop using drugs in the community due to both physical and psychological symptoms.  Continued use will put the patient at risk for medical and/or psychiatric complications.       Diagnoses:    Alcohol use disorder severe  Alcohol withdrawal severe  Major depressive disorder moderate recurrent without psychosis  Suicidal ideation with access to gun no plan or intent          Plan:   Problem list  1#alcohol use disorder severe alcohol withdrawal severe     - [facility] protocol using Valium for management of alcohol withdrawal  Patient has a pulse of 125 blood pressure of 144/110 he has tremor sleep disturbance agitation sweats he has nausea vomiting diarrhea he is required 10 mg of diazepam's since he came on the unit since his admission he is required 40 mg of diazepam  - Continue thiamine, folate, and multivitamin daily    2#for his depression patient will continue Cymbalta 30 mg primary team  to decide if titration needs to occur after detox is complete    3#AST and ALT elevated 7168 atypical distribution for alcoholism but we'll put internal medicine consult  4#we'll order GGT  5#patient has suicidal ideation in the emergency room but denies at this right now he has no plan or intent no urge   to see if patient does not have an access to gun to confirm removal of access to guns removal      - Consider anti-craving medications prior to discharge. Pt willing to review additional information about both naltrexone and Antabuse.    Alcohol withdrawal nausea prn Zofran as needed for nausea     hydroxyzine 25 mg q4h prn for acute anxiety  Trazodone 50 mg at bedtime prn for sleep disturbances       Patient has been unable to stop using drugs in the community due to both physical and psychological symptoms.  Continued use will put the patient at risk for medical and/or psychiatric complications.    I HAVE REVIEWED LABS WITH PT AND TALKED ABOUT RESULTS WITH PT  I HAVE REVIEWED AND SUMMARIZED OLD RECORDS including his medication reconcilation of his home medications  and PDMP   I HAVE SPOKEN WITH RN ABOUT MEDICATIONS AND DETOX SCORES  I HAVE SPOKEN WITH CM ABOUT PTS TREATMETN OPTIONS   Laboratory/Imaging:    Liver Function Studies -   Recent Labs   Lab Test 03/05/21  2314   PROTTOTAL 8.5   ALBUMIN 3.9   BILITOTAL 0.3   ALKPHOS 106   AST 70*   *      Last Comprehensive Metabolic Panel:  Sodium   Date Value Ref Range Status   03/05/2021 140 133 - 144 mmol/L Final     Potassium   Date Value Ref Range Status   03/05/2021 4.2 3.4 - 5.3 mmol/L Final     Chloride   Date Value Ref Range Status   03/05/2021 104 94 - 109 mmol/L Final     Carbon Dioxide   Date Value Ref Range Status   03/05/2021 27 20 - 32 mmol/L Final     Anion Gap   Date Value Ref Range Status   03/05/2021 9 3 - 14 mmol/L Final     Glucose   Date Value Ref Range Status   03/05/2021 129 (H) 70 - 99 mg/dL Final     Urea Nitrogen   Date  "Value Ref Range Status   03/05/2021 20 7 - 30 mg/dL Final     Creatinine   Date Value Ref Range Status   03/05/2021 1.06 0.66 - 1.25 mg/dL Final     GFR Estimate   Date Value Ref Range Status   03/05/2021 >90 >60 mL/min/[1.73_m2] Final     Comment:     Non  GFR Calc  Starting 12/18/2018, serum creatinine based estimated GFR (eGFR) will be   calculated using the Chronic Kidney Disease Epidemiology Collaboration   (CKD-EPI) equation.       Calcium   Date Value Ref Range Status   03/05/2021 8.1 (L) 8.5 - 10.1 mg/dL Final     Bilirubin Total   Date Value Ref Range Status   03/05/2021 0.3 0.2 - 1.3 mg/dL Final     Alkaline Phosphatase   Date Value Ref Range Status   03/05/2021 106 40 - 150 U/L Final     ALT   Date Value Ref Range Status   03/05/2021 168 (H) 0 - 70 U/L Final     AST   Date Value Ref Range Status   03/05/2021 70 (H) 0 - 45 U/L Final                   Medical treatment/interventions:  Medical concerns: As above    - Consults: IM consult placed. Appreciate assistance.     Legal Status: Voluntary     Safety Assessment:   Checks: Status 15  Pt has not required locked seclusion or restraints in the past 24 hours to maintain safety, please refer to RN documentation for further details.    The risks, benefits, alternatives and side effects have been discussed and are understood by the patient.       Patient will be treated in therapeutic milieu with appropriate individual and group therapies as described.  Disposition: Pending clinical stabilization. Pt does  appear interested in outpatient CD treatment        \"Much or all of the text in this note was generated through the use of Dragon Dictate voice to text software. Errors in spelling or words which appear to be out of contact are unintentional, may be present due having escaped editing\"     "

## 2021-03-06 NOTE — CONSULTS
"[facility]    Internal Medicine Initial Consult       Date of Admission: 3/5/2021  Consult Requested by: Pamela Lala MD  Reason for Consult: Co-Management of Chronic Medical Conditions    Assessment & Recommendations  Xavier Odell is a 36 year old man with a past medical history of asthma, GERD, ADINA, MDD, and alcohol dependency who is admitted to station 3A with alcohol withdrawal.        Alcohol Withdrawal - Patient drinks 10-15 [place] daily. Patient previously sober for one year then relapsed January 2021  -CIWA per protocol   -Valium 5-20mg Q30min PRN  -Management per psychiatry team.     ADINA  MDD - Continue duloxetine 30mg daily and mirtazapine 15mg nightly.    -Management per psychiatry     Asthma - Well controlled with albuterol, continue while inpatient.     Transaminitis, mild - , AST 70.  Suspect elevation related to ongoing alcohol abuse vs underlying hepatic steatosis.   -Repeat hepatic panel tomorrow  -Recommend outpatient follow up with PCP    GERD  Hx of Upper GI Bleeding 2/2 Gastric Ulcer (5/2019) - Admitted 5/12-5/20/2019 for massive hematemesis and hemorrhagic shock due to a Grade D esophagitis with superficial gastric ulcers.  Was on a PPI BID for 8 weeks. No longer on GI prophylaxis. Patient currently denies abdominal pain, hematochezia, hematuria, melena.   -Low threshold to restart omeprazole vs pantoprazole should patient complain of GI symptoms       Medicine will sign off, please page with any additional concerns.     Estrellita Matthews PA-C  Hospitalist Service  Pager: [phone]  7a-6p M-F and 7a-3p weekends/holidays, through 3/7/21  Otherwise page job code [phone] (3B), [phone] (3A), or [phone] ([facility] and )  Text paging via Shutl is appreciated  ______________________________________________________________________    Reason for Admission  Alcohol withdrawal and suicidal ideations    Chief Complaint   \"Alcohol " "problem\"    History of Present Illness   History is obtained from the patient and medical record.     Xavier Odell is a 36 year old year old man with a PMH as listed above who is admitted to behavioral health for alcohol withdrawal and suicidal ideations.    Internal Medicine service was asked to see patient for a general medical evaluation. Currently, patient is resting in bed.  He states he's \"feeling okay but tired.\" He states he was only able to eat some of his breakfast secondary to nausea.  He states his anxiety is \"very high\" and notes it's been several hours since his last valium. Patient confirms history of GERD and GI bleeding.  Denies heartburn, abdominal pain, hematemesis, hematochezia, or melena.     Review of Systems   10 point ROS performed and negative unless otherwise noted in HPI     Past Medical History    I have reviewed this patient's medical history and updated it with pertinent information if needed.   Past Medical History:   Diagnosis Date     Uncomplicated asthma         Past Surgical History   I have reviewed this patient's surgical history and updated it with pertinent information if needed.  Past Surgical History:   Procedure Laterality Date     ORTHOPEDIC SURGERY          Social History   Social History     Tobacco Use     Smoking status: Never Smoker     Smokeless tobacco: Never Used   Substance Use Topics     Alcohol use: Yes     Comment: pt drinking 10-12 alcoholic seltzers a day     Drug use: Not Currently       Family History   Family history reviewed with patient and is noncontributory.    Medications   Medications Prior to Admission   Medication Sig Dispense Refill Last Dose     albuterol (PROAIR HFA/PROVENTIL HFA/VENTOLIN HFA) 108 (90 Base) MCG/ACT inhaler Inhale 2 puffs into the lungs every 6 hours   3/5/2021     bacitracin 500 UNIT/GM OINT Apply topically 2 times daily 15 g 0 3/5/2021     DULoxetine (CYMBALTA) 30 MG capsule Take 1 capsule (30 mg) by mouth daily 30 capsule 1 " "3/5/2021     folic acid (FOLVITE) 1 MG tablet Take 1 tablet (1 mg) by mouth daily 30 tablet 0 3/5/2021     mirtazapine (REMERON) 15 MG tablet Take 1 tablet (15 mg) by mouth At Bedtime 30 tablet 1 3/4/2021 at Unknown time     multivitamin w/minerals (THERA-VIT-M) tablet Take 1 tablet by mouth daily 30 tablet 0 3/5/2021     nicotine (NICORETTE) 2 MG gum Place 1 each (2 mg) inside cheek as needed for smoking cessation 100 each 3 3/5/2021     polyethylene glycol (MIRALAX/GLYCOLAX) packet Take 17 g by mouth daily 30 packet 3 3/5/2021     vitamin B1 (THIAMINE) 100 MG tablet Take 1 tablet (100 mg) by mouth daily 30 tablet 1 3/5/2021     naltrexone (DEPADE/REVIA) 50 MG tablet Take 50 mg by mouth daily   Unknown at Unknown time       Allergies      Allergies   Allergen Reactions     Banana      Mild throat irritation per pt     Chicken Allergy      Anaphalxis     Peanut (Diagnostic)      Pt reports does not have allergy to this.  Patient today, 3/6/2021, patient confirms no serious aversion to peanuts.  Therefore, no removal of peanut products from garcia.       Physical Exam   BP (!) 140/106   Pulse 120   Temp 97.7  F (36.5  C) (Temporal)   Resp 16   Ht 1.753 m (5' 9\")   Wt 108.4 kg (239 lb)   SpO2 97%   BMI 35.29 kg/m     GENERAL: Alert and oriented X3. Cooperative.   HEENT: Anicteric sclera. Mucous membranes moist.   CV: RRR. S1, S2. No murmurs appreciated.   RESPIRATORY: Effort normal on room air. Lungs CTAB with no wheezing, rales, rhonchi.   GI: Abdomen soft, non distended, non tender.   NEUROLOGICAL: No focal deficits. Moves all extremities. Essential tremor noted  EXTREMITIES: No peripheral edema. Warm and well perfused.   SKIN: No jaundice. No rashes.     Data   Data reviewed today: I reviewed all medications, new labs and imaging results over the last 24 hours.   Results for BHAVANA MAGO CHAIREZ (MRN [id number]) as of 3/6/2021 12:20   Ref. Range 3/5/2021 23:14   Sodium Latest Ref Range: 133 - 144 mmol/L 140 "   Potassium Latest Ref Range: 3.4 - 5.3 mmol/L 4.2   Chloride Latest Ref Range: 94 - 109 mmol/L 104   Carbon Dioxide Latest Ref Range: 20 - 32 mmol/L 27   Urea Nitrogen Latest Ref Range: 7 - 30 mg/dL 20   Creatinine Latest Ref Range: 0.66 - 1.25 mg/dL 1.06   GFR Estimate Latest Ref Range: >60 mL/min/1.73_m2 >90   GFR Estimate If Black Latest Ref Range: >60 mL/min/1.73_m2 >90   Calcium Latest Ref Range: 8.5 - 10.1 mg/dL 8.1 (L)   Anion Gap Latest Ref Range: 3 - 14 mmol/L 9   Albumin Latest Ref Range: 3.4 - 5.0 g/dL 3.9   Protein Total Latest Ref Range: 6.8 - 8.8 g/dL 8.5   Bilirubin Total Latest Ref Range: 0.2 - 1.3 mg/dL 0.3   Alkaline Phosphatase Latest Ref Range: 40 - 150 U/L 106   ALT Latest Ref Range: 0 - 70 U/L 168 (H)   AST Latest Ref Range: 0 - 45 U/L 70 (H)   Glucose Latest Ref Range: 70 - 99 mg/dL 129 (H)   WBC Latest Ref Range: 4.0 - 11.0 10e9/L 8.7   Hemoglobin Latest Ref Range: 13.3 - 17.7 g/dL 16.7   Hematocrit Latest Ref Range: 40.0 - 53.0 % 51.8   Platelet Count Latest Ref Range: 150 - 450 10e9/L 492 (H)   RBC Count Latest Ref Range: 4.4 - 5.9 10e12/L 5.91 (H)   MCV Latest Ref Range: 78 - 100 fl 88   MCH Latest Ref Range: 26.5 - 33.0 pg 28.3   MCHC Latest Ref Range: 31.5 - 36.5 g/dL 32.2

## 2021-03-06 NOTE — SAFE
"Xavier Odell  March 6, 2021    Pt.'s alcohol breath test was 0.221 upon arrival. Pt. given Valium in ED. Pt. endorses drinking 10 white claw seltzers per day for the past 6-8 weeks. Pt. states he will wake up and immediately have a hard seltzer while showering. Pt. describes withdrawal symptoms including shaking, nausea, vomiting, diarrhea, and skin feeling like it's \"on fire\". Pt. denies history of seizures. Pt. most recently drank at 2pm this afternoon prior to arrival.    Pt. endorses nicotine use, has 2x 80mg pouches in his mouth \"constantly for years\". Pt. endorses remote history of Xanax and Adderall abuse over 5 years ago, none since.     Pt. endorses passive suicidal ideation \"always\". Pt. states thoughts include, \"not waking up wouldn't be the worst thing\" and that he would \"rather be dead than deal with anguish\". Pt. denies intent, stating he would \"never act on it\". Pt. states he loves his mother and brother too much. Pt. denies any plan for suicide. Pt. did disclose having a firearm in his residence. Pt. states he gave the gun to his brother today as a \"preventative measure\" but denies any intent of using it for suicide. Pt. states he can keep himself safe on a detox unit.      Current Suicidal Ideation/Self-Injurious Concerns/Methods: Other ideation.    Inappropriate Sexual Behavior: No    Aggression/Homicidal Ideation: None - N/A      For additional details see full DEC assessment.       OMAR Mcdowell      "
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
## 2021-03-06 NOTE — ED PROVIDER NOTES
[facility] EMERGENCY DEPARTMENT ([facility])     March 5, 2021  History     Chief Complaint   Patient presents with     Alcohol Problem     Pt seeking detox     Suicidal     pt endorses SI     HPI  Xavier Odell is a 36 year old male with a PMH of alcohol abuse, gastric ulcer, pancreatitis, ADINA and MDD who presents to the ED today complaining of an alcohol problem and suicidal ideation.  Patient states that he drinks 10-15 white claw drinks a day.  He starts drinking in the morning until he goes to sleep.  This is been an ongoing thing since the end of January.  Patient has a long history of alcohol abuse with a year of sobriety.  He endorses some suicidal ideations and did have access to a gun this evening and requested for his family to have it removed.  He states that he is always passively suicidal but does not currently have a plan.  He denies any other drug use.  He denies any other significant acute medical issues but has had a history of GI bleeds in the past.  He states that his withdrawal symptoms are increasing anxiety, depression and panicky feeling.  He denies any seizure history in the past.    I have reviewed the Medications, Allergies, Past Medical and Surgical History, and Social History in the [facility] system.  PAST MEDICAL HISTORY:   Past Medical History:   Diagnosis Date     Uncomplicated asthma        PAST SURGICAL HISTORY:   Past Surgical History:   Procedure Laterality Date     ORTHOPEDIC SURGERY         Past medical history, past surgical history, medications, and allergies were reviewed with the patient. Additional pertinent items: None    FAMILY HISTORY: No family history on file.    SOCIAL HISTORY:   Social History     Tobacco Use     Smoking status: Never Smoker     Smokeless tobacco: Never Used   Substance Use Topics     Alcohol use: Yes     Comment: pt drinking 10-12 alcoholic seltzers a day     Social history was reviewed with the patient. Additional pertinent items:  None      Patient's Medications   New Prescriptions    No medications on file   Previous Medications    ALBUTEROL (PROAIR HFA/PROVENTIL HFA/VENTOLIN HFA) 108 (90 BASE) MCG/ACT INHALER    Inhale 2 puffs into the lungs every 6 hours    BACITRACIN 500 UNIT/GM OINT    Apply topically 2 times daily    DULOXETINE (CYMBALTA) 30 MG CAPSULE    Take 1 capsule (30 mg) by mouth daily    FOLIC ACID (FOLVITE) 1 MG TABLET    Take 1 tablet (1 mg) by mouth daily    MIRTAZAPINE (REMERON) 15 MG TABLET    Take 1 tablet (15 mg) by mouth At Bedtime    MULTIVITAMIN W/MINERALS (THERA-VIT-M) TABLET    Take 1 tablet by mouth daily    NALTREXONE (DEPADE/REVIA) 50 MG TABLET    Take 50 mg by mouth daily    NICOTINE (NICORETTE) 2 MG GUM    Place 1 each (2 mg) inside cheek as needed for smoking cessation    POLYETHYLENE GLYCOL (MIRALAX/GLYCOLAX) PACKET    Take 17 g by mouth daily    VITAMIN B1 (THIAMINE) 100 MG TABLET    Take 1 tablet (100 mg) by mouth daily   Modified Medications    No medications on file   Discontinued Medications    No medications on file          Allergies   Allergen Reactions     Banana      Mild throat irritation per pt     Chicken Allergy      Anaphalxis     Peanut (Diagnostic)      Pt reports does not have allergy to this        Review of Systems  A complete review of systems was performed with pertinent positives and negatives noted in the HPI, and all other systems negative.    Physical Exam   BP: (!) 142/80  Pulse: 134  Temp: 98.6  F (37  C)  Resp: 16  Weight: 108.9 kg (240 lb)  SpO2: 100 %      Physical Exam  Vitals signs and nursing note reviewed.   Constitutional:       General: He is not in acute distress.     Appearance: He is well-developed. He is not ill-appearing, toxic-appearing or diaphoretic.      Comments: Comfortably resting, lying in bed, NAD, nondiaphoretic, lucid, fully conversant, no  respiratory distress, alert and oriented.  Intoxicated with no signs of acute withdrawal at this time   HENT:       Head: Normocephalic and atraumatic.      Mouth/Throat:      Mouth: Mucous membranes are moist.   Eyes:      General: No scleral icterus.  Neck:      Musculoskeletal: Normal range of motion and neck supple.   Cardiovascular:      Rate and Rhythm: Tachycardia present.   Pulmonary:      Effort: Pulmonary effort is normal.   Skin:     General: Skin is warm and dry.      Findings: No rash.   Neurological:      Mental Status: He is alert and oriented to person, place, and time.         ED Course        Procedures                           Results for orders placed or performed during the hospital encounter of 03/05/21 (from the past 24 hour(s))   Drug abuse screen 6 urine (tox)   Result Value Ref Range    Amphetamine Qual Urine Negative NEG^Negative    Barbiturates Qual Urine Negative NEG^Negative    Benzodiazepine Qual Urine Negative NEG^Negative    Cannabinoids Qual Urine Negative NEG^Negative    Cocaine Qual Urine Negative NEG^Negative    Ethanol Qual Urine Positive (A) NEG^Negative    Opiates Qualitative Urine Negative NEG^Negative   Alcohol breath test POCT   Result Value Ref Range    Alcohol Breath Test 0.221 (A) 0.00 - 0.01   CBC with platelets differential   Result Value Ref Range    WBC 8.7 4.0 - 11.0 10e9/L    RBC Count 5.91 (H) 4.4 - 5.9 10e12/L    Hemoglobin 16.7 13.3 - 17.7 g/dL    Hematocrit 51.8 40.0 - 53.0 %    MCV 88 78 - 100 fl    MCH 28.3 26.5 - 33.0 pg    MCHC 32.2 31.5 - 36.5 g/dL    RDW 14.6 10.0 - 15.0 %    Platelet Count 492 (H) 150 - 450 10e9/L    Diff Method Automated Method     % Neutrophils 67.2 %    % Lymphocytes 25.4 %    % Monocytes 5.8 %    % Eosinophils 0.5 %    % Basophils 0.9 %    % Immature Granulocytes 0.2 %    Nucleated RBCs 0 0 /100    Absolute Neutrophil 5.8 1.6 - 8.3 10e9/L    Absolute Lymphocytes 2.2 0.8 - 5.3 10e9/L    Absolute Monocytes 0.5 0.0 - 1.3 10e9/L    Absolute Eosinophils 0.0 0.0 - 0.7 10e9/L    Absolute Basophils 0.1 0.0 - 0.2 10e9/L    Abs Immature Granulocytes 0.0 0  - 0.4 10e9/L    Absolute Nucleated RBC 0.0    Comprehensive metabolic panel   Result Value Ref Range    Sodium 140 133 - 144 mmol/L    Potassium 4.2 3.4 - 5.3 mmol/L    Chloride 104 94 - 109 mmol/L    Carbon Dioxide 27 20 - 32 mmol/L    Anion Gap 9 3 - 14 mmol/L    Glucose 129 (H) 70 - 99 mg/dL    Urea Nitrogen 20 7 - 30 mg/dL    Creatinine 1.06 0.66 - 1.25 mg/dL    GFR Estimate >90 >60 mL/min/[1.73_m2]    GFR Estimate If Black >90 >60 mL/min/[1.73_m2]    Calcium 8.1 (L) 8.5 - 10.1 mg/dL    Bilirubin Total 0.3 0.2 - 1.3 mg/dL    Albumin 3.9 3.4 - 5.0 g/dL    Protein Total 8.5 6.8 - 8.8 g/dL    Alkaline Phosphatase 106 40 - 150 U/L     (H) 0 - 70 U/L    AST 70 (H) 0 - 45 U/L     Medications   diazepam (VALIUM) tablet 5-20 mg (10 mg Oral Given 3/5/21 2332)             Assessments & Plan (with Medical Decision Making)   This is a 36-year-old male presenting to the emergency room requesting alcohol detox.  Patient states that for a little over a month he has been drinking approximately 15 white claws a day.  Currently he is intoxicated but is cooperative.  His labs are otherwise reviewed.  He is provided with the MSSA protocol.  The BEC evaluated the patient because of some passive suicidal comments.  Patient could contract for safety while here in the emergency room and during hospitalization.  At this time the patient can be safely admitted.    I have reviewed the nursing notes.    I have reviewed the findings, diagnosis, plan and need for follow up with the patient.    New Prescriptions    No medications on file       Final diagnoses:   Alcohol abuse     Enrique Michelle MD  3/5/2021   [facility] EMERGENCY DEPARTMENT     Ozzy Michelle MD  03/06/21 0057

## 2021-03-06 NOTE — PROGRESS NOTES
03/06/21 1046   Patient Belongings   Patient Belongings other (see comments)   Patient Belongings Put in Hospital Secure Location (Security or Locker, etc.) other (see comments)   Belongings Search Yes   Clothing Search Yes   Second Staff Zafar Thakur Associate   Storage - backpack, deodorant, toothpaste,    Med room bid - cell phone, multiple keys, $1.67 in change,     A               Admission:  I am responsible for any personal items that are not sent to the safe or pharmacy.  [facility] is not responsible for loss, theft or damage of any property in my possession.    Signature:  _________________________________ Date: _______  Time: _____                                              Staff Signature:  ____________________________ Date: ________  Time: _____      2nd Staff person, if patient is unable/unwilling to sign:    Signature: ________________________________ Date: ________  Time: _____     Discharge:  [place] has returned all of my personal belongings:    Signature: _________________________________ Date: ________  Time: _____                                          Staff Signature:  ____________________________ Date: ________  Time: _____

## 2021-03-06 NOTE — ED TRIAGE NOTES
Pt states he is in severe alcohol withdrawal. Pt states he didn't want to come but his mom brought him in. PT was sober for a year and relapsed recently. Pt has been drinking 10-12 alcoholic seltzers a day for a month. Pts last drink was 1400 today.

## 2021-03-06 NOTE — ED NOTES
0545: Writer received call from [facility] stating that they are unable to take pt at this time due to elevated HR of 125, but would be glad to take Pt during the day after HR has come down.

## 2021-03-07 LAB
ALBUMIN SERPL-MCNC: 3.6 G/DL (ref 3.4–5)
ALP SERPL-CCNC: 95 U/L (ref 40–150)
ALT SERPL W P-5'-P-CCNC: 121 U/L (ref 0–70)
AST SERPL W P-5'-P-CCNC: 56 U/L (ref 0–45)
BILIRUB DIRECT SERPL-MCNC: 0.2 MG/DL (ref 0–0.2)
BILIRUB SERPL-MCNC: 0.9 MG/DL (ref 0.2–1.3)
PROT SERPL-MCNC: 7.9 G/DL (ref 6.8–8.8)

## 2021-03-07 PROCEDURE — 128N000004 HC R&B CD ADULT

## 2021-03-07 PROCEDURE — 99233 SBSQ HOSP IP/OBS HIGH 50: CPT | Performed by: PSYCHIATRY & NEUROLOGY

## 2021-03-07 PROCEDURE — 36415 COLL VENOUS BLD VENIPUNCTURE: CPT | Performed by: PHYSICIAN ASSISTANT

## 2021-03-07 PROCEDURE — 250N000013 HC RX MED GY IP 250 OP 250 PS 637: Performed by: PSYCHIATRY & NEUROLOGY

## 2021-03-07 PROCEDURE — 80076 HEPATIC FUNCTION PANEL: CPT | Performed by: PHYSICIAN ASSISTANT

## 2021-03-07 RX ORDER — DULOXETIN HYDROCHLORIDE 20 MG/1
20 CAPSULE, DELAYED RELEASE ORAL 2 TIMES DAILY
Status: DISCONTINUED | OUTPATIENT
Start: 2021-03-08 | End: 2021-03-08 | Stop reason: HOSPADM

## 2021-03-07 RX ORDER — EPINEPHRINE 0.3 MG/.3ML
0.3 INJECTION SUBCUTANEOUS PRN
Status: ON HOLD | COMMUNITY
End: 2021-11-03

## 2021-03-07 RX ADMIN — DIAZEPAM 10 MG: 5 TABLET ORAL at 00:56

## 2021-03-07 RX ADMIN — MIRTAZAPINE 15 MG: 15 TABLET, FILM COATED ORAL at 20:32

## 2021-03-07 RX ADMIN — ATENOLOL 50 MG: 50 TABLET ORAL at 16:10

## 2021-03-07 RX ADMIN — DIAZEPAM 10 MG: 5 TABLET ORAL at 08:23

## 2021-03-07 RX ADMIN — FOLIC ACID 1 MG: 1 TABLET ORAL at 08:22

## 2021-03-07 RX ADMIN — DIAZEPAM 10 MG: 5 TABLET ORAL at 03:54

## 2021-03-07 RX ADMIN — THIAMINE HCL TAB 100 MG 100 MG: 100 TAB at 08:22

## 2021-03-07 RX ADMIN — DULOXETINE HYDROCHLORIDE 30 MG: 30 CAPSULE, DELAYED RELEASE ORAL at 08:22

## 2021-03-07 RX ADMIN — MULTIPLE VITAMINS W/ MINERALS TAB 1 TABLET: TAB at 08:22

## 2021-03-07 ASSESSMENT — ACTIVITIES OF DAILY LIVING (ADL)
DRESS: SCRUBS (BEHAVIORAL HEALTH)
HYGIENE/GROOMING: INDEPENDENT
HYGIENE/GROOMING: INDEPENDENT
ORAL_HYGIENE: INDEPENDENT
LAUNDRY: WITH SUPERVISION
LAUNDRY: UNABLE TO COMPLETE
DRESS: STREET CLOTHES;SCRUBS (BEHAVIORAL HEALTH);INDEPENDENT
ORAL_HYGIENE: INDEPENDENT

## 2021-03-07 NOTE — PROGRESS NOTES
[facility], [place]   Psychiatric Progress Note        Interim history   This is a 36 year old male with       Alcohol use disorder severe  Alcohol withdrawal severe  Major depressive disorder moderate recurrent without psychosis  .Pt seen in rounds.   The patient's care was discussed with the treatment team during the daily team meeting and/or staff's chart notes were reviewed.  Staff report patient has been visible in the milieu,  no acute eventsovernight.     Patient's mood is flat  Energy Level:LOW  Sleep:No concerns, sleeps well through night  Appetite:poor LOW  motivation interest    DENEID ANY Suicidal/homicidal ideation/plan intent.  DENIED ANY psychosis  No prior suicde attempts  No access to gun  Pt is in alcohol withdrawal still being monitered every 4 hrs for it,   Pt mssa score are monitered  Tolerating meds and has no side effects.              Medications:     Current Facility-Administered Medications   Medication     albuterol (PROAIR HFA/PROVENTIL HFA/VENTOLIN HFA) 108 (90 Base) MCG/ACT inhaler 2 puff     alum & mag hydroxide-simethicone (MAALOX) suspension 30 mL     atenolol (TENORMIN) tablet 50 mg     cloNIDine (CATAPRES) tablet 0.1 mg     diazepam (VALIUM) tablet 5-20 mg     DULoxetine (CYMBALTA) DR capsule 30 mg     folic acid (FOLVITE) tablet 1 mg     ibuprofen (ADVIL/MOTRIN) tablet 600 mg     mirtazapine (REMERON) tablet 15 mg     multivitamin w/minerals (THERA-VIT-M) tablet 1 tablet     nicotine (NICORETTE) gum 2-4 mg     ondansetron (ZOFRAN-ODT) ODT tab 4 mg     thiamine (B-1) tablet 100 mg     traZODone (DESYREL) tablet 50 mg             Allergies:     Allergies   Allergen Reactions     Banana      Mild throat irritation per pt     Chicken Allergy      Anaphalxis     Peanut (Diagnostic)      Pt reports does not have allergy to this.  Patient today, 3/6/2021, patient confirms no serious aversion to peanuts.  Therefore, no removal of peanut products from garcia.  "           Psychiatric Examination:   Blood pressure (!) 141/96, pulse 112, temperature 97.2  F (36.2  C), temperature source Temporal, resp. rate 16, height 1.753 m (5' 9\"), weight 108.4 kg (239 lb), SpO2 97 %.  Weight is 239 lbs 0 oz  Body mass index is 35.29 kg/m .    Appearance:  awake, alert and adequately groomed  Attitude:  cooperative  Eye Contact:  good  Mood:  depressed  Affect:  appropriate and in normal range and mood congruent  Speech:  clear, coherent rate /rhythm are normal in rate  Psychomotor Behavior:  no evidence of tardive dyskinesia, dystonia, or tics and intact station, gait and muscle tone  Throught Process:  logical  Associations:  no loose associations  Thought Content:  no evidence of suicidal ideation or homicidal ideation, no evidence of psychotic thought, no auditory hallucinations present and no visual hallucinations present  Insight:  limited  Judgement:  fair  Oriented to:  time, person, and place  Attention Span and Concentration:  intact  Recent and Remote Memory:  intact  Language fund of knowledge are adequate         Labs:     No results found for: NTBNPI, NTBNP  Lab Results   Component Value Date    WBC 8.7 03/05/2021    HGB 16.7 03/05/2021    HCT 51.8 03/05/2021    MCV 88 03/05/2021     (H) 03/05/2021     No results found for: TSH      DX       Alcohol use disorder severe  Alcohol withdrawal severe  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 Continue the same MSSA protocol as ordered. Continue thiamine 100 mg p.o. daily, M.V.I. one p.o. daily and folate 1 mg p.o. Daily  Will continue mssa protocal to detox off alcohol on valium,  Pt is c/o of termor , agitation poor sleep and poor appetite, he has sweats, feels shakey  On mssa client scored [id number] today and  needed needed  mg po as of yet , total dose since admission was 11    MSSA    Eating Disturbances: ate " and enjoyed all of it or not applicable  Tremor: 1 - not visibly apparent but can be felt by the examiner placing his fingertip slightly against the patient's fingertips  Sleep Disturbance: slept through the night or not applicable  Clouding of Sensorium: no evidence  Hallucinations: 0 - none  Quality of Contact: 0 - awareness of examiner and people around him/her  Agitation: 2  Paroxysmal Sweats: 1 - barely perceptible sweating  Temperature: 99.5 or below  Pulse: 5 - 110 to 119  Total MSSA Score: 10    For his depression we will increase Cymbalta to 20 mg twice a day    Laboratory/Imaging: reviewed with patient   Consults: internal medicine consult reviewed  Patient will be treated in therapeutic milieu with appropriate individual and group therapies as described.  PDMP CHECKED     Supportive psychotheraoy provided, arcenio talked about recovery enviroment, relapse prevention, triggers to use.  Discussed with patient many issues of addiction,triggers, relapse, and establishing a solid recovery program.  Asked pt to be med complinat   Medical diagnoses to be addressed this admission:    Plan:  [facility]     Internal Medicine Initial Consult       Date of Admission: 3/5/2021  Consult Requested by: Pamela Lala MD  Reason for Consult: Co-Management of Chronic Medical Conditions     Assessment & Recommendations  Xavier Odell is a 36 year old man with a past medical history of asthma, GERD, ADINA, MDD, and alcohol dependency who is admitted to [facility] with alcohol withdrawal.        Alcohol Withdrawal - Patient drinks 10-15 White Claws daily. Patient previously sober for one year then relapsed January 2021  -CIWA per protocol   -Valium 5-20mg Q30min PRN  -Management per psychiatry team.      ADINA  MDD - Continue duloxetine 30mg daily and mirtazapine 15mg nightly.    -Management per psychiatry      Asthma - Well controlled with albuterol, continue while inpatient.      Transaminitis,  mild - , AST 70.  Suspect elevation related to ongoing alcohol abuse vs underlying hepatic steatosis.   -Repeat hepatic panel tomorrow  -Recommend outpatient follow up with PCP     GERD  Hx of Upper GI Bleeding 2/2 Gastric Ulcer (5/2019) - Admitted 5/12-5/20/2019 for massive hematemesis and hemorrhagic shock due to a Grade D esophagitis with superficial gastric ulcers.  Was on a PPI BID for 8 weeks. No longer on GI prophylaxis. Patient currently denies abdominal pain, hematochezia, hematuria, melena.   -Low threshold to restart omeprazole vs pantoprazole should patient complain of GI symptoms         Medicine will sign off, please page with any additional concerns.        Legal Status: voluntary    Safety Assessment:   Checks:  15 min  Precautions: withdrawal precautions  Pt has not required locked seclusion or restraints in the past 24 hours to maintain safety, please refer to RN documentation for further details.  Discussed with patient many issues of addiction,triggers, relapse, and establishing a solid recovery program.  Able to give informed consent:  YES   Discussed Risks/Benefits/Side Effects/Alternatives: YES    After discussion of the indications, risks, benefits, alternatives and consequences of no treatment, the patient elects to complete detox and look into treatment

## 2021-03-07 NOTE — PHARMACY-ADMISSION MEDICATION HISTORY
Admission Medication History status for the 3/5/2021 admission is complete.  See EPIC admission navigator for Prior to Admission medications.    Medication history sources:  Patient, Sure Scripts, and Chart Review     Medication history source reliability: Good    Changes made to PTA medication list (reason)  Added: Epi Pen  Deleted: Bacitracin, Folic acid, MV, Naltrexone, Nicotine gum, Miralax, and Thiamine   Changed: Albuterol-->PRN, Duloxetine 30 mg daily-->BID    Additional medication history information (including reliability of information, actions taken by pharmacist): None    Time spent in this activity: 25 minutes    Medication history completed by: Valentine De La Rosa [facility]     Prior to Admission medications    Medication Sig Last Dose Taking? Auth Provider   albuterol (PROAIR HFA/PROVENTIL HFA/VENTOLIN HFA) 108 (90 Base) MCG/ACT inhaler Inhale 2 puffs into the lungs every 6 hours PRN 3/5/2021 Yes Unknown, Entered By History   DULoxetine (CYMBALTA) 30 MG capsule Take 1 capsule (30 mg) by mouth daily  Patient taking differently: Take 30 mg by mouth 2 times daily  3/5/2021 Yes Jimbo Jin MD   mirtazapine (REMERON) 15 MG tablet Take 1 tablet (15 mg) by mouth At Bedtime 3/4/2021 at Unknown time Yes Jimbo Jin MD   EPINEPHrine (ANY BX GENERIC EQUIV) 0.3 MG/0.3ML injection 2-pack Inject 0.3 mg into the muscle as needed for anaphylaxis (Chicken Allergy) Unknown at Unknown time  Unknown, Entered By History

## 2021-03-07 NOTE — PLAN OF CARE
"Pt denies SI/SIB/HI and hallucinations. Pt said \"I'm no more depressed than normal.\" writer asked what he meant by that, pt laughed and said \"I just mean I am no more depressed than I am on a normal basis, but not suicidal at all.\" MSSA scoring done. Valium 10mg given. Pt is also hypertensive and clonidine prn was given. Pt is polite and cooperative. Pt is sleeping now as he said \"I didn't get much sleep last night.\" will continue to monitor for w/d and safety.  "

## 2021-03-07 NOTE — PLAN OF CARE
"Patient had a good shift, stayed in room most of the time sleeping.  MSSA 10 and 6, received 10 mg valium x1.  Patient slept soundly after lunch, refused to get up in bed, answered \"No\" to all the question asked.  Currently sleeping in room, will continue to monitor closely.  "

## 2021-03-07 NOTE — PROGRESS NOTES
MSSA scores of 8/8 pt receivers 20mg of valium this shift. Pt is observed to sleep throughout the noc.

## 2021-03-07 NOTE — PROGRESS NOTES
03/06/2021   Groups   Details    (Psychotherapy)   Number of patients attending the group:  9  Group Length:  1 Hours     Group Therapy Type: Psychotherapy     Summary of Group / Topics Discussed:      The  Psychotherapy group goal is to promote insight to positive choice and change. Group processing is within a supportive and safe environment. Patients will process emotions using verbal group and expressive psychotherapy interventions including visual art/writing interventions.     Group interventions support patients by: Support / process group step 1     Subjective -patient report of mood today-   anxious and depressed     Group Response- 9 members engaged.       Patient Response-Pt spoke about relapse after exactly 1 year of sobriety. He wants to get back on track.  He was pleasant, cooperative and engaged.     Villa Dunn, INEZFT, ATR-BC

## 2021-03-08 VITALS
HEIGHT: 69 IN | SYSTOLIC BLOOD PRESSURE: 136 MMHG | TEMPERATURE: 97.8 F | BODY MASS INDEX: 35.4 KG/M2 | RESPIRATION RATE: 16 BRPM | DIASTOLIC BLOOD PRESSURE: 93 MMHG | WEIGHT: 239 LBS | HEART RATE: 91 BPM | OXYGEN SATURATION: 97 %

## 2021-03-08 PROCEDURE — 250N000013 HC RX MED GY IP 250 OP 250 PS 637: Performed by: PSYCHIATRY & NEUROLOGY

## 2021-03-08 PROCEDURE — 99239 HOSP IP/OBS DSCHRG MGMT >30: CPT | Performed by: PSYCHIATRY & NEUROLOGY

## 2021-03-08 RX ORDER — MIRTAZAPINE 15 MG/1
15 TABLET, FILM COATED ORAL AT BEDTIME
Qty: 30 TABLET | Refills: 0 | Status: ON HOLD | OUTPATIENT
Start: 2021-03-08 | End: 2021-08-08

## 2021-03-08 RX ORDER — DULOXETIN HYDROCHLORIDE 30 MG/1
60 CAPSULE, DELAYED RELEASE ORAL DAILY
Qty: 60 CAPSULE | Refills: 0 | Status: ON HOLD | OUTPATIENT
Start: 2021-03-08 | End: 2021-08-08

## 2021-03-08 RX ADMIN — FOLIC ACID 1 MG: 1 TABLET ORAL at 08:09

## 2021-03-08 RX ADMIN — THIAMINE HCL TAB 100 MG 100 MG: 100 TAB at 08:09

## 2021-03-08 RX ADMIN — MULTIPLE VITAMINS W/ MINERALS TAB 1 TABLET: TAB at 08:08

## 2021-03-08 RX ADMIN — DULOXETINE HYDROCHLORIDE 20 MG: 20 CAPSULE, DELAYED RELEASE ORAL at 08:08

## 2021-03-08 RX ADMIN — TRAZODONE HYDROCHLORIDE 50 MG: 50 TABLET ORAL at 03:33
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
## 2021-03-08 NOTE — PLAN OF CARE
Patient has been without need of withdrawal medication for over   24 hours' time, and has thus graduated to 'out of detox' status.   Patient continues to deny any semblance of suicidal ideation, and instead states he looks forward to his eventual discharge, which will likely happen later today.   He also states that his current antidepressant regimen has been reviewed with inpatient provider, and he feels comfortable with agreed-upon protocol.   Will continue to monitor as prudent.

## 2021-03-08 NOTE — PROGRESS NOTES
. MSSA score of 4, pt requires no valium for alcohol withdrawal. Pt is observed to sleep well except when disturbed by new roommate arriving. Given trazadone 50 with good result.

## 2021-03-08 NOTE — PROGRESS NOTES
03/08/21 0218   Patient Belongings   Did you bring any home meds/supplements to the hospital?  No   Patient Belongings remains with patient;locker;sent to security per site process;returned to patient at discharge   Patient Belongings Remaining with Patient clothing   Patient Belongings Put in Hospital Secure Location (Security or Locker, etc.) other (see comments)   Belongings Search Yes   Clothing Search Yes   Second Staff Jame [employer] Ha RN   Comment clothing remain with patient   STORAGE BIN: hat,mask, jacket, socks, belt, laced boot  MED BIN: none  SECURITY: none  NO PHONE. NO WALLET.  A               Admission:  I am responsible for any personal items that are not sent to the safe or pharmacy.  [facility] is not responsible for loss, theft or damage of any property in my possession.    Signature:  _________________________________ Date: _______  Time: _____                                              Staff Signature:  ____________________________ Date: ________  Time: _____      2nd Staff person, if patient is unable/unwilling to sign:    Signature: ________________________________ Date: ________  Time: _____     Discharge:  [facility] has returned all of my personal belongings:    Signature: _________________________________ Date: ________  Time: _____                                          Staff Signature:  ____________________________ Date: ________  Time: _____       ;

## 2021-03-08 NOTE — PLAN OF CARE
Behavioral Team Discussion: (3/8/2021)    Continued Stay Criteria/Rationale: Patient admitted for alcohol withdrawal, complicated by SI.  Plan: The following services will be provided to the patient; psychiatric assessment, medication management, therapeutic milieu, individual and group support, and skills groups.   Participants: 3A Provider: Dr. Dai MD; 3A RN's: Tito Templeton, RN; 3A CM's: Germaine Nielson, [facility] [place][note 2], Terri Chávez MA [facility] and Marian Bermudez [facility] [place][note 2]   Summary/Recommendation: Providers will assess today for treatment recommendations, discharge planning, and aftercare plans. CM will meet with pt for discharge planning.   Medical/Physical: Internal medicine consult completed 3/6/21.  Precautions:   Behavioral Orders   Procedures     Code 1 - Restrict to Unit     Discontinue 1:1 attendant for suicide risk     Order Specific Question:   I have performed an in person assessment of the patient     Answer:   Based on this assessment the patient no longer requires a one on one attendant at this point in time.     Order Specific Question:   Rationale     Answer:   Medical Record Reviewed     Order Specific Question:   Rationale     Answer:   Patient States able to remain safe in hospital     Order Specific Question:   Rationale     Answer:   Modifications to care environment made to mitigate safety risk     Order Specific Question:   Rationale     Answer:   Routine observations are sufficient to monitor safety.     Routine Programming     As clinically indicated     Status 15     Every 15 minutes.     Withdrawal precautions     Rationale for change in precautions or plan: N/A  Progress: Improving.

## 2021-03-08 NOTE — DISCHARGE INSTRUCTIONS
"Behavioral [facility] Discharge Planning and Instructions  THANK YOU FOR CHOOSING THE [facility]  [facility]: [phone]  Summary: You were admitted to, then processed through, [facility] on March 6, 2021 for detoxification from alcohol.    A medical examination was performed that included lab work.   Your current medication regimen was scrutinized and discussed with you by Dr. Lala and Dr. Pérez.  You have met with a  and opted to do outpatient treatment at [facility].    Please make your recovery a priority, Mr. Odell! It has honestly been a pleasure working with you.     Our warm regards to your mother, as well.  You are fortunate to have such a strong, caring and effective advocate for you. Many do not.  She, in turn, is fortunate to have a fine son such as yourself. Many do not.    Main Diagnosis:  Alcohol Dependence: COMPLICATED    Major Treatments, Procedures and Findings:  You were detoxified from alcohol using the appropriate protocol(s). You have had a chemical dependency assessment.  You have had blood drawn, and the results have been reviewed with you.  Please take a copy of your laboratory work with you to your next provider appointment.  Symptoms to Report:  If you experience more anxiety, confusion, sleeplessness, deep sadness or thoughts of suicide, notify your treatment team or notify your primary care physician. IF THE SYMPTOMS YOU ARE EXPERIENCING ARE A MEDICAL EMERGENCY, CALL 911 IMMEDIATELY.     Lifestyle Adjustment: Adjust your lifestyle to get enough sleep, relaxation, exercise and excellent nutrition. Continue to develop healthy coping skills to decrease stress and promote a sober living environment. Do NOT use alcohol, illegal drugs or addictive medications other than what is currently prescribed. AA, NA, and a sponsor are excellent resources for support.     Medical Follow-Up: Patient states that he prefers to follow up with Dr. South " "Christinatyre\" once discharged from [facility], [facility], on 3/9/21.    Treatment Follow-Up: [facility] on March 9th at 11:00 am [address] [address] [place] [id number]  # [phone]    Resources:  [facility] [phone] Support Group:  AA/NA and Sponsor/support.  Crisis Intervention: 394.471.4568 or 254-862-5047. Call anytime for help.  National [place] on Mental Illness (www.mn.martí[place].org): 506.269.4045 or 508-210-5448.  Alcoholics Anonymous (www.alcoholics-anonymous.org): Check your phone book for your local chapter.  Suicide Awareness Voices of Education (www.save.org): 368.168.7107  National Suicide Prevention Line (www.mentalhealthmn.org): 294.925.5826  Mental Health Consumer/Survivor Network of [place] (www.mhcsn.net): 698.725.3924 or 057-553-6164.  Mental Health Association of [place] (www.mentalhealth.org): 737.597.4057 or 265-351-1213  Substance Abuse and Mental Health Services. (www.samhsa.gov)    [place] [facility] ([facility])  [facility] connects people seeking recovery to resources that help foster and sustain long-term recovery.  Whether you are seeking resources for treatment, transportation, housing, job training, education, health care or other pathways to recovery, [facility] is a great place to start. 396.186.9252 www.[facility].org    General Medication Instruction: See your medication papers for instructions. Take all medicines as directed.  Make no changes unless your doctor suggests them. Go to all your doctor visits.  Be sure to have all your required lab tests. This way, your medicines may be refilled on time. Do not use any drugs not prescribed by your provider. AA/NA and sponsors are excellent resources for support. Avoid alcohol at all costs!    Please Note:  If you have any questions at anytime after you are discharged please call the [facility] [place] detoxification [facility] at [phone].  " [facility], Behavioral Intake [phone]. Please take this discharge folder with you to all your follow up appointments, it contains your lab results, diagnosis, medication list and discharge recommendations.   THANK YOU FOR CHOOSING THE [facility]

## 2021-06-09 ENCOUNTER — HOSPITAL ENCOUNTER (EMERGENCY)
Facility: CLINIC | Age: 36
Discharge: HOME | End: 2021-06-10
Attending: EMERGENCY MEDICINE | Admitting: EMERGENCY MEDICINE
Payer: COMMERCIAL

## 2021-06-09 DIAGNOSIS — F10.920 ALCOHOLIC INTOXICATION WITHOUT COMPLICATION (H): ICD-10-CM

## 2021-06-09 LAB
ALCOHOL BREATH TEST: 0.23 (ref 0–0.01)
APTT PPP: 30 SEC (ref 22–37)
BASOPHILS # BLD AUTO: 0 10E9/L (ref 0–0.2)
BASOPHILS NFR BLD AUTO: 0.5 %
DIFFERENTIAL METHOD BLD: NORMAL
EOSINOPHIL # BLD AUTO: 0 10E9/L (ref 0–0.7)
EOSINOPHIL NFR BLD AUTO: 0.5 %
ERYTHROCYTE [DISTWIDTH] IN BLOOD BY AUTOMATED COUNT: 12.6 % (ref 10–15)
HCT VFR BLD AUTO: 49.3 % (ref 40–53)
HGB BLD-MCNC: 15.9 G/DL (ref 13.3–17.7)
IMM GRANULOCYTES # BLD: 0 10E9/L (ref 0–0.4)
IMM GRANULOCYTES NFR BLD: 0.1 %
INR PPP: 0.95 (ref 0.86–1.14)
LACTATE BLD-SCNC: 4.8 MMOL/L (ref 0.7–2)
LYMPHOCYTES # BLD AUTO: 2.1 10E9/L (ref 0.8–5.3)
LYMPHOCYTES NFR BLD AUTO: 26.7 %
MCH RBC QN AUTO: 28.3 PG (ref 26.5–33)
MCHC RBC AUTO-ENTMCNC: 32.3 G/DL (ref 31.5–36.5)
MCV RBC AUTO: 88 FL (ref 78–100)
MONOCYTES # BLD AUTO: 0.5 10E9/L (ref 0–1.3)
MONOCYTES NFR BLD AUTO: 6.3 %
NEUTROPHILS # BLD AUTO: 5.1 10E9/L (ref 1.6–8.3)
NEUTROPHILS NFR BLD AUTO: 65.9 %
NRBC # BLD AUTO: 0 10*3/UL
NRBC BLD AUTO-RTO: 0 /100
PLATELET # BLD AUTO: 389 10E9/L (ref 150–450)
POTASSIUM SERPL-SCNC: 4.1 MMOL/L (ref 3.4–5.3)
RBC # BLD AUTO: 5.62 10E12/L (ref 4.4–5.9)
WBC # BLD AUTO: 7.7 10E9/L (ref 4–11)

## 2021-06-09 PROCEDURE — 80053 COMPREHEN METABOLIC PANEL: CPT | Performed by: EMERGENCY MEDICINE

## 2021-06-09 PROCEDURE — 99285 EMERGENCY DEPT VISIT HI MDM: CPT | Performed by: EMERGENCY MEDICINE

## 2021-06-09 PROCEDURE — 83605 ASSAY OF LACTIC ACID: CPT | Performed by: EMERGENCY MEDICINE

## 2021-06-09 PROCEDURE — 85610 PROTHROMBIN TIME: CPT | Performed by: EMERGENCY MEDICINE

## 2021-06-09 PROCEDURE — 80307 DRUG TEST PRSMV CHEM ANLYZR: CPT | Performed by: EMERGENCY MEDICINE

## 2021-06-09 PROCEDURE — 82075 ASSAY OF BREATH ETHANOL: CPT

## 2021-06-09 PROCEDURE — 82077 ASSAY SPEC XCP UR&BREATH IA: CPT | Performed by: EMERGENCY MEDICINE

## 2021-06-09 PROCEDURE — C9113 INJ PANTOPRAZOLE SODIUM, VIA: HCPCS | Performed by: EMERGENCY MEDICINE

## 2021-06-09 PROCEDURE — 250N000013 HC RX MED GY IP 250 OP 250 PS 637: Performed by: EMERGENCY MEDICINE

## 2021-06-09 PROCEDURE — 96361 HYDRATE IV INFUSION ADD-ON: CPT | Performed by: EMERGENCY MEDICINE

## 2021-06-09 PROCEDURE — 258N000003 HC RX IP 258 OP 636: Performed by: EMERGENCY MEDICINE

## 2021-06-09 PROCEDURE — 80320 DRUG SCREEN QUANTALCOHOLS: CPT | Performed by: EMERGENCY MEDICINE

## 2021-06-09 PROCEDURE — 84132 ASSAY OF SERUM POTASSIUM: CPT | Performed by: EMERGENCY MEDICINE

## 2021-06-09 PROCEDURE — 85730 THROMBOPLASTIN TIME PARTIAL: CPT | Performed by: EMERGENCY MEDICINE

## 2021-06-09 PROCEDURE — 85025 COMPLETE CBC W/AUTO DIFF WBC: CPT | Performed by: EMERGENCY MEDICINE

## 2021-06-09 PROCEDURE — 250N000011 HC RX IP 250 OP 636: Performed by: EMERGENCY MEDICINE

## 2021-06-09 PROCEDURE — 99285 EMERGENCY DEPT VISIT HI MDM: CPT | Mod: 25 | Performed by: EMERGENCY MEDICINE

## 2021-06-09 PROCEDURE — 96374 THER/PROPH/DIAG INJ IV PUSH: CPT | Performed by: EMERGENCY MEDICINE

## 2021-06-09 RX ORDER — DIAZEPAM 10 MG
10 TABLET ORAL ONCE
Status: COMPLETED | OUTPATIENT
Start: 2021-06-09 | End: 2021-06-09

## 2021-06-09 RX ADMIN — DIAZEPAM 10 MG: 10 TABLET ORAL at 23:53

## 2021-06-09 RX ADMIN — SODIUM CHLORIDE 1000 ML: 9 INJECTION, SOLUTION INTRAVENOUS at 20:23

## 2021-06-09 RX ADMIN — PANTOPRAZOLE SODIUM 80 MG: 40 INJECTION, POWDER, FOR SOLUTION INTRAVENOUS at 20:24

## 2021-06-09 RX ADMIN — SODIUM CHLORIDE 1000 ML: 9 INJECTION, SOLUTION INTRAVENOUS at 21:50

## 2021-06-09 ASSESSMENT — ENCOUNTER SYMPTOMS
ABDOMINAL PAIN: 0
BLOOD IN STOOL: 1
FEVER: 0
DYSPHORIC MOOD: 1
SHORTNESS OF BREATH: 0
SEIZURES: 0

## 2021-06-10 VITALS
DIASTOLIC BLOOD PRESSURE: 87 MMHG | WEIGHT: 240 LBS | BODY MASS INDEX: 35.44 KG/M2 | HEART RATE: 110 BPM | RESPIRATION RATE: 16 BRPM | TEMPERATURE: 98.8 F | OXYGEN SATURATION: 96 % | SYSTOLIC BLOOD PRESSURE: 139 MMHG

## 2021-06-10 LAB
ALBUMIN SERPL-MCNC: 4 G/DL (ref 3.4–5)
ALP SERPL-CCNC: 86 U/L (ref 40–150)
ALT SERPL W P-5'-P-CCNC: 71 U/L (ref 0–70)
AMPHETAMINES UR QL SCN: NEGATIVE
ANION GAP SERPL CALCULATED.3IONS-SCNC: 16 MMOL/L (ref 3–14)
AST SERPL W P-5'-P-CCNC: 41 U/L (ref 0–45)
BARBITURATES UR QL: NEGATIVE
BENZODIAZ UR QL: NEGATIVE
BILIRUB SERPL-MCNC: 0.4 MG/DL (ref 0.2–1.3)
BUN SERPL-MCNC: 15 MG/DL (ref 7–30)
CALCIUM SERPL-MCNC: 8.3 MG/DL (ref 8.5–10.1)
CANNABINOIDS UR QL SCN: NEGATIVE
CHLORIDE SERPL-SCNC: 106 MMOL/L (ref 94–109)
CO2 SERPL-SCNC: 20 MMOL/L (ref 20–32)
COCAINE UR QL: NEGATIVE
CREAT SERPL-MCNC: 0.98 MG/DL (ref 0.66–1.25)
ETHANOL SERPL-MCNC: 0.24 G/DL
ETHANOL UR QL SCN: POSITIVE
GFR SERPL CREATININE-BSD FRML MDRD: >90 ML/MIN/{1.73_M2}
GLUCOSE SERPL-MCNC: 85 MG/DL (ref 70–99)
LACTATE BLD-SCNC: 3.9 MMOL/L (ref 0.7–2)
LACTATE BLD-SCNC: 4.5 MMOL/L (ref 0.7–2)
OPIATES UR QL SCN: NEGATIVE
POTASSIUM SERPL-SCNC: ABNORMAL MMOL/L (ref 3.4–5.3)
PROT SERPL-MCNC: 8.3 G/DL (ref 6.8–8.8)
SODIUM SERPL-SCNC: 142 MMOL/L (ref 133–144)

## 2021-06-10 PROCEDURE — 90791 PSYCH DIAGNOSTIC EVALUATION: CPT

## 2021-06-10 PROCEDURE — 258N000003 HC RX IP 258 OP 636: Performed by: EMERGENCY MEDICINE

## 2021-06-10 PROCEDURE — 96361 HYDRATE IV INFUSION ADD-ON: CPT | Performed by: EMERGENCY MEDICINE

## 2021-06-10 PROCEDURE — 83605 ASSAY OF LACTIC ACID: CPT | Performed by: EMERGENCY MEDICINE

## 2021-06-10 RX ADMIN — SODIUM CHLORIDE, POTASSIUM CHLORIDE, SODIUM LACTATE AND CALCIUM CHLORIDE 1000 ML: 600; 310; 30; 20 INJECTION, SOLUTION INTRAVENOUS at 00:29

## 2021-06-10 NOTE — DISCHARGE INSTRUCTIONS
If you develop abdominal pain, fevers, chills, chest pain, shortness of breath or if you have any further concerns return to the emergency department.    You may always return if you would like detox or further chemical dependency treatment.    We discussed the elevated lactic acid, this can be elevated for many reasons including infections or other problems in your abdomen.  If you feel ill or if you have any further concerns return to the emergency department.

## 2021-06-10 NOTE — ED NOTES
Emergency Department Patient Sign-out       Brief HPI:  This is a 36 year old male signed out to me by Dr. Geller .  See initial ED Provider note for details of the presentation.            Significant Events prior to my assuming care: Patient presenting after ingestion of cooking wine and mouthwash. No hx seizures/DT's. Patient recently lost job 2/2 drinking and first DWI recently. Patient does not want detox. Patient has had passive SI without plan. Has no intent of harming self. No hx prior attempts. Patient scheduled for duel diagnosis on Monday. Per prior doc, rectal bleeding just dark stools, hgb over 15. Patient tells me they are dark brown. No hematemesis. Lactic elevated likely 2/2 ingestion. No symptoms infection, no fever.       Exam:   Patient Vitals for the past 24 hrs:   BP Temp Temp src Pulse Resp SpO2 Weight   06/09/21 2329 (!) 151/84 98.8  F (37.1  C) Oral 110 -- -- --   06/09/21 1933 -- -- -- -- -- -- 108.9 kg (240 lb)   06/09/21 1931 (!) 122/103 99.1  F (37.3  C) Oral 120 16 93 % --           ED RESULTS:   Results for orders placed or performed during the hospital encounter of 06/09/21 (from the past 24 hour(s))   Alcohol breath test POCT     Status: Abnormal    Collection Time: 06/09/21  8:03 PM   Result Value Ref Range    Alcohol Breath Test 0.23 (A) 0.00 - 0.01   CBC with platelets differential     Status: None    Collection Time: 06/09/21  8:05 PM   Result Value Ref Range    WBC 7.7 4.0 - 11.0 10e9/L    RBC Count 5.62 4.4 - 5.9 10e12/L    Hemoglobin 15.9 13.3 - 17.7 g/dL    Hematocrit 49.3 40.0 - 53.0 %    MCV 88 78 - 100 fl    MCH 28.3 26.5 - 33.0 pg    MCHC 32.3 31.5 - 36.5 g/dL    RDW 12.6 10.0 - 15.0 %    Platelet Count 389 150 - 450 10e9/L    Diff Method Automated Method     % Neutrophils 65.9 %    % Lymphocytes 26.7 %    % Monocytes 6.3 %    % Eosinophils 0.5 %    % Basophils 0.5 %    % Immature Granulocytes 0.1 %    Nucleated RBCs 0 0 /100    Absolute Neutrophil 5.1 1.6 - 8.3 10e9/L     Absolute Lymphocytes 2.1 0.8 - 5.3 10e9/L    Absolute Monocytes 0.5 0.0 - 1.3 10e9/L    Absolute Eosinophils 0.0 0.0 - 0.7 10e9/L    Absolute Basophils 0.0 0.0 - 0.2 10e9/L    Abs Immature Granulocytes 0.0 0 - 0.4 10e9/L    Absolute Nucleated RBC 0.0    INR     Status: None    Collection Time: 06/09/21  8:05 PM   Result Value Ref Range    INR 0.95 0.86 - 1.14   Partial thromboplastin time     Status: None    Collection Time: 06/09/21  8:05 PM   Result Value Ref Range    PTT 30 22 - 37 sec   Lactic acid whole blood     Status: Abnormal    Collection Time: 06/09/21  8:05 PM   Result Value Ref Range    Lactic Acid 4.8 (HH) 0.7 - 2.0 mmol/L   Potassium     Status: None    Collection Time: 06/09/21  8:50 PM   Result Value Ref Range    Potassium 4.1 3.4 - 5.3 mmol/L   Alcohol level blood     Status: Abnormal    Collection Time: 06/09/21  8:50 PM   Result Value Ref Range    Ethanol g/dL 0.24 (H) <0.01 g/dL   Comprehensive metabolic panel     Status: Abnormal    Collection Time: 06/09/21  8:50 PM   Result Value Ref Range    Sodium 142 133 - 144 mmol/L    Potassium Canceled, Test credited 3.4 - 5.3 mmol/L    Chloride 106 94 - 109 mmol/L    Carbon Dioxide 20 20 - 32 mmol/L    Anion Gap 16 (H) 3 - 14 mmol/L    Glucose 85 70 - 99 mg/dL    Urea Nitrogen 15 7 - 30 mg/dL    Creatinine 0.98 0.66 - 1.25 mg/dL    GFR Estimate >90 >60 mL/min/[1.73_m2]    GFR Estimate If Black >90 >60 mL/min/[1.73_m2]    Calcium 8.3 (L) 8.5 - 10.1 mg/dL    Bilirubin Total 0.4 0.2 - 1.3 mg/dL    Albumin 4.0 3.4 - 5.0 g/dL    Protein Total 8.3 6.8 - 8.8 g/dL    Alkaline Phosphatase 86 40 - 150 U/L    ALT 71 (H) 0 - 70 U/L    AST 41 0 - 45 U/L   Lactic acid     Status: Abnormal    Collection Time: 06/09/21 11:55 PM   Result Value Ref Range    Lactic Acid 4.5 (HH) 0.7 - 2.0 mmol/L   Drug abuse screen 6 urine (tox)     Status: Abnormal    Collection Time: 06/09/21 11:55 PM   Result Value Ref Range    Amphetamine Qual Urine Negative NEG^Negative     Barbiturates Qual Urine Negative NEG^Negative    Benzodiazepine Qual Urine Negative NEG^Negative    Cannabinoids Qual Urine Negative NEG^Negative    Cocaine Qual Urine Negative NEG^Negative    Ethanol Qual Urine Positive (A) NEG^Negative    Opiates Qualitative Urine Negative NEG^Negative       ED MEDICATIONS:   Medications   lactated ringers BOLUS 1,000 mL (1,000 mLs Intravenous New Bag 6/10/21 0029)   pantoprazole (PROTONIX) IV push injection 80 mg (80 mg Intravenous Given 6/9/21 2024)   0.9% sodium chloride BOLUS (0 mLs Intravenous Stopped 6/9/21 2138)   0.9% sodium chloride BOLUS (0 mLs Intravenous Stopped 6/9/21 2331)   diazepam (VALIUM) tablet 10 mg (10 mg Oral Given 6/9/21 9373)         Impression:  No diagnosis found.    Plan:    Patient was observed for several more hours.  He is ambulating without difficulty and he feels well.  He is tolerating p.o. intake.  He does not appear to be acutely withdrawing at this point.  Lactic acid is slowly come down but has not quite cleared.  I do not feel repeat lactic acid is necessary as he appears well, afebrile, nontoxic, tolerating p.o. intake and has no complaints whatsoever.  He did not want detox and chemical dependency treatment.. Patient did have a bowel movement here without black or bloody stools.  Prior physician stated this is very unlikely a GI bleed, hemoglobin is over 15, he does not have symptoms of anemia.    I did discuss the lactic with the patient.  The patient like to go home to get some sleep.  He has no abdominal pain, chest pain, shortness of breath.  He does not feel ill.  He feels little anxious but that is it.  The patient does not want further work-up at this point, he would like to sleep at home.  We did offer detox and further work-up but the patient would rather be discharged.  He knows he can return if he has any further concerns.      MD Александр Moore Matthew Joseph, MD  06/10/21 8821       Lopez Fountain,  MD  06/10/21 0500

## 2021-06-10 NOTE — ED TRIAGE NOTES
Brought in by mom, drank 3/4 bottle of mouthwash and some cooking wine.  Also having black stools. Hx.  GI bleed. Pt. states feels dehydrated.

## 2021-06-10 NOTE — ED NOTES
ED Observation History and Physical  [facility]  Observation Initiation Date: Jun 9, 2021    Xavier Odell MRN: [id number]   Age: 36 year old YOB: 1985     History     Chief Complaint   Patient presents with     Ingestion     Drinking mouthwash     Rectal Bleeding     HPI  Xavier Odell is a 36 year old male with PMH notable for etoh, UGIB who presented to the ED with altered mental status. History limited due to altered mental status. There is reason to suspect alcohol and/or drug intoxication as the etiology of altered mental status. Due to etoh.     Past Medical and Surgical History, Medications, Allergies, and Social History were reviewed in the electronic medical record. Review with the patient was attempted but limited due to altered mental status.      Review of Systems  A complete review of systems was attempted but limited due to altered mental status.    Physical Exam   BP: (!) 122/103  Pulse: 120  Temp: 99.1  F (37.3  C)  Resp: 16  Weight: 108.9 kg (240 lb)  SpO2: 93 %    Physical Exam  General: smells of EtOH, no acute distress  HENT: MMM. Atraumatic head.   Eyes: PERRL, normal sclerae, nystagmus present  Neck: non-tender, supple  Cardio: Tachycardic, Regular rhythm.   Resp: Normal work of breathing, normal respiratory rate  Abdomen: no tenderness, non-distended, no rebound, no guarding  Neuro: alert, slow to respond. Slurred speech. Confused. CN II-XII grossly intact. Grossly normal strength and sensation.   Integumentary/Skin: no rash visualized, normal color    ED Course      Procedures                          Labs Ordered and Resulted from Time of ED Arrival Up to the Time of Departure from the ED   LACTIC ACID WHOLE BLOOD - Abnormal; Notable for the following components:       Result Value    Lactic Acid 4.8 (*)     All other components within normal limits   ALCOHOL BREATH TEST POCT - Abnormal; Notable for the following components:    Alcohol Breath  Test 0.23 (*)     All other components within normal limits   CBC WITH PLATELETS DIFFERENTIAL   INR   PARTIAL THROMBOPLASTIN TIME   POTASSIUM   ALCOHOL ETHYL   COMPREHENSIVE METABOLIC PANEL   LACTIC ACID WHOLE BLOOD   DRUG ABUSE SCREEN 6 CHEM DEP URINE ([facility])   PERIPHERAL IV CATHETER            Assessments & Plan (with Medical Decision Making)   Patient arriving with altered mental status with reason to suspect alcohol or other drug intoxication as etiology. Nursing notes reviewed. Exam without findings suggestive of trauma, non-focal. Breath EtOH ~.23. Glucose normal.     AMS likely due to intoxication delirium but cannot immediately rule out other dangerous etiologies of AMS. Plan close clinical monitoring of the patient and his mental status for clearing of intoxicating substance. With approriate clearing, the patient would likely be able to be discharged. If not appropriately clearing, plan broadening of work-up, potentially including CT head and serum labs.     During my care, the patient did not require medications for agitation, and did not require restraints/seclusion for patient and/or provider safety.     With period of monitoring, the patient continues to clear appropriately but is not yet clinically sober at this time. Patient signed out to oncoming provider with plan for further monitoring, likely discharge if appropriately clear with sobriety, further work-up if indicated.     Preliminary diagnosis:  Altered mental status, unspecified     --  Sonny Geller MD   [facility] EMERGENCY DEPARTMENT  6/9/2021      Sonny Geller MD  06/09/21 1585

## 2021-06-10 NOTE — ED PROVIDER NOTES
"ED Provider Note  [facility]      History     Chief Complaint   Patient presents with     Ingestion     Drinking mouthwash     Rectal Bleeding     HPI  Xavier Odell is a 36 year old male with a medical history significant for alcohol dependence, depression, anxiety and gastric ulcers complicated by GI bleed who presents to the Emergency Department for evaluation of his alcohol abuse and mental health.  The mother reports that the patient has been drinking heavily for years.  The patient reports that he has had periods of sobriety, the longest being a year and the most recent was for approximately a month.  The patient went to his mother's house this week and began withdrawing, so he drank wine that he found in the fridge and drank mouthwash.  The mother then brought the patient in here to the Emergency Department to be evaluated.  The patient here reports that he is not interested in detox as he states he can \"sweat it out at home\".  Patient reports that he has stopped drinking cold turkey multiple times in the past on his own.  He denies any history of withdrawal seizures, but he does report that he has had hallucinations in the past when withdrawing.  The patient's mother is very concerned about his mental health.  She reports that the patient told the nurse here that he has been severely depressed recently and she is concerned that he is going to drink himself to death.  The patient does see a therapist and has an NP who manages his mental health medications through [facility].  The mother reports that his Cymbalta was recently increased from 30 mg to 60 mg, but she does not feel that his medications are adequately helping his mental health.    The patient does note that recently he has been having dark black stools.  He denies any history of esophageal varices, but his mother reports that he has ulcers that have bled in the past.    Past Medical History  Past Medical " History:   Diagnosis Date     GI bleed      Substance abuse (H)      Uncomplicated asthma      Past Surgical History:   Procedure Laterality Date     ORTHOPEDIC SURGERY       DULoxetine (CYMBALTA) 30 MG capsule  mirtazapine (REMERON) 15 MG tablet  albuterol (PROAIR HFA/PROVENTIL HFA/VENTOLIN HFA) 108 (90 Base) MCG/ACT inhaler  EPINEPHrine (ANY BX GENERIC EQUIV) 0.3 MG/0.3ML injection 2-pack      Allergies   Allergen Reactions     Banana      Mild throat irritation per pt     Chicken Allergy      Anaphalxis     Peanut (Diagnostic)      Pt reports does not have allergy to this.  Patient today, 3/6/2021, patient confirms no serious aversion to peanuts.  Therefore, no removal of peanut products from garcia.     Family History  History reviewed. No pertinent family history.  Social History   Social History     Tobacco Use     Smoking status: Never Smoker     Smokeless tobacco: Never Used   Substance Use Topics     Alcohol use: Yes     Comment: Drinking cooking wine and mouthwash     Drug use: Not Currently      Past medical history, past surgical history, medications, allergies, family history, and social history were reviewed with the patient. No additional pertinent items.       Review of Systems   Constitutional: Negative for fever.   Respiratory: Negative for shortness of breath.    Cardiovascular: Negative for chest pain.   Gastrointestinal: Positive for blood in stool. Negative for abdominal pain.   Neurological: Negative for seizures.   Psychiatric/Behavioral: Positive for dysphoric mood. Negative for self-injury and suicidal ideas.   All other systems reviewed and are negative.      Physical Exam   BP: (!) 122/103  Pulse: 120  Temp: 99.1  F (37.3  C)  Resp: 16  Weight: 108.9 kg (240 lb)  SpO2: 93 %  Physical Exam  Vitals signs and nursing note reviewed.   Constitutional:       General: He is not in acute distress.  HENT:      Head: Normocephalic and atraumatic.   Eyes:      Conjunctiva/sclera: Conjunctivae normal.       Pupils: Pupils are equal, round, and reactive to light.   Neck:      Musculoskeletal: Normal range of motion and neck supple.   Cardiovascular:      Rate and Rhythm: Normal rate and regular rhythm.      Heart sounds: No murmur. No friction rub. No gallop.    Pulmonary:      Effort: Pulmonary effort is normal. No respiratory distress.      Breath sounds: Normal breath sounds.   Abdominal:      Palpations: Abdomen is soft.      Tenderness: There is no abdominal tenderness.   Musculoskeletal:         General: No tenderness.   Skin:     General: Skin is warm.   Neurological:      Mental Status: He is alert and oriented to person, place, and time.      Cranial Nerves: No cranial nerve deficit.   Psychiatric:         Behavior: Behavior normal.         Thought Content: Thought content normal.         Judgment: Judgment normal.         ED Course      Procedures     8:33 PM  The patient was seen and examined by Sonny Geller DO in Room ED11.                  Results for orders placed or performed during the hospital encounter of 06/09/21   CBC with platelets differential     Status: None   Result Value Ref Range    WBC 7.7 4.0 - 11.0 10e9/L    RBC Count 5.62 4.4 - 5.9 10e12/L    Hemoglobin 15.9 13.3 - 17.7 g/dL    Hematocrit 49.3 40.0 - 53.0 %    MCV 88 78 - 100 fl    MCH 28.3 26.5 - 33.0 pg    MCHC 32.3 31.5 - 36.5 g/dL    RDW 12.6 10.0 - 15.0 %    Platelet Count 389 150 - 450 10e9/L    Diff Method Automated Method     % Neutrophils 65.9 %    % Lymphocytes 26.7 %    % Monocytes 6.3 %    % Eosinophils 0.5 %    % Basophils 0.5 %    % Immature Granulocytes 0.1 %    Nucleated RBCs 0 0 /100    Absolute Neutrophil 5.1 1.6 - 8.3 10e9/L    Absolute Lymphocytes 2.1 0.8 - 5.3 10e9/L    Absolute Monocytes 0.5 0.0 - 1.3 10e9/L    Absolute Eosinophils 0.0 0.0 - 0.7 10e9/L    Absolute Basophils 0.0 0.0 - 0.2 10e9/L    Abs Immature Granulocytes 0.0 0 - 0.4 10e9/L    Absolute Nucleated RBC 0.0    INR     Status: None   Result Value  Ref Range    INR 0.95 0.86 - 1.14   Partial thromboplastin time     Status: None   Result Value Ref Range    PTT 30 22 - 37 sec   Lactic acid whole blood     Status: Abnormal   Result Value Ref Range    Lactic Acid 4.8 (HH) 0.7 - 2.0 mmol/L   Potassium     Status: None   Result Value Ref Range    Potassium 4.1 3.4 - 5.3 mmol/L   Alcohol level blood     Status: Abnormal   Result Value Ref Range    Ethanol g/dL 0.24 (H) <0.01 g/dL   Comprehensive metabolic panel     Status: Abnormal   Result Value Ref Range    Sodium 142 133 - 144 mmol/L    Potassium Canceled, Test credited 3.4 - 5.3 mmol/L    Chloride 106 94 - 109 mmol/L    Carbon Dioxide 20 20 - 32 mmol/L    Anion Gap 16 (H) 3 - 14 mmol/L    Glucose 85 70 - 99 mg/dL    Urea Nitrogen 15 7 - 30 mg/dL    Creatinine 0.98 0.66 - 1.25 mg/dL    GFR Estimate >90 >60 mL/min/[1.73_m2]    GFR Estimate If Black >90 >60 mL/min/[1.73_m2]    Calcium 8.3 (L) 8.5 - 10.1 mg/dL    Bilirubin Total 0.4 0.2 - 1.3 mg/dL    Albumin 4.0 3.4 - 5.0 g/dL    Protein Total 8.3 6.8 - 8.8 g/dL    Alkaline Phosphatase 86 40 - 150 U/L    ALT 71 (H) 0 - 70 U/L    AST 41 0 - 45 U/L   Lactic acid     Status: Abnormal   Result Value Ref Range    Lactic Acid 4.5 (HH) 0.7 - 2.0 mmol/L   Drug abuse screen 6 urine (tox)     Status: Abnormal   Result Value Ref Range    Amphetamine Qual Urine Negative NEG^Negative    Barbiturates Qual Urine Negative NEG^Negative    Benzodiazepine Qual Urine Negative NEG^Negative    Cannabinoids Qual Urine Negative NEG^Negative    Cocaine Qual Urine Negative NEG^Negative    Ethanol Qual Urine Positive (A) NEG^Negative    Opiates Qualitative Urine Negative NEG^Negative   Lactic acid     Status: Abnormal   Result Value Ref Range    Lactic Acid 3.9 (H) 0.7 - 2.0 mmol/L   Alcohol breath test POCT     Status: Abnormal   Result Value Ref Range    Alcohol Breath Test 0.23 (A) 0.00 - 0.01     Medications   pantoprazole (PROTONIX) IV push injection 80 mg (80 mg Intravenous Given 6/9/21  2024)   0.9% sodium chloride BOLUS (0 mLs Intravenous Stopped 6/9/21 2138)   0.9% sodium chloride BOLUS (0 mLs Intravenous Stopped 6/9/21 2331)   diazepam (VALIUM) tablet 10 mg (10 mg Oral Given 6/9/21 2353)   lactated ringers BOLUS 1,000 mL (0 mLs Intravenous Stopped 6/10/21 0204)        Assessments & Plan (with Medical Decision Making)     37 yo m here w/ intoxicated and concern for needing rehab and ? Tarry stool. Labs w/o any evidence of acute gi bleed. Lactic acid likely elevated d/t cirrhosis.     I have reviewed the nursing notes. I have reviewed the findings, diagnosis, plan and need for follow up with the patient.    Will sign out pt to next doc, pending repeat lactic acid and dec assessment.    Discharge Medication List as of 6/10/2021  5:20 AM          Final diagnoses:   Alcoholic intoxication without complication (H)       --  I, Thiago Pantoja, am serving as a trained medical scribe to document services personally performed by Sonny Geller DO, based on the provider's statements to me.     ISonny DO, was physically present and have reviewed and verified the accuracy of this note documented by Thiago Pantoja.    Sonny Geller DO  [facility] EMERGENCY DEPARTMENT  6/9/2021     Sonny Geller MD  06/16/21 6811

## 2021-06-14 ENCOUNTER — HOSPITAL ENCOUNTER (OUTPATIENT)
Dept: BEHAVIORAL HEALTH | Facility: CLINIC | Age: 36
Discharge: HOME | End: 2021-06-14
Attending: FAMILY MEDICINE | Admitting: FAMILY MEDICINE
Payer: COMMERCIAL

## 2021-06-14 PROCEDURE — 90791 PSYCH DIAGNOSTIC EVALUATION: CPT | Mod: 95 | Performed by: COUNSELOR

## 2021-06-14 ASSESSMENT — COLUMBIA-SUICIDE SEVERITY RATING SCALE - C-SSRS
TOTAL  NUMBER OF INTERRUPTED ATTEMPTS LIFETIME: NO
4. HAVE YOU HAD THESE THOUGHTS AND HAD SOME INTENTION OF ACTING ON THEM?: NO
1. IN THE PAST MONTH, HAVE YOU WISHED YOU WERE DEAD OR WISHED YOU COULD GO TO SLEEP AND NOT WAKE UP?: NO
ATTEMPT LIFETIME: NO
2. HAVE YOU ACTUALLY HAD ANY THOUGHTS OF KILLING YOURSELF LIFETIME?: YES
2. HAVE YOU ACTUALLY HAD ANY THOUGHTS OF KILLING YOURSELF?: NO
6. HAVE YOU EVER DONE ANYTHING, STARTED TO DO ANYTHING, OR PREPARED TO DO ANYTHING TO END YOUR LIFE?: NO
4. HAVE YOU HAD THESE THOUGHTS AND HAD SOME INTENTION OF ACTING ON THEM?: NO
3. HAVE YOU BEEN THINKING ABOUT HOW YOU MIGHT KILL YOURSELF?: NO
1. IN THE PAST MONTH, HAVE YOU WISHED YOU WERE DEAD OR WISHED YOU COULD GO TO SLEEP AND NOT WAKE UP?: YES
ATTEMPT PAST THREE MONTHS: NO
5. HAVE YOU STARTED TO WORK OUT OR WORKED OUT THE DETAILS OF HOW TO KILL YOURSELF? DO YOU INTEND TO CARRY OUT THIS PLAN?: NO
6. HAVE YOU EVER DONE ANYTHING, STARTED TO DO ANYTHING, OR PREPARED TO DO ANYTHING TO END YOUR LIFE?: NO
TOTAL  NUMBER OF ABORTED OR SELF INTERRUPTED ATTEMPTS PAST 3 MONTHS: NO
TOTAL  NUMBER OF INTERRUPTED ATTEMPTS PAST 3 MONTHS: NO
TOTAL  NUMBER OF ABORTED OR SELF INTERRUPTED ATTEMPTS PAST LIFETIME: NO
5. HAVE YOU STARTED TO WORK OUT OR WORKED OUT THE DETAILS OF HOW TO KILL YOURSELF? DO YOU INTEND TO CARRY OUT THIS PLAN?: NO

## 2021-06-14 ASSESSMENT — ANXIETY QUESTIONNAIRES
3. WORRYING TOO MUCH ABOUT DIFFERENT THINGS: NEARLY EVERY DAY
2. NOT BEING ABLE TO STOP OR CONTROL WORRYING: MORE THAN HALF THE DAYS
6. BECOMING EASILY ANNOYED OR IRRITABLE: NOT AT ALL
7. FEELING AFRAID AS IF SOMETHING AWFUL MIGHT HAPPEN: NOT AT ALL
4. TROUBLE RELAXING: NOT AT ALL
GAD7 TOTAL SCORE: 8
1. FEELING NERVOUS, ANXIOUS, OR ON EDGE: NEARLY EVERY DAY
5. BEING SO RESTLESS THAT IT IS HARD TO SIT STILL: NOT AT ALL

## 2021-06-14 ASSESSMENT — PATIENT HEALTH QUESTIONNAIRE - PHQ9: SUM OF ALL RESPONSES TO PHQ QUESTIONS 1-9: 15
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
## 2021-06-15 ENCOUNTER — TELEPHONE (OUTPATIENT)
Dept: BEHAVIORAL HEALTH | Facility: CLINIC | Age: 36
End: 2021-06-15

## 2021-06-15 ASSESSMENT — ANXIETY QUESTIONNAIRES: GAD7 TOTAL SCORE: 8

## 2021-06-16 ENCOUNTER — TELEPHONE (OUTPATIENT)
Dept: BEHAVIORAL HEALTH | Facility: CLINIC | Age: 36
End: 2021-06-16

## 2021-06-17 ENCOUNTER — TELEPHONE (OUTPATIENT)
Dept: BEHAVIORAL HEALTH | Facility: CLINIC | Age: 36
End: 2021-06-17

## 2021-06-17 NOTE — TELEPHONE ENCOUNTER
Received phone call from patient who stated that he would like to be removed from the wait list at this time due to inability to make it work within his schedule. He shared that he would contact program in the future if he would like to participate.     Marti Martínez, [facility], [facility]  6/17/2021

## 2021-06-17 NOTE — TELEPHONE ENCOUNTER
Writer contacted patient to coordinate start in DDP program. Left message requesting return phone call. Second message left with patient.     Marti Martínez, [facility], [facility]  6/17/2021

## 2021-06-30 ENCOUNTER — HOSPITAL ENCOUNTER (EMERGENCY)
Facility: CLINIC | Age: 36
Discharge: HOME | End: 2021-06-30
Attending: EMERGENCY MEDICINE | Admitting: EMERGENCY MEDICINE
Payer: COMMERCIAL

## 2021-06-30 VITALS
OXYGEN SATURATION: 99 % | SYSTOLIC BLOOD PRESSURE: 128 MMHG | HEART RATE: 91 BPM | RESPIRATION RATE: 18 BRPM | DIASTOLIC BLOOD PRESSURE: 64 MMHG | TEMPERATURE: 98 F

## 2021-06-30 DIAGNOSIS — F10.220 ACUTE ALCOHOLIC INTOXICATION IN ALCOHOLISM WITHOUT COMPLICATION (H): ICD-10-CM

## 2021-06-30 DIAGNOSIS — R04.0 EPISTAXIS: ICD-10-CM

## 2021-06-30 LAB
ALBUMIN SERPL-MCNC: 3.9 G/DL (ref 3.4–5)
ALCOHOL BREATH TEST: 0.21 (ref 0–0.01)
ALP SERPL-CCNC: 88 U/L (ref 40–150)
ALT SERPL W P-5'-P-CCNC: 195 U/L (ref 0–70)
ANION GAP SERPL CALCULATED.3IONS-SCNC: 6 MMOL/L (ref 3–14)
AST SERPL W P-5'-P-CCNC: 142 U/L (ref 0–45)
BASOPHILS # BLD AUTO: 0.1 10E9/L (ref 0–0.2)
BASOPHILS NFR BLD AUTO: 0.9 %
BILIRUB SERPL-MCNC: 0.3 MG/DL (ref 0.2–1.3)
BUN SERPL-MCNC: 14 MG/DL (ref 7–30)
CALCIUM SERPL-MCNC: 8.2 MG/DL (ref 8.5–10.1)
CHLORIDE SERPL-SCNC: 111 MMOL/L (ref 94–109)
CO2 SERPL-SCNC: 27 MMOL/L (ref 20–32)
CREAT SERPL-MCNC: 1.02 MG/DL (ref 0.66–1.25)
DIFFERENTIAL METHOD BLD: NORMAL
EOSINOPHIL # BLD AUTO: 0.2 10E9/L (ref 0–0.7)
EOSINOPHIL NFR BLD AUTO: 2.6 %
ERYTHROCYTE [DISTWIDTH] IN BLOOD BY AUTOMATED COUNT: 14 % (ref 10–15)
ETHANOL SERPL-MCNC: 0.44 G/DL
GFR SERPL CREATININE-BSD FRML MDRD: >90 ML/MIN/{1.73_M2}
GLUCOSE SERPL-MCNC: 108 MG/DL (ref 70–99)
HCT VFR BLD AUTO: 49.3 % (ref 40–53)
HGB BLD-MCNC: 15.8 G/DL (ref 13.3–17.7)
IMM GRANULOCYTES # BLD: 0.2 10E9/L (ref 0–0.4)
IMM GRANULOCYTES NFR BLD: 1.8 %
LYMPHOCYTES # BLD AUTO: 3.4 10E9/L (ref 0.8–5.3)
LYMPHOCYTES NFR BLD AUTO: 38 %
MCH RBC QN AUTO: 28.1 PG (ref 26.5–33)
MCHC RBC AUTO-ENTMCNC: 32 G/DL (ref 31.5–36.5)
MCV RBC AUTO: 88 FL (ref 78–100)
MONOCYTES # BLD AUTO: 0.6 10E9/L (ref 0–1.3)
MONOCYTES NFR BLD AUTO: 7.2 %
NEUTROPHILS # BLD AUTO: 4.4 10E9/L (ref 1.6–8.3)
NEUTROPHILS NFR BLD AUTO: 49.5 %
NRBC # BLD AUTO: 0 10*3/UL
NRBC BLD AUTO-RTO: 0 /100
PLATELET # BLD AUTO: 273 10E9/L (ref 150–450)
POTASSIUM SERPL-SCNC: 4.3 MMOL/L (ref 3.4–5.3)
PROT SERPL-MCNC: 8.5 G/DL (ref 6.8–8.8)
RBC # BLD AUTO: 5.62 10E12/L (ref 4.4–5.9)
SODIUM SERPL-SCNC: 144 MMOL/L (ref 133–144)
WBC # BLD AUTO: 8.9 10E9/L (ref 4–11)

## 2021-06-30 PROCEDURE — 82075 ASSAY OF BREATH ETHANOL: CPT | Performed by: EMERGENCY MEDICINE

## 2021-06-30 PROCEDURE — 250N000013 HC RX MED GY IP 250 OP 250 PS 637: Performed by: INTERNAL MEDICINE

## 2021-06-30 PROCEDURE — 250N000009 HC RX 250: Performed by: EMERGENCY MEDICINE

## 2021-06-30 PROCEDURE — 258N000001 HC RX 258: Performed by: EMERGENCY MEDICINE

## 2021-06-30 PROCEDURE — 99284 EMERGENCY DEPT VISIT MOD MDM: CPT | Mod: 25 | Performed by: EMERGENCY MEDICINE

## 2021-06-30 PROCEDURE — 85025 COMPLETE CBC W/AUTO DIFF WBC: CPT | Performed by: EMERGENCY MEDICINE

## 2021-06-30 PROCEDURE — 82077 ASSAY SPEC XCP UR&BREATH IA: CPT | Performed by: EMERGENCY MEDICINE

## 2021-06-30 PROCEDURE — 250N000011 HC RX IP 250 OP 636: Performed by: EMERGENCY MEDICINE

## 2021-06-30 PROCEDURE — 99284 EMERGENCY DEPT VISIT MOD MDM: CPT | Performed by: EMERGENCY MEDICINE

## 2021-06-30 PROCEDURE — 96366 THER/PROPH/DIAG IV INF ADDON: CPT | Performed by: EMERGENCY MEDICINE

## 2021-06-30 PROCEDURE — 96365 THER/PROPH/DIAG IV INF INIT: CPT | Performed by: EMERGENCY MEDICINE

## 2021-06-30 PROCEDURE — 80053 COMPREHEN METABOLIC PANEL: CPT | Performed by: EMERGENCY MEDICINE

## 2021-06-30 RX ORDER — FOLIC ACID 1 MG/1
1 TABLET ORAL ONCE
Status: DISCONTINUED | OUTPATIENT
Start: 2021-06-30 | End: 2021-06-30

## 2021-06-30 RX ORDER — HYDROXYZINE HYDROCHLORIDE 25 MG/1
25 TABLET, FILM COATED ORAL ONCE
Status: COMPLETED | OUTPATIENT
Start: 2021-06-30 | End: 2021-06-30

## 2021-06-30 RX ORDER — MULTIPLE VITAMINS W/ MINERALS TAB 9MG-400MCG
1 TAB ORAL ONCE
Status: DISCONTINUED | OUTPATIENT
Start: 2021-06-30 | End: 2021-06-30

## 2021-06-30 RX ORDER — LANOLIN ALCOHOL/MO/W.PET/CERES
100 CREAM (GRAM) TOPICAL ONCE
Status: DISCONTINUED | OUTPATIENT
Start: 2021-06-30 | End: 2021-06-30

## 2021-06-30 RX ADMIN — HYDROXYZINE HYDROCHLORIDE 25 MG: 25 TABLET, FILM COATED ORAL at 20:46

## 2021-06-30 RX ADMIN — THIAMINE HYDROCHLORIDE: 100 INJECTION, SOLUTION INTRAMUSCULAR; INTRAVENOUS at 15:14

## 2021-06-30 RX ADMIN — GABAPENTIN 400 MG: 300 CAPSULE ORAL at 20:46

## 2021-06-30 ASSESSMENT — LIFESTYLE VARIABLES: INTOXICATION: 1

## 2021-06-30 NOTE — ED NOTES
Leona Astudillo is Pt's mother and he gives us consent to talk to her and she is willing to talk with us and give history as well. [phone]

## 2021-06-30 NOTE — ED NOTES
Bed: HW04  Expected date: 6/30/21  Expected time: 1:29 PM  Means of arrival:   Comments:  [place] 20: 37 y/o, M, Mental health,Drinking Vanilla Extract , unstable on his Feet.

## 2021-06-30 NOTE — ED PROVIDER NOTES
"    [facility] ([facility])    6/30/21     Hallway 4 2:20 PM   History     Chief Complaint   Patient presents with     Alcohol Intoxication     Suicidal     The history is provided by the patient and medical records. History limited by: alcohol intoxication.     Xavier Odell is a 36 year old male with history of generalized anxiety disorder, gastric ulcer, pancreatitis who was brought in by ambulance with alcohol intoxication. Patient currently in treatment program. He states he does not know how he got here and does not want to be here. He just wants to go home.  Per medics, patient's brother had gotten him out of the apartment and was getting him to the car when patient had a fall onto concrete.  That was when medics were called.  Patient had a bloody nose earlier, nose is crusted with blood and there is some blood smeared on his face.  When asked what happened he states that he does not know, does not think anyone had assaulted him.  When asked if he was suicidal he states \"that's such a hard question to answer.\"  After long pause he states \"no, no I'm not.\"  Per Epic records, he had been in the process of getting into the dual diagnosis program but was unable to make it work within his schedule.      I have reviewed the Medications, Allergies, Past Medical and Surgical History, and Social History in the [facility] system.  Past Medical History:   Diagnosis Date     GI bleed      Substance abuse (H)      Uncomplicated asthma        Past Surgical History:   Procedure Laterality Date     ORTHOPEDIC SURGERY         Past medical history, past surgical history, medications, and allergies were reviewed with the patient. Additional pertinent items: None    No family history on file.    Social History     Tobacco Use     Smoking status: Never Smoker     Smokeless tobacco: Never Used   Substance Use Topics     Alcohol use: Yes     Comment: Drinking cooking wine and mouthwash        Social history " was reviewed with the patient. Additional pertinent items: None    Review of Systems   Unable to perform ROS: Mental status change           Physical Exam   BP: 132/71  Pulse: 118  Resp: 14  SpO2: 92 %    Physical Exam    Physical Exam   Constitutional:   well nourished, well developed, intoxicated, with slurred speech  HENT:   Head: Normocephalic and atraumatic.   Eyes: Conjunctivae are normal. Pupils are equal, round, and reactive to light.  Bony orbits intact, EOMI with no entrapment  Nose: Dried blood to nose, no septal hematoma, no active bleeding, nose does not appear deformed  TMS: No hemotympanum, good light reflex  pharynx has no erythema or exudate, mucous membranes are moist  Neck:   no adenopathy, no bony tenderness  Cardiovascular: tachycardic without murmurs or gallops  Pulmonary/Chest: Clear to auscultation bilaterally, with no wheezes or retractions. No respiratory distress.  GI: Soft with good bowel sounds.  Non-tender, non-distended, with no guarding, no rebound, no peritoneal signs.   Back:  No bony or CVA tenderness   Musculoskeletal:  no edema or clubbing   Skin: Skin is warm and dry. No rash noted.   Neurological: alert and oriented to person, place, and time.  Moving all extremities, tries to get off ED cot, slurred speech  Psychiatric:  normal mood and affect.     ED Course        Procedures               The medical record was reviewed and interpreted.  Current labs reviewed and interpreted.                 Results for orders placed or performed during the hospital encounter of 06/30/21 (from the past 24 hour(s))   CBC with platelets differential   Result Value Ref Range    WBC 8.9 4.0 - 11.0 10e9/L    RBC Count 5.62 4.4 - 5.9 10e12/L    Hemoglobin 15.8 13.3 - 17.7 g/dL    Hematocrit 49.3 40.0 - 53.0 %    MCV 88 78 - 100 fl    MCH 28.1 26.5 - 33.0 pg    MCHC 32.0 31.5 - 36.5 g/dL    RDW 14.0 10.0 - 15.0 %    Platelet Count 273 150 - 450 10e9/L    Diff Method Automated Method     %  Neutrophils 49.5 %    % Lymphocytes 38.0 %    % Monocytes 7.2 %    % Eosinophils 2.6 %    % Basophils 0.9 %    % Immature Granulocytes 1.8 %    Nucleated RBCs 0 0 /100    Absolute Neutrophil 4.4 1.6 - 8.3 10e9/L    Absolute Lymphocytes 3.4 0.8 - 5.3 10e9/L    Absolute Monocytes 0.6 0.0 - 1.3 10e9/L    Absolute Eosinophils 0.2 0.0 - 0.7 10e9/L    Absolute Basophils 0.1 0.0 - 0.2 10e9/L    Abs Immature Granulocytes 0.2 0 - 0.4 10e9/L    Absolute Nucleated RBC 0.0    Comprehensive metabolic panel   Result Value Ref Range    Sodium 144 133 - 144 mmol/L    Potassium 4.3 3.4 - 5.3 mmol/L    Chloride 111 (H) 94 - 109 mmol/L    Carbon Dioxide 27 20 - 32 mmol/L    Anion Gap 6 3 - 14 mmol/L    Glucose 108 (H) 70 - 99 mg/dL    Urea Nitrogen 14 7 - 30 mg/dL    Creatinine 1.02 0.66 - 1.25 mg/dL    GFR Estimate >90 >60 mL/min/[1.73_m2]    GFR Estimate If Black >90 >60 mL/min/[1.73_m2]    Calcium 8.2 (L) 8.5 - 10.1 mg/dL    Bilirubin Total 0.3 0.2 - 1.3 mg/dL    Albumin 3.9 3.4 - 5.0 g/dL    Protein Total 8.5 6.8 - 8.8 g/dL    Alkaline Phosphatase 88 40 - 150 U/L     (H) 0 - 70 U/L     (H) 0 - 45 U/L   Alcohol level blood   Result Value Ref Range    Ethanol g/dL 0.44 (HH) <0.01 g/dL     Medications   dextrose 5% and 0.45% NaCl 1,000 mL with Infuvite Adult 10 mL, thiamine 100 mg, folic acid 1 mg infusion ( Intravenous New Bag 6/30/21 8374)              Assessments & Plan (with Medical Decision Making)       I have reviewed the nursing notes.  EMERGENCY DEPARTMENT COURSE: Patient was seen and examined at 1418 p.m. in the hallway.  The patient was intoxicated and kept trying to leave.  He was placed on a one-to-one for his safety.  There were no rooms available during the initial evaluation and he was initially seen in a hallway bed..  At approximately 1455 pm, the patient was moved into room 15.    I treated the patient with a banana bag IV, which includes thiamine, folic acid, and multivitamins.  Laboratory studies  show a blood alcohol level of 0.44.  CBC is unremarkable.  Comprehensive metabolic panel shows elevated LFTs, with an ALT of 195 and an AST of 142, consistent with heavy alcohol abuse.  The patient did have a history of a fall, although he does not recall it.  He did have dried blood in his nose.  He has no active bleeding.  He appears to have no other signs of head trauma.  He has no ecchymoses or deformity noted.  I do not believe that he requires a head CT at this time.    The patient is a 36-year-old male with a history of alcohol abuse who presents acutely intoxicated.  He had an unwitnessed fall.  He will be observed in the emergency department until he is more sober and able to be more thoroughly evaluated.  He repeatedly asked to be discharged.  He denies suicidal ideation.  He is not interested in detox.  He will be observed in the emergency department until clinically sober or until he obtains a sober ride home. He was signed out to Dr. Rodriguez at about 1800 pm for disposition.     I have reviewed the findings, diagnosis, plan and need for follow up with the patient.    New Prescriptions    No medications on file       Final diagnoses:   Acute alcoholic intoxication in alcoholism without complication (H)   Epistaxis - resolved       6/30/2021   [facility] EMERGENCY DEPARTMENT  I, Gabriela Smith, am serving as a trained medical scribe to document services personally performed by Ivette Schwartz MD based on the provider's statements to me on June 30, 2021.  This document has been checked and approved by the attending provider.    IIvette MD, was physically present and have reviewed and verified the accuracy of this note documented by Gabriela Smith, medical scribe.     This note was created in part by the use of Dragon voice recognition dictation system. Inadvertent grammatical errors and typographical errors may still exist.  MD Lana Dominguez Alda L, MD  06/30/21 [id number]

## 2021-07-01 NOTE — ED NOTES
Emergency Department Patient Sign-out       Brief HPI:  This is a 36 year old male signed out to me by Dr. Schwartz .  See initial ED Provider note for details of the presentation.          Patient is medically cleared for admission to a Behavioral Health unit.      The patient is not on a hold.      The patient has not required medication for agitation.    Awaiting Transfer to Mental Health Facility and awaiting to sober out or discharge with his family as he is not interested in Detox.        Significant Events prior to my assuming care: none      Exam:   Patient Vitals for the past 24 hrs:   BP Pulse Resp SpO2   06/30/21 1355 132/71 118 14 92 %           ED RESULTS:   Results for orders placed or performed during the hospital encounter of 06/30/21 (from the past 24 hour(s))   CBC with platelets differential     Status: None    Collection Time: 06/30/21  2:32 PM   Result Value Ref Range    WBC 8.9 4.0 - 11.0 10e9/L    RBC Count 5.62 4.4 - 5.9 10e12/L    Hemoglobin 15.8 13.3 - 17.7 g/dL    Hematocrit 49.3 40.0 - 53.0 %    MCV 88 78 - 100 fl    MCH 28.1 26.5 - 33.0 pg    MCHC 32.0 31.5 - 36.5 g/dL    RDW 14.0 10.0 - 15.0 %    Platelet Count 273 150 - 450 10e9/L    Diff Method Automated Method     % Neutrophils 49.5 %    % Lymphocytes 38.0 %    % Monocytes 7.2 %    % Eosinophils 2.6 %    % Basophils 0.9 %    % Immature Granulocytes 1.8 %    Nucleated RBCs 0 0 /100    Absolute Neutrophil 4.4 1.6 - 8.3 10e9/L    Absolute Lymphocytes 3.4 0.8 - 5.3 10e9/L    Absolute Monocytes 0.6 0.0 - 1.3 10e9/L    Absolute Eosinophils 0.2 0.0 - 0.7 10e9/L    Absolute Basophils 0.1 0.0 - 0.2 10e9/L    Abs Immature Granulocytes 0.2 0 - 0.4 10e9/L    Absolute Nucleated RBC 0.0    Comprehensive metabolic panel     Status: Abnormal    Collection Time: 06/30/21  2:32 PM   Result Value Ref Range    Sodium 144 133 - 144 mmol/L    Potassium 4.3 3.4 - 5.3 mmol/L    Chloride 111 (H) 94 - 109 mmol/L    Carbon Dioxide 27 20 - 32 mmol/L     Anion Gap 6 3 - 14 mmol/L    Glucose 108 (H) 70 - 99 mg/dL    Urea Nitrogen 14 7 - 30 mg/dL    Creatinine 1.02 0.66 - 1.25 mg/dL    GFR Estimate >90 >60 mL/min/[1.73_m2]    GFR Estimate If Black >90 >60 mL/min/[1.73_m2]    Calcium 8.2 (L) 8.5 - 10.1 mg/dL    Bilirubin Total 0.3 0.2 - 1.3 mg/dL    Albumin 3.9 3.4 - 5.0 g/dL    Protein Total 8.5 6.8 - 8.8 g/dL    Alkaline Phosphatase 88 40 - 150 U/L     (H) 0 - 70 U/L     (H) 0 - 45 U/L   Alcohol level blood     Status: Abnormal    Collection Time: 06/30/21  2:32 PM   Result Value Ref Range    Ethanol g/dL 0.44 (HH) <0.01 g/dL   Alcohol breath test POCT     Status: Abnormal    Collection Time: 06/30/21  8:03 PM   Result Value Ref Range    Alcohol Breath Test 0.207 (A) 0.00 - 0.01       ED MEDICATIONS:   Medications   dextrose 5% and 0.45% NaCl 1,000 mL with Infuvite Adult 10 mL, thiamine 100 mg, folic acid 1 mg infusion ( Intravenous Stopped 6/30/21 1728)   hydrOXYzine (ATARAX) tablet 25 mg (25 mg Oral Given 6/30/21 2046)   gabapentin (NEURONTIN) capsule 400 mg (400 mg Oral Given 6/30/21 2046)         Impression:    ICD-10-CM    1. Acute alcoholic intoxication in alcoholism without complication (H)  F10.220    2. Epistaxis  R04.0     resolved       Plan:    Pending studies include given vistaril and neurontin, discharge to his brother and follow up with his Mental Health Providers.        Nancy Rodriguez MD, Nancy Dugan MD  06/30/21 6632

## 2021-07-02 ENCOUNTER — TELEPHONE (OUTPATIENT)
Dept: BEHAVIORAL HEALTH | Facility: CLINIC | Age: 36
End: 2021-07-02

## 2021-07-02 NOTE — TELEPHONE ENCOUNTER
Writer contacted patient to check in and provide update on wait for DDP 1 program. Left message requesting return phone call.     Marti Martínez, [facility], [facility]  7/2/2021

## 2021-07-08 ENCOUNTER — BEH TREATMENT PLAN (OUTPATIENT)
Dept: BEHAVIORAL HEALTH | Facility: CLINIC | Age: 36
End: 2021-07-08
Attending: PSYCHIATRY & NEUROLOGY

## 2021-07-08 ENCOUNTER — TELEPHONE (OUTPATIENT)
Dept: BEHAVIORAL HEALTH | Facility: CLINIC | Age: 36
End: 2021-07-08

## 2021-07-08 DIAGNOSIS — F33.2 MAJOR DEPRESSIVE DISORDER, RECURRENT EPISODE, SEVERE (H): ICD-10-CM

## 2021-07-08 NOTE — TELEPHONE ENCOUNTER
Writer contacted patient to offer spot in DDP 1 program starting 7/12/21. Left message requesting return phone call by 12:00PM on 7/9/21 otherwise other patients on waitlist may be contacted.     Marti Martínez, [facility] , [facility]  7/8/2021

## 2021-07-08 NOTE — TELEPHONE ENCOUNTER
Spoke with patient to schedule start in DDP 1 on Monday 7/12/21. Writer also completed admission.   Marti Martínez, [facility], [facility]  7/8/2021

## 2021-07-08 NOTE — PROGRESS NOTES
Admission Date: 7/8/2021    Identify any current concerns with potential impact to admission:     medication/medical concerns: none reported     immediate safety concerns:no safety concerns    Does patient have safety plan? none  Note: Please copy safety plan copied into BEH Encounter     Other (insurance/childcare/transportation/housing/planned absences/etc): none endorsed    Patient's insurance is: [employer] .     Does patient need appointment with provider? None     Review patient's program schedule and inform them of any variation due to late days or holidays.                                                                                  (delete if not applicable):   For Dual Disorder Outpatient Program:     Patient reported his last use of substances as: alcohol use yesterday.     Patient reports the following concerns in regards to withdrawal/intoxication: none endorsed at this time, was recently in detox for 2.5 days at [address]         Completed by: Marti Martínez MA, [facility]

## 2021-07-12 ENCOUNTER — HOSPITAL ENCOUNTER (OUTPATIENT)
Dept: BEHAVIORAL HEALTH | Facility: CLINIC | Age: 36
End: 2021-07-12
Attending: PSYCHIATRY & NEUROLOGY
Payer: COMMERCIAL

## 2021-07-12 PROBLEM — F33.2 MAJOR DEPRESSIVE DISORDER, RECURRENT EPISODE, SEVERE (H): Status: ACTIVE | Noted: 2021-07-12

## 2021-07-12 PROCEDURE — G0177 OPPS/PHP; TRAIN & EDUC SERV: HCPCS | Mod: 95

## 2021-07-12 PROCEDURE — G0177 OPPS/PHP; TRAIN & EDUC SERV: HCPCS | Mod: 95 | Performed by: OCCUPATIONAL THERAPIST

## 2021-07-12 PROCEDURE — 90853 GROUP PSYCHOTHERAPY: CPT | Mod: 95 | Performed by: COUNSELOR

## 2021-07-12 NOTE — GROUP NOTE
"Process Group Note    PATIENT'S NAME: Xavier Odell  MRN:   [id number]  :   1985  ACCT. NUMBER: [id number]  DATE OF SERVICE: 21  START TIME:  9:00 AM  END TIME:  9:50 AM  FACILITATOR: Suzanne Jacobson [facility]  TOPIC:  Process Group    Diagnoses:  296.33 (F33.2) Major Depressive Disorder, Recurrent Episode, Severe.  4. Other Diagnoses that is relevant to services:   Substance-Related & Addictive Disorders Alcohol Use Disorder   303.90 (F10.20) Severe.      [facility] Adult Dual Diagnosis Day Treatment  TRACK: 1    NUMBER OF PARTICIPANTS: 5                                      Service Modality:  Video Visit     Telemedicine Visit: The patient's condition can be safely assessed and treated via synchronous audio and visual telemedicine encounter.      Reason for Telemedicine Visit: Services only offered telehealth    Originating Site (Patient Location): Patient's home    Distant Site (Provider Location): Provider Remote Setting- Home Office    Consent:  The patient/guardian has verbally consented to: the potential risks and benefits of telemedicine (video visit) versus in person care; bill my insurance or make self-payment for services provided; and responsibility for payment of non-covered services.     Patient would like the video invitation sent by:  My Chart    Mode of Communication:  Video Conference via Medical Zoom    As the provider I attest to compliance with applicable laws and regulations related to telemedicine.                Data:    Session content: At the start of this group, patients were invited to check in by identifying themselves, describing their current emotional status, and identifying issues to address in this group.   Area(s) of treatment focus addressed in this session included Symptom Management, Personal Safety and Abstinence/Relapse Prevention.    Xavier reported feeling \"tired\" today because he was camping this weekend.  His goal for the day is to unpack and also keep " "busy as boredom is a trigger for him.  He took some some time to share a bit about himself and what brought him to treatment.  He reported that he had a year of sobriety under his belt but then \"celbrated\" when he hit a year which led into a full-blown relapse.      Therapeutic Interventions/Treatment Strategies:  Psychotherapist offered support, feedback and validation and reinforced use of skills. Treatment modalities used include Motivational Interviewing, Cognitive Behavioral Therapy and Dialectical Behavioral Therapy. Interventions include Symptoms Management: Promoted understanding of their diagnoses and how it impacts their functioning.    Assessment:    Patient response:   Patient responded to session by accepting feedback, giving feedback and listening    Possible barriers to participation / learning include: and no barriers identified    Health Issues:   None reported       Substance Use Review:   Substance Use: Substance use has decreased    Mental Status/Behavioral Observations  Appearance:   Appropriate   Eye Contact:   Good   Psychomotor Behavior: Normal   Attitude:   Cooperative   Orientation:   All  Speech   Rate / Production: Normal    Volume:  Normal   Mood:    Depressed   Affect:    Appropriate   Thought Content:   Clear  Thought Form:  Coherent  Logical     Insight:    Good     Plan:     Safety Plan: No current safety concerns identified.  Recommended that patient call 911 or go to the local ED should there be a change in any of these risk factors.     Barriers to treatment: None identified    Patient Contracts (see media tab):  None    Substance Use: Provided encouragement towards sobriety    Provided support and affirmation for steps taken towards sobriety      Continue or Discharge: Patient will continue in Adult Dual Disorder Program (DDP) as planned. Patient is likely to benefit from learning and using skills as they work toward the goals identified in their treatment plan.      Suzanne LEMONS" [place], [facility]  July 12, 2021

## 2021-07-12 NOTE — GROUP NOTE
Psychoeducation Group Note    PATIENT'S NAME: Xavier Odell  MRN:   [id number]  :   1985  ACCT. NUMBER: [id number]  DATE OF SERVICE: 21  START TIME: 10:00 AM  END TIME: 10:50 AM  FACILITATOR: Rebecca Denton OTR/L  TOPIC: MH Life Skills Group: Lifestyle Balance and Structure  [facility] Adult Dual Diagnosis Day Treatment  TRACK: 1    NUMBER OF PARTICIPANTS: 5                                      Service Modality:  Video Visit     Telemedicine Visit: The patient's condition can be safely assessed and treated via synchronous audio and visual telemedicine encounter.      Reason for Telemedicine Visit: Services only offered telehealth    Originating Site (Patient Location): Patient's home    Distant Site (Provider Location): [facility]     Consent:  The patient/guardian has verbally consented to: the potential risks and benefits of telemedicine (video visit) versus in person care; bill my insurance or make self-payment for services provided; and responsibility for payment of non-covered services.     Patient would like the video invitation sent by:  My Chart    Mode of Communication:  Video Conference via Medical Zoom    As the provider I attest to compliance with applicable laws and regulations related to telemedicine.         Summary of Group / Topics Discussed:  Lifestyle Balance and Strucure:  Goal-setting & integration: Patients were introduced to the process and benefits of setting realistic, timely, and achievable goals to help support their ability to follow through with meaningful personal, health, recovery and treatment goals that they would like to achieve to improve overall functioning.  Patients were also taught and then practiced techniques to manage a variety of obstacles to achieve their goals and how to break goals into manageable pieces.  Patients also reported on follow through of goals.     Patient Session Goals /  Objectives:    Facilitated the creation of a vision for recovery and wellbeing    Identified and wrote meaningful SMART goals to engage in meaningful activities of daily living     Identified and problem solved barriers to achieving goals     Identified plan to support follow through on goals and reflection on progress made          Patient Participation / Response:  Fully participated with the group by sharing personal reflections / insights and openly received / provided feedback with other participants.    Patient presentation: constricted affect; active in group discussion, Demonstrated understanding of content through identifying some goals for the week ahead including getting settled into the group  and Patient would benefit from additional opportunities to practice the content to be able to generalize it to their everyday life with increased intentionality, consistency, and efficacy in support of their psychiatric recovery    Xavier began the Dual Disorder Program today.  He was welcomed and oriented to Life skills groups.  Encouraged Xavier to ask questions as needed.   Treatment Plan:  Patient has an initial individualized treatment plan that was created as part of their diagnostic assessment / admission process.  A master individualized treatment plan is in the process of being developed with the patient and multi-disciplinary care team.    ALICIA Schulz/L

## 2021-07-12 NOTE — GROUP NOTE
Psychoeducation Group Note    PATIENT'S NAME: Xavier Odell  MRN:   [id number]  :   1985  ACCT. NUMBER: [id number]  DATE OF SERVICE: 21  START TIME: 11:00 AM  END TIME: 11:50 AM  FACILITATOR: Birgit Jacob, RN  TOPIC: DARLEEN RN Group: [facility]                                    Service Modality:  Video Visit     Telemedicine Visit: The patient's condition can be safely assessed and treated via synchronous audio and visual telemedicine encounter.      Reason for Telemedicine Visit:  covid19    Originating Site (Patient Location): Patient's home    Distant Site (Provider Location): Provider Remote Setting- Home Office    Consent:  The patient/guardian has verbally consented to: the potential risks and benefits of telemedicine (video visit) versus in person care; bill my insurance or make self-payment for services provided; and responsibility for payment of non-covered services.     Patient would like the video invitation sent by:  My Chart    Mode of Communication:  Video Conference via Medical Zoom    As the provider I attest to compliance with applicable laws and regulations related to telemedicine.          [facility] Adult Dual Diagnosis Day Treatment  TRACK: 1    NUMBER OF PARTICIPANTS: 5    Summary of Group / Topics Discussed:  Foundations of Health: Nutrition: MICD nutrition, pt 1: Patients were provided education on the role of nutrition in the body and all of the functions that nutrition influences including energy, body structure and bodily function as overarching categories. Select macronutrients and micronutrients and their important roles and functions were also reviewed. Chemical and substance use disrupt how we eat, how our organs behave, and what our body does with the food that we do eat. Patients were educated on the effects that chemicals have on nutritional status and ways in which nutrition can be promoted while in recovery.      Patient Session Goals /  Objectives:  ? Identified hunger as a factor that can increase risk for chemical/substance relapse  ? Described the role of macronutrients and micronutrients in the body  ? Explained the effects of chemicals/substances on nutrition  ? Listed strategies for promoting balanced nutrition to promote wellness and recovery      Patient Participation / Response:  Fully participated with the group by sharing personal reflections / insights and openly received / provided feedback with other participants.    Demonstrated understanding of topics discussed through group discussion and participation and Identified / Expressed personal readiness to practice skills    Treatment Plan:  Patient has an initial individualized treatment plan that was created as part of their diagnostic assessment / admission process.  A master individualized treatment plan is in the process of being developed with the patient and multi-disciplinary care team.    Birgit Jacob RN

## 2021-07-13 ENCOUNTER — HOSPITAL ENCOUNTER (OUTPATIENT)
Dept: BEHAVIORAL HEALTH | Facility: CLINIC | Age: 36
End: 2021-07-13
Attending: PSYCHIATRY & NEUROLOGY
Payer: COMMERCIAL

## 2021-07-13 ENCOUNTER — TELEPHONE (OUTPATIENT)
Dept: BEHAVIORAL HEALTH | Facility: CLINIC | Age: 36
End: 2021-07-13

## 2021-07-13 PROCEDURE — G0177 OPPS/PHP; TRAIN & EDUC SERV: HCPCS | Mod: GT

## 2021-07-13 PROCEDURE — 90853 GROUP PSYCHOTHERAPY: CPT | Mod: 95 | Performed by: PSYCHOLOGIST

## 2021-07-13 PROCEDURE — 90853 GROUP PSYCHOTHERAPY: CPT | Mod: GT | Performed by: COUNSELOR

## 2021-07-13 NOTE — GROUP NOTE
"Process Group Note    PATIENT'S NAME: Xavier Odell  MRN:   [id number]  :   1985  ACCT. NUMBER: [id number]  DATE OF SERVICE: 21  START TIME: 10:00 AM  END TIME: 10:50 AM  FACILITATOR: Suzanne Jaocbson [facility]  TOPIC:  Process Group    Diagnoses:  296.33 (F33.2) Major Depressive Disorder, Recurrent Episode, Severe.  4. Other Diagnoses that is relevant to services:   Substance-Related & Addictive Disorders Alcohol Use Disorder   303.90 (F10.20) Severe.      [facility] Adult Mental Health Day Treatment  TRACK: 1    NUMBER OF PARTICIPANTS: 7                                      Service Modality:  Video Visit     Telemedicine Visit: The patient's condition can be safely assessed and treated via synchronous audio and visual telemedicine encounter.      Reason for Telemedicine Visit: Services only offered telehealth    Originating Site (Patient Location): Patient's home    Distant Site (Provider Location): Provider Remote Setting- Home Office    Consent:  The patient/guardian has verbally consented to: the potential risks and benefits of telemedicine (video visit) versus in person care; bill my insurance or make self-payment for services provided; and responsibility for payment of non-covered services.     Patient would like the video invitation sent by:  My Chart    Mode of Communication:  Video Conference via Medical Zoom    As the provider I attest to compliance with applicable laws and regulations related to telemedicine.                Data:    Session content: At the start of this group, patients were invited to check in by identifying themselves, describing their current emotional status, and identifying issues to address in this group.   Area(s) of treatment focus addressed in this session included Symptom Management and Personal Safety.    Xavier reported feeling \"peaceful\" today.  Her goal for the day is to continue to clean up after he returned from his trip.  He reported feeling a bit " triggered because he just won a court case and received some money and his first thought was that he can go party with it.  Client declined additional process time but contributed to group discussion and provided feedback and support to peers.      Therapeutic Interventions/Treatment Strategies:  Psychotherapist offered support, feedback and validation.    Assessment:    Patient response:   Patient responded to session by accepting feedback, giving feedback and listening    Possible barriers to participation / learning include: and no barriers identified    Health Issues:   None reported       Substance Use Review:   Substance Use: Substance use has decreased    Mental Status/Behavioral Observations  Appearance:   Appropriate   Eye Contact:   Good   Psychomotor Behavior: Normal   Attitude:   Cooperative   Orientation:   All  Speech   Rate / Production: Normal    Volume:  Normal   Mood:    Normal  Affect:    Appropriate   Thought Content:   Clear  Thought Form:  Coherent  Logical     Insight:    Good     Plan:     Safety Plan: No current safety concerns identified.  Recommended that patient call 911 or go to the local ED should there be a change in any of these risk factors.     Barriers to treatment: None identified    Patient Contracts (see media tab):  None    Substance Use: Provided encouragement towards sobriety    Provided support and affirmation for steps taken towards sobriety      Continue or Discharge: Patient will continue in Adult Dual Disorder Program (DDP) as planned. Patient is likely to benefit from learning and using skills as they work toward the goals identified in their treatment plan.      Suzanne Jacobson, Psychiatric  July 13, 2021

## 2021-07-13 NOTE — TELEPHONE ENCOUNTER
Walter called to conduct health screen. Pt unavailable. Will call tomorrow around 1pm.  Birgit Jacob RN on 7/13/2021 at 2:22 PM

## 2021-07-13 NOTE — GROUP NOTE
Psychotherapy Group Note    PATIENT'S NAME: Xavier Odell  MRN:   [id number]  :   1985  ACCT. NUMBER: [id number]  DATE OF SERVICE: 21  START TIME:  9:00 AM  END TIME:  9:50 AM  FACILITATOR: Ozzy Archuleta LP  TOPIC:  EBP Group: Specialty [facility] Adult Dual Diagnosis Day Treatment  TRACK: 1    NUMBER OF PARTICIPANTS: 7    Telemedicine Visit: The patient's condition can be safely assessed and treated via synchronous audio and visual telemedicine encounter.      Reason for Telemedicine Visit: Services only offered telehealth    Originating Site (Patient Location): Patient's home    Distant Site (Provider Location): Provider Remote Setting- Home Office    Consent:  The patient/guardian has verbally consented to: the potential risks and benefits of telemedicine (video visit) versus in person care; bill my insurance or make self-payment for services provided; and responsibility for payment of non-covered services.     Mode of Communication:  Video Conference via [employer]    As the provider I attest to compliance with applicable laws and regulations related to telemedicine.      Summary of Group / Topics Discussed:  Specialty Topics: Trauma and PTSD: Patients were provided with information to understand the types and origins of trauma, the relationship between trauma and PTSD, the scope of Trauma-Informed Care, and treatment options. Patients were able to explore how trauma may have impacted their functioning directly or indirectly, with reference to the the complexity of trauma and associated treatment options. Patients reviewed their current awareness of this topic and relevance to their functioning. Individual experiences with symptoms and treatment options were discussed.     Patient Session Goals / Objectives:    Discussed definitions of trauma, Trauma-Informed Care, PTSD    Discussed how traumatic experiences affect the individual and their relationships  (directly, indirectly, brain development and function)    Identified how a history of trauma or exposure to trauma may impact group work    Assisted patients to find ways to adapt functioning in light of past traumatic experience(s), and to identify suitable treatment options and community resources      Patient Participation / Response:  Fully participated with the group by sharing personal reflections / insights and openly received / provided feedback with other participants.    Demonstrated understanding of topics discussed through group discussion and participation    Treatment Plan:  Patient has a current master individualized treatment plan.  See Epic treatment plan for more information.    Ozzy Archuleta, LP

## 2021-07-13 NOTE — GROUP NOTE
Psychoeducation Group Note    PATIENT'S NAME: Xavier Odell  MRN:   [id number]  :   1985  ACCT. NUMBER: [id number]  DATE OF SERVICE: 21  START TIME: 11:00 AM  END TIME: 11:50 AM  FACILITATOR: Birgit Jacob, RN  TOPIC: DARLEEN RN Group: [facility]                                    Service Modality:  Video Visit     Telemedicine Visit: The patient's condition can be safely assessed and treated via synchronous audio and visual telemedicine encounter.      Reason for Telemedicine Visit:  covid19    Originating Site (Patient Location): Patient's home    Distant Site (Provider Location): Provider Remote Setting- Home Office    Consent:  The patient/guardian has verbally consented to: the potential risks and benefits of telemedicine (video visit) versus in person care; bill my insurance or make self-payment for services provided; and responsibility for payment of non-covered services.     Patient would like the video invitation sent by:  My Chart    Mode of Communication:  Video Conference via Medical Zoom    As the provider I attest to compliance with applicable laws and regulations related to telemedicine.          [facility] Adult Dual Diagnosis Day Treatment  TRACK: 1    NUMBER OF PARTICIPANTS: 5    Summary of Group / Topics Discussed:  Foundations of Health: Nutrition: MICJENNIFER nutrition, pt 2: Patients were provided education on the role of nutrition in the body and all of the functions that nutrition influences including energy, body structure and bodily function as overarching categories. Select macronutrients and micronutrients and their important roles and functions were also reviewed. Chemical and substance use disrupt how we eat, how our organs behave, and what our body does with the food that we do eat. Patients were educated on the effects that chemicals have on nutritional status and ways in which nutrition can be promoted while in recovery.      Patient Session Goals /  Objectives:  ? Identified hunger as a factor that can increase risk for chemical/substance relapse  ? Described the role of macronutrients and micronutrients in the body  ? Explained the effects of chemicals/substances on nutrition  ? Listed strategies for promoting balanced nutrition to promote wellness and recovery      Patient Participation / Response:  Fully participated with the group by sharing personal reflections / insights and openly received / provided feedback with other participants.    Demonstrated understanding of topics discussed through group discussion and participation and Identified / Expressed personal readiness to practice skills    Treatment Plan:  Patient has an initial individualized treatment plan that was created as part of their diagnostic assessment / admission process.  A master individualized treatment plan is in the process of being developed with the patient and multi-disciplinary care team.    Birgit Jacob RN

## 2021-07-14 ENCOUNTER — HOSPITAL ENCOUNTER (OUTPATIENT)
Dept: BEHAVIORAL HEALTH | Facility: CLINIC | Age: 36
End: 2021-07-14
Attending: PSYCHIATRY & NEUROLOGY
Payer: COMMERCIAL

## 2021-07-14 ENCOUNTER — TELEPHONE (OUTPATIENT)
Dept: BEHAVIORAL HEALTH | Facility: CLINIC | Age: 36
End: 2021-07-14

## 2021-07-14 PROCEDURE — 90853 GROUP PSYCHOTHERAPY: CPT | Mod: GT | Performed by: COUNSELOR

## 2021-07-14 PROCEDURE — G0177 OPPS/PHP; TRAIN & EDUC SERV: HCPCS | Mod: GT | Performed by: OCCUPATIONAL THERAPIST

## 2021-07-14 PROCEDURE — G0177 OPPS/PHP; TRAIN & EDUC SERV: HCPCS | Mod: GT

## 2021-07-14 NOTE — GROUP NOTE
"Process Group Note    PATIENT'S NAME: Xavier Odell  MRN:   [id number]  :   1985  ACCT. NUMBER: [id number]  DATE OF SERVICE: 21  START TIME:  9:00 AM  END TIME:  9:50 AM  FACILITATOR: Suzanne Jacobson [facility]  TOPIC:  Process Group    Diagnoses:  296.33 (F33.2) Major Depressive Disorder, Recurrent Episode, Severe.  4. Other Diagnoses that is relevant to services:   Substance-Related & Addictive Disorders Alcohol Use Disorder   303.90 (F10.20) Severe.      [facility] Adult Dual Diagnosis Day Treatment  TRACK: 1    NUMBER OF PARTICIPANTS: 6                                      Service Modality:  Video Visit     Telemedicine Visit: The patient's condition can be safely assessed and treated via synchronous audio and visual telemedicine encounter.      Reason for Telemedicine Visit: Services only offered telehealth    Originating Site (Patient Location): Patient's home    Distant Site (Provider Location): Provider Remote Setting- Home Office    Consent:  The patient/guardian has verbally consented to: the potential risks and benefits of telemedicine (video visit) versus in person care; bill my insurance or make self-payment for services provided; and responsibility for payment of non-covered services.     Patient would like the video invitation sent by:  My Chart    Mode of Communication:  Video Conference via Medical Zoom    As the provider I attest to compliance with applicable laws and regulations related to telemedicine.              Data:    Session content: At the start of this group, patients were invited to check in by identifying themselves, describing their current emotional status, and identifying issues to address in this group.   Area(s) of treatment focus addressed in this session included Symptom Management and Personal Safety.    Xavier reported feeling \"hopeful\" today.  His goal is to pay some bills.  Client declined additional process time but contributed to group discussion and " provided feedback and support to peers.      Therapeutic Interventions/Treatment Strategies:  Psychotherapist offered support, feedback and validation.    Assessment:    Patient response:   Patient responded to session by accepting feedback, giving feedback and listening    Possible barriers to participation / learning include: and no barriers identified    Health Issues:   None reported       Substance Use Review:   Substance Use: Substance use has decreased    Mental Status/Behavioral Observations  Appearance:   Appropriate   Eye Contact:   Good   Psychomotor Behavior: Normal   Attitude:   Cooperative   Orientation:   All  Speech   Rate / Production: Normal    Volume:  Normal   Mood:    Depressed   Affect:    Appropriate   Thought Content:   Clear  Thought Form:  Coherent  Logical     Insight:    Good     Plan:     Safety Plan: No current safety concerns identified.  Recommended that patient call 911 or go to the local ED should there be a change in any of these risk factors.     Barriers to treatment: None identified    Patient Contracts (see media tab):  None    Substance Use: Provided encouragement towards sobriety    Provided support and affirmation for steps taken towards sobriety      Continue or Discharge: Patient will continue in Adult Dual Disorder Program (DDP) as planned. Patient is likely to benefit from learning and using skills as they work toward the goals identified in their treatment plan.      Suzanne Jacobson, [facility]  July 14, 2021

## 2021-07-14 NOTE — TELEPHONE ENCOUNTER
"RN Review of Medical History / Physical Health Screen  Outpatient Mental Health Programs - [facility] Adult Dual Diagnosis Day Treatment    PATIENT'S NAME: Xavier Odell  MRN:   [id number]  :   1985  ACCT. NUMBER: [id number]  CURRENT AGE:  36 year old    DATE OF DIAGNOSTIC ASSESSMENT: 21  DATE OF ADMISSION: 21     Please see Diagnostic Assessment for additional Medical History.     General Health:   Have you had any exposure to any communicable disease in the past 2-3 weeks? no     Are you aware of safe sex practices? yes       Nutrition:    Are you on a special diet? If yes, please explain:  no   Do you have any concerns regarding your nutritional status? If yes, please explain:  no   Have you had any appetite changes in the last 3 months?  No     Have you had any weight loss or weight gain in the last 3 months?  No     Do you have a history of an eating disorder? no   Do you have a history of being in an eating disorder program? no     Patient height and weight recorded by RN in epic flowsheet: no No; Unable to measure  Because of temporary in-person programmatic suspension due to COVID-19 pandemic, all pt weights and heights will be collected through patient self-report an recorded in physical health screening progress note upon admission to the program.                            Height/Weight Review:  Patient reported height:     5'9\"   Patient reports weight:  Date last checked:  ~220 pounds   Any referrals/needs identified?                BMI Review:  Was the patient informed of BMI? no      Findings See above         Fall Risk:   Have you had any falls in the past 3 months? no     Do you currently use any assistive devices for mobility?   no      Additional Comments/Assessment: Pt denies seizures (current/historical), dizziness, mobility concerns. No fall risk assessed; No safety concerns r/t falls.  No IT at this time, has some concerns re: running out of meds and having " difficulty with accessing psych provider at [facility], has PCP     Per completion of the Medical History / Physical Health Screen, is there a recommendation to see / follow up with a primary care physician/clinic or dentist?    Yes, Recommendations:   medications      Birgit Jacob RN  7/14/2021

## 2021-07-14 NOTE — GROUP NOTE
Psychoeducation Group Note    PATIENT'S NAME: Xavier Odell  MRN:   [id number]  :   1985  ACCT. NUMBER: [id number]  DATE OF SERVICE: 21  START TIME: 10:00 AM  END TIME: 10:50 AM  FACILITATOR: Rebecca Denton OTR/L  TOPIC: MH Life Skills Group: Resiliency Development  [facility] Adult Dual Diagnosis Day Treatment  TRACK: 1    NUMBER OF PARTICIPANTS: 7                                      Service Modality:  Video Visit     Telemedicine Visit: The patient's condition can be safely assessed and treated via synchronous audio and visual telemedicine encounter.      Reason for Telemedicine Visit: Services only offered telehealth    Originating Site (Patient Location): Patient's home    Distant Site (Provider Location): [facility]    Consent:  The patient/guardian has verbally consented to: the potential risks and benefits of telemedicine (video visit) versus in person care; bill my insurance or make self-payment for services provided; and responsibility for payment of non-covered services.     Patient would like the video invitation sent by:  My Chart    Mode of Communication:  Video Conference via Medical Zoom    As the provider I attest to compliance with applicable laws and regulations related to telemedicine.         Summary of Group / Topics Discussed:  Resiliency Development:  What is mindfulness?  Provide explanation of mindfulness and its application to each patient s general health, symptoms and stressors.  Discuss history, application to mental health therapies, key concepts, benefits and barriers to practice.  Mindfulness is the practice of focusing on the present moment, non-judgmentally.  Benefits include increasing awareness of thoughts, emotions, and behaviors with a goal of identifying patterns that may contribute to mental health symptoms and perception of stress.  Skills are taught and practiced in session to help each  patient apply the skills to their daily lives.     Patient Session Goals / Objectives:    Discuss patient s current awareness, knowledge and practice of mindfulness     Demonstrate understanding of key concepts    Identify when/how to use mindfulness practice Identified how using coping skills can be used for symptom and stress management          Established a relapse prevention plan to practice these skills in their own environments       Practiced and reflected on how to generalize taught skills to their everyday life                     Patient Participation / Response:  Fully participated with the group by sharing personal reflections / insights and openly received / provided feedback with other participants.    Patient presentation: active in group discussion sharing ideas and insights with the group, Verbalized understanding of content and Patient would benefit from additional opportunities to practice the content to be able to generalize it to their everyday life with increased intentionality, consistency, and efficacy in support of their psychiatric recovery    Treatment Plan:  Patient has an initial individualized treatment plan that was created as part of their diagnostic assessment / admission process.  A master individualized treatment plan is in the process of being developed with the patient and multi-disciplinary care team.    Rebecca Denton OTR/L

## 2021-07-14 NOTE — GROUP NOTE
Psychoeducation Group Note    PATIENT'S NAME: Xavier Odell  MRN:   [id number]  :   1985  ACCT. NUMBER: [id number]  DATE OF SERVICE: 21  START TIME: 11:00 AM  END TIME: 11:50 AM  FACILITATOR: Birgit Jacob, RN  TOPIC: DARLEEN RN Group: [facility]                                    Service Modality:  Video Visit     Telemedicine Visit: The patient's condition can be safely assessed and treated via synchronous audio and visual telemedicine encounter.      Reason for Telemedicine Visit:  covid19    Originating Site (Patient Location): Patient's home    Distant Site (Provider Location): Provider Remote Setting- Home Office    Consent:  The patient/guardian has verbally consented to: the potential risks and benefits of telemedicine (video visit) versus in person care; bill my insurance or make self-payment for services provided; and responsibility for payment of non-covered services.     Patient would like the video invitation sent by:  My Chart    Mode of Communication:  Video Conference via Medical Zoom    As the provider I attest to compliance with applicable laws and regulations related to telemedicine.          [facility] Adult Dual Diagnosis Day Treatment  TRACK: 1    NUMBER OF PARTICIPANTS: 7    Summary of Group / Topics Discussed:  Foundations of Health: Nutrition: MICD nutrition; Gut-Brain connection: Patients were provided education on the role of nutrition in the body and all of the functions that nutrition influences including energy, body structure and bodily function as overarching categories. Select macronutrients and micronutrients and their important roles and functions were also reviewed. Chemical and substance use disrupt how we eat, how our organs behave, and what our body does with the food that we do eat. Patients were educated on the effects that chemicals have on nutritional status and ways in which nutrition can be promoted while in recovery.      Patient Session  Goals / Objectives:  ? Identified hunger as a factor that can increase risk for chemical/substance relapse  ? Described the role of macronutrients and micronutrients in the body  ? Explained the effects of chemicals/substances on nutrition  ? Listed strategies for promoting balanced nutrition to promote wellness and recovery      Patient Participation / Response:  Fully participated with the group by sharing personal reflections / insights and openly received / provided feedback with other participants.    Demonstrated understanding of topics discussed through group discussion and participation and Identified / Expressed personal readiness to practice skills    Treatment Plan:  Patient has an initial individualized treatment plan that was created as part of their diagnostic assessment / admission process.  A master individualized treatment plan is in the process of being developed with the patient and multi-disciplinary care team.    Birgit Jacob RN

## 2021-07-15 ENCOUNTER — HOSPITAL ENCOUNTER (OUTPATIENT)
Dept: BEHAVIORAL HEALTH | Facility: CLINIC | Age: 36
End: 2021-07-15
Attending: PSYCHIATRY & NEUROLOGY
Payer: COMMERCIAL

## 2021-07-15 PROCEDURE — G0177 OPPS/PHP; TRAIN & EDUC SERV: HCPCS | Mod: GT

## 2021-07-15 PROCEDURE — 90853 GROUP PSYCHOTHERAPY: CPT | Mod: GT | Performed by: COUNSELOR

## 2021-07-15 PROCEDURE — G0177 OPPS/PHP; TRAIN & EDUC SERV: HCPCS | Mod: GT | Performed by: OCCUPATIONAL THERAPIST

## 2021-07-15 NOTE — GROUP NOTE
Psychoeducation Group Note    PATIENT'S NAME: Xavier Odell  MRN:   [id number]  :   1985  ACCT. NUMBER: [id number]  DATE OF SERVICE: 7/15/21  START TIME:  9:00 AM  END TIME:  9:50 AM  FACILITATOR: Rebecca Denton OTR/L  TOPIC: MH Life Skills Group: Sensory Approaches in Mental Health  [facility] Adult Dual Diagnosis Day Treatment  TRACK: 1    NUMBER OF PARTICIPANTS: 7                                      Service Modality:  Video Visit     Telemedicine Visit: The patient's condition can be safely assessed and treated via synchronous audio and visual telemedicine encounter.      Reason for Telemedicine Visit: Services only offered telehealth    Originating Site (Patient Location): Patient's home    Distant Site (Provider Location): [facility]    Consent:  The patient/guardian has verbally consented to: the potential risks and benefits of telemedicine (video visit) versus in person care; bill my insurance or make self-payment for services provided; and responsibility for payment of non-covered services.     Patient would like the video invitation sent by:  My Chart    Mode of Communication:  Video Conference via Medical Zoom    As the provider I attest to compliance with applicable laws and regulations related to telemedicine.         Summary of Group / Topics Discussed:  Sensory Approaches in Mental Health:  Coping Through the Senses Introduction: Part 1: Patients were introduced and taught about neurosensory based skills and strategies related to supporting effective self regulation skills.  Patients were taught about the external sensory systems (taste, smell, auditory, touch and visual) and how they can be used for coping with mental health symptoms and stressors.  Patients were provided with an experiential opportunity to increase self-awareness of helpful sensory input and self care activities. Patients were introduced on how to create  supportive environments that encourage use of these skills.         Patient Session Goals / Objectives:    Identified specific and individualized neurosensory skills to help when distressed      Identified skills learned and how this applies to current daily life    Established a plan for practice of these skills in their own environments        Patient Participation / Response:  Fully participated with the group by sharing personal reflections / insights and openly received / provided feedback with other participants.    Patient presentation: active in group discussion; constricted affect, Verbalized understanding of content and Patient would benefit from additional opportunities to practice the content to be able to generalize it to their everyday life with increased intentionality, consistency, and efficacy in support of their psychiatric recovery    Treatment Plan:  Patient has an initial individualized treatment plan that was created as part of their diagnostic assessment / admission process.  A master individualized treatment plan is in the process of being developed with the patient and multi-disciplinary care team.    ALICIA Schulz/L

## 2021-07-15 NOTE — ADDENDUM NOTE
Encounter addended by: Rebecca Denton OTR/L on: 7/15/2021 1:36 PM   Actions taken: Clinical Note Signed, Episode edited, Charge Capture section accepted

## 2021-07-15 NOTE — PROGRESS NOTES
Adult Dual Disorder Program:  Individualized Treatment Plan     Date of Plan: 7/15/21    Name: Xavier Odell MRN: [id number]    : 1985    Programs:  Adult Dual Disorder Program (DDP)    Clinical Track (if applicable):  1    DSM5 Diagnosis    296.33 (F33.2) Major Depressive Disorder, Recurrent Episode, Severe.  4. Other Diagnoses that is relevant to services:   Substance-Related & Addictive Disorders Alcohol Use Disorder   303.90 (F10.20) Severe.        Adult Dual Disorder Program Multidisciplinary Team Members: Kathy Del Rosario MD, Suzanne Jacobson, [facility], [facility]; Enrique Archuleta MA, , [place] [facility]; David Sequeira, LMFT, [facility]; Pedrito Yu OTR/L;  Birgit Jacob RN     Xavier Odell will participate in the Adult Dual Disorder Program  5 days per week, 3 hours per day. Anticipated duration/discharge: 6-8 weeks     Due to COVID-19, services will be delivered via telemedicine until further notice.        Program Start Date: 21  Anticipated Discharge Date: 9/3/21 (pending authorization/clinical changes)    NOTE: Complete CGI     Review Date: Does Xavier Odell continue to meet criteria to participate in the Adult Dual Disorder Program, 5 days per week; 3 hours per day?   21 no                       Client Strengths:  committed to sobriety, creative, educated, empathetic, goal-focused, good listener, insightful, intelligent, motivated, open to learning, open to suggestions / feedback, supportive, wants to learn, willing to ask questions and willing to relate to others    Client Participation in Plan:  Contributed to goals and plan   Attended individual treatment plan meeting on 7/15/21  Agrees with plan   Received copy of treatment plan   Discussed with staff     Areas of Vulnerability:  Depressive symptoms   Substance use     Long-Term Goals:  Knowledge about illness and management of symptoms   Maintenance of personal safety   Maintenance of sobriety   Effective management of impulsivity     Abuse  Prevention Plan:  Safe, therapeutic environment   Safety coping plan as needed   Education regarding illness and skill development   Coordination with care providers   Impluse control education and intervention   Medication adjustment/management (MI/CD)   Monitor for use of substances    Discharge Criteria:  Satisfactory progress toward treatment goals   Improvement re: identified problems and symptoms   Ability to continue recovery at next level of service   Has a discharge plan in place   Has safety/coping plan in place   Ability to maintain sobriety  Complete goodbye letter assignment   Regular attendance as scheduled     Areas of Treatment Focus        Area of Treatment Focus:   Symptom Stabilization and Management  Start Date:    7/15/21    Dimension:   III. Emotional / Behavioral Condition    Description:    Xavier has been struggling with depression.    Goal:  Target Date: 8/12/21 Status: Stopped  Xavier will learn and practice skills to manage obsessive thinking and ruminating on the past as that often precipitated substance use.    Xavier will continue to take his medications as prescribed.      Progress:   7/30/21: Xavier was discharged prior to completion of goals due to 3 consecutive no call/no shows. STOPPED    Treatment Strategies:   Facilitate increased self awareness  Provide education regarding coping skills  Teach adaptive coping skills and communication skills  Use reality based supportive approach      Area of Treatment Focus:   Abstinence / Relapse Prevention  Start Date:    7/15/21    Dimension:   I. Acute Intoxication / Withdrawal Potential    Description:    Xavier has been struggling with alcohol use.    Goal:  Target Date: 8/12/21 Status: Stopped  Xavier will remain sober while in DDP.    Xavier will work in improving impulse control related to substance use.      Progress:    7/30/21: Xavier was discharged prior to completion of goals due to 3 consecutive no call/no shows. STOPPED    Treatment  "Strategies:   Facilitate increased self awareness  Provide education regarding coping skills  Use reality based supportive approach      Area of Treatment Focus:   Community Resources / Support and Discharge Planning  Start Date:    7/15/21    Dimension:   VI. Recovery Environment    Description:   Xavier has providers at [facility] but they have not been seeing him since he started DDP.  He has a supportive family and friends.  He is currently not working.       Goal:  Target Date: 8/12/21 Status: Stopped  Xavier will resume seeing his therapist and psychiatry at [facility].    Xavier will explore options for jobs prior to discharge.      Xavier will explore [employer] as an options for community support meetings.      Progress:    7/30/21: Xavier was discharged prior to completion of goals due to 3 consecutive no call/no shows. STOPPED    Treatment Strategies:   Assist clients in establishing / strengthening support network  Assist to identify treatment goals  Assist with discharge planning       Suzanne Jacobson, [facility], [facility]      NOTE: Required signatures are completed manually and scanned into the electronic medical record. See \"Media\" tab in epic.    The Individualized Treatment Plan Signature Page has been routed to the provider for co-sign.       "

## 2021-07-15 NOTE — GROUP NOTE
Process Group Note    PATIENT'S NAME: Xavier Odell  MRN:   [id number]  :   1985  ACCT. NUMBER: [id number]  DATE OF SERVICE: 7/15/21  START TIME: 10:00 AM  END TIME: 10:50 AM  FACILITATOR: Suzanne Jacobson [facility]  TOPIC:  Process Group    Diagnoses:  296.33 (F33.2) Major Depressive Disorder, Recurrent Episode, Severe.  4. Other Diagnoses that is relevant to services:   Substance-Related & Addictive Disorders Alcohol Use Disorder   303.90 (F10.20) Severe.      [facility] Adult Dual Diagnosis Day Treatment  TRACK: 1    NUMBER OF PARTICIPANTS: 7                                      Service Modality:  Video Visit     Telemedicine Visit: The patient's condition can be safely assessed and treated via synchronous audio and visual telemedicine encounter.      Reason for Telemedicine Visit: Services only offered telehealth    Originating Site (Patient Location): Patient's home    Distant Site (Provider Location): Provider Remote Setting- Home Office    Consent:  The patient/guardian has verbally consented to: the potential risks and benefits of telemedicine (video visit) versus in person care; bill my insurance or make self-payment for services provided; and responsibility for payment of non-covered services.     Patient would like the video invitation sent by:  My Chart    Mode of Communication:  Video Conference via Medical Zoom    As the provider I attest to compliance with applicable laws and regulations related to telemedicine.                Data:    Session content: At the start of this group, patients were invited to check in by identifying themselves, describing their current emotional status, and identifying issues to address in this group.   Area(s) of treatment focus addressed in this session included Symptom Management, Personal Safety and Abstinence/Relapse Prevention.      Xavier reported feeling apathetic today.  His goal is to finish paying his bills today.  Client declined additional process  time but contributed to group discussion and provided feedback and support to peers.    Therapeutic Interventions/Treatment Strategies:  Psychotherapist offered support, feedback and validation.    Assessment:    Patient response:   Patient responded to session by accepting feedback, giving feedback and listening    Possible barriers to participation / learning include: and no barriers identified    Health Issues:   None reported       Substance Use Review:   Substance Use: Substance use has decreased    Mental Status/Behavioral Observations  Appearance:   Appropriate   Eye Contact:   Good   Psychomotor Behavior: Normal   Attitude:   Cooperative   Orientation:   All  Speech   Rate / Production: Normal    Volume:  Normal   Mood:    Depressed   Affect:    Appropriate   Thought Content:   Clear  Thought Form:  Coherent  Logical     Insight:    Good     Plan:     Safety Plan: No current safety concerns identified.  Recommended that patient call 911 or go to the local ED should there be a change in any of these risk factors.     Barriers to treatment: None identified    Patient Contracts (see media tab):  None    Substance Use: Provided encouragement towards sobriety    Provided support and affirmation for steps taken towards sobriety      Continue or Discharge: Patient will continue in Adult Dual Disorder Program (DDP) as planned. Patient is likely to benefit from learning and using skills as they work toward the goals identified in their treatment plan.      Suzanne Jacobson, [facility]  July 15, 2021

## 2021-07-15 NOTE — PROGRESS NOTES
Adult Dual Disorder Program:   Acknowledgement of Current Treatment Plan     I have reviewed my treatment plan with my therapist on 7/15/21.   I agree with the plan as it is written in the electronic health record.    Name:                  Signature:  Xavier Odell         Unavailable to sign due to COVID-19   NASH Patterson, [facility]  Psychotherapist   NASH Mckinnon on 7/15/2021 at 12:03 PM       Kathy Del Rosario MD  Psychiatrist/Medical Director Kathy Del Rosario MD on 9-12-21

## 2021-07-15 NOTE — GROUP NOTE
Psychoeducation Group Note    PATIENT'S NAME: Xavier Odell  MRN:   [id number]  :   1985  ACCT. NUMBER: [id number]  DATE OF SERVICE: 7/15/21  START TIME: 11:00 AM  END TIME: 11:50 AM  FACILITATOR: Nadiya Patel RN  TOPIC: MH RN Group: [facility] Adult Dual Diagnosis Day Treatment  TRACK:  ONE    NUMBER OF PARTICIPANTS: 7    Summary of Group / Topics Discussed:  Health Maintenance: Weekend planning: Patients were given time to complete a weekend plan of what they will do to promote wellness and sobriety over the weekend when they do not have the structure of group. Patients were encouraged to review progress on their treatment goals and were challenged to identify ways to work toward meeting them. Patients identified and discussed foreseeable barriers to success over the weekend and then developed a plan to overcome them. Patients reviewed their distress coping skills and social support network and discussed this with the group.       Patient Session Goals / Objectives:    ?    Identified activities to engage in that promote balance in wellness  ?    Distinguished possible barriers to success over the weekend and created a plan to overcome them  ?    Listed distress coping skills and identified social support network to utilize if in crisis during the weekend          Patient Participation / Response:  Fully participated with the group by sharing personal reflections / insights and openly received / provided feedback with other participants.    Demonstrated understanding of topics discussed through group discussion and participation    Treatment Plan:  Patient has a current master individualized treatment plan.  See Epic treatment plan for more information.    Nadiya Patel RN

## 2021-07-16 ENCOUNTER — HOSPITAL ENCOUNTER (OUTPATIENT)
Dept: BEHAVIORAL HEALTH | Facility: CLINIC | Age: 36
End: 2021-07-16
Attending: PSYCHIATRY & NEUROLOGY
Payer: COMMERCIAL

## 2021-07-16 PROCEDURE — 90853 GROUP PSYCHOTHERAPY: CPT | Mod: 95 | Performed by: COUNSELOR

## 2021-07-16 PROCEDURE — G0177 OPPS/PHP; TRAIN & EDUC SERV: HCPCS | Mod: GT | Performed by: OCCUPATIONAL THERAPIST

## 2021-07-16 NOTE — GROUP NOTE
Psychoeducation Group Note    PATIENT'S NAME: Xavier Odell  MRN:   [id number]  :   1985  ACCT. NUMBER: [id number]  DATE OF SERVICE: 21  START TIME:  9:00 AM  END TIME:  9:50 AM  FACILITATOR: Rebecca Denton OTR/L  TOPIC: MH Life Skills Group: Sensory Approaches in Mental Health  [facility] Adult Dual Diagnosis Day Treatment  TRACK: 1    NUMBER OF PARTICIPANTS: 7                                      Service Modality:  Video Visit     Telemedicine Visit: The patient's condition can be safely assessed and treated via synchronous audio and visual telemedicine encounter.      Reason for Telemedicine Visit: Services only offered telehealth    Originating Site (Patient Location): Patient's home    Distant Site (Provider Location): [facility]    Consent:  The patient/guardian has verbally consented to: the potential risks and benefits of telemedicine (video visit) versus in person care; bill my insurance or make self-payment for services provided; and responsibility for payment of non-covered services.     Patient would like the video invitation sent by:  My Chart    Mode of Communication:  Video Conference via Medical Zoom    As the provider I attest to compliance with applicable laws and regulations related to telemedicine.         Summary of Group / Topics Discussed:  Sensory Approaches in Mental Health:  Coping Through the Senses Introduction Part 2: Patients were introduced and taught about neurosensory based skills and strategies related to supporting effective self regulation skills.  Patients were taught about the internal sensory systems (proprioception, vestibular, deep touch, and interoception) and how they can be used for coping with mental health symptoms and stressors.  Patients were provided with an experiential opportunity to increase self-awareness of helpful sensory input and self care activities. Patients were introduced on  how to create supportive environments that encourage use of these skills.         Patient Session Goals / Objectives:    Identified specific and individualized neurosensory skills to help when distressed      Identified skills learned and how this applies to current daily life    Established a plan for practice of these skills in their own environments        Patient Participation / Response:  Fully participated with the group by sharing personal reflections / insights and openly received / provided feedback with other participants.    Patient presentation: constricted affect; active in group discussion sharing ideas and asking questions, Verbalized understanding of content and Patient would benefit from additional opportunities to practice the content to be able to generalize it to their everyday life with increased intentionality, consistency, and efficacy in support of their psychiatric recovery    Treatment Plan:  Patient has an initial individualized treatment plan that was created as part of their diagnostic assessment / admission process.  A master individualized treatment plan is in the process of being developed with the patient and multi-disciplinary care team.    Rebecca Denton OTR/L

## 2021-07-16 NOTE — GROUP NOTE
Psychotherapy Group Note    PATIENT'S NAME: Xavier Odell  MRN:   [id number]  :   1985  ACCT. NUMBER: [id number]  DATE OF SERVICE: 21  START TIME: 11:00 AM  END TIME: 11:50 AM  FACILITATOR: Suzanne Jacobson LPCC  TOPIC:  EBP Group: DDP Relapse Prevention  [facility] Adult Dual Diagnosis Day Treatment  TRACK: 1    NUMBER OF PARTICIPANTS: 5                                      Service Modality:  Video Visit     Telemedicine Visit: The patient's condition can be safely assessed and treated via synchronous audio and visual telemedicine encounter.      Reason for Telemedicine Visit: Services only offered telehealth    Originating Site (Patient Location): Patient's home    Distant Site (Provider Location): Provider Remote Setting- Home Office    Consent:  The patient/guardian has verbally consented to: the potential risks and benefits of telemedicine (video visit) versus in person care; bill my insurance or make self-payment for services provided; and responsibility for payment of non-covered services.     Patient would like the video invitation sent by:  My Chart    Mode of Communication:  Video Conference via Medical Zoom    As the provider I attest to compliance with applicable laws and regulations related to telemedicine.          Summary of Group / Topics Discussed:  DDP Relapse Prevention: Stages of Change: Patients explored the process and types of change in relation to substance use, including but not limited to: theories of change, steps to making change, methods of changing behavior, and potential barriers to implementing change. Patients discussed their current and past experiences with managing change in relation to substance use and what stage of change they currently identify with. Patients identified what changes may benefit their daily lives and how they can work towards implementing change.    Patient Session Goals / Objectives:    Gained understanding of the change  process    Identified positive and negative behavioral patterns    Made plans to track and implement changes and shared experiences in group    Identified personal barriers to change        Patient Participation / Response:  Fully participated with the group by sharing personal reflections / insights and openly received / provided feedback with other participants.    Demonstrated understanding of topics discussed through group discussion and participation, Demonstrated understanding of utilizing relapse prevention skills to manage urges and maintain sobriety and Identified / Expressed personal readiness to utilize relapse prevention skills    Treatment Plan:  Patient has a current master individualized treatment plan.  See Epic treatment plan for more information.    Suzanne Jacobson, [facility]

## 2021-07-16 NOTE — GROUP NOTE
"Process Group Note    PATIENT'S NAME: Xavier Odell  MRN:   [id number]  :   1985  ACCT. NUMBER: [id number]  DATE OF SERVICE: 21  START TIME: 10:00 AM  END TIME: 10:50 AM  FACILITATOR: Suzanne Jacobson [facility]  TOPIC:  Process Group    Diagnoses:  296.33 (F33.2) Major Depressive Disorder, Recurrent Episode, Severe.  4. Other Diagnoses that is relevant to services:   Substance-Related & Addictive Disorders Alcohol Use Disorder   303.90 (F10.20) Severe.      [facility] Adult Dual Diagnosis Day Treatment  TRACK: 1    NUMBER OF PARTICIPANTS: 5                                      Service Modality:  Video Visit     Telemedicine Visit: The patient's condition can be safely assessed and treated via synchronous audio and visual telemedicine encounter.      Reason for Telemedicine Visit: Services only offered telehealth    Originating Site (Patient Location): Patient's home    Distant Site (Provider Location): Provider Remote Setting- Home Office    Consent:  The patient/guardian has verbally consented to: the potential risks and benefits of telemedicine (video visit) versus in person care; bill my insurance or make self-payment for services provided; and responsibility for payment of non-covered services.     Patient would like the video invitation sent by:  My Chart    Mode of Communication:  Video Conference via Medical Zoom    As the provider I attest to compliance with applicable laws and regulations related to telemedicine.                Data:    Session content: At the start of this group, patients were invited to check in by identifying themselves, describing their current emotional status, and identifying issues to address in this group.   Area(s) of treatment focus addressed in this session included Symptom Management, Personal Safety and Abstinence/Relapse Prevention.    Xavier reported feeling \"content\" today.  His goal for the day is to deal with some IRS paperwork he received.  Client " declined additional process time but contributed to group discussion and provided feedback and support to peers.      Therapeutic Interventions/Treatment Strategies:  Psychotherapist offered support, feedback and validation and reinforced use of skills.    Assessment:    Patient response:   Patient responded to session by accepting feedback, giving feedback and listening    Possible barriers to participation / learning include: and no barriers identified    Health Issues:   None reported       Substance Use Review:   Substance Use: Substance use has decreased    Mental Status/Behavioral Observations  Appearance:   Appropriate   Eye Contact:   Good   Psychomotor Behavior: Normal   Attitude:   Cooperative   Orientation:   All  Speech   Rate / Production: Normal    Volume:  Normal   Mood:    Depressed   Affect:    Appropriate   Thought Content:   Clear  Thought Form:  Coherent  Logical     Insight:    Good     Plan:     Safety Plan: No current safety concerns identified.  Recommended that patient call 911 or go to the local ED should there be a change in any of these risk factors.     Barriers to treatment: None identified    Patient Contracts (see media tab):  None    Substance Use: Provided encouragement towards sobriety    Provided support and affirmation for steps taken towards sobriety      Continue or Discharge: Patient will continue in Adult Dual Disorder Program (DDP) as planned. Patient is likely to benefit from learning and using skills as they work toward the goals identified in their treatment plan.      Suzanne Jacobson, [facility]  July 16, 2021

## 2021-07-18 ENCOUNTER — HEALTH MAINTENANCE LETTER (OUTPATIENT)
Age: 36
End: 2021-07-18

## 2021-07-19 ENCOUNTER — HOSPITAL ENCOUNTER (OUTPATIENT)
Dept: BEHAVIORAL HEALTH | Facility: CLINIC | Age: 36
End: 2021-07-19
Attending: PSYCHIATRY & NEUROLOGY
Payer: COMMERCIAL

## 2021-07-19 PROCEDURE — 90853 GROUP PSYCHOTHERAPY: CPT | Mod: GT | Performed by: COUNSELOR

## 2021-07-19 PROCEDURE — G0177 OPPS/PHP; TRAIN & EDUC SERV: HCPCS | Mod: 95

## 2021-07-19 PROCEDURE — G0177 OPPS/PHP; TRAIN & EDUC SERV: HCPCS | Mod: GT | Performed by: OCCUPATIONAL THERAPIST

## 2021-07-19 NOTE — GROUP NOTE
Psychoeducation Group Note    PATIENT'S NAME: Xavier Odell  MRN:   [id number]  :   1985  ACCT. NUMBER: [id number]  DATE OF SERVICE: 21  START TIME: 10:00 AM  END TIME: 10:50 AM  FACILITATOR: Rebecca Denton OTR/L  TOPIC: MH Life Skills Group: Lifestyle Balance and Structure  [facility] Adult Dual Diagnosis Day Treatment  TRACK: 1    NUMBER OF PARTICIPANTS: 6                                      Service Modality:  Video Visit     Telemedicine Visit: The patient's condition can be safely assessed and treated via synchronous audio and visual telemedicine encounter.      Reason for Telemedicine Visit: Services only offered telehealth    Originating Site (Patient Location): Patient's home    Distant Site ([facility]     Consent:  The patient/guardian has verbally consented to: the potential risks and benefits of telemedicine (video visit) versus in person care; bill my insurance or make self-payment for services provided; and responsibility for payment of non-covered services.     Patient would like the video invitation sent by:  My Chart    Mode of Communication:  Video Conference via Medical Zoom    As the provider I attest to compliance with applicable laws and regulations related to telemedicine.         Summary of Group / Topics Discussed:  Lifestyle Balance and Strucure:  Goal-setting & integration: Patients were introduced to the process and benefits of setting realistic, timely, and achievable goals to help support their ability to follow through with meaningful personal, health, recovery and treatment goals that they would like to achieve to improve overall functioning.  Patients were also taught and then practiced techniques to manage a variety of obstacles to achieve their goals and how to break goals into manageable pieces.  Patients also reported on follow through of goals.     Patient Session Goals /  Objectives:    Facilitated the creation of a vision for recovery and wellbeing    Identified and wrote meaningful SMART goals to engage in meaningful activities of daily living     Identified and problem solved barriers to achieving goals     Identified plan to support follow through on goals and reflection on progress made          Patient Participation / Response:  Fully participated with the group by sharing personal reflections / insights and openly received / provided feedback with other participants.    Patient presentation: constricted affect; active in group discussion, Demonstrated understanding of content through identifying some goals for himself for the week ahead  and Patient would benefit from additional opportunities to practice the content to be able to generalize it to their everyday life with increased intentionality, consistency, and efficacy in support of their psychiatric recovery    Treatment Plan:  Patient has a current master individualized treatment plan.  See Epic treatment plan for more information.    Rebecca Denton, OTR/L

## 2021-07-19 NOTE — GROUP NOTE
"Process Group Note    PATIENT'S NAME: Xavier Odell  MRN:   [id number]  :   1985  ACCT. NUMBER: [id number]  DATE OF SERVICE: 21  START TIME:  9:00 AM  END TIME:  9:50 AM  FACILITATOR: Suzanne Jacobson [facility]  TOPIC:  Process Group    Diagnoses:  296.33 (F33.2) Major Depressive Disorder, Recurrent Episode, Severe.  4. Other Diagnoses that is relevant to services:   Substance-Related & Addictive Disorders Alcohol Use Disorder   303.90 (F10.20) Severe.      [facility] Adult Dual Diagnosis Day Treatment  TRACK: 1    NUMBER OF PARTICIPANTS: 4                                      Service Modality:  Video Visit     Telemedicine Visit: The patient's condition can be safely assessed and treated via synchronous audio and visual telemedicine encounter.      Reason for Telemedicine Visit: Services only offered telehealth    Originating Site (Patient Location): Patient's home    Distant Site (Provider Location): Provider Remote Setting- Home Office    Consent:  The patient/guardian has verbally consented to: the potential risks and benefits of telemedicine (video visit) versus in person care; bill my insurance or make self-payment for services provided; and responsibility for payment of non-covered services.     Patient would like the video invitation sent by:  My Chart    Mode of Communication:  Video Conference via Medical Zoom    As the provider I attest to compliance with applicable laws and regulations related to telemedicine.                Data:    Session content: At the start of this group, patients were invited to check in by identifying themselves, describing their current emotional status, and identifying issues to address in this group.   Area(s) of treatment focus addressed in this session included Symptom Management, Personal Safety and Abstinence/Relapse Prevention.    Xavier reported feeling \"calm\" and \"relaxed\" today.  His goal for the day is to clean up around his apartment.  Client " declined additional process time but contributed to group discussion and provided feedback and support to peers.      Therapeutic Interventions/Treatment Strategies:  Psychotherapist offered support, feedback and validation.    Assessment:    Patient response:   Patient responded to session by accepting feedback, giving feedback and listening    Possible barriers to participation / learning include: and no barriers identified    Health Issues:   None reported       Substance Use Review:   Substance Use: Substance use has decreased    Mental Status/Behavioral Observations  Appearance:   Appropriate   Eye Contact:   Good   Psychomotor Behavior: Normal   Attitude:   Cooperative   Orientation:   All  Speech   Rate / Production: Normal    Volume:  Normal   Mood:    Normal  Affect:    Appropriate   Thought Content:   Clear  Thought Form:  Coherent  Logical     Insight:    Good     Plan:     Safety Plan: No current safety concerns identified.  Recommended that patient call 911 or go to the local ED should there be a change in any of these risk factors.     Barriers to treatment: None identified    Patient Contracts (see media tab):  None    Substance Use: Provided encouragement towards sobriety    Provided support and affirmation for steps taken towards sobriety      Continue or Discharge: Patient will continue in Adult Dual Disorder Program (DDP) as planned. Patient is likely to benefit from learning and using skills as they work toward the goals identified in their treatment plan.      Suzanne Jacobson, [facility]  July 19, 2021

## 2021-07-19 NOTE — GROUP NOTE
Psychoeducation Group Note    PATIENT'S NAME: Xavier Odell  MRN:   [id number]  :   1985  ACCT. NUMBER: [id number]  DATE OF SERVICE: 21  START TIME: 11:00 AM  END TIME: 11:50 AM  FACILITATOR: Birgit Jacob RN  TOPIC: DARLEEN RN Group: Health Maintenance                                    Service Modality:  Video Visit     Telemedicine Visit: The patient's condition can be safely assessed and treated via synchronous audio and visual telemedicine encounter.      Reason for Telemedicine Visit:  covid19    Originating Site (Patient Location): Patient's home    Distant Site (Provider Location): Provider Remote Setting- Home Office    Consent:  The patient/guardian has verbally consented to: the potential risks and benefits of telemedicine (video visit) versus in person care; bill my insurance or make self-payment for services provided; and responsibility for payment of non-covered services.     Patient would like the video invitation sent by:  My Chart    Mode of Communication:  Video Conference via Medical Zoom    As the provider I attest to compliance with applicable laws and regulations related to telemedicine.          [facility] Adult Dual Diagnosis Day Treatment  TRACK: 1    NUMBER OF PARTICIPANTS: 6    Summary of Group / Topics Discussed:  Health Maintenance: Eight Dimensions of Wellness: The concept of holistic health through the model of eight dimensions was introduced. Group members participated in identifying behaviors and activities in each of the dimensions of wellness.  The importance of each dimension was reinforced and the concept of balance in life as it relates to wellness was explored.      Patient Session Goals / Objectives:    Verbalized understanding of balance in wellness and how it relates to their life    Identified and explained the eight dimensions of wellness    Categorized activities and wellness needs into corresponding dimensions appropriately during exercise           Patient Participation / Response:  Moderately participated, sharing some personal reflections / insights and adequately adequately received / provided feedback with other participants.    Demonstrated understanding of topics discussed through group discussion and participation and Identified / Expressed personal readiness to practice skills    Treatment Plan:  Patient has a current master individualized treatment plan.  See Epic treatment plan for more information.    Birgit Jacob RN

## 2021-07-20 ENCOUNTER — HOSPITAL ENCOUNTER (OUTPATIENT)
Dept: BEHAVIORAL HEALTH | Facility: CLINIC | Age: 36
End: 2021-07-20
Attending: PSYCHIATRY & NEUROLOGY
Payer: COMMERCIAL

## 2021-07-20 PROCEDURE — G0177 OPPS/PHP; TRAIN & EDUC SERV: HCPCS | Mod: GT

## 2021-07-20 PROCEDURE — 90853 GROUP PSYCHOTHERAPY: CPT | Mod: GT | Performed by: PSYCHOLOGIST

## 2021-07-20 NOTE — GROUP NOTE
Psychoeducation Group Note    PATIENT'S NAME: Xavier Odell  MRN:   [id number]  :   1985  ACCT. NUMBER: [id number]  DATE OF SERVICE: 21  START TIME: 10:00 AM  END TIME: 10:50 AM  FACILITATOR: Birgit Jacob RN  TOPIC: DARLEEN RN Group: Health Maintenance                                    Service Modality:  Video Visit     Telemedicine Visit: The patient's condition can be safely assessed and treated via synchronous audio and visual telemedicine encounter.      Reason for Telemedicine Visit:  covid19    Originating Site (Patient Location): Patient's home    Distant Site (Provider Location): Provider Remote Setting- Home Office    Consent:  The patient/guardian has verbally consented to: the potential risks and benefits of telemedicine (video visit) versus in person care; bill my insurance or make self-payment for services provided; and responsibility for payment of non-covered services.     Patient would like the video invitation sent by:  My Chart    Mode of Communication:  Video Conference via Medical Zoom    As the provider I attest to compliance with applicable laws and regulations related to telemedicine.          [facility] Adult Dual Diagnosis Day Treatment  TRACK: 1    NUMBER OF PARTICIPANTS: 6    Summary of Group / Topics Discussed:  Health Maintenance: Discharge planning/Community resources, pt 1: Patients worked on completing an instructor-facilitated discharge planning activity. Discharge planning begins for all patients after admission. Competent discharge planning promotes a successful transition and decreases the likelihood of mental health relapse. In this group, all dimensions of wellness were reviewed to assess for needs/discharge readiness. These dimensions included: physical, emotional, occupational/productivity, environmental, social, spiritual, intellectual, and financial. Patients worked on completing/updating their discharge planning and identifying their treatment needs  prior to time of discharge.     Patient Session Goals / Objectives:  ? Identified unmet treatment needs to accomplish before discharge  ? Completed all dimensions of the discharge planning packet  ? Participated in the planning process, make phone calls, set up appointments, got connected with community resources, followed up with treatment team as needed         Patient Participation / Response:  Fully participated with the group by sharing personal reflections / insights and openly received / provided feedback with other participants.    Demonstrated understanding of topics discussed through group discussion and participation and Identified / Expressed personal readiness to practice skills    Treatment Plan:  Patient has a current master individualized treatment plan.  See Epic treatment plan for more information.    Birgit Jacob RN

## 2021-07-20 NOTE — GROUP NOTE
Process Group Note    PATIENT'S NAME: Xavier Odell  MRN:   [id number]  :   1985  ACCT. NUMBER: [id number]  DATE OF SERVICE: 21  START TIME:  9:00 AM  END TIME:  9:50 AM  FACILITATOR: Ozzy Archuleta LP  TOPIC:  Process Group    Diagnoses:  296.33 (F33.2) Major Depressive Disorder, Recurrent Episode, Severe.  4. Other Diagnoses that is relevant to services:   Substance-Related & Addictive Disorders Alcohol Use Disorder   303.90 (F10.20) Severe.          [facility] Adult Dual Diagnosis Day Treatment  TRACK: 1    NUMBER OF PARTICIPANTS: 6    Telemedicine Visit: The patient's condition can be safely assessed and treated via synchronous audio and visual telemedicine encounter.      Reason for Telemedicine Visit: Services only offered telehealth    Originating Site (Patient Location): Patient's home    Distant Site (Provider Location): Provider Remote Setting- Home Office    Consent:  The patient/guardian has verbally consented to: the potential risks and benefits of telemedicine (video visit) versus in person care; bill my insurance or make self-payment for services provided; and responsibility for payment of non-covered services.     Mode of Communication:  Video Conference via [employer]    As the provider I attest to compliance with applicable laws and regulations related to telemedicine.            Data:    Session content: At the start of this group, patients were invited to check in by identifying themselves, describing their current emotional status, and identifying issues to address in this group.   Area(s) of treatment focus addressed in this session included Symptom Management, Personal Safety, Community Resources/Discharge Planning, Abstinence/Relapse Prevention, Develop / Improve Independent Living Skills and Develop Socialization / Interpersonal Relationship Skills.    Pt reports feeling calm, guarded, denies safety issues, stress with career, low energy, wonders  "\"what's next.\"    Therapeutic Interventions/Treatment Strategies:  Psychotherapist offered support, feedback and validation. Treatment modalities used include Cognitive Behavioral Therapy. Interventions include Cognitive Restructuring:  Assisted patient in formulating new neutral/positive alternatives to challenge less helpful / ineffective thoughts.    Assessment:    Patient response:   Patient responded to session by accepting feedback, giving feedback, listening, focusing on goals, being attentive, accepting support and appearing alert    Possible barriers to participation / learning include: and no barriers identified    Health Issues:   None reported       Substance Use Review:   Substance Use: Substance use has decreased    Mental Status/Behavioral Observations  Appearance:   Appropriate   Eye Contact:   Good   Psychomotor Behavior: Normal   Attitude:   Cooperative   Orientation:   All  Speech   Rate / Production: Normal    Volume:  Normal   Mood:    Anxious  Irritable   Affect:    Constricted   Thought Content:   Clear and Safety denies any current safety concerns including suicidal ideation, self-harm, and homicidal ideation  Thought Form:  Coherent  Logical     Insight:    Good     Plan:     Safety Plan: Recommended that patient call 911 or go to the local ED should there be a change in any of these risk factors.     Barriers to treatment: None identified    Patient Contracts (see media tab):  None    Substance Use: Stage of Change: Action     Continue or Discharge: Patient will continue in Adult Dual Disorder Program (DDP) as planned. Patient is likely to benefit from learning and using skills as they work toward the goals identified in their treatment plan.      Ozzy Archuleta LP  July 20, 2021    "

## 2021-07-20 NOTE — GROUP NOTE
Psychoeducation Group Note    PATIENT'S NAME: Xavier Odell  MRN:   [id number]  :   1985  ACCT. NUMBER: [id number]  DATE OF SERVICE: 21  START TIME: 11:00 AM  END TIME: 11:50 AM  FACILITATOR: Birgit Jacob RN  TOPIC: DARLEEN RN Group: Health Maintenance                                    Service Modality:  Video Visit     Telemedicine Visit: The patient's condition can be safely assessed and treated via synchronous audio and visual telemedicine encounter.      Reason for Telemedicine Visit:  covid19    Originating Site (Patient Location): Patient's home    Distant Site (Provider Location): Provider Remote Setting- Home Office    Consent:  The patient/guardian has verbally consented to: the potential risks and benefits of telemedicine (video visit) versus in person care; bill my insurance or make self-payment for services provided; and responsibility for payment of non-covered services.     Patient would like the video invitation sent by:  My Chart    Mode of Communication:  Video Conference via Medical Zoom    As the provider I attest to compliance with applicable laws and regulations related to telemedicine.          [facility] Adult Dual Diagnosis Day Treatment  TRACK: 1    NUMBER OF PARTICIPANTS: 4    Summary of Group / Topics Discussed:  Health Maintenance: Discharge planning/Community resources, pt 2: Patients worked on completing an instructor-facilitated discharge planning activity. Discharge planning begins for all patients after admission. Competent discharge planning promotes a successful transition and decreases the likelihood of mental health relapse. In this group, all dimensions of wellness were reviewed to assess for needs/discharge readiness. These dimensions included: physical, emotional, occupational/productivity, environmental, social, spiritual, intellectual, and financial. Patients worked on completing/updating their discharge planning and identifying their treatment needs  prior to time of discharge.     Patient Session Goals / Objectives:  ? Identified unmet treatment needs to accomplish before discharge  ? Completed all dimensions of the discharge planning packet  ? Participated in the planning process, make phone calls, set up appointments, got connected with community resources, followed up with treatment team as needed         Patient Participation / Response:  Fully participated with the group by sharing personal reflections / insights and openly received / provided feedback with other participants.    Demonstrated understanding of topics discussed through group discussion and participation and Identified / Expressed personal readiness to practice skills    Treatment Plan:  Patient has a current master individualized treatment plan.  See Epic treatment plan for more information.    Birgit Jacob RN

## 2021-07-21 ENCOUNTER — HOSPITAL ENCOUNTER (OUTPATIENT)
Dept: BEHAVIORAL HEALTH | Facility: CLINIC | Age: 36
End: 2021-07-21
Attending: PSYCHIATRY & NEUROLOGY
Payer: COMMERCIAL

## 2021-07-21 PROCEDURE — 90853 GROUP PSYCHOTHERAPY: CPT | Mod: GT | Performed by: MARRIAGE & FAMILY THERAPIST

## 2021-07-21 PROCEDURE — 90853 GROUP PSYCHOTHERAPY: CPT | Mod: 95 | Performed by: COUNSELOR

## 2021-07-21 NOTE — GROUP NOTE
Psychotherapy Group Note    PATIENT'S NAME: Xavier Odell  MRN:   [id number]  :   1985  [id number]  DATE OF SERVICE: 21  START TIME: 10:00 AM  END TIME: 10:50 AM  FACILITATOR: James Sequeira LMFT  TOPIC:  EBP Group: Coping Skills  [facility] Adult Dual Diagnosis Day Treatment  TRACK: 1    NUMBER OF PARTICIPANTS: 4                                      Service Modality:  Video Visit     Telemedicine Visit: The patient's condition can be safely assessed and treated via synchronous audio and visual telemedicine encounter.      Reason for Telemedicine Visit: Services only offered telehealth    Originating Site (Patient Location): Patient's home    Distant Site (Provider Location): Provider Remote Setting- Home Office    Consent:  The patient/guardian has verbally consented to: the potential risks and benefits of telemedicine (video visit) versus in person care; bill my insurance or make self-payment for services provided; and responsibility for payment of non-covered services.     Patient would like the video invitation sent by:  My Chart    Mode of Communication:  Video Conference via Medical Zoom    As the provider I attest to compliance with applicable laws and regulations related to telemedicine.         Summary of Group / Topics Discussed:  Coping Skills: Grounding: Patients discussed and practiced strategies to increase attachment / presence to the current moment.  Patients identified situations in which using these strategies will help improve emotion regulation sense of calm in the body.  Reviewed the benefits of applying grounding strategies, as well as past / current practices of each member.  Patients identified situations in which using these strategies would reduce stress. They developed the ability to distinguish when these strategies can be useful in their lives for management and stress and psychological well-being.    Patient Session Goals /  Objectives:    Understand the purpose of using grounding strategies to reduce stress.    Verbalize understanding of how and when to apply grounding strategies to reduce distress and increase presence in the moment.    Review patients current grounding practices and discuss a more formal way of practicing and accessing skills.    Practice using various calming strategies (e.g. 5-4-3-2-1; mental and body awareness).    Choose 1-2 grounding strategies to apply during times of distress.        Patient Participation / Response:  Moderately participated, sharing some personal reflections / insights and adequately adequately received / provided feedback with other participants.    Demonstrated understanding of topics discussed through group discussion and participation, Expressed understanding of the relevance / importance of coping skills at distressing times in life and Practiced 2-3 new coping skills in session    Treatment Plan:  Patient has a current master individualized treatment plan.  See Epic treatment plan for more information.    David Sequeira, [facility]

## 2021-07-21 NOTE — GROUP NOTE
"Process Group Note    PATIENT'S NAME: Xavier Odell  MRN:   [id number]  :   1985  ACCT. NUMBER: [id number]  DATE OF SERVICE: 21  START TIME:  9:00 AM  END TIME:  9:50 AM  FACILITATOR: Suzanne Jacobson [facility]  TOPIC:  Process Group    Diagnoses:  296.33 (F33.2) Major Depressive Disorder, Recurrent Episode, Severe.  4. Other Diagnoses that is relevant to services:   Substance-Related & Addictive Disorders Alcohol Use Disorder   303.90 (F10.20) Severe.      [facility] Adult Dual Diagnosis Day Treatment  TRACK: 1    NUMBER OF PARTICIPANTS: 4                                      Service Modality:  Video Visit     Telemedicine Visit: The patient's condition can be safely assessed and treated via synchronous audio and visual telemedicine encounter.      Reason for Telemedicine Visit: Services only offered telehealth    Originating Site (Patient Location): Patient's home    Distant Site (Provider Location): Provider Remote Setting- Home Office    Consent:  The patient/guardian has verbally consented to: the potential risks and benefits of telemedicine (video visit) versus in person care; bill my insurance or make self-payment for services provided; and responsibility for payment of non-covered services.     Patient would like the video invitation sent by:  My Chart    Mode of Communication:  Video Conference via Medical Zoom    As the provider I attest to compliance with applicable laws and regulations related to telemedicine.                  Data:    Session content: At the start of this group, patients were invited to check in by identifying themselves, describing their current emotional status, and identifying issues to address in this group.   Area(s) of treatment focus addressed in this session included Symptom Management, Personal Safety and Abstinence/Relapse Prevention.    Xavier reported feeling \"pretty good\" today.  His goal is to continue to clean his apartment.  Client declined additional " process time but contributed to group discussion and provided feedback and support to peers.      Therapeutic Interventions/Treatment Strategies:  Psychotherapist offered support, feedback and validation.    Assessment:    Patient response:   Patient responded to session by accepting feedback, giving feedback and listening    Possible barriers to participation / learning include: and no barriers identified    Health Issues:   None reported       Substance Use Review:   Substance Use: Substance use has decreased    Mental Status/Behavioral Observations  Appearance:   Appropriate   Eye Contact:   Good   Psychomotor Behavior: Normal   Attitude:   Cooperative   Orientation:   All  Speech   Rate / Production: Normal    Volume:  Normal   Mood:    Normal  Affect:    Appropriate   Thought Content:   Clear  Thought Form:  Coherent  Logical     Insight:    Good     Plan:     Safety Plan: No current safety concerns identified.  Recommended that patient call 911 or go to the local ED should there be a change in any of these risk factors.     Barriers to treatment: None identified    Patient Contracts (see media tab):  None    Substance Use: Provided encouragement towards sobriety    Provided support and affirmation for steps taken towards sobriety      Continue or Discharge: Patient will continue in Adult Dual Disorder Program (DDP) as planned. Patient is likely to benefit from learning and using skills as they work toward the goals identified in their treatment plan.      Suzanne Jacobson, [facility]  July 21, 2021

## 2021-07-21 NOTE — GROUP NOTE
Psychotherapy Group Note    PATIENT'S NAME: Xavier Odell  MRN:   [id number]  :   1985  [id number]  DATE OF SERVICE: 21  START TIME: 11:00 AM  END TIME: 11:50 AM  FACILITATOR: James Sequeira LMFT  TOPIC:  EBP Group: Coping Skills  [facility] Adult Dual Diagnosis Day Treatment  TRACK: 1    NUMBER OF PARTICIPANTS: 4                                        Service Modality:  Video Visit     Telemedicine Visit: The patient's condition can be safely assessed and treated via synchronous audio and visual telemedicine encounter.      Reason for Telemedicine Visit: Services only offered telehealth    Originating Site (Patient Location): Patient's home    Distant Site (Provider Location): Provider Remote Setting- Home Office    Consent:  The patient/guardian has verbally consented to: the potential risks and benefits of telemedicine (video visit) versus in person care; bill my insurance or make self-payment for services provided; and responsibility for payment of non-covered services.     Patient would like the video invitation sent by:  My Chart    Mode of Communication:  Video Conference via Medical Zoom    As the provider I attest to compliance with applicable laws and regulations related to telemedicine.       Summary of Group / Topics Discussed:  Coping Skills: Radical Acceptance: Patients learned radical acceptance as a way to tolerate heightened stress in the moment.  Patients identified situations that necessitate radical acceptance.  They focused on applying acceptance of the moment to tolerate difficult emotions and events. Patients discussed how to distinguish when this can be useful in their lives and when other skills are more relevant or helpful.    Patient Session Goals / Objectives:    Understand that some amount of pain exists for each of us in our lives    Process the difficulty of acceptance in our lives and benefits of radical acceptance to mood and  functioning.    Demonstrate understanding of when to use the radical acceptance to tolerate distress by providing examples of situations where this could be applied.    Identify barriers to applying radical acceptance to difficult situations and explore strategies to overcome them        Patient Participation / Response:  Moderately participated, sharing some personal reflections / insights and adequately adequately received / provided feedback with other participants.    Demonstrated knowledge of when to consider using a variety of coping skills in daily life, Identified barriers to applying coping skills and Practiced 2-3 new coping skills in session    Treatment Plan:  Patient has a current master individualized treatment plan.  See Epic treatment plan for more information.    David Sequeira, [facility]

## 2021-07-22 ENCOUNTER — HOSPITAL ENCOUNTER (OUTPATIENT)
Dept: BEHAVIORAL HEALTH | Facility: CLINIC | Age: 36
End: 2021-07-22
Attending: PSYCHIATRY & NEUROLOGY
Payer: COMMERCIAL

## 2021-07-22 PROCEDURE — 90853 GROUP PSYCHOTHERAPY: CPT | Mod: GT | Performed by: COUNSELOR

## 2021-07-22 PROCEDURE — G0177 OPPS/PHP; TRAIN & EDUC SERV: HCPCS | Mod: GT

## 2021-07-22 NOTE — GROUP NOTE
Process Group Note    PATIENT'S NAME: Xavier Odell  MRN:   [id number]  :   1985  ACCT. NUMBER: [id number]  DATE OF SERVICE: 21  START TIME: 10:00 AM  END TIME: 10:50 AM  FACILITATOR: Suzanne Jacobson [facility]  TOPIC:  Process Group    Diagnoses:  296.33 (F33.2) Major Depressive Disorder, Recurrent Episode, Severe.  4. Other Diagnoses that is relevant to services:   Substance-Related & Addictive Disorders Alcohol Use Disorder   303.90 (F10.20) Severe.      [facility] Mental Health Day Treatment  TRACK: 4    NUMBER OF PARTICIPANTS: 4                                      Service Modality:  Video Visit     Telemedicine Visit: The patient's condition can be safely assessed and treated via synchronous audio and visual telemedicine encounter.      Reason for Telemedicine Visit: Services only offered telehealth    Originating Site (Patient Location): Patient's home    Distant Site (Provider Location): Provider Remote Setting- Home Office    Consent:  The patient/guardian has verbally consented to: the potential risks and benefits of telemedicine (video visit) versus in person care; bill my insurance or make self-payment for services provided; and responsibility for payment of non-covered services.     Patient would like the video invitation sent by:  My Chart    Mode of Communication:  Video Conference via Medical Zoom    As the provider I attest to compliance with applicable laws and regulations related to telemedicine.                Data:    Session content: At the start of this group, patients were invited to check in by identifying themselves, describing their current emotional status, and identifying issues to address in this group.   Area(s) of treatment focus addressed in this session included Symptom Management, Personal Safety and Abstinence/Relapse Prevention.    Xavier reported feeling unmotivated today.  His goal today is to be more active (ride his bike and play catch with his dog).   He took some time to share more about how his use has affected his relationships.     Therapeutic Interventions/Treatment Strategies:  Psychotherapist offered support, feedback and validation and reinforced use of skills. Treatment modalities used include Motivational Interviewing, Cognitive Behavioral Therapy and Dialectical Behavioral Therapy. Interventions include Relapse Prevention: Discussed the use of substances and its impact on their relationships.    Assessment:    Patient response:   Patient responded to session by accepting feedback, giving feedback and listening    Possible barriers to participation / learning include: and no barriers identified    Health Issues:   None reported       Substance Use Review:   Substance Use: No active concerns identified.    Mental Status/Behavioral Observations  Appearance:   Appropriate   Eye Contact:   Good   Psychomotor Behavior: Normal   Attitude:   Cooperative   Orientation:   All  Speech   Rate / Production: Normal    Volume:  Normal   Mood:    Anxious  Depressed   Affect:    Appropriate   Thought Content:   Clear  Thought Form:  Coherent  Logical     Insight:    Good     Plan:     Safety Plan: No current safety concerns identified.  Recommended that patient call 911 or go to the local ED should there be a change in any of these risk factors.     Barriers to treatment: None identified    Patient Contracts (see media tab):  None    Substance Use: Provided encouragement towards sobriety    Provided support and affirmation for steps taken towards sobriety      Continue or Discharge: Patient will continue in Adult Dual Disorder Program (DDP) as planned. Patient is likely to benefit from learning and using skills as they work toward the goals identified in their treatment plan.      Suzanne Jacobson, [facility]  July 22, 2021

## 2021-07-22 NOTE — GROUP NOTE
Psychotherapy Group Note    PATIENT'S NAME: Xavier Odell  MRN:   [id number]  :   1985  ACCT. NUMBER: [id number]  DATE OF SERVICE: 21  START TIME: 11:00 AM  END TIME: 11:50 AM  FACILITATOR: Suzanne Jacobson LP[facility]  TOPIC:  EBP Group: DDP Relapse Prevention  [facility] Adult Dual Diagnosis Day Treatment  TRACK: 1    NUMBER OF PARTICIPANTS: 4                                      Service Modality:  Video Visit     Telemedicine Visit: The patient's condition can be safely assessed and treated via synchronous audio and visual telemedicine encounter.      Reason for Telemedicine Visit: Services only offered telehealth    Originating Site (Patient Location): Patient's home    Distant Site (Provider Location): Provider Remote Setting- Home Office    Consent:  The patient/guardian has verbally consented to: the potential risks and benefits of telemedicine (video visit) versus in person care; bill my insurance or make self-payment for services provided; and responsibility for payment of non-covered services.     Patient would like the video invitation sent by:  My Chart    Mode of Communication:  Video Conference via Medical Zoom    As the provider I attest to compliance with applicable laws and regulations related to telemedicine.          Summary of Group / Topics Discussed:  DDP Relapse Prevention: Urge Surfing: Patients explored the process and types of change in relation to substance use, including but not limited to: theories of change, steps to making change, methods of changing behavior, and potential barriers to implementing change. Patients discussed their current and past experiences with managing change in relation to substance use and what stage of change they currently identify with. Patients identified what changes may benefit their daily lives and how they can work towards implementing change.    Patient Session Goals / Objectives:    Gained understanding of the change  process    Identified positive and negative behavioral patterns    Made plans to track and implement changes and shared experiences in group    Identified personal barriers to change        Patient Participation / Response:  Fully participated with the group by sharing personal reflections / insights and openly received / provided feedback with other participants.    Demonstrated understanding of topics discussed through group discussion and participation, Demonstrated understanding of utilizing relapse prevention skills to manage urges and maintain sobriety and Identified / Expressed personal readiness to utilize relapse prevention skills    Treatment Plan:  Patient has a current master individualized treatment plan.  See Epic treatment plan for more information.    [facility]uzanne Jacobson, [facility]

## 2021-07-22 NOTE — GROUP NOTE
Psychoeducation Group Note    PATIENT'S NAME: Xavier Odell  MRN:   [id number]  :   1985  ACCT. NUMBER: [id number]  DATE OF SERVICE: 21  START TIME:  9:00 AM  END TIME:  9:50 AM  FACILITATOR: Adán Yu OTR/L  TOPIC: MH Life Skills Group: Resiliency Development                                    Service Modality:  Video Visit     Telemedicine Visit: The patient's condition can be safely assessed and treated via synchronous audio and visual telemedicine encounter.      Reason for Telemedicine Visit: Services only offered telehealth    Originating Site (Patient Location): Patient's home    Distant Site (Provider Location): Provider Remote Setting- Home Office    Consent:  The patient/guardian has verbally consented to: the potential risks and benefits of telemedicine (video visit) versus in person care; bill my insurance or make self-payment for services provided; and responsibility for payment of non-covered services.     Mode of Communication:  Video Conference via Medical Zoom    As the provider I attest to compliance with applicable laws and regulations related to telemedicine.       [facility] Adult Dual Diagnosis Day Treatment  TRACK: Group One    NUMBER OF PARTICIPANTS: 4    Summary of Group / Topics Discussed:  Resiliency Development:  Coping Skills(): Patients were taught how to identify stressors, signs of stress, coping skills, and prevention strategies for overall stress management.  Patients were given the opportunity to identify both ongoing and acute mental health symptoms and how to effectively manage these symptoms by developing an effective aftercare plan.  Patients increased awareness of community based resources.    Patient Session Goals / Objectives:    Identified how using coping skills can be used for symptom and stress management       Improved awareness of individualed symptoms and stressors and how to effectively cope     Established a relapse  prevention plan to practice these skills in their own environments    Practiced and reflected on how to generalize taught skills to their everyday life          Patient Participation / Response:  Fully participated with the group by sharing personal reflections / insights and openly received / provided feedback with other participants.    Demonstrated understanding of content through handout/group discussion , Verbalized understanding of content and Patient would benefit from additional opportunities to practice the content to be able to generalize it to their everyday life with increased intentionality, consistency, and efficacy in support of their psychiatric recovery    Treatment Plan:  Patient has a current master individualized treatment plan.  See Epic treatment plan for more information.    Adán Yu, OTR/L

## 2021-07-27 ENCOUNTER — TELEPHONE (OUTPATIENT)
Dept: BEHAVIORAL HEALTH | Facility: CLINIC | Age: 36
End: 2021-07-27

## 2021-07-27 ENCOUNTER — HOSPITAL ENCOUNTER (OUTPATIENT)
Dept: BEHAVIORAL HEALTH | Facility: CLINIC | Age: 36
End: 2021-07-27
Attending: PSYCHIATRY & NEUROLOGY
Payer: COMMERCIAL

## 2021-07-27 NOTE — TELEPHONE ENCOUNTER
Writer called client to explain their absence from group today.  Writer left them a voicemail asking that they return writer's call.    Suzanne Jacobson [facility] on 7/27/2021 at 10:53 AM

## 2021-07-29 ENCOUNTER — TELEPHONE (OUTPATIENT)
Dept: BEHAVIORAL HEALTH | Facility: CLINIC | Age: 36
End: 2021-07-29

## 2021-07-29 NOTE — TELEPHONE ENCOUNTER
Writer called client to explain their absence from group today.  Writer left them a voicemail asking that they return writer's call.  Writer informed him that he will be discharged if he does not attend group tomorrow.    Suzanne Jacobson [facility] on 7/29/2021 at 2:56 PM

## 2021-07-30 NOTE — DISCHARGE SUMMARY
Adult Dual Disorder Program   Discharge Summary/Instructions     Patient: Xavier Odell MRN: [id number]  : 1985 Age:  36 year old Sex:  male    Admission Date: 21  Discharge Date: 21  Diagnosis:   296.33 (F33.2) Major Depressive Disorder, Recurrent Episode, Severe.  4. Other Diagnoses that is relevant to services:   Substance-Related & Addictive Disorders Alcohol Use Disorder   303.90 (F10.20) Severe.      Focus of Treatment / Discharge Recommendations:    Personal Safety/ Management of Symptoms:    * Follow your safety plan.  Report increased symptoms to your care team                    and/or use the crisis resources listed below.    Crisis Resources:    Suicide Prevention Lifeline: 7-550-394-WGLR (1821)    Crisis Text Line Service (available 24 hours a day, 7 days a week): Text [place] to 608299    Call  **CRISIS (690063) from a cell phone to talk to a team of professionals who can help you.  Crisis Services By [facility]: Phone Number:   [place]     [phone]   [place]    [phone]   [place]    [phone]   [place]    [phone]   [place]    [phone]   [place] [phone]   [place]     [phone]       Call 911 or go to my nearest emergency department.     Managing Symptoms / Abstinence / Preventing Relapse:    Take all medicines as directed.  Carry a current list of medicines with you.    Use coping skills: mindfulness, breathing, grounding, etc.    Do not use illicit (street) drugs, controlled substances (narcotics) or alcohol.    Go to all appointments.    Report symptoms to your care team including: thoughts of suicide, loss of sleep, increased confusion, mood getting worse, feeling more aggressive, or substance use.    Develop/Improve Independent Living/Socialization Skills: Attend community support groups    Community Resources/Supports and Discharge Planning:      Follow up with psychiatrist / main caregiver: Unknown    Next visit: N/A    Follow up with your therapist:  Unknown   Next visit: N/A    Go to group therapy and / or support groups at: AA, NA, [facility], [facility], etc.     See your medical doctor about:  Any physical health concerns     Other:  N/A    Copy of summary sent to: Sent to patient via [employer]      Client Signature:_unavailable to sign due to COVID-19________________________________   Date / Time:___________    Staff Signature:___Suzanne Jacobson [facility] on 7/30/2021 at 10:29 AM  _______________________________   Date / Time:___________

## 2021-08-02 ENCOUNTER — BEH TREATMENT PLAN (OUTPATIENT)
Dept: BEHAVIORAL HEALTH | Facility: CLINIC | Age: 36
End: 2021-08-02
Attending: PSYCHIATRY & NEUROLOGY

## 2021-08-02 ENCOUNTER — HOSPITAL ENCOUNTER (OUTPATIENT)
Dept: BEHAVIORAL HEALTH | Facility: CLINIC | Age: 36
End: 2021-08-02
Attending: PSYCHIATRY & NEUROLOGY
Payer: COMMERCIAL

## 2021-08-02 ENCOUNTER — TELEPHONE (OUTPATIENT)
Dept: BEHAVIORAL HEALTH | Facility: CLINIC | Age: 36
End: 2021-08-02

## 2021-08-02 PROCEDURE — G0177 OPPS/PHP; TRAIN & EDUC SERV: HCPCS | Mod: GT

## 2021-08-02 PROCEDURE — G0177 OPPS/PHP; TRAIN & EDUC SERV: HCPCS | Mod: GT,95 | Performed by: OCCUPATIONAL THERAPIST

## 2021-08-02 PROCEDURE — 90853 GROUP PSYCHOTHERAPY: CPT | Mod: GT,95 | Performed by: MARRIAGE & FAMILY THERAPIST

## 2021-08-02 NOTE — GROUP NOTE
Psychoeducation Group Note    PATIENT'S NAME: Xavier Odell  MRN:   [id number]  :   1985  ACCT. NUMBER: [id number]  DATE OF SERVICE: 21  START TIME: 11:00 AM  END TIME: 11:50 AM  FACILITATOR: Birgit Jacob RN  TOPIC: DARLEEN RN Group: Mental Health Maintenance                                    Service Modality:  Video Visit     Telemedicine Visit: The patient's condition can be safely assessed and treated via synchronous audio and visual telemedicine encounter.      Reason for Telemedicine Visit:  covid19    Originating Site (Patient Location): Patient's home    Distant Site (Provider Location): Provider Remote Setting- Home Office    Consent:  The patient/guardian has verbally consented to: the potential risks and benefits of telemedicine (video visit) versus in person care; bill my insurance or make self-payment for services provided; and responsibility for payment of non-covered services.     Patient would like the video invitation sent by:  My Chart    Mode of Communication:  Video Conference via Medical Zoom    As the provider I attest to compliance with applicable laws and regulations related to telemedicine.          [facility] Adult Dual Diagnosis Day Treatment  TRACK: 1    NUMBER OF PARTICIPANTS: 6    Summary of Group / Topics Discussed:  Mental Health Maintenance:  Vulnerability: In this group, the concept of vulnerability was explored through the viewing, discussion, and self-reflection of the Mireya Steel Talk Titled,  The Power of Vulnerability.      Patient Session Goals / Objectives:  ? Defined and described definition of vulnerability   ? Identified 2 or more ways of practicing authenticity         Patient Participation / Response:  Fully participated with the group by sharing personal reflections / insights and openly received / provided feedback with other participants.    Demonstrated understanding of topics discussed through group discussion and participation and  Identified / Expressed personal readiness to practice skills    Treatment Plan:  Patient has a current master individualized treatment plan.  See Epic treatment plan for more information.    Birgit Jacob RN

## 2021-08-02 NOTE — TELEPHONE ENCOUNTER
Writer spoke with patient who stated he did not contact us over the last week due to relapse and would like to still be in groups. Patient reported that he drank alcohol every day last week. Patient reported that he drank 12 cans of alcoholic seltzer drinks per day. Patient reported that he is feeling withdrawal symptoms at this time. Patient endorsed that he is experiencing sweatiness and shakiness. Patient reported his last use of alcohol was Saturday evening/early Sunday morning. Patient reported that prior to relapse last week he had not had any slips or relapses while in program. Writer agreed with patient to readmit him into program at this time.     Marti Martínez, [facility], [facility]  8/2/2021

## 2021-08-02 NOTE — PROGRESS NOTES
Adult Dual Disorder Program:  Individualized Treatment Plan      Date of Plan: 7/15/21     Name: Xavier Odell                                             MRN: [id number]     : 1985     Programs:  Adult Dual Disorder Program (DDP)     Clinical Track (if applicable):  1     DSM5 Diagnosis     296.33 (F33.2) Major Depressive Disorder, Recurrent Episode, Severe.  4. Other Diagnoses that is relevant to services:   Substance-Related & Addictive Disorders Alcohol Use Disorder   303.90 (F10.20) Severe.           Adult Dual Disorder Program Multidisciplinary Team Members: Kathy Del Rosario MD, Suzanne Jacobson, [facility], [facility]; Enrique Archuleta MA, , [facility]; David Sequeira, LMFT, [facility]; Pedrito Yu OTR/L;  Birgit Jacob RN      Xavier Odell will participate in the Adult Dual Disorder Program  5 days per week, 3 hours per day. Anticipated duration/discharge: 6-8 weeks      Due to COVID-19, services will be delivered via telemedicine until further notice.          Program Start Date: 21  Anticipated Discharge Date: 9/3/21 (pending authorization/clinical changes)     NOTE: Complete CGI      Review Date: Does Xavier Odell continue to meet criteria to participate in the Adult Dual Disorder Program, 5 days per week; 3 hours per day?   21 yes    9/3/21 no                           Client Strengths:  committed to sobriety, creative, educated, empathetic, goal-focused, good listener, insightful, intelligent, motivated, open to learning, open to suggestions / feedback, supportive, wants to learn, willing to ask questions and willing to relate to others     Client Participation in Plan:  Contributed to goals and plan   Attended individual treatment plan meeting on 7/15/21  Agrees with plan   Received copy of treatment plan   Discussed with staff      Areas of Vulnerability:  Depressive symptoms   Substance use      Long-Term Goals:  Knowledge about illness and management of symptoms   Maintenance of personal safety    Maintenance of sobriety   Effective management of impulsivity      Abuse Prevention Plan:  Safe, therapeutic environment   Safety coping plan as needed   Education regarding illness and skill development   Coordination with care providers   Impluse control education and intervention   Medication adjustment/management (MI/CD)   Monitor for use of substances     Discharge Criteria:  Satisfactory progress toward treatment goals   Improvement re: identified problems and symptoms   Ability to continue recovery at next level of service   Has a discharge plan in place   Has safety/coping plan in place   Ability to maintain sobriety  Complete goodbye letter assignment   Regular attendance as scheduled      Areas of Treatment Focus          Area of Treatment Focus:   Symptom Stabilization and Management  Start Date:    7/15/21    Dimension:   III. Emotional / Behavioral Condition    Description:    Xavier has been struggling with depression.    Goal:                                                                          Target Date: 8/12/21; 9/3/21                                          Status: Completed  Xavier will learn and practice skills to manage obsessive thinking and ruminating on the past as that often precipitated substance use.     Xavier will continue to take his medications as prescribed.      Progress:   8/12/21: Xavier is working on his obsessive thinking and rumination and feels that his new medication has been helping with that.  He has identified that he needs to change his setting and be more active as they usually occur when he is not active.  Xavier has been taking his medications as prescribed. CONTINUE    9/3/21: Xavier continued to work on practicing coping skills to manage his mental health symptoms and improved his ability to challenge his ruminating and negative thoughts.  He has struggled to take his medications as prescribed but is working on being more medication compliant.  COMPLETED     Treatment Strategies:   Facilitate increased self awareness  Provide education regarding coping skills  Teach adaptive coping skills and communication skills  Use reality based supportive approach       Area of Treatment Focus:   Abstinence / Relapse Prevention  Start Date:    7/15/21    Dimension:   I. Acute Intoxication / Withdrawal Potential    Description:    Xavier has been struggling with alcohol use.    Goal:                                                                          Target Date: 8/12/21; 9/3/21                                         Status: Completed  Xavier will remain sober while in DDP.     Xavier will work in improving impulse control related to substance use.      Progress:  8/12/21: Xavier relapsed a few weeks ago and went to detox but has remained sober since being discharged from detox.  Xavier has continued to work on improve impulse control and has gained some insight into vulnerabilities to use.  CONTINUE    9/3/21: Xavier has remained sober since his relapse and has been doing a better job and improving impulse control.  COMPLETED    Treatment Strategies:   Facilitate increased self awareness  Provide education regarding coping skills  Use reality based supportive approach       Area of Treatment Focus:   Community Resources / Support and Discharge Planning  Start Date:    7/15/21    Dimension:   VI. Recovery Environment    Description:   Xavier has providers at [facility] but they have not been seeing him since he started DDP.  He has a supportive family and friends.  He is currently not working.       Goal:                                                                          Target Date: 8/12/21; 9/3/21                                         Status: Completed  Xavier will resume seeing his therapist and psychiatry at [facility].     Xavier will explore options for jobs prior to discharge.       Xavier will explore [facility] as an options for community support meetings.       "Progress:  8/12/21: Xavier has not yet resumed therapy and psychiatry and is thinking about finding a new therapist.  Xavier has not yet explored job options but that is not an immediate concern right now.   Xavier has begun exploring [facility].      9/3/21: Xavier has not yet resumed therapy.  He is currently working on finding a new job.  He has not yet attended a [facility] meeting.  COMPLETED    Treatment Strategies:   Assist clients in establishing / strengthening support network  Assist to identify treatment goals  Assist with discharge planning         Suzanne Jacobson, [facility], [facility]        NOTE: Required signatures are completed manually and scanned into the electronic medical record. See \"Media\" tab in epic.     The Individualized Treatment Plan Signature Page has been routed to the provider for co-sign.           "

## 2021-08-02 NOTE — PROGRESS NOTES
"** Patient was discharged on 7/30/21 due to not returning calls and no contact. Patient returend to group today and stated he relapsed. Patient will rejoin group and we will resume same episode of care. Please disregard any discharge information entered on 7/30/21.**    Admission Date: 8/2/2021    Identify any current concerns with potential impact to admission:     medication/medical concerns: shakiness and sweating due to recent substance use- able to tolerate withdrawal symptoms    immediate safety concerns: no safety concerns     Does patient have safety plan? none  Note: Please copy safety plan copied into BEH Encounter     Other (insurance/childcare/transportation/housing/planned absences/etc): no concerns    Patient's insurance is: [employer] .     Does patient need appointment with provider? no    Review patient's program schedule and inform them of any variation due to late days or holidays.                                                                                  (delete if not applicable):   For Dual Disorder Outpatient Program:     Patient reported his last use of substances as: alcohol on 8/1/21. 12 cans of \"seltzer\" per day last week    Patient reports the following concerns in regards to withdrawal/intoxication: will continue to monitor and encouraged patient to go to ED if symptoms worsen. Currently experiencing some sweating and shakiness.         Completed by: Marti Martínez MA, LPCC, LADC  "

## 2021-08-03 ENCOUNTER — HOSPITAL ENCOUNTER (OUTPATIENT)
Dept: BEHAVIORAL HEALTH | Facility: CLINIC | Age: 36
End: 2021-08-03
Attending: PSYCHIATRY & NEUROLOGY
Payer: COMMERCIAL

## 2021-08-03 PROCEDURE — 90853 GROUP PSYCHOTHERAPY: CPT | Mod: GT,95 | Performed by: PSYCHOLOGIST

## 2021-08-03 NOTE — GROUP NOTE
Process Group Note    PATIENT'S NAME: Xavier Odell  MRN:   [id number]  :   1985  ACCT. NUMBER: [id number]  DATE OF SERVICE: 21  START TIME:  9:00 AM  END TIME:  9:50 AM  FACILITATOR: Ozzy Archuleta LP  TOPIC:  Process Group    Diagnoses:  296.33 (F33.2) Major Depressive Disorder, Recurrent Episode, Severe.  4. Other Diagnoses that is relevant to services:   Substance-Related & Addictive Disorders Alcohol Use Disorder   303.90 (F10.20) Severe.    [facility] Adult Dual Diagnosis Day Treatment  TRACK: 1    NUMBER OF PARTICIPANTS: 5                                      Service Modality:  Video Visit     Telemedicine Visit: The patient's condition can be safely assessed and treated via synchronous audio and visual telemedicine encounter.      Reason for Telemedicine Visit: Services only offered telehealth    Originating Site (Patient Location): Patient's home    Distant Site (Provider Location): Provider Remote Setting- Home Office    Consent:  The patient/guardian has verbally consented to: the potential risks and benefits of telemedicine (video visit) versus in person care; bill my insurance or make self-payment for services provided; and responsibility for payment of non-covered services.     Patient would like the video invitation sent by:  My Chart    Mode of Communication:  Video Conference via Medical Zoom    As the provider I attest to compliance with applicable laws and regulations related to telemedicine.               Data:    Session content: At the start of this group, patients were invited to check in by identifying themselves, describing their current emotional status, and identifying issues to address in this group.   Area(s) of treatment focus addressed in this session included Symptom Management, Personal Safety, Community Resources/Discharge Planning, Abstinence/Relapse Prevention, Develop / Improve Independent Living Skills and Develop Socialization /  Interpersonal Relationship Skills.    Pt reports significant depression and anxiety. Pt reports fear of relapse. Pt reports no S/I and denies use since yesterday. Pt reports he is not sleeping well and lacks energy. Pt reports he does have support people he can use.     Therapeutic Interventions/Treatment Strategies:  Psychotherapist offered support, feedback and validation. Treatment modalities used include Cognitive Behavioral Therapy. Interventions include Coping Skills: Assisted patient in identifying 1-2 healthy distraction skills to reduce overall distress and Relapse Prevention: Facilitated understanding the importance of awareness of factors that contribute to relapse .    Assessment:    Patient response:   Patient responded to session by accepting feedback, giving feedback, listening, focusing on goals, being attentive, accepting support and appearing alert    Possible barriers to participation / learning include: and no barriers identified    Health Issues:   None reported       Substance Use Review:   Substance Use: Substance use has decreased    Mental Status/Behavioral Observations  Appearance:   Appropriate   Eye Contact:   Good   Psychomotor Behavior: Normal   Attitude:   Cooperative   Orientation:   All  Speech   Rate / Production: Normal    Volume:  Normal   Mood:    Anxious  Depressed   Affect:    Constricted   Thought Content:   Clear and Safety denies any current safety concerns including suicidal ideation, self-harm, and homicidal ideation  Thought Form:  Coherent     Insight:    Good     Plan:     Safety Plan: Recommended that patient call 911 or go to the local ED should there be a change in any of these risk factors.     Barriers to treatment: None identified    Patient Contracts (see media tab):  None    Substance Use: Stage of Change: Action     Continue or Discharge: Patient will continue in Adult Dual Disorder Program (DDP) as planned. Patient is likely to benefit from learning and using  skills as they work toward the goals identified in their treatment plan.      Ozzy Archuleta, JASON  August 3, 2021

## 2021-08-04 ENCOUNTER — TELEPHONE (OUTPATIENT)
Dept: BEHAVIORAL HEALTH | Facility: CLINIC | Age: 36
End: 2021-08-04

## 2021-08-04 ENCOUNTER — HOSPITAL ENCOUNTER (OUTPATIENT)
Facility: CLINIC | Age: 36
Discharge: PSYCHIATRIC OR SUBSTANCE USE FACILITY | End: 2021-08-05
Attending: EMERGENCY MEDICINE | Admitting: STUDENT IN AN ORGANIZED HEALTH CARE EDUCATION/TRAINING PROGRAM
Payer: COMMERCIAL

## 2021-08-04 DIAGNOSIS — F10.930 ALCOHOL WITHDRAWAL SYNDROME WITHOUT COMPLICATION (H): ICD-10-CM

## 2021-08-04 DIAGNOSIS — F19.20 CHEMICAL DEPENDENCY (H): Primary | ICD-10-CM

## 2021-08-04 DIAGNOSIS — Z11.52 ENCOUNTER FOR SCREENING LABORATORY TESTING FOR COVID-19 VIRUS: ICD-10-CM

## 2021-08-04 LAB
ALBUMIN SERPL-MCNC: 3.4 G/DL (ref 3.4–5)
ALCOHOL BREATH TEST: 0.18 (ref 0–0.01)
ALP SERPL-CCNC: 100 U/L (ref 40–150)
ALT SERPL W P-5'-P-CCNC: 148 U/L (ref 0–70)
AMPHETAMINES UR QL SCN: ABNORMAL
ANION GAP SERPL CALCULATED.3IONS-SCNC: 13 MMOL/L (ref 3–14)
APTT PPP: 29 SECONDS (ref 22–38)
AST SERPL W P-5'-P-CCNC: 271 U/L (ref 0–45)
BARBITURATES UR QL: ABNORMAL
BASOPHILS # BLD AUTO: 0 10E3/UL (ref 0–0.2)
BASOPHILS NFR BLD AUTO: 0 %
BENZODIAZ UR QL: ABNORMAL
BILIRUB SERPL-MCNC: 0.7 MG/DL (ref 0.2–1.3)
BUN SERPL-MCNC: 10 MG/DL (ref 7–30)
CALCIUM SERPL-MCNC: 8.7 MG/DL (ref 8.5–10.1)
CANNABINOIDS UR QL SCN: ABNORMAL
CHLORIDE BLD-SCNC: 100 MMOL/L (ref 94–109)
CO2 SERPL-SCNC: 24 MMOL/L (ref 20–32)
COCAINE UR QL: ABNORMAL
CREAT SERPL-MCNC: 0.92 MG/DL (ref 0.66–1.25)
EOSINOPHIL # BLD AUTO: 0 10E3/UL (ref 0–0.7)
EOSINOPHIL NFR BLD AUTO: 0 %
ERYTHROCYTE [DISTWIDTH] IN BLOOD BY AUTOMATED COUNT: 15 % (ref 10–15)
ETHANOL SERPL-MCNC: 0.26 G/DL
GFR SERPL CREATININE-BSD FRML MDRD: >90 ML/MIN/1.73M2
GLUCOSE BLD-MCNC: 84 MG/DL (ref 70–99)
HCO3 BLDV-SCNC: 23 MMOL/L (ref 21–28)
HCO3 BLDV-SCNC: 25 MMOL/L (ref 21–28)
HCT VFR BLD AUTO: 42.1 % (ref 40–53)
HGB BLD-MCNC: 14 G/DL (ref 13.3–17.7)
IMM GRANULOCYTES # BLD: 0 10E3/UL
IMM GRANULOCYTES NFR BLD: 0 %
INR PPP: 0.96 (ref 0.86–1.14)
LACTATE BLD-SCNC: 2.9 MMOL/L
LACTATE BLD-SCNC: 3.3 MMOL/L
LIPASE SERPL-CCNC: 269 U/L (ref 73–393)
LYMPHOCYTES # BLD AUTO: 0.8 10E3/UL (ref 0.8–5.3)
LYMPHOCYTES NFR BLD AUTO: 18 %
MAGNESIUM SERPL-MCNC: 2.3 MG/DL (ref 1.6–2.3)
MCH RBC QN AUTO: 29.4 PG (ref 26.5–33)
MCHC RBC AUTO-ENTMCNC: 33.3 G/DL (ref 31.5–36.5)
MCV RBC AUTO: 88 FL (ref 78–100)
MONOCYTES # BLD AUTO: 0.3 10E3/UL (ref 0–1.3)
MONOCYTES NFR BLD AUTO: 6 %
NEUTROPHILS # BLD AUTO: 3.5 10E3/UL (ref 1.6–8.3)
NEUTROPHILS NFR BLD AUTO: 76 %
NRBC # BLD AUTO: 0 10E3/UL
NRBC BLD AUTO-RTO: 0 /100
OPIATES UR QL SCN: ABNORMAL
PCO2 BLDV: 39 MM HG (ref 40–50)
PCO2 BLDV: 39 MM HG (ref 40–50)
PH BLDV: 7.38 [PH] (ref 7.32–7.43)
PH BLDV: 7.42 [PH] (ref 7.32–7.43)
PLATELET # BLD AUTO: 104 10E3/UL (ref 150–450)
PO2 BLDV: 34 MM HG (ref 25–47)
PO2 BLDV: 41 MM HG (ref 25–47)
POTASSIUM BLD-SCNC: 3.3 MMOL/L (ref 3.4–5.3)
PROT SERPL-MCNC: 8.4 G/DL (ref 6.8–8.8)
RBC # BLD AUTO: 4.77 10E6/UL (ref 4.4–5.9)
SAO2 % BLDV: 67 % (ref 94–100)
SAO2 % BLDV: 76 % (ref 94–100)
SARS-COV-2 RNA RESP QL NAA+PROBE: NEGATIVE
SODIUM SERPL-SCNC: 137 MMOL/L (ref 133–144)
WBC # BLD AUTO: 4.6 10E3/UL (ref 4–11)

## 2021-08-04 PROCEDURE — 85610 PROTHROMBIN TIME: CPT | Performed by: EMERGENCY MEDICINE

## 2021-08-04 PROCEDURE — U0005 INFEC AGEN DETEC AMPLI PROBE: HCPCS | Performed by: EMERGENCY MEDICINE

## 2021-08-04 PROCEDURE — 99285 EMERGENCY DEPT VISIT HI MDM: CPT | Performed by: EMERGENCY MEDICINE

## 2021-08-04 PROCEDURE — C9803 HOPD COVID-19 SPEC COLLECT: HCPCS | Performed by: EMERGENCY MEDICINE

## 2021-08-04 PROCEDURE — 36415 COLL VENOUS BLD VENIPUNCTURE: CPT | Performed by: EMERGENCY MEDICINE

## 2021-08-04 PROCEDURE — 82075 ASSAY OF BREATH ETHANOL: CPT | Performed by: EMERGENCY MEDICINE

## 2021-08-04 PROCEDURE — 250N000009 HC RX 250: Performed by: EMERGENCY MEDICINE

## 2021-08-04 PROCEDURE — 250N000013 HC RX MED GY IP 250 OP 250 PS 637: Performed by: EMERGENCY MEDICINE

## 2021-08-04 PROCEDURE — 85730 THROMBOPLASTIN TIME PARTIAL: CPT | Performed by: EMERGENCY MEDICINE

## 2021-08-04 PROCEDURE — 83690 ASSAY OF LIPASE: CPT | Performed by: EMERGENCY MEDICINE

## 2021-08-04 PROCEDURE — 96366 THER/PROPH/DIAG IV INF ADDON: CPT | Performed by: EMERGENCY MEDICINE

## 2021-08-04 PROCEDURE — 82077 ASSAY SPEC XCP UR&BREATH IA: CPT | Performed by: EMERGENCY MEDICINE

## 2021-08-04 PROCEDURE — 80053 COMPREHEN METABOLIC PANEL: CPT | Performed by: EMERGENCY MEDICINE

## 2021-08-04 PROCEDURE — 96375 TX/PRO/DX INJ NEW DRUG ADDON: CPT | Performed by: EMERGENCY MEDICINE

## 2021-08-04 PROCEDURE — 96365 THER/PROPH/DIAG IV INF INIT: CPT | Performed by: EMERGENCY MEDICINE

## 2021-08-04 PROCEDURE — 258N000003 HC RX IP 258 OP 636: Performed by: EMERGENCY MEDICINE

## 2021-08-04 PROCEDURE — 85025 COMPLETE CBC W/AUTO DIFF WBC: CPT | Performed by: EMERGENCY MEDICINE

## 2021-08-04 PROCEDURE — 82803 BLOOD GASES ANY COMBINATION: CPT

## 2021-08-04 PROCEDURE — 83735 ASSAY OF MAGNESIUM: CPT | Performed by: EMERGENCY MEDICINE

## 2021-08-04 PROCEDURE — 250N000011 HC RX IP 250 OP 636: Performed by: EMERGENCY MEDICINE

## 2021-08-04 PROCEDURE — 99285 EMERGENCY DEPT VISIT HI MDM: CPT | Mod: 25 | Performed by: EMERGENCY MEDICINE

## 2021-08-04 PROCEDURE — 99223 1ST HOSP IP/OBS HIGH 75: CPT | Mod: AI | Performed by: STUDENT IN AN ORGANIZED HEALTH CARE EDUCATION/TRAINING PROGRAM

## 2021-08-04 PROCEDURE — 80307 DRUG TEST PRSMV CHEM ANLYZR: CPT | Performed by: EMERGENCY MEDICINE

## 2021-08-04 PROCEDURE — 258N000001 HC RX 258: Performed by: EMERGENCY MEDICINE

## 2021-08-04 PROCEDURE — 96361 HYDRATE IV INFUSION ADD-ON: CPT | Performed by: EMERGENCY MEDICINE

## 2021-08-04 RX ORDER — ONDANSETRON 2 MG/ML
8 INJECTION INTRAMUSCULAR; INTRAVENOUS ONCE
Status: COMPLETED | OUTPATIENT
Start: 2021-08-04 | End: 2021-08-04

## 2021-08-04 RX ORDER — DIAZEPAM 5 MG
5-20 TABLET ORAL EVERY 30 MIN PRN
Status: DISCONTINUED | OUTPATIENT
Start: 2021-08-04 | End: 2021-08-05

## 2021-08-04 RX ADMIN — DIAZEPAM 10 MG: 5 TABLET ORAL at 21:13

## 2021-08-04 RX ADMIN — DIAZEPAM 10 MG: 5 TABLET ORAL at 19:41

## 2021-08-04 RX ADMIN — ONDANSETRON 8 MG: 2 INJECTION INTRAMUSCULAR; INTRAVENOUS at 22:01

## 2021-08-04 RX ADMIN — FOLIC ACID: 5 INJECTION, SOLUTION INTRAMUSCULAR; INTRAVENOUS; SUBCUTANEOUS at 18:26

## 2021-08-04 RX ADMIN — DIAZEPAM 10 MG: 5 TABLET ORAL at 22:22

## 2021-08-04 RX ADMIN — DIAZEPAM 10 MG: 5 TABLET ORAL at 18:26

## 2021-08-04 RX ADMIN — SODIUM CHLORIDE 1000 ML: 9 INJECTION, SOLUTION INTRAVENOUS at 20:48

## 2021-08-04 ASSESSMENT — ENCOUNTER SYMPTOMS
ROS GI COMMENTS: POSITIVE FOR BLACK STOOL
VOMITING: 0

## 2021-08-04 NOTE — ED PROVIDER NOTES
[facility] EMERGENCY DEPARTMENT ([facility])   August 4, 2021  History     Chief Complaint   Patient presents with     Drug / Alcohol Assessment     Seeking detox from alcohol. Last drink 1030 this morning. drinking about a 12 pack of hard seltzer + 750ml vodka     The history is provided by the patient and medical records.     Xavier Odell is a 36 year old male with PMH significant for MDD, ADINA, GI bleed and alcohol abuse who presents to the Emergency Department for detox.  Patient reports that he has been drinking about a 12 pack of hard seltzers and 750 mL of vodka daily with his last drink being about 7 hours ago.  He states that he had a year-long period of sobriety and then relapsed in January (7 months ago) and has been drinking since.  He indicates that it is been getting increasingly worse in the last 3 weeks.  Patient denies history of withdrawal seizures, but indicates he has a history of a GI bleed following alcohol detox and had to be admitted to the ICU for 5 days in [place] 2 years ago.  He also reports a trauma history of hallucinations and shaking.  Patient indicates that he thinks his abdomen is slightly distended and reports that he has been having some blackish stool frequently, but has not been having consistent bowel movements recently.  Patient denies bloody emesis.  Patient denies suicidal and homicidal ideation.  He denies any other drug use.  Patient has a nosebleed and a small cut between his eyebrows, but indicates he gets the nosebleeds from withdrawals and the cut is from a zit.  Patient denies cirrhosis of the liver.      PAST MEDICAL HISTORY:   Past Medical History:   Diagnosis Date     GI bleed      Substance abuse (H)      Uncomplicated asthma        PAST SURGICAL HISTORY:   Past Surgical History:   Procedure Laterality Date     ORTHOPEDIC SURGERY         Past medical history, past surgical history, medications, and allergies were reviewed with the patient. Additional  pertinent items: None    FAMILY HISTORY:   Family History   Problem Relation Age of Onset     Depression Mother      Depression Father      Substance Abuse Father        SOCIAL HISTORY:   Social History     Tobacco Use     Smoking status: Never Smoker     Smokeless tobacco: Never Used   Substance Use Topics     Alcohol use: Yes     Comment: Drinking cooking wine and mouthwash     Social history was reviewed with the patient. Additional pertinent items: None      Current Discharge Medication List      CONTINUE these medications which have NOT CHANGED    Details   albuterol (PROAIR HFA/PROVENTIL HFA/VENTOLIN HFA) 108 (90 Base) MCG/ACT inhaler Inhale 2 puffs into the lungs every 6 hours as needed     Comments: Pharmacy may dispense brand covered by insurance (Proair, or proventil or ventolin or generic albuterol inhaler)      B Complex Vitamins (VITAMIN B COMPLEX PO) Take by mouth daily -Take 1 tablet of unknown dose by mouth every morning      DULoxetine (CYMBALTA) 30 MG capsule Take 2 capsules (60 mg) by mouth daily  Qty: 60 capsule, Refills: 0    Associated Diagnoses: Major depressive disorder with single episode, remission status unspecified      EPINEPHrine (ANY BX GENERIC EQUIV) 0.3 MG/0.3ML injection 2-pack Inject 0.3 mg into the muscle as needed for anaphylaxis (Chicken Allergy)      mirtazapine (REMERON) 15 MG tablet Take 1 tablet (15 mg) by mouth At Bedtime  Qty: 30 tablet, Refills: 0    Associated Diagnoses: Major depressive disorder with single episode, remission status unspecified                Allergies   Allergen Reactions     Banana      Mild throat irritation per pt     Chicken Allergy      Anaphalxis     Peanut (Diagnostic)      Pt reports does not have allergy to this.  Patient today, 3/6/2021, patient confirms no serious aversion to peanuts.  Therefore, no removal of peanut products from garcia.        Review of Systems   HENT: Positive for nosebleeds.    Gastrointestinal: Negative for vomiting.         Positive for black stool   Psychiatric/Behavioral: Negative for suicidal ideas.   All other systems reviewed and are negative.        Physical Exam   BP: (!) 140/84  Pulse: (!) 125  Temp: 98.7  F (37.1  C)  Resp: 18  SpO2: 98 %      Physical Exam  Vitals and nursing note reviewed.   Constitutional:       General: He is not in acute distress.     Appearance: He is well-developed. He is ill-appearing. He is not toxic-appearing or diaphoretic.      Comments: Patient is awake and alert, he is lying in the bed, mentating normally but appears washed out and anxious.  He does appear to be suffering from a mild nosebleed from his right naris which he states he gets when he withdraws.   HENT:      Head: Normocephalic and atraumatic.      Nose:      Right Nostril: Epistaxis present.      Mouth/Throat:      Lips: Pink.      Mouth: Mucous membranes are moist.      Pharynx: Oropharynx is clear. No oropharyngeal exudate.   Eyes:      General: Lids are normal. No scleral icterus.     Extraocular Movements: Extraocular movements intact.      Right eye: No nystagmus.      Left eye: No nystagmus.      Conjunctiva/sclera: Conjunctivae normal.      Pupils: Pupils are equal, round, and reactive to light.   Neck:      Thyroid: No thyromegaly.      Vascular: No JVD.      Trachea: No tracheal deviation.   Cardiovascular:      Rate and Rhythm: Regular rhythm. Tachycardia present.      Pulses: Normal pulses.      Heart sounds: Normal heart sounds. No murmur heard.   No friction rub. No gallop.    Pulmonary:      Effort: Pulmonary effort is normal. No respiratory distress.      Breath sounds: Normal breath sounds.   Abdominal:      General: Bowel sounds are normal. There is no distension.      Palpations: Abdomen is soft. There is no mass.      Tenderness: There is no abdominal tenderness. There is no guarding or rebound.   Musculoskeletal:         General: No tenderness. Normal range of motion.      Cervical back: Normal range of motion  and neck supple. No erythema or rigidity.      Right lower leg: No edema.      Left lower leg: No edema.   Lymphadenopathy:      Cervical: No cervical adenopathy.   Skin:     General: Skin is warm and dry.      Capillary Refill: Capillary refill takes less than 2 seconds.      Coloration: Skin is not pale.      Findings: No erythema or rash.   Neurological:      Mental Status: He is alert and oriented to person, place, and time.      Cranial Nerves: No cranial nerve deficit.      Sensory: No sensory deficit.      Motor: Tremor present.   Psychiatric:         Mood and Affect: Mood and affect normal.         Speech: Speech normal.         Behavior: Behavior normal.         ED Course   5:55 PM  The patient was seen and examined by Nic Garcia MD in Room ED15.      Procedures         The Lactic acid level is elevated due to alcohol withdrawal syndrome, at this time there is no sign of severe sepsis or septic shock.            Labs Ordered and Resulted from Time of ED Arrival Up to the Time of Departure from the ED   COMPREHENSIVE METABOLIC PANEL - Abnormal; Notable for the following components:       Result Value    Potassium 3.3 (*)      (*)      (*)     All other components within normal limits   ETHYL ALCOHOL LEVEL - Abnormal; Notable for the following components:    Alcohol ethyl 0.26 (*)     All other components within normal limits   CBC WITH PLATELETS AND DIFFERENTIAL - Abnormal; Notable for the following components:    Platelet Count 104 (*)     All other components within normal limits   ISTAT GASES LACTATE VENOUS POCT - Abnormal; Notable for the following components:    Lactic Acid POCT 3.3 (*)     O2 Sat, Venous POCT 67 (*)     pCO2V Venous POCT 39 (*)     All other components within normal limits   DRUG ABUSE SCREEN 1 URINE (ED) - Abnormal; Notable for the following components:    Benzodiazepines Urine Screen Positive (*)     Cannabinoids Urine Screen Positive (*)     All other components  within normal limits   ISTAT GASES LACTATE VENOUS POCT - Abnormal; Notable for the following components:    Lactic Acid POCT 2.9 (*)     O2 Sat, Venous POCT 76 (*)     pCO2V Venous POCT 39 (*)     All other components within normal limits   ALCOHOL BREATH TEST POCT - Abnormal; Notable for the following components:    Alcohol Breath Test 0.184 (*)     All other components within normal limits   INR - Normal   PARTIAL THROMBOPLASTIN TIME - Normal   LIPASE - Normal   MAGNESIUM - Normal   SARS-COV2 (COVID-19) VIRUS RT-PCR - Normal    Narrative:     Testing was performed using the warren  SARS-CoV-2 & Influenza A/B Assay on the warren  [place]  System.  This test should be ordered for the detection of SARS-COV-2 in individuals who meet SARS-CoV-2 clinical and/or epidemiological criteria. Test performance is unknown in asymptomatic patients.  This test is for in vitro diagnostic use under the FDA EUA for laboratories certified under CLIA to perform moderate and/or high complexity testing. This test has not been FDA cleared or approved.  A negative test does not rule out the presence of PCR inhibitors in the specimen or target RNA in concentration below the limit of detection for the assay. The possibility of a false negative should be considered if the patient's recent exposure or clinical presentation suggests COVID-19.  [facility] are certified under the Clinical Laboratory Improvement Amendments of 1988 (CLIA-88) as qualified to perform moderate and/or high complexity laboratory testing.   PERIPHERAL IV CATHETER   ISTAT CG4 GASES LACTATE YUE NURSING POCT   MSSA SCORE AND VS   NOTIFY   ISTAT CG4 GASES LACTATE YUE NURSING POCT   DRUGS OF ABUSE SCREEN URINE POCT   COVID-19 VIRUS (CORONAVIRUS) BY PCR    Narrative:     The following orders were created for panel order Asymptomatic COVID-19 Virus (Coronavirus) by PCR Nasopharyngeal.  Procedure                               Abnormality         Status                      ---------                               -----------         ------                     SARS-COV2 (COVID-19) Vir...[[id number]]  Normal              Final result                 Please view results for these tests on the individual orders.   CBC WITH PLATELETS & DIFFERENTIAL    Narrative:     The following orders were created for panel order CBC with platelets differential.  Procedure                               Abnormality         Status                     ---------                               -----------         ------                     CBC with platelets and d...[[id number]]  Abnormal            Final result                 Please view results for these tests on the individual orders.   URINE DRUGS OF ABUSE SCREEN    Narrative:     The following orders were created for panel order Urine Drugs of Abuse Screen.  Procedure                               Abnormality         Status                     ---------                               -----------         ------                     Drug abuse screen 1 urin...[[id number]]  Abnormal            Final result                 Please view results for these tests on the individual orders.       Medications   albuterol (PROAIR HFA/PROVENTIL HFA/VENTOLIN HFA) 108 (90 Base) MCG/ACT inhaler 2 puff (has no administration in time range)   lidocaine 1 % 0.1-1 mL (has no administration in time range)   lidocaine (LMX4) cream (has no administration in time range)   sodium chloride (PF) 0.9% PF flush 3 mL (3 mLs Intracatheter Not Given 8/5/21 0808)   sodium chloride (PF) 0.9% PF flush 3 mL (has no administration in time range)   senna-docusate (SENOKOT-S/PERICOLACE) 8.6-50 MG per tablet 1 tablet (has no administration in time range)     Or   senna-docusate (SENOKOT-S/PERICOLACE) 8.6-50 MG per tablet 2 tablet (has no administration in time range)   acetaminophen (TYLENOL) tablet 325 mg (has no administration in time range)   ondansetron (ZOFRAN-ODT) ODT tab 4 mg ( Oral  See Alternative 8/5/21 0045)     Or   ondansetron (ZOFRAN) injection 4 mg (4 mg Intravenous Given 8/5/21 0045)   sodium chloride 0.9% infusion ( Intravenous New Bag 8/5/21 0057)   cloNIDine (CATAPRES) tablet 0.1 mg (0.1 mg Oral Given 8/5/21 1128)   flumazenil (ROMAZICON) injection 0.2 mg (has no administration in time range)   melatonin tablet 5 mg (has no administration in time range)   LORazepam (ATIVAN) tablet 1-2 mg (1 mg Oral Given 8/5/21 0821)     Or   LORazepam (ATIVAN) injection 1-2 mg ( Intravenous See Alternative 8/5/21 0821)   thiamine (B-1) tablet 100 mg (100 mg Oral Given 8/5/21 0807)   folic acid (FOLVITE) tablet 1 mg (1 mg Oral Given 8/5/21 0807)   multivitamin w/minerals (THERA-VIT-M) tablet 1 tablet (1 tablet Oral Given 8/5/21 0806)   dextrose 5% and 0.45% NaCl 1,000 mL with Infuvite Adult 10 mL, thiamine 100 mg, folic acid 1 mg infusion ( Intravenous Stopped 8/4/21 2032)   0.9% sodium chloride BOLUS (0 mLs Intravenous Stopped 8/4/21 2208)   ondansetron (ZOFRAN) injection 8 mg (8 mg Intravenous Given 8/4/21 2201)             Assessments & Plan (with Medical Decision Making)     This patient presented to the emergency department seeking detox treatment.  Given his past history of severe withdrawal syndrome needing ICU level of care at one point I did not feel that he was appropriate for our detox unit and felt that medical detox on an inpatient medical unit was in his best interest.  He is tachycardic and tremulous with a mildly elevated lactate level all suggesting that his withdrawal syndrome has begun.  Nausea was controlled with antiemetics and patient has been able to take oral benzodiazepines.  He was placed on the MSSA protocol with Valium and received several doses of Valium for withdrawal symptoms.  He was hydrated with 2 L of fluid, 1 of which was a banana bag.  Lactate level was reassessed after second liter and is noted to be trending downwards.  At this point, as he is able to take oral  intake and seems to be improving/stabilizing the treatment I feel that inpatient admission to a medical unit is appropriate.  I have spoken with the triage hospitalist and patient will be admitted to the hospitalist service on the [facility].    I have reviewed the nursing notes.    I have reviewed the findings, diagnosis, plan and need for follow up with the patient.    Current Discharge Medication List          Final diagnoses:   Alcohol withdrawal syndrome without complication (H)       I, Too Bee, am serving as a trained medical scribe to document services personally performed by Nic Garcia MD based on the provider's statements to me on August 4, 2021.  This document has been checked and approved by the attending provider.    I, Nic Garcia MD, was physically present and have reviewed and verified the accuracy of this note documented by Too Bee, medical scribe.       Nic Garcia MD    8/4/2021   [facility] EMERGENCY DEPARTMENT     Nic Garcia MD  08/05/21 1146

## 2021-08-04 NOTE — TELEPHONE ENCOUNTER
Writer contacted patient to inquire about absence from group. Left message requesting return phone call and notifying him we may call emergency contact if he does not respond.     Marti Martínez, [facility], [facility]  8/4/2021

## 2021-08-05 ENCOUNTER — TELEPHONE (OUTPATIENT)
Dept: BEHAVIORAL HEALTH | Facility: CLINIC | Age: 36
End: 2021-08-05

## 2021-08-05 ENCOUNTER — HOSPITAL ENCOUNTER (INPATIENT)
Facility: CLINIC | Age: 36
LOS: 3 days | Discharge: HOME | End: 2021-08-08
Attending: PSYCHIATRY & NEUROLOGY | Admitting: PSYCHIATRY & NEUROLOGY
Payer: COMMERCIAL

## 2021-08-05 VITALS
DIASTOLIC BLOOD PRESSURE: 96 MMHG | SYSTOLIC BLOOD PRESSURE: 158 MMHG | HEART RATE: 90 BPM | RESPIRATION RATE: 18 BRPM | OXYGEN SATURATION: 97 % | TEMPERATURE: 98.8 F

## 2021-08-05 DIAGNOSIS — F32.9 MAJOR DEPRESSIVE DISORDER WITH SINGLE EPISODE, REMISSION STATUS UNSPECIFIED: ICD-10-CM

## 2021-08-05 DIAGNOSIS — F33.2 SEVERE EPISODE OF RECURRENT MAJOR DEPRESSIVE DISORDER, WITHOUT PSYCHOTIC FEATURES (H): Primary | ICD-10-CM

## 2021-08-05 PROBLEM — F10.939 ALCOHOL WITHDRAWAL SYNDROME, WITH UNSPECIFIED COMPLICATION (H): Status: ACTIVE | Noted: 2021-08-05

## 2021-08-05 LAB
ALBUMIN SERPL-MCNC: 3 G/DL (ref 3.4–5)
ALP SERPL-CCNC: 86 U/L (ref 40–150)
ALT SERPL W P-5'-P-CCNC: 123 U/L (ref 0–70)
ANION GAP SERPL CALCULATED.3IONS-SCNC: 9 MMOL/L (ref 3–14)
AST SERPL W P-5'-P-CCNC: 214 U/L (ref 0–45)
BILIRUB SERPL-MCNC: 0.8 MG/DL (ref 0.2–1.3)
BUN SERPL-MCNC: 6 MG/DL (ref 7–30)
CALCIUM SERPL-MCNC: 8.3 MG/DL (ref 8.5–10.1)
CHLORIDE BLD-SCNC: 101 MMOL/L (ref 94–109)
CO2 SERPL-SCNC: 26 MMOL/L (ref 20–32)
CREAT SERPL-MCNC: 0.84 MG/DL (ref 0.66–1.25)
GFR SERPL CREATININE-BSD FRML MDRD: >90 ML/MIN/1.73M2
GLUCOSE BLD-MCNC: 93 MG/DL (ref 70–99)
HOLD SPECIMEN: NORMAL
LACTATE SERPL-SCNC: 0.6 MMOL/L (ref 0.7–2)
POTASSIUM BLD-SCNC: 3.7 MMOL/L (ref 3.4–5.3)
PROT SERPL-MCNC: 7.3 G/DL (ref 6.8–8.8)
SODIUM SERPL-SCNC: 136 MMOL/L (ref 133–144)

## 2021-08-05 PROCEDURE — 258N000003 HC RX IP 258 OP 636

## 2021-08-05 PROCEDURE — 80053 COMPREHEN METABOLIC PANEL: CPT | Performed by: STUDENT IN AN ORGANIZED HEALTH CARE EDUCATION/TRAINING PROGRAM

## 2021-08-05 PROCEDURE — 250N000013 HC RX MED GY IP 250 OP 250 PS 637: Performed by: PSYCHIATRY & NEUROLOGY

## 2021-08-05 PROCEDURE — 99217 PR OBSERVATION CARE DISCHARGE: CPT | Mod: GC | Performed by: INTERNAL MEDICINE

## 2021-08-05 PROCEDURE — 128N000004 HC R&B CD ADULT

## 2021-08-05 PROCEDURE — 250N000013 HC RX MED GY IP 250 OP 250 PS 637: Performed by: STUDENT IN AN ORGANIZED HEALTH CARE EDUCATION/TRAINING PROGRAM

## 2021-08-05 PROCEDURE — 36415 COLL VENOUS BLD VENIPUNCTURE: CPT | Performed by: STUDENT IN AN ORGANIZED HEALTH CARE EDUCATION/TRAINING PROGRAM

## 2021-08-05 PROCEDURE — 250N000013 HC RX MED GY IP 250 OP 250 PS 637: Performed by: EMERGENCY MEDICINE

## 2021-08-05 PROCEDURE — G0378 HOSPITAL OBSERVATION PER HR: HCPCS

## 2021-08-05 PROCEDURE — 83605 ASSAY OF LACTIC ACID: CPT | Performed by: STUDENT IN AN ORGANIZED HEALTH CARE EDUCATION/TRAINING PROGRAM

## 2021-08-05 PROCEDURE — 250N000011 HC RX IP 250 OP 636: Performed by: STUDENT IN AN ORGANIZED HEALTH CARE EDUCATION/TRAINING PROGRAM

## 2021-08-05 RX ORDER — LANOLIN ALCOHOL/MO/W.PET/CERES
100 CREAM (GRAM) TOPICAL DAILY
Status: DISCONTINUED | OUTPATIENT
Start: 2021-08-06 | End: 2021-08-08 | Stop reason: HOSPADM

## 2021-08-05 RX ORDER — LORAZEPAM 1 MG/1
1-4 TABLET ORAL EVERY 30 MIN PRN
Status: DISCONTINUED | OUTPATIENT
Start: 2021-08-05 | End: 2021-08-08 | Stop reason: HOSPADM

## 2021-08-05 RX ORDER — LANOLIN ALCOHOL/MO/W.PET/CERES
100 CREAM (GRAM) TOPICAL DAILY
Status: DISCONTINUED | OUTPATIENT
Start: 2021-08-05 | End: 2021-08-05 | Stop reason: HOSPADM

## 2021-08-05 RX ORDER — CLONIDINE HYDROCHLORIDE 0.1 MG/1
0.1 TABLET ORAL EVERY 8 HOURS
Status: ON HOLD | DISCHARGE
Start: 2021-08-05 | End: 2021-08-08

## 2021-08-05 RX ORDER — FOLIC ACID 1 MG/1
1 TABLET ORAL DAILY
Status: DISCONTINUED | OUTPATIENT
Start: 2021-08-06 | End: 2021-08-08 | Stop reason: HOSPADM

## 2021-08-05 RX ORDER — MULTIPLE VITAMINS W/ MINERALS TAB 9MG-400MCG
1 TAB ORAL DAILY
Status: DISCONTINUED | OUTPATIENT
Start: 2021-08-06 | End: 2021-08-08 | Stop reason: HOSPADM

## 2021-08-05 RX ORDER — SODIUM CHLORIDE 9 MG/ML
INJECTION, SOLUTION INTRAVENOUS
Status: COMPLETED
Start: 2021-08-05 | End: 2021-08-05

## 2021-08-05 RX ORDER — ONDANSETRON 2 MG/ML
4 INJECTION INTRAMUSCULAR; INTRAVENOUS EVERY 6 HOURS PRN
Status: DISCONTINUED | OUTPATIENT
Start: 2021-08-05 | End: 2021-08-05 | Stop reason: HOSPADM

## 2021-08-05 RX ORDER — CLONIDINE HYDROCHLORIDE 0.1 MG/1
0.1 TABLET ORAL EVERY 8 HOURS PRN
Status: DISCONTINUED | OUTPATIENT
Start: 2021-08-05 | End: 2021-08-08 | Stop reason: HOSPADM

## 2021-08-05 RX ORDER — CLONIDINE HYDROCHLORIDE 0.1 MG/1
0.1 TABLET ORAL EVERY 8 HOURS
Status: DISCONTINUED | OUTPATIENT
Start: 2021-08-05 | End: 2021-08-05 | Stop reason: HOSPADM

## 2021-08-05 RX ORDER — ONDANSETRON 4 MG/1
4 TABLET, ORALLY DISINTEGRATING ORAL EVERY 6 HOURS PRN
Status: DISCONTINUED | OUTPATIENT
Start: 2021-08-05 | End: 2021-08-05 | Stop reason: HOSPADM

## 2021-08-05 RX ORDER — AMOXICILLIN 250 MG
1 CAPSULE ORAL 2 TIMES DAILY PRN
Status: DISCONTINUED | OUTPATIENT
Start: 2021-08-05 | End: 2021-08-08 | Stop reason: HOSPADM

## 2021-08-05 RX ORDER — MULTIPLE VITAMINS W/ MINERALS TAB 9MG-400MCG
1 TAB ORAL DAILY
Status: DISCONTINUED | OUTPATIENT
Start: 2021-08-05 | End: 2021-08-05 | Stop reason: HOSPADM

## 2021-08-05 RX ORDER — MULTIPLE VITAMINS W/ MINERALS TAB 9MG-400MCG
1 TAB ORAL DAILY
DISCHARGE
Start: 2021-08-06 | End: 2023-07-02

## 2021-08-05 RX ORDER — AMOXICILLIN 250 MG
1 CAPSULE ORAL 2 TIMES DAILY PRN
Status: DISCONTINUED | OUTPATIENT
Start: 2021-08-05 | End: 2021-08-05 | Stop reason: HOSPADM

## 2021-08-05 RX ORDER — LANOLIN ALCOHOL/MO/W.PET/CERES
100 CREAM (GRAM) TOPICAL DAILY
DISCHARGE
Start: 2021-08-06 | End: 2023-07-02

## 2021-08-05 RX ORDER — ALBUTEROL SULFATE 90 UG/1
2 AEROSOL, METERED RESPIRATORY (INHALATION) EVERY 6 HOURS PRN
Status: DISCONTINUED | OUTPATIENT
Start: 2021-08-05 | End: 2021-08-05 | Stop reason: HOSPADM

## 2021-08-05 RX ORDER — ACETAMINOPHEN 325 MG/1
650 TABLET ORAL EVERY 4 HOURS PRN
Status: DISCONTINUED | OUTPATIENT
Start: 2021-08-05 | End: 2021-08-08 | Stop reason: HOSPADM

## 2021-08-05 RX ORDER — LIDOCAINE 40 MG/G
CREAM TOPICAL
Status: DISCONTINUED | OUTPATIENT
Start: 2021-08-05 | End: 2021-08-05 | Stop reason: HOSPADM

## 2021-08-05 RX ORDER — ALBUTEROL SULFATE 90 UG/1
2 AEROSOL, METERED RESPIRATORY (INHALATION) EVERY 6 HOURS PRN
Status: DISCONTINUED | OUTPATIENT
Start: 2021-08-05 | End: 2021-08-08 | Stop reason: HOSPADM

## 2021-08-05 RX ORDER — LORAZEPAM 1 MG/1
1-4 TABLET ORAL EVERY 30 MIN PRN
Status: DISCONTINUED | OUTPATIENT
Start: 2021-08-05 | End: 2021-08-05

## 2021-08-05 RX ORDER — LORAZEPAM 2 MG/ML
1-2 INJECTION INTRAMUSCULAR EVERY 30 MIN PRN
Status: DISCONTINUED | OUTPATIENT
Start: 2021-08-05 | End: 2021-08-05 | Stop reason: HOSPADM

## 2021-08-05 RX ORDER — AMOXICILLIN 250 MG
2 CAPSULE ORAL 2 TIMES DAILY PRN
Status: DISCONTINUED | OUTPATIENT
Start: 2021-08-05 | End: 2021-08-05 | Stop reason: HOSPADM

## 2021-08-05 RX ORDER — SODIUM CHLORIDE 9 MG/ML
INJECTION, SOLUTION INTRAVENOUS CONTINUOUS
Status: ACTIVE | OUTPATIENT
Start: 2021-08-05 | End: 2021-08-05

## 2021-08-05 RX ORDER — ONDANSETRON 4 MG/1
4 TABLET, ORALLY DISINTEGRATING ORAL EVERY 6 HOURS PRN
Status: DISCONTINUED | OUTPATIENT
Start: 2021-08-05 | End: 2021-08-08 | Stop reason: HOSPADM

## 2021-08-05 RX ORDER — CLONIDINE HYDROCHLORIDE 0.1 MG/1
0.1 TABLET ORAL EVERY 8 HOURS
Status: DISCONTINUED | OUTPATIENT
Start: 2021-08-05 | End: 2021-08-05

## 2021-08-05 RX ORDER — FLUMAZENIL 0.1 MG/ML
0.2 INJECTION, SOLUTION INTRAVENOUS
Status: DISCONTINUED | OUTPATIENT
Start: 2021-08-05 | End: 2021-08-05 | Stop reason: HOSPADM

## 2021-08-05 RX ORDER — HYDROXYZINE HYDROCHLORIDE 25 MG/1
25 TABLET, FILM COATED ORAL EVERY 4 HOURS PRN
Status: DISCONTINUED | OUTPATIENT
Start: 2021-08-05 | End: 2021-08-08 | Stop reason: HOSPADM

## 2021-08-05 RX ORDER — DULOXETIN HYDROCHLORIDE 60 MG/1
60 CAPSULE, DELAYED RELEASE ORAL DAILY
Status: DISCONTINUED | OUTPATIENT
Start: 2021-08-05 | End: 2021-08-08 | Stop reason: HOSPADM

## 2021-08-05 RX ORDER — FOLIC ACID 1 MG/1
1 TABLET ORAL DAILY
DISCHARGE
Start: 2021-08-06 | End: 2023-07-02

## 2021-08-05 RX ORDER — LORAZEPAM 1 MG/1
1-2 TABLET ORAL EVERY 30 MIN PRN
Status: DISCONTINUED | OUTPATIENT
Start: 2021-08-05 | End: 2021-08-05 | Stop reason: HOSPADM

## 2021-08-05 RX ORDER — LOPERAMIDE HCL 2 MG
2 CAPSULE ORAL 4 TIMES DAILY PRN
Status: DISCONTINUED | OUTPATIENT
Start: 2021-08-05 | End: 2021-08-08 | Stop reason: HOSPADM

## 2021-08-05 RX ORDER — FOLIC ACID 1 MG/1
1 TABLET ORAL DAILY
Status: DISCONTINUED | OUTPATIENT
Start: 2021-08-05 | End: 2021-08-05 | Stop reason: HOSPADM

## 2021-08-05 RX ORDER — ACETAMINOPHEN 325 MG/1
325 TABLET ORAL EVERY 4 HOURS PRN
Status: DISCONTINUED | OUTPATIENT
Start: 2021-08-05 | End: 2021-08-05 | Stop reason: HOSPADM

## 2021-08-05 RX ORDER — MAGNESIUM HYDROXIDE/ALUMINUM HYDROXICE/SIMETHICONE 120; 1200; 1200 MG/30ML; MG/30ML; MG/30ML
30 SUSPENSION ORAL EVERY 4 HOURS PRN
Status: DISCONTINUED | OUTPATIENT
Start: 2021-08-05 | End: 2021-08-08 | Stop reason: HOSPADM

## 2021-08-05 RX ORDER — MIRTAZAPINE 15 MG/1
15 TABLET, FILM COATED ORAL AT BEDTIME
Status: DISCONTINUED | OUTPATIENT
Start: 2021-08-05 | End: 2021-08-08 | Stop reason: HOSPADM

## 2021-08-05 RX ADMIN — DIAZEPAM 10 MG: 5 TABLET ORAL at 02:17

## 2021-08-05 RX ADMIN — Medication 1 TABLET: at 08:06

## 2021-08-05 RX ADMIN — ONDANSETRON 4 MG: 2 INJECTION INTRAMUSCULAR; INTRAVENOUS at 00:45

## 2021-08-05 RX ADMIN — LORAZEPAM 2 MG: 1 TABLET ORAL at 16:48

## 2021-08-05 RX ADMIN — HYDROXYZINE HYDROCHLORIDE 25 MG: 25 TABLET, FILM COATED ORAL at 16:48

## 2021-08-05 RX ADMIN — MIRTAZAPINE 15 MG: 15 TABLET, FILM COATED ORAL at 20:17

## 2021-08-05 RX ADMIN — FOLIC ACID 1 MG: 1 TABLET ORAL at 08:07

## 2021-08-05 RX ADMIN — CLONIDINE HYDROCHLORIDE 0.1 MG: 0.1 TABLET ORAL at 05:00

## 2021-08-05 RX ADMIN — LORAZEPAM 1 MG: 1 TABLET ORAL at 08:21

## 2021-08-05 RX ADMIN — LORAZEPAM 1 MG: 1 TABLET ORAL at 13:08

## 2021-08-05 RX ADMIN — NICOTINE POLACRILEX 4 MG: 2 GUM, CHEWING BUCCAL at 20:17

## 2021-08-05 RX ADMIN — LORAZEPAM 2 MG: 1 TABLET ORAL at 20:17

## 2021-08-05 RX ADMIN — DIAZEPAM 5 MG: 5 TABLET ORAL at 00:03

## 2021-08-05 RX ADMIN — SODIUM CHLORIDE: 9 INJECTION, SOLUTION INTRAVENOUS at 00:57

## 2021-08-05 RX ADMIN — LORAZEPAM 2 MG: 1 TABLET ORAL at 05:00

## 2021-08-05 RX ADMIN — THIAMINE HCL TAB 100 MG 100 MG: 100 TAB at 08:07

## 2021-08-05 RX ADMIN — CLONIDINE HYDROCHLORIDE 0.1 MG: 0.1 TABLET ORAL at 11:28

## 2021-08-05 ASSESSMENT — ACTIVITIES OF DAILY LIVING (ADL)
HEARING_DIFFICULTY_OR_DEAF: NO
FALL_HISTORY_WITHIN_LAST_SIX_MONTHS: YES
DRESSING/BATHING_DIFFICULTY: NO
WEAR_GLASSES_OR_BLIND: NO
DIFFICULTY_COMMUNICATING: NO
CONCENTRATING,_REMEMBERING_OR_MAKING_DECISIONS_DIFFICULTY: NO
WHICH_OF_THE_ABOVE_FUNCTIONAL_RISKS_HAD_A_RECENT_ONSET_OR_CHANGE?: FALL HISTORY
DOING_ERRANDS_INDEPENDENTLY_DIFFICULTY: NO
TOILETING_ISSUES: NO
WALKING_OR_CLIMBING_STAIRS_DIFFICULTY: NO
NUMBER_OF_TIMES_PATIENT_HAS_FALLEN_WITHIN_LAST_SIX_MONTHS: 3
DIFFICULTY_EATING/SWALLOWING: NO

## 2021-08-05 ASSESSMENT — MIFFLIN-ST. JEOR: SCORE: 1923.29

## 2021-08-05 NOTE — ED NOTES
Pt had large emesis 200 mL in bag/on floor. Pt cleaned up. Pt given IV zofran and reports feeling better.

## 2021-08-05 NOTE — PHARMACY-ADMISSION MEDICATION HISTORY
Admission Medication History Completed by Pharmacy    See [facility] Admission Navigator for allergy information, preferred outpatient pharmacy, prior to admission medications and immunization status.     Medication History Sources:     Patient    SureScripts    Changes made to PTA medication list (reason):    Added: Vitamin B Complex    Deleted: None    Changed: None    Additional Information:    Patient states he has not taken duloxetine or mirtazapine in the past couple of weeks due to drinking. He has not had to use the epinephrine pen at all.    Prior to Admission medications    Medication Sig Last Dose Taking? Auth Provider   albuterol (PROAIR HFA/PROVENTIL HFA/VENTOLIN HFA) 108 (90 Base) MCG/ACT inhaler Inhale 2 puffs into the lungs every 6 hours as needed  Past Week at Unknown time Yes Unknown, Entered By History   B Complex Vitamins (VITAMIN B COMPLEX PO) Take 1 tablet of unknown dose by mouth every morning 8/3/2021 at breakfast Yes Unknown, Entered By History   DULoxetine (CYMBALTA) 30 MG capsule Take 2 capsules (60 mg) by mouth daily Past Month at Unknown time Yes Pamela Lala MD   EPINEPHrine (ANY BX GENERIC EQUIV) 0.3 MG/0.3ML injection 2-pack Inject 0.3 mg into the muscle as needed for anaphylaxis (Chicken Allergy)  Yes Unknown, Entered By History   mirtazapine (REMERON) 15 MG tablet Take 1 tablet (15 mg) by mouth At Bedtime Past Month at Unknown time Yes Pamela Lala MD       Date completed: 08/05/21    Medication history completed by: WILLAM COLE

## 2021-08-05 NOTE — PLAN OF CARE
2350: Received report from Eula SANTOS.   0010: Pt arrived on unit.     Vitals: /76   Pulse 112   Temp 98.6  F (37  C)   Resp 20   SpO2 98%   Lung sounds clear. Denies CP, SOB.  On RA   Output: Voiding spontaneously, urinal at bedside  Last BM: Pt unsure, states it has been awhile  Abdomen distended   Activity: SBA    Skin: Intact ex scabs   Pain: Pt is having some abdominal pain, wasn't able to describe stated 'it feels like something isn't right,' declined intervention. Unsure when his last bowel movement was.   CMS/Neuro: Intact. A&O x 4   Dressing: NA   Diet: Regular/thin;   Intermittent nausea over NOC, zofran given   LDA: L-PIV - infusing NS @ 100cc/hr   Equipment: IV pump/pole; Urinal; Belongings   Plan/Add'l info: Pt states last drink was @ 11am on 8/4.   On telemetry, tachycardic.     MSSA scores for half the night, now scoring using CIWA.  CIWA of 14 @ 0500, given 2mg ativan per instructions.     Able to make needs known. Call light within reach.  Discharge plan: TBD

## 2021-08-05 NOTE — ED NOTES
[facility]   ED Nurse to Floor Handoff     Xavier Odell is a 36 year old male who speaks English and lives with family members,  in a home  They arrived in the ED by car from home    ED Chief Complaint: Drug / Alcohol Assessment (Seeking detox from alcohol. Last drink 1030 this morning. drinking about a 12 pack of hard seltzer + 750ml vodka)    ED Dx;   Final diagnoses:   Alcohol withdrawal syndrome without complication (H)         Needed?: No    Allergies:   Allergies   Allergen Reactions     Banana      Mild throat irritation per pt     Chicken Allergy      Anaphalxis     Peanut (Diagnostic)      Pt reports does not have allergy to this.  Patient today, 3/6/2021, patient confirms no serious aversion to peanuts.  Therefore, no removal of peanut products from garcia.   .  Past Medical Hx:   Past Medical History:   Diagnosis Date     GI bleed      Substance abuse (H)      Uncomplicated asthma       Baseline Mental status: WDL  Current Mental Status changes: at basesline    Infection present or suspected this encounter: no  Sepsis suspected: No  Isolation type: No active isolations  Patient tested for COVID 19 prior to admission: YES     Activity level - Baseline/Home:  Independent  Activity Level - Current:   Stand with Assist    Bariatric equipment needed?: No    In the ED these meds were given:   Medications   diazepam (VALIUM) tablet 5-20 mg (10 mg Oral Given 8/4/21 2222)   dextrose 5% and 0.45% NaCl 1,000 mL with Infuvite Adult 10 mL, thiamine 100 mg, folic acid 1 mg infusion ( Intravenous Stopped 8/4/21 2032)   0.9% sodium chloride BOLUS (0 mLs Intravenous Stopped 8/4/21 2208)   ondansetron (ZOFRAN) injection 8 mg (8 mg Intravenous Given 8/4/21 2201)       Drips running?  No    Home pump  No    Current LDAs  Peripheral IV 08/04/21 Left Upper forearm (Active)   Site Assessment WDL 08/04/21 1822   Line Status Saline locked 08/04/21 1822   Dressing  Intervention New dressing  08/04/21 1822   Phlebitis Scale 0-->no symptoms 08/04/21 1822   Number of days: 0       Labs results:   Labs Ordered and Resulted from Time of ED Arrival Up to the Time of Departure from the ED   COMPREHENSIVE METABOLIC PANEL - Abnormal; Notable for the following components:       Result Value    Potassium 3.3 (*)      (*)      (*)     All other components within normal limits   ETHYL ALCOHOL LEVEL - Abnormal; Notable for the following components:    Alcohol ethyl 0.26 (*)     All other components within normal limits   CBC WITH PLATELETS AND DIFFERENTIAL - Abnormal; Notable for the following components:    Platelet Count 104 (*)     All other components within normal limits   ISTAT GASES LACTATE VENOUS POCT - Abnormal; Notable for the following components:    Lactic Acid POCT 3.3 (*)     O2 Sat, Venous POCT 67 (*)     pCO2V Venous POCT 39 (*)     All other components within normal limits   DRUG ABUSE SCREEN 1 URINE (ED) - Abnormal; Notable for the following components:    Benzodiazepines Urine Screen Positive (*)     Cannabinoids Urine Screen Positive (*)     All other components within normal limits   ISTAT GASES LACTATE VENOUS POCT - Abnormal; Notable for the following components:    Lactic Acid POCT 2.9 (*)     O2 Sat, Venous POCT 76 (*)     pCO2V Venous POCT 39 (*)     All other components within normal limits   ALCOHOL BREATH TEST POCT - Abnormal; Notable for the following components:    Alcohol Breath Test 0.184 (*)     All other components within normal limits   INR - Normal   PARTIAL THROMBOPLASTIN TIME - Normal   LIPASE - Normal   MAGNESIUM - Normal   SARS-COV2 (COVID-19) VIRUS RT-PCR   PERIPHERAL IV CATHETER   ISTAT CG4 GASES LACTATE YUE NURSING POCT   MSSA SCORE AND VS   NOTIFY   ISTAT CG4 GASES LACTATE YUE NURSING POCT   DRUGS OF ABUSE SCREEN URINE POCT   COVID-19 VIRUS (CORONAVIRUS) BY PCR    Narrative:     The following orders were created for panel order  Asymptomatic COVID-19 Virus (Coronavirus) by PCR Nasopharyngeal.  Procedure                               Abnormality         Status                     ---------                               -----------         ------                     SARS-COV2 (COVID-19) Vir...[[id number]]                      In process                   Please view results for these tests on the individual orders.   CBC WITH PLATELETS & DIFFERENTIAL    Narrative:     The following orders were created for panel order CBC with platelets differential.  Procedure                               Abnormality         Status                     ---------                               -----------         ------                     CBC with platelets and d...[[id number]]  Abnormal            Final result                 Please view results for these tests on the individual orders.   URINE DRUGS OF ABUSE SCREEN    Narrative:     The following orders were created for panel order Urine Drugs of Abuse Screen.  Procedure                               Abnormality         Status                     ---------                               -----------         ------                     Drug abuse screen 1 urin...[[id number]]  Abnormal            Final result                 Please view results for these tests on the individual orders.       Imaging Studies: No results found for this or any previous visit (from the past 24 hour(s)).    Recent vital signs:   BP (!) 133/90   Pulse (!) 125   Temp 99  F (37.2  C) (Oral)   Resp 11   SpO2 95%             Cardiac Rhythm: Tachycardia  Pt needs tele? Yes  Skin/wound Issues: None    Code Status: Full Code    Pain control: pt had none    Nausea control: fair    Abnormal labs/tests/findings requiring intervention: Lactic acid elevated treated with fluids.     Family present during ED course? Yes   Family Comments/Social Situation comments:     Tasks needing completion: None    CORBIN SAM RN  [facility] --   [phone]  ED  [phone] [facility]

## 2021-08-05 NOTE — PLAN OF CARE
"DORA Odell  36/M    S = Situation:     Voluntary admit from [place] Unit 8A for alcohol    B  = Background:     Previously admitted to 3A March 5-8, 2021. Admitted from ED to 8A 8/4/21 for one night    Drinking 15 hard seltzers and 0.75 L vodka daily since January 2021; states having periods during this time where he would not drink for several days. Last drink 8/4/21 at 11:00.    Denies seizures, endorsed Hx DTs as \"shaking and some hallucinations\"    Benzo: UTOX is positive, Pt denies any other substance use beyond alcohol, likely received in ED before UTOX collected    Hx asthma, GERD, GI bleed in 2019    Pt states he is in [facility] program and would like to return to program    Pt administered Valium 55 mg, Ativan 4 mg before transfer to . Pt states Ativan was more helpful than valium      A  =  Assessment:     MSSA = 12 (145/97, pulse 100)  ,     COVID negative    Endorsed anxiety, denied SI. Calm, cooperative with intake      R =   Request or Recommendation:     MSSA with ativan, withdrawal precautions    Labs ordered: B12, lipids, TSH, GGT    Psychiatry, Internal Medicine, Case Management to see on 8/6  "

## 2021-08-05 NOTE — H&P
[facility]    History and Physical - Hospitalist Service       Date of Admission:  8/4/2021    Assessment & Plan      Xavier Odell is a 36 year old man admitted on 8/4/2021 for acute alcohol withdrawal.    Acute Alcohol Withdrawal  Tremulous, hypertensive, tachycardic with alcohol breath test at 0.184 with last alcoholic drink 8 hours prior to presentation.   - MSSA with lorazepam   - Consider adding gabapentin  - may need escalation of care to ICU if needing precedex  - PRN Zofran    Severe Alcohol Use Disorder  Depression  Anxiety  Long history of alcohol use disorder, relapse, hospitalization for detox, complications. Interested in restarting depression medication other than duloxetine and pharmacotherapy to assist with alcohol use disorder.   - Psych consult per patient request  - Thiamine, folate    H/o GI Bleed  5/2019 had hemorrhagic shock due to massive hematemesis requiring emergent intubation with EGD showing superficial gastric ulcers, Grade D esophagitis at [facility] in [place], [place]. Hemoglobin stable. Vomiting without hematemesis  - Monitor for GI bleed  - Avoid NSAIDs  - Nausea control    Thrombocytopenia  104 down from 273.   - Monitor     Diet: Combination Diet Regular Diet AdultRegular  DVT Prophylaxis: Ambulate every shift  Carrillo Catheter: Not present  Central Lines: None  Code Status: Full Code Full      Disposition Plan   Expected discharge: 3-5 days recommended to prior living arrangement once adequate pain management/ tolerating PO medications, antibiotic plan established and mental status at baseline.     The patient's care was discussed with the Patient.    Tess Daly MD  [facility]  Securely message with the Vocera Web Console (learn more here)  Text page via [employer] Paging/Directory      ______________________________________________________________________    Chief Complaint    Alcohol Use    History is obtained from the patient    History of Present Illness   Xavier Odell is a 36 year old man with history of severe alcohol use disorder requiring multiple hospitalization for detox, h/o GI bleed presenting for alcohol detox. He was sober for the past year, then relapsed at the start of the calendar year. Since then he has been drinking alcohol regularly with significant increase in the past week, drinking 750ml vodka and a 12-pack of hard selzers daily. On morning of admission was nauseous, anxious with abdominal discomfort and bloating. Presented to [facility] where the provider recommended that he present to the ED for admission to detox.     In the ED, he was tachycardic, hypertensive. Labs significant for hypokalemia, elevated lactic acid to 3.3, elevated AST and ALT, normal Hgb. He was given thiamine, folate, IV fluids, IV Zofran then admitted for further management.     Review of Systems    The 10 point Review of Systems is negative other than noted in the HPI or here.     Past Medical History    I have reviewed this patient's medical history and updated it with pertinent information if needed.   Past Medical History:   Diagnosis Date     GI bleed      Substance abuse (H)      Uncomplicated asthma        Past Surgical History   I have reviewed this patient's surgical history and updated it with pertinent information if needed.  Past Surgical History:   Procedure Laterality Date     ORTHOPEDIC SURGERY         Social History   I have reviewed this patient's social history and updated it with pertinent information if needed.  Social History     Tobacco Use     Smoking status: Never Smoker     Smokeless tobacco: Never Used   Substance Use Topics     Alcohol use: Yes     Comment: Drinking cooking wine and mouthwash     Drug use: Not Currently       Family History   Father with history of alcohol use disorder, depression     Prior to Admission Medications    Prior to Admission Medications   Prescriptions Last Dose Informant Patient Reported? Taking?   DULoxetine (CYMBALTA) 30 MG capsule   No No   Sig: Take 2 capsules (60 mg) by mouth daily   EPINEPHrine (ANY BX GENERIC EQUIV) 0.3 MG/0.3ML injection 2-pack   Yes No   Sig: Inject 0.3 mg into the muscle as needed for anaphylaxis (Chicken Allergy)   albuterol (PROAIR HFA/PROVENTIL HFA/VENTOLIN HFA) 108 (90 Base) MCG/ACT inhaler   Yes No   Sig: Inhale 2 puffs into the lungs every 6 hours as needed    mirtazapine (REMERON) 15 MG tablet   No No   Sig: Take 1 tablet (15 mg) by mouth At Bedtime      Facility-Administered Medications: None     Allergies   Allergies   Allergen Reactions     Banana      Mild throat irritation per pt     Chicken Allergy      Anaphalxis     Peanut (Diagnostic)      Pt reports does not have allergy to this.  Patient today, 3/6/2021, patient confirms no serious aversion to peanuts.  Therefore, no removal of peanut products from garcia.       Physical Exam   Vital Signs: Temp: 99.7  F (37.6  C) Temp src: Oral BP: (!) 153/97 Pulse: (!) 122   Resp: 20 SpO2: 95 % O2 Device: None (Room air)    Weight: 0 lbs 0 oz    Appearance: Alert, laying in bed  Eyes: PERRL, EOM grossly intact, conjunctivae and sclerae clear.   Nose: Nares clear with no active discharge.    Mouth/Throat: Moist mucous membranes.   Neck: Supple, no masses. No significant cervical lymphadenopathy.  Pulmonary: Normal work of breathing on room air. Good air entry, clear to auscultation bilaterally, with no rales, rhonchi, or wheezing.  Cardiovascular: Regular rate and rhythm, normal S1 and S2, with no murmurs.  Normal symmetric peripheral pulses and brisk cap refill.  Abdominal: Normoactive bowel sounds, distended, RUQ and gastric cancer mildly tender to palpation  Neurologic: Alert, speech dysarthric, face symmetric, moving all extremities equally with grossly normal coordination, impaired finger-nose-finger bilaterally  Extremities/Back: No  CVA tenderness. Trace pedal edema      Data   Data reviewed today: I reviewed all medications, new labs and imaging results over the last 24 hours.  Recent Labs   Lab 08/04/21  1827   WBC 4.6   HGB 14.0   MCV 88   *   INR 0.96      POTASSIUM 3.3*   CHLORIDE 100   CO2 24   BUN 10   CR 0.92   ANIONGAP 13   NEHA 8.7   GLC 84   ALBUMIN 3.4   PROTTOTAL 8.4   BILITOTAL 0.7   ALKPHOS 100   *   *   LIPASE 269

## 2021-08-05 NOTE — PROGRESS NOTES
08/05/21 1622   Patient Belongings   Did you bring any home meds/supplements to the hospital?  No   Patient Belongings other (see comments)  (storage bin, medroom bin)   Belongings Search Yes   Clothing Search Yes   Second Staff Dinesh Lester   Storage bin: shoes, shirt  Medroom bin: cell phone  NO WALLET, NO CASH  A               Admission:  I am responsible for any personal items that are not sent to the safe or pharmacy.  [facility] is not responsible for loss, theft or damage of any property in my possession.    Signature:  _________________________________ Date: _______  Time: _____                                              Staff Signature:  ____________________________ Date: ________  Time: _____      2nd Staff person, if patient is unable/unwilling to sign:    Signature: ________________________________ Date: ________  Time: _____     Discharge:  [facility] has returned all of my personal belongings:    Signature: _________________________________ Date: ________  Time: _____                                          Staff Signature:  ____________________________ Date: ________  Time: _____

## 2021-08-05 NOTE — PROGRESS NOTES
Charts reviewed and met with patient.  He wants to compete detox. [facility]  Detox symptoms last for about 203 days  Stable  /84   Pulse 90   Temp 98.8  F (37.1  C) (Oral)   Resp 18   SpO2 97%   LFT's trending down  Hypokalemia resolved    Medically stable to transfer to         Dr BELEN Torres MD, [facility]  Hospitalist ( Internal medicine)  Pager: [phone]

## 2021-08-05 NOTE — PLAN OF CARE
Pt up independently in room. Continues to have some tremors at times. Ativan given x1 for CIWA of 8. PIV saline locked. Plan for pt to go to detox this afternoon. Pt able to make needs known.

## 2021-08-05 NOTE — DISCHARGE SUMMARY
[facility]  Hospitalist Discharge Summary      Date of Admission:  8/4/2021  Date of Discharge:  8/5/2021  Discharging Provider: Brian Pagan MD      Discharge Diagnoses   Acute alcohol withdrawal  Alcohol dependence  Depression       Follow-ups Needed After Discharge   Follow-up Appointments     Follow Up and recommended labs and tests      Patient to be transferred to detox unit for continued management of   alcohol detox                 Discharge Disposition   Discharged to home  Condition at discharge: Stable      Hospital Course   Xavier Odell is a 36 year old man admitted on 8/4/2021 for acute alcohol withdrawal.     Acute Alcohol Withdrawal   Presented being tremulous, hypertensive, tachycardic with alcohol breath test at 0.184 with last alcoholic drink 8 hours prior to presentation.   - MSSA with lorazepam . Was on Diazepam and already took 55 mg in < 12 hrs prior to transitioning to Lorazepam.   Withdrawal symptoms better controlled with Lorazepam   -Clonidine for KELLY control        Severe Alcohol Use Disorder  Depression  Anxiety  Long history of alcohol use disorder, relapse, hospitalization for detox, complications. Interested in restarting depression medication other than duloxetine and pharmacotherapy to assist with alcohol use disorder.   - Thiamine, folate     H/o GI Bleed  5/2019 had hemorrhagic shock due to massive hematemesis requiring emergent intubation with EGD showing superficial gastric ulcers, Grade D esophagitis at [facility] in [place], [place]. Hemoglobin stable. Vomiting without hematemesis  - Monitor for GI bleed. Hgb stable at this time at   - Avoid NSAIDs  - Nausea control     Thrombocytopenia  104 down from 273.   - Monitor    Consultations This Hospital Stay   CARE MANAGEMENT / SOCIAL WORK IP CONSULT  PSYCHIATRY IP CONSULT    Code Status   Full Code    Time Spent on this Encounter   I, Brian Pagan MD,  personally saw the patient today and spent greater than 30 minutes discharging this patient.       Brian Pagan MD  [facility] UNIT 8A  [address] [facility] [address]  Phone: [phone]  Fax: [phone]  ______________________________________________________________________    Physical Exam   Vital Signs: Temp: 98.8  F (37.1  C) Temp src: Oral BP: (!) 158/96 Pulse: 90   Resp: 18 SpO2: 97 % O2 Device: None (Room air)    Weight: 0 lbs 0 oz  See separate progress note on discharge day        Primary Care Physician   Clinic [facility]    Discharge Orders      Follow Up and recommended labs and tests    Patient to be transferred to detox unit for continued management of alcohol detox     Reason for your hospital stay    Acute Alcohol withdrawal     Activity - Up ad flora     Full Code     Advance Diet as Tolerated    Follow this diet upon discharge: Orders Placed This Encounter      Combination Diet Regular Diet Adult       Significant Results and Procedures   No results found for this or any previous visit.    Discharge Medications   Current Discharge Medication List      START taking these medications    Details   cloNIDine (CATAPRES) 0.1 MG tablet Take 1 tablet (0.1 mg) by mouth every 8 hours    Associated Diagnoses: Chemical dependency (H)      folic acid (FOLVITE) 1 MG tablet Take 1 tablet (1 mg) by mouth daily    Associated Diagnoses: Chemical dependency (H)      multivitamin w/minerals (THERA-VIT-M) tablet Take 1 tablet by mouth daily    Associated Diagnoses: Chemical dependency (H)      thiamine (B-1) 100 MG tablet Take 1 tablet (100 mg) by mouth daily    Associated Diagnoses: Chemical dependency (H)         CONTINUE these medications which have NOT CHANGED    Details   albuterol (PROAIR HFA/PROVENTIL HFA/VENTOLIN HFA) 108 (90 Base) MCG/ACT inhaler Inhale 2 puffs into the lungs every 6 hours as needed     Comments: Pharmacy may dispense brand covered by insurance (Proair, or proventil or  ventolin or generic albuterol inhaler)      B Complex Vitamins (VITAMIN B COMPLEX PO) Take by mouth daily -Take 1 tablet of unknown dose by mouth every morning      DULoxetine (CYMBALTA) 30 MG capsule Take 2 capsules (60 mg) by mouth daily  Qty: 60 capsule, Refills: 0    Associated Diagnoses: Major depressive disorder with single episode, remission status unspecified      EPINEPHrine (ANY BX GENERIC EQUIV) 0.3 MG/0.3ML injection 2-pack Inject 0.3 mg into the muscle as needed for anaphylaxis (Chicken Allergy)      mirtazapine (REMERON) 15 MG tablet Take 1 tablet (15 mg) by mouth At Bedtime  Qty: 30 tablet, Refills: 0    Associated Diagnoses: Major depressive disorder with single episode, remission status unspecified           Allergies   Allergies   Allergen Reactions     Banana      Mild throat irritation per pt     Chicken Allergy      Anaphalxis     Peanut (Diagnostic)      Pt reports does not have allergy to this.  Patient today, 3/6/2021, patient confirms no serious aversion to peanuts.  Therefore, no removal of peanut products from garcia.

## 2021-08-05 NOTE — TELEPHONE ENCOUNTER
S:  Latonia RN from [address] [place] [address] in [place] called with 36y/M for IP Detox for alcohol withdrawal.    B:  Pt was admitted overnight from ED to  due to low potassium.  Pt breathe alcohol  was .26.  Pt was transferred to [facility], but is now stable and potassium is up to a 3.7.  Dr Torres is follow Doc for doc to doc [phone].  Pt UTOX was positive for Benzo's and cannabis.  Covid was NEGATIVE.   Pt is ambulating independently.    Last CIWA score was a 9 per RN and pt has received ativan at 5am and again at 8:30am this morning.   AST 0 - 45 U/L 214High     ALT 0 - 70 U/L 123High      A:  Vol    R:  Patient cleared and ready for behavioral bed placement: Yes   Provider Beto was paged at 10:27am to review pt and give disposition to present for 3A/Beto Pérez paged again at 12:45pm.  Dr Pérez called back at 12:50pm.  Would like details of what pt drinks, how much and for how long.  Intake called  @ 12:56 for RN to interview client on his alcohol intake and benzo use.   paged Dr Torres@ 12:57pm to retrieve a direct call no for a doc to doc.  Dr Torres called back at 1:02pm and reported Dr Pérez already called him direct and doc to doc was completed.   RN from  called back @ and reports pt drinks 15 seltzers a day and  sometime ALSO a  750 l vodka too.  He has drank for years.  He was sober for a year, but started drinking again in Jan 2021.    Dr Pérez Paged @ 1:22pm with update on pt for Disposition.   Dr Fontaine called back at 1:38pm and accepted pt fpor 3a  Pt placed in que and unit called w/disposition @ 1:53pm  Unit  Nurse updated with report time

## 2021-08-05 NOTE — ED NOTES
Bed: ED11  Expected date: 8/4/21  Expected time:   Means of arrival:   Comments:  RM 15 for monitoring

## 2021-08-05 NOTE — PROGRESS NOTES
[facility]    Medicine Progress Note - Hospitalist Service       Date of Admission:  8/4/2021    Assessment & Plan         Xavier Odell is a 36 year old man admitted on 8/4/2021 for acute alcohol withdrawal.     Acute Alcohol Withdrawal  Tremulous, hypertensive, tachycardic with alcohol breath test at 0.184 with last alcoholic drink 8 hours prior to presentation.   - MSSA with lorazepam . Was on Diazepam and already took 55 mg in < 12 hrs prior to transitioning to Lorazepam  -Clonidine for KELLY control   - may need escalation of care to ICU if needing precedex  - PRN Zofran     Severe Alcohol Use Disorder  Depression  Anxiety  Long history of alcohol use disorder, relapse, hospitalization for detox, complications. Interested in restarting depression medication other than duloxetine and pharmacotherapy to assist with alcohol use disorder.   - Psych consult per patient request  - Thiamine, folate     H/o GI Bleed  5/2019 had hemorrhagic shock due to massive hematemesis requiring emergent intubation with EGD showing superficial gastric ulcers, Grade D esophagitis at [facility] in [place], [place]. Hemoglobin stable. Vomiting without hematemesis  - Monitor for GI bleed. Hgb stable at this time at   - Avoid NSAIDs  - Nausea control     Thrombocytopenia  104 down from 273.   - Monitor             Diet: Combination Diet Regular Diet Adult    DVT Prophylaxis: Pneumatic Compression Devices  Carrillo Catheter: Not present  Central Lines: None  Code Status: Full Code      Disposition Plan   Expected discharge:  2-3 days recommended to prior living arrangement once Complete with withdrawal symptoms .     The patient's care was discussed with the Bedside Nurse and Patient.    Brian Pagan MD  Hospitalist Service  [facility]  Securely message with the Vocera Web Console (learn more here)  Text page via [employer]  Paging/Directory      Clinically Significant Risk Factors Present on Admission              # Thrombocytopenia: Plts = 104 10e3/uL (Ref range: 150 - 450 10e3/uL) on admission, will monitor for bleeding      ______________________________________________________________________    Interval History   Patient says he is going to bad withdrawals  Goes through phases of profuse sweating and shakes  Nauseous  Says that he is in an outpatient program and would hope to go back to it  Also, wants to go back on his depression meds   Denies chest pain or SOB      Data reviewed today: I reviewed all medications, new labs and imaging results over the last 24 hours. I personally reviewed no images or EKG's today.    Physical Exam   Vital Signs: Temp: 98.8  F (37.1  C) Temp src: Oral BP: 128/84 Pulse: 90   Resp: 18 SpO2: 97 % O2 Device: None (Room air)    Weight: 0 lbs 0 oz  General Appearance: Awake, alert and in mild distress   Respiratory: Clear breath sounds bilaterally   Cardiovascular: Normal heart sounds. No murmurs   GI: Soft, non tender. Normal bowel sounds   Skin: No bruising or bleeding  Other: Awake, alert and orientated X 3. Diffuse tremors     Data   Recent Labs   Lab 08/05/21  0705 08/04/21  1827   WBC  --  4.6   HGB  --  14.0   MCV  --  88   PLT  --  104*   INR  --  0.96    137   POTASSIUM 3.7 3.3*   CHLORIDE 101 100   CO2 26 24   BUN 6* 10   CR 0.84 0.92   ANIONGAP 9 13   NEHA 8.3* 8.7   GLC 93 84   ALBUMIN 3.0* 3.4   PROTTOTAL 7.3 8.4   BILITOTAL 0.8 0.7   ALKPHOS 86 100   * 148*   * 271*   LIPASE  --  269

## 2021-08-06 LAB
CHOLEST SERPL-MCNC: 249 MG/DL
FASTING STATUS PATIENT QL REPORTED: YES
FOLATE SERPL-MCNC: 19.2 NG/ML
GGT SERPL-CCNC: 646 U/L (ref 0–75)
HDLC SERPL-MCNC: 70 MG/DL
LDLC SERPL CALC-MCNC: 134 MG/DL
NONHDLC SERPL-MCNC: 179 MG/DL
T4 FREE SERPL-MCNC: 0.83 NG/DL (ref 0.76–1.46)
TRIGL SERPL-MCNC: 226 MG/DL
TSH SERPL DL<=0.005 MIU/L-ACNC: 4.19 MU/L (ref 0.4–4)
VIT B12 SERPL-MCNC: 558 PG/ML (ref 193–986)

## 2021-08-06 PROCEDURE — HZ2ZZZZ DETOXIFICATION SERVICES FOR SUBSTANCE ABUSE TREATMENT: ICD-10-PCS | Performed by: PSYCHIATRY & NEUROLOGY

## 2021-08-06 PROCEDURE — 84443 ASSAY THYROID STIM HORMONE: CPT | Performed by: PSYCHIATRY & NEUROLOGY

## 2021-08-06 PROCEDURE — 36415 COLL VENOUS BLD VENIPUNCTURE: CPT | Performed by: PSYCHIATRY & NEUROLOGY

## 2021-08-06 PROCEDURE — 80061 LIPID PANEL: CPT | Performed by: PSYCHIATRY & NEUROLOGY

## 2021-08-06 PROCEDURE — 84439 ASSAY OF FREE THYROXINE: CPT | Performed by: PSYCHIATRY & NEUROLOGY

## 2021-08-06 PROCEDURE — 82746 ASSAY OF FOLIC ACID SERUM: CPT | Performed by: PSYCHIATRY & NEUROLOGY

## 2021-08-06 PROCEDURE — 82607 VITAMIN B-12: CPT | Performed by: PSYCHIATRY & NEUROLOGY

## 2021-08-06 PROCEDURE — 82977 ASSAY OF GGT: CPT | Performed by: PSYCHIATRY & NEUROLOGY

## 2021-08-06 PROCEDURE — 128N000004 HC R&B CD ADULT

## 2021-08-06 PROCEDURE — 250N000013 HC RX MED GY IP 250 OP 250 PS 637: Performed by: PSYCHIATRY & NEUROLOGY

## 2021-08-06 PROCEDURE — 99223 1ST HOSP IP/OBS HIGH 75: CPT | Mod: AI | Performed by: PSYCHIATRY & NEUROLOGY

## 2021-08-06 RX ORDER — ARIPIPRAZOLE 2 MG/1
2 TABLET ORAL DAILY
Status: DISCONTINUED | OUTPATIENT
Start: 2021-08-06 | End: 2021-08-08 | Stop reason: HOSPADM

## 2021-08-06 RX ADMIN — LORAZEPAM 2 MG: 1 TABLET ORAL at 16:21

## 2021-08-06 RX ADMIN — THIAMINE HCL TAB 100 MG 100 MG: 100 TAB at 08:32

## 2021-08-06 RX ADMIN — ARIPIPRAZOLE 2 MG: 2 TABLET ORAL at 13:12

## 2021-08-06 RX ADMIN — NICOTINE POLACRILEX 4 MG: 2 GUM, CHEWING BUCCAL at 16:21

## 2021-08-06 RX ADMIN — DULOXETINE HYDROCHLORIDE 60 MG: 60 CAPSULE, DELAYED RELEASE ORAL at 08:32

## 2021-08-06 RX ADMIN — CLONIDINE HYDROCHLORIDE 0.1 MG: 0.1 TABLET ORAL at 16:21

## 2021-08-06 RX ADMIN — MIRTAZAPINE 15 MG: 15 TABLET, FILM COATED ORAL at 20:28

## 2021-08-06 RX ADMIN — FOLIC ACID 1 MG: 1 TABLET ORAL at 08:32

## 2021-08-06 RX ADMIN — Medication 1 TABLET: at 08:32

## 2021-08-06 RX ADMIN — LORAZEPAM 2 MG: 1 TABLET ORAL at 08:32

## 2021-08-06 RX ADMIN — LORAZEPAM 2 MG: 1 TABLET ORAL at 20:28

## 2021-08-06 ASSESSMENT — ACTIVITIES OF DAILY LIVING (ADL)
ORAL_HYGIENE: INDEPENDENT
HYGIENE/GROOMING: HANDWASHING;INDEPENDENT
DRESS: INDEPENDENT
ORAL_HYGIENE: INDEPENDENT
HYGIENE/GROOMING: INDEPENDENT
LAUNDRY: WITH SUPERVISION
DRESS: SCRUBS (BEHAVIORAL HEALTH)

## 2021-08-06 NOTE — PLAN OF CARE
Behavioral Team Discussion: (8/6/2021)    Continued Stay Criteria/Rationale: Patient admitted for alcohol withdrawal and Use Disorder.  Plan: The following services will be provided to the patient; psychiatric assessment, medication management, therapeutic milieu, individual and group support, and skills groups.   Participants: 3A Provider: Dr. Santa Pérez MD; 3A RN: Adebayo Kaminski, RN; 3A CM's: Terri Chávez .  Summary/Recommendation: Providers will assess today for treatment recommendations, discharge planning, and aftercare plans. CM will meet with pt for discharge planning.   Medical/Physical: None noted  Precautions:   Behavioral Orders   Procedures     Code 1 - Restrict to Unit     Routine Programming     As clinically indicated     Status 15     Every 15 minutes.     Withdrawal precautions     Rationale for change in precautions or plan: N/A  Progress: No Change.

## 2021-08-06 NOTE — PROGRESS NOTES
Met with Pt to initiate discharge planning.  Pt is currently enrolled in the Dual Day OP Program and states he spoke with his counselor and was informed he could return to the program.  He plans to do this on Monday 8/9.  Advised Pt to seek assistance if needs change.  Pt acknowledged.

## 2021-08-06 NOTE — PROGRESS NOTES
Pt scored twice since admission to unit for alcohol withdrawal    MSSA of 12, 10. Pt received 2 mg ativan x2

## 2021-08-06 NOTE — PROGRESS NOTES
MSSA scores of 5/5 pt did not require ativan for alcohol withdrawal this shift and is observed to sleep throughout the noc.

## 2021-08-06 NOTE — PLAN OF CARE
Problem: Alcohol Withdrawal  Goal: Alcohol Withdrawal Symptom Control  Outcome: No Change     Pt reports that he feels worse this evening, MSSA 9 given Ativan 2 mg and clonidine for HTN.  He endorses depression 7/10 but denies any thoughts plan or intent for self harm. Anxiety 7/10, declined need for PRN vistaril but did accept handout with tips for dealing with stress and anxiety.  He was mostly isolative to his room, did not attend groups, not social with peers but did get up for dinner.

## 2021-08-06 NOTE — CONSULTS
Circumstances of recent discharge and re-admittance noted. Please refer to recent medicine H&P by Tess Daly in charting dated 8/4/21, which was reviewed by this writer and is up to date. Please call the on-call BHAVNA for any f/u medical concerns if they arise.     In short, Xavier Odell is a 36 year old male with a past medical history of Alcohol abuse, depression, anxiety, and GI bleed who was admitted to Internal medicine service from 8/4-8/5 for alcohol dependence and acute withdrawal, and transferred to station 3A for continued management of alcohol detox.     Diagnoses:  # Alcohol abuse, dependence, acute withdrawal  # Depression, anxiety  # Hx of GI bleed  # Thrombocytopenia  # Transaminitis      Will repeat CMP and CBC 8/7 to ensure stability of thrombocytopenia and transaminitis. Will peripherally follow for results and sign off if stable.       Juli Velasquez PA-C  Hospitalist Service  Pager: [phone]

## 2021-08-06 NOTE — H&P
Xavier Odell is a 36 year old male who was referred by self  historian.   CHIEF COMPLAINT: Alcohol    HISTORY OF PRESENT ILLNESS:    Patient is 36-year-old  male who has chronic alcoholism he has history of depression generalized anxiety disorder gastric ulcer pancreatitis.  Patient was initially admitted to medical unit because of increasing lactate.  He was stabilized and transferred here for further care.    He is in outpatient dual diagnosis program for 3 weeks but he has been drinking and it is been drinking 15 CANS. he told his counselor who recommended that he get treatment.  Patient has been using the following substances: Alcohol  Started at age [age], became a problem at 26        Patient has tolerance, withdrawal, progressive use, loss of control, spending more time and more amount than intended. Patient has made attempts to quit, is experiencing cravings, and reports negative consequences.    He has no history of seizures but has some blotches that she is that he sees? Delirium tremens                         Denies thoughts of suicide or harming others.      Denies auditory or visual hallucinations.     Patient smokes 0 cigarettes    Patient denied any gambling      PSYCHIATRIC REVIEW OF SYSTEMS:         Psychiatric Review of Systems:   Depression:    Reports he feels depressed hopeless feels like he is there is no purpose he has no energy no motivation sleeps poorly isolation but he has no active suicide ideation plan or intent  Marjan:    Denied: sleeplessness, impulsiveness, racing thoughts, increased goal-directed activities, pressured speech, increase in energy  Marjan Feeling euphoric,Distractible,Impulsive,Grandiose,Talking excessively,Have energy without sleeping,Mood swings,Irritability  Denies: sleeplessness, increased goal-directed activities, abrupt increase in energy, pressured speech  Psychosis:    Denied: visual hallucinations, auditory hallucinations, paranoia, grandiosity,  ideas of reference, thought insertions, thought broadcasting.  Denies: visual hallucinations, auditory hallucinations, paranoia  Anxiety:   Reports: excessive worries that are difficult to control for the past 6 months,   Chronic anxiety , not able to stop worrying impacting sleep, poor conc, irritable , muscle tension    Anxiety  Pt has following s/o of anxiety  Feeling anxious all the time,Excessive worry,Not able to stop worrying,Impacting sleep,Concentration,Muscle tension,Irritability,Fatigue  Denied panic attacks      Denies: worries that are difficult to control for the past 6 months, panic attacks  PTSD:     Denies: re-experiencing past trauma, nightmares, increased arousal, avoidance of traumatic stimuli, impaired function.  OCD:    Reports he has ruminative thoughts used to, but does not team does not mean things in symmetry  ED:     Denies: restriction, binging, purging.    Denied symptoms of attention deficit disorder include a failure to pay attention to detail, a pattern of careless mistakes, a pattern of inattentive listening, a failure to follow through with projects, poor personal organization, losing necessary objects, distractibility, forgetfulness.    Denied symptoms of borderline personality disorder include a fear of abandonment, unstable self-image, impulsive behavior, dissociative feeling, intense anger, unstable personal relationships, chronic feelings of boredom, periods of intense depressed mood.              PSYCHIATRIC HISTORY     Previous diagnoses: Depression anxiety      Symptoms in relation to substance use (symptoms present without use?):     Past court commitments: none  SIB /SUICIDE ATTEMPTS NONE  Psych Hosp : Never psychiatrically hospitalized  Outpatient Programs none  Inpatient cd trt was not lodging plus last year  Out pt cd trt outpatient treatment and [facility] and  Presently in dual diagnosis program  PAST PSYCH MED TRIALS     Cymbalta Remeron    SOCIAL HISTORY                                                                          Single works in [employer]          Family History:   FAMILY HISTORY:   Family History   Problem Relation Age of Onset     Depression Mother      Depression Father      Substance Abuse Father      Family Mental Health History-depression in both parents    Substance Use Problems - present for father having alcoholism             PTA Medications:     Medications Prior to Admission   Medication Sig Dispense Refill Last Dose     albuterol (PROAIR HFA/PROVENTIL HFA/VENTOLIN HFA) 108 (90 Base) MCG/ACT inhaler Inhale 2 puffs into the lungs every 6 hours as needed         B Complex Vitamins (VITAMIN B COMPLEX PO) Take by mouth daily -Take 1 tablet of unknown dose by mouth every morning        cloNIDine (CATAPRES) 0.1 MG tablet Take 1 tablet (0.1 mg) by mouth every 8 hours        DULoxetine (CYMBALTA) 30 MG capsule Take 2 capsules (60 mg) by mouth daily 60 capsule 0      EPINEPHrine (ANY BX GENERIC EQUIV) 0.3 MG/0.3ML injection 2-pack Inject 0.3 mg into the muscle as needed for anaphylaxis (Chicken Allergy)        folic acid (FOLVITE) 1 MG tablet Take 1 tablet (1 mg) by mouth daily        mirtazapine (REMERON) 15 MG tablet Take 1 tablet (15 mg) by mouth At Bedtime 30 tablet 0      multivitamin w/minerals (THERA-VIT-M) tablet Take 1 tablet by mouth daily        thiamine (B-1) 100 MG tablet Take 1 tablet (100 mg) by mouth daily             Allergies:     Allergies   Allergen Reactions     Banana      Mild throat irritation per pt     Chicken Allergy      Anaphalxis          Labs:     Recent Results (from the past 48 hour(s))   Alcohol breath test POCT    Collection Time: 08/04/21  5:35 PM   Result Value Ref Range    Alcohol Breath Test 0.184 (A) 0.00 - 0.01   iStat Gases (lactate) venous, POCT    Collection Time: 08/04/21  6:23 PM   Result Value Ref Range    Lactic Acid POCT 3.3 (H) <=2.0 mmol/L    Bicarbonate Venous POCT 25 21 - 28 mmol/L    O2 Sat, Venous  POCT 67 (L) 94 - 100 %    pCO2V Venous POCT 39 (L) 40 - 50 mm Hg    pH Venous POCT 7.42 7.32 - 7.43    pO2 Venous POCT 34 25 - 47 mm Hg   INR    Collection Time: 08/04/21  6:27 PM   Result Value Ref Range    INR 0.96 0.86 - 1.14   Partial thromboplastin time    Collection Time: 08/04/21  6:27 PM   Result Value Ref Range    aPTT 29 22 - 38 Seconds   Comprehensive metabolic panel    Collection Time: 08/04/21  6:27 PM   Result Value Ref Range    Sodium 137 133 - 144 mmol/L    Potassium 3.3 (L) 3.4 - 5.3 mmol/L    Chloride 100 94 - 109 mmol/L    Carbon Dioxide (CO2) 24 20 - 32 mmol/L    Anion Gap 13 3 - 14 mmol/L    Urea Nitrogen 10 7 - 30 mg/dL    Creatinine 0.92 0.66 - 1.25 mg/dL    Calcium 8.7 8.5 - 10.1 mg/dL    Glucose 84 70 - 99 mg/dL    Alkaline Phosphatase 100 40 - 150 U/L     (H) 0 - 45 U/L     (H) 0 - 70 U/L    Protein Total 8.4 6.8 - 8.8 g/dL    Albumin 3.4 3.4 - 5.0 g/dL    Bilirubin Total 0.7 0.2 - 1.3 mg/dL    GFR Estimate >90 >60 mL/min/1.73m2   Lipase    Collection Time: 08/04/21  6:27 PM   Result Value Ref Range    Lipase 269 73 - 393 U/L   Magnesium    Collection Time: 08/04/21  6:27 PM   Result Value Ref Range    Magnesium 2.3 1.6 - 2.3 mg/dL   Ethyl Alcohol Level    Collection Time: 08/04/21  6:27 PM   Result Value Ref Range    Alcohol ethyl 0.26 (H) <=0.01 g/dL   CBC with platelets and differential    Collection Time: 08/04/21  6:27 PM   Result Value Ref Range    WBC Count 4.6 4.0 - 11.0 10e3/uL    RBC Count 4.77 4.40 - 5.90 10e6/uL    Hemoglobin 14.0 13.3 - 17.7 g/dL    Hematocrit 42.1 40.0 - 53.0 %    MCV 88 78 - 100 fL    MCH 29.4 26.5 - 33.0 pg    MCHC 33.3 31.5 - 36.5 g/dL    RDW 15.0 10.0 - 15.0 %    Platelet Count 104 (L) 150 - 450 10e3/uL    % Neutrophils 76 %    % Lymphocytes 18 %    % Monocytes 6 %    % Eosinophils 0 %    % Basophils 0 %    % Immature Granulocytes 0 %    NRBCs per 100 WBC 0 <1 /100    Absolute Neutrophils 3.5 1.6 - 8.3 10e3/uL    Absolute Lymphocytes 0.8 0.8  - 5.3 10e3/uL    Absolute Monocytes 0.3 0.0 - 1.3 10e3/uL    Absolute Eosinophils 0.0 0.0 - 0.7 10e3/uL    Absolute Basophils 0.0 0.0 - 0.2 10e3/uL    Absolute Immature Granulocytes 0.0 <=0.0 10e3/uL    Absolute NRBCs 0.0 10e3/uL   Drug abuse screen 1 urine (ED)    Collection Time: 08/04/21  6:30 PM   Result Value Ref Range    Amphetamines Urine Screen Negative Screen Negative    Barbiturates Urine Screen Negative Screen Negative    Benzodiazepines Urine Screen Positive (A) Screen Negative    Cannabinoids Urine Screen Positive (A) Screen Negative    Cocaine Urine Screen Negative Screen Negative    Opiates Urine Screen Negative Screen Negative   iStat Gases (lactate) venous, POCT    Collection Time: 08/04/21 10:15 PM   Result Value Ref Range    Lactic Acid POCT 2.9 (H) <=2.0 mmol/L    Bicarbonate Venous POCT 23 21 - 28 mmol/L    O2 Sat, Venous POCT 76 (L) 94 - 100 %    pCO2V Venous POCT 39 (L) 40 - 50 mm Hg    pH Venous POCT 7.38 7.32 - 7.43    pO2 Venous POCT 41 25 - 47 mm Hg   SARS-COV2 (COVID-19) Virus RT-PCR    Collection Time: 08/04/21 10:16 PM    Specimen: Nasopharyngeal; Swab   Result Value Ref Range    SARS CoV2 PCR Negative Negative   Comprehensive metabolic panel    Collection Time: 08/05/21  7:05 AM   Result Value Ref Range    Sodium 136 133 - 144 mmol/L    Potassium 3.7 3.4 - 5.3 mmol/L    Chloride 101 94 - 109 mmol/L    Carbon Dioxide (CO2) 26 20 - 32 mmol/L    Anion Gap 9 3 - 14 mmol/L    Urea Nitrogen 6 (L) 7 - 30 mg/dL    Creatinine 0.84 0.66 - 1.25 mg/dL    Calcium 8.3 (L) 8.5 - 10.1 mg/dL    Glucose 93 70 - 99 mg/dL    Alkaline Phosphatase 86 40 - 150 U/L     (H) 0 - 45 U/L     (H) 0 - 70 U/L    Protein Total 7.3 6.8 - 8.8 g/dL    Albumin 3.0 (L) 3.4 - 5.0 g/dL    Bilirubin Total 0.8 0.2 - 1.3 mg/dL    GFR Estimate >90 >60 mL/min/1.73m2   Lactic acid whole blood    Collection Time: 08/05/21  7:05 AM   Result Value Ref Range    Lactic Acid 0.6 (L) 0.7 - 2.0 mmol/L   Extra Purple Top  "Tube    Collection Time: 08/05/21  7:05 AM   Result Value Ref Range    Hold Specimen JIC    GGT    Collection Time: 08/06/21  7:54 AM   Result Value Ref Range     (H) 0 - 75 U/L   Lipid panel    Collection Time: 08/06/21  7:54 AM   Result Value Ref Range    Cholesterol 249 (H) <200 mg/dL    Triglycerides 226 (H) <150 mg/dL    Direct Measure HDL 70 >=40 mg/dL    LDL Cholesterol Calculated 134 (H) <=100 mg/dL    Non HDL Cholesterol 179 (H) <130 mg/dL    Patient Fasting > 8hrs? Yes    TSH with free T4 reflex and/or T3 as indicated    Collection Time: 08/06/21  7:54 AM   Result Value Ref Range    TSH 4.19 (H) 0.40 - 4.00 mU/L   T4 free    Collection Time: 08/06/21  7:54 AM   Result Value Ref Range    Free T4 0.83 0.76 - 1.46 ng/dL         BP (!) 150/109   Pulse 94   Temp 97.9  F (36.6  C) (Temporal)   Resp 16   Ht 1.753 m (5' 9\")   Wt 99.8 kg (220 lb)   SpO2 99%   BMI 32.49 kg/m    Weight is 220 lbs 0 oz  Body mass index is 32.49 kg/m .    Physical Exam:     ROS: 10 point ROS neg other than the symptoms noted above in the HPI.            Past Medical History:   PAST MEDICAL HISTORY:   Past Medical History:   Diagnosis Date     GI bleed      Substance abuse (H)      Uncomplicated asthma        PAST SURGICAL HISTORY:   Past Surgical History:   Procedure Laterality Date     ORTHOPEDIC SURGERY         -    -           MENTAL STATUS EXAM:      Constitutional: General appearance of patient:  Appearance:  awake, alert, appeared as age stated, adequate groomed and slightly unkempt  Attitude:  cooperative  Eye Contact:  good  Mood:   Depressed  Affect:  congruent   Speech:  clear, coherent normal rate   Psychomotor Behavior:  no evidence of tardive dyskinesia, dystonia, or tics  Thought Process:  logical, linear and goal oriented  Associations:  no loose associations  Thought Content:  no evidence of psychotic thought and active suicidal ideation present  Denied any active suicidal /homicidation ideation plan " intent   Insight:  fair  Judgment:  fair  Oriented to:  time, person, and place  Attention Span and Concentration:  intact  Recent and Remote Memory:  intact  Language:  english with appropriate syntax and vocabulary  Fund of Knowledge: appropriate  Muscle Strength and Tone: normal  Gait and Station: Normal     There are no abnormal or psychotic thoughts, no preoccupations, no overvalued ideas, no rumination, no obsessions, no compulsions, no somatic concerns, no hypochrondriasis, no ideas of reference, and no delusions.  Patient denies homicidal thoughts.   Patient denies suicidal thoughts.  Patient appears to have good judgment and good insight.     Musculoskeletal: Patient shows no abnormalities of motor activity: there is no tremor, no tic, and no dystonia.  There is no apparent muscle atrophy, strength and tone appear normal, and there are no abnormal movements.  Patient has normal gait and stance.    DISCUSSION:         Assessment:       Patient has a biological predisposition with family history positive for alcoholism in his father  Psychologically patient is experiencing alcohol use disorder increasing depression increasing anxiety  Patient has these particular stressors relapse while in outpatient treatment noncompliance with medication  Patient has chronic illness exacerbation leading to hospitalization progression as described.     Patient has been unable to stop using drugs in the community due to both physical and psychological symptoms.  Continued use will put the patient at risk for medical and/or psychiatric complications.      Inpatient psychiatric hospitalization is warranted at this time for safety, stabilization, and possible adjustment in medications.       Diagnoses:             Alcohol use disorder severe  Alcohol withdrawal severe  Major depressive disorder moderate recurrent without psychosis         Plan:   Problem list  1#alcohol use disorder severe alcohol withdrawal severe     - MSSA  protocol using Valium for management of alcohol withdrawal  Patient has a pulse of 94 blood pressure 150/109 eating disturbance tremor agitation sweats  His MSSA a score is 10 received 2 mg of lorazepam patient believes lorazepam works better for him  - Continue thiamine, folate, and multivitamin daily    2#elevated liver enzymes   most likely from alcoholism nonviral suspected    3#elevated  most likely from alcoholism  4#for his depression and anxiety patient be continued on Cymbalta 60 mg Remeron 50 mg  5#Abilify 2 mg be added for ruminative thoughts patient signed neuroleptic consent      - Consider anti-craving medications prior to discharge. Pt willing to review additional information about both naltrexone and Antabuse.    Alcohol withdrawal nausea prn Zofran as needed for nausea     hydroxyzine 25 mg q4h prn for acute anxiety  Trazodone 50 mg at bedtime prn for sleep disturbances       Patient has been unable to stop using drugs in the community due to both physical and psychological symptoms.  Continued use will put the patient at risk for medical and/or psychiatric complications.    I HAVE REVIEWED LABS WITH PT AND TALKED ABOUT RESULTS WITH PT  I HAVE REVIEWED AND SUMMARIZED OLD RECORDS including his medication reconcilation of his home medications  and PDMP   I HAVE SPOKEN WITH RN ABOUT MEDICATIONS AND DETOX SCORES  I HAVE SPOKEN WITH CM ABOUT PTS TREATMENT OPTIONS             Laboratory/Imaging:    Liver Function Studies -   Recent Labs   Lab Test 08/05/21  0705   PROTTOTAL 7.3   ALBUMIN 3.0*   BILITOTAL 0.8   ALKPHOS 86   *   *      Last Comprehensive Metabolic Panel:  Sodium   Date Value Ref Range Status   08/05/2021 136 133 - 144 mmol/L Final   06/30/2021 144 133 - 144 mmol/L Final     Potassium   Date Value Ref Range Status   08/05/2021 3.7 3.4 - 5.3 mmol/L Final   06/30/2021 4.3 3.4 - 5.3 mmol/L Final     Chloride   Date Value Ref Range Status   08/05/2021 101 94 -  109 mmol/L Final   06/30/2021 111 (H) 94 - 109 mmol/L Final     Carbon Dioxide   Date Value Ref Range Status   06/30/2021 27 20 - 32 mmol/L Final     Carbon Dioxide (CO2)   Date Value Ref Range Status   08/05/2021 26 20 - 32 mmol/L Final     Anion Gap   Date Value Ref Range Status   08/05/2021 9 3 - 14 mmol/L Final   06/30/2021 6 3 - 14 mmol/L Final     Glucose   Date Value Ref Range Status   08/05/2021 93 70 - 99 mg/dL Final   06/30/2021 108 (H) 70 - 99 mg/dL Final     Urea Nitrogen   Date Value Ref Range Status   08/05/2021 6 (L) 7 - 30 mg/dL Final   06/30/2021 14 7 - 30 mg/dL Final     Creatinine   Date Value Ref Range Status   08/05/2021 0.84 0.66 - 1.25 mg/dL Final   06/30/2021 1.02 0.66 - 1.25 mg/dL Final     GFR Estimate   Date Value Ref Range Status   08/05/2021 >90 >60 mL/min/1.73m2 Final     Comment:     As of July 11, 2021, eGFR is calculated by the CKD-EPI creatinine equation, without race adjustment. eGFR can be influenced by muscle mass, exercise, and diet. The reported eGFR is an estimation only and is only applicable if the renal function is stable.   06/30/2021 >90 >60 mL/min/[1.73_m2] Final     Comment:     Non  GFR Calc  Starting 12/18/2018, serum creatinine based estimated GFR (eGFR) will be   calculated using the Chronic Kidney Disease Epidemiology Collaboration   (CKD-EPI) equation.       Calcium   Date Value Ref Range Status   08/05/2021 8.3 (L) 8.5 - 10.1 mg/dL Final   06/30/2021 8.2 (L) 8.5 - 10.1 mg/dL Final     Bilirubin Total   Date Value Ref Range Status   08/05/2021 0.8 0.2 - 1.3 mg/dL Final   06/30/2021 0.3 0.2 - 1.3 mg/dL Final     Alkaline Phosphatase   Date Value Ref Range Status   08/05/2021 86 40 - 150 U/L Final   06/30/2021 88 40 - 150 U/L Final     ALT   Date Value Ref Range Status   08/05/2021 123 (H) 0 - 70 U/L Final   06/30/2021 195 (H) 0 - 70 U/L Final     AST   Date Value Ref Range Status   08/05/2021 214 (H) 0 - 45 U/L Final   06/30/2021 142 (H) 0 - 45 U/L  "Final                   Medical treatment/interventions:  Medical concerns: As above    - Consults: IM consult placed. Appreciate assistance.     Legal Status: Voluntary     Safety Assessment:   Checks: Status 15  Pt has not required locked seclusion or restraints in the past 24 hours to maintain safety, please refer to RN documentation for further details.    The risks, benefits, alternatives and side effects have been discussed and are understood by the patient.       Patient will be treated in therapeutic milieu with appropriate individual and group therapies as described.  Disposition: Pending clinical stabilization. Pt does  appear interested in COMPLETE DETOX AND DO TRT  Length of stay 3-5 days        \"Much or all of the text in this note was generated through the use of Dragon Dictate voice to text software. Errors in spelling or words which appear to be out of contact are unintentional, may be present due having escaped editing\"     "

## 2021-08-06 NOTE — PLAN OF CARE
"Pt states \"since end of January been hitting it pretty hard\" \"with breaks in between but it was still pretty significant\" alcohol use during the relapse. States he did treatment at [facility] and stayed sober for a year but shortly after the 1 year rodrigo he relapsed. Still enrolled in the dual program here which is daily, \"they are aware of the relapse\" and he states he can return there once detox is complete. Endorses depression a 9 \"cause I'm here\" and anxiety a 7 of 10 in severity. Denies suicidal ideation plans or intent. MSSA score is a 10, 2 mg po ativan administered.    Outcome: Mild alcohol withdrawal but requiring medication. Depression and anxiety are reported to be high.    Will continue to monitor and intervene as warranted.  "

## 2021-08-06 NOTE — PHARMACY-ADMISSION MEDICATION HISTORY
Please see Admission Medication History note completed?by pharmacist?on 8/5/21under previous encounter at [facility] for information regarding prior to admission medications.    Talisha Domínguez Pharm.D

## 2021-08-07 LAB
ALBUMIN SERPL-MCNC: 3 G/DL (ref 3.4–5)
ALP SERPL-CCNC: 80 U/L (ref 40–150)
ALT SERPL W P-5'-P-CCNC: 139 U/L (ref 0–70)
ANION GAP SERPL CALCULATED.3IONS-SCNC: 9 MMOL/L (ref 3–14)
AST SERPL W P-5'-P-CCNC: 214 U/L (ref 0–45)
BILIRUB SERPL-MCNC: 0.7 MG/DL (ref 0.2–1.3)
BUN SERPL-MCNC: 7 MG/DL (ref 7–30)
CALCIUM SERPL-MCNC: 8.6 MG/DL (ref 8.5–10.1)
CHLORIDE BLD-SCNC: 102 MMOL/L (ref 94–109)
CO2 SERPL-SCNC: 27 MMOL/L (ref 20–32)
CREAT SERPL-MCNC: 0.72 MG/DL (ref 0.66–1.25)
ERYTHROCYTE [DISTWIDTH] IN BLOOD BY AUTOMATED COUNT: 14.2 % (ref 10–15)
GFR SERPL CREATININE-BSD FRML MDRD: >90 ML/MIN/1.73M2
GLUCOSE BLD-MCNC: 109 MG/DL (ref 70–99)
HCT VFR BLD AUTO: 40.9 % (ref 40–53)
HGB BLD-MCNC: 13.1 G/DL (ref 13.3–17.7)
MCH RBC QN AUTO: 28.5 PG (ref 26.5–33)
MCHC RBC AUTO-ENTMCNC: 32 G/DL (ref 31.5–36.5)
MCV RBC AUTO: 89 FL (ref 78–100)
PLATELET # BLD AUTO: 103 10E3/UL (ref 150–450)
POTASSIUM BLD-SCNC: 3.3 MMOL/L (ref 3.4–5.3)
POTASSIUM BLD-SCNC: 3.6 MMOL/L (ref 3.4–5.3)
PROT SERPL-MCNC: 7.5 G/DL (ref 6.8–8.8)
RBC # BLD AUTO: 4.6 10E6/UL (ref 4.4–5.9)
SODIUM SERPL-SCNC: 138 MMOL/L (ref 133–144)
WBC # BLD AUTO: 3.1 10E3/UL (ref 4–11)

## 2021-08-07 PROCEDURE — 36415 COLL VENOUS BLD VENIPUNCTURE: CPT | Performed by: PHYSICIAN ASSISTANT

## 2021-08-07 PROCEDURE — 93010 ELECTROCARDIOGRAM REPORT: CPT | Performed by: INTERNAL MEDICINE

## 2021-08-07 PROCEDURE — 84132 ASSAY OF SERUM POTASSIUM: CPT | Performed by: PHYSICIAN ASSISTANT

## 2021-08-07 PROCEDURE — 250N000013 HC RX MED GY IP 250 OP 250 PS 637: Performed by: PHYSICIAN ASSISTANT

## 2021-08-07 PROCEDURE — 80053 COMPREHEN METABOLIC PANEL: CPT | Performed by: PHYSICIAN ASSISTANT

## 2021-08-07 PROCEDURE — 250N000013 HC RX MED GY IP 250 OP 250 PS 637: Performed by: PSYCHIATRY & NEUROLOGY

## 2021-08-07 PROCEDURE — 85027 COMPLETE CBC AUTOMATED: CPT | Performed by: PHYSICIAN ASSISTANT

## 2021-08-07 PROCEDURE — 128N000004 HC R&B CD ADULT

## 2021-08-07 RX ORDER — POTASSIUM CHLORIDE 750 MG/1
20 TABLET, EXTENDED RELEASE ORAL ONCE
Status: COMPLETED | OUTPATIENT
Start: 2021-08-07 | End: 2021-08-07

## 2021-08-07 RX ADMIN — THIAMINE HCL TAB 100 MG 100 MG: 100 TAB at 08:30

## 2021-08-07 RX ADMIN — DULOXETINE HYDROCHLORIDE 60 MG: 60 CAPSULE, DELAYED RELEASE ORAL at 08:30

## 2021-08-07 RX ADMIN — MIRTAZAPINE 15 MG: 15 TABLET, FILM COATED ORAL at 20:06

## 2021-08-07 RX ADMIN — LORAZEPAM 2 MG: 1 TABLET ORAL at 08:28

## 2021-08-07 RX ADMIN — POTASSIUM CHLORIDE 20 MEQ: 750 TABLET, EXTENDED RELEASE ORAL at 12:41

## 2021-08-07 RX ADMIN — FOLIC ACID 1 MG: 1 TABLET ORAL at 08:30

## 2021-08-07 RX ADMIN — ARIPIPRAZOLE 2 MG: 2 TABLET ORAL at 08:30

## 2021-08-07 RX ADMIN — LORAZEPAM 1 MG: 1 TABLET ORAL at 12:10

## 2021-08-07 RX ADMIN — Medication 1 TABLET: at 08:30

## 2021-08-07 RX ADMIN — HYDROXYZINE HYDROCHLORIDE 25 MG: 25 TABLET, FILM COATED ORAL at 12:41

## 2021-08-07 ASSESSMENT — ACTIVITIES OF DAILY LIVING (ADL)
DRESS: SCRUBS (BEHAVIORAL HEALTH);INDEPENDENT
HYGIENE/GROOMING: INDEPENDENT
HYGIENE/GROOMING: HANDWASHING;INDEPENDENT
LAUNDRY: WITH SUPERVISION
DRESS: SCRUBS (BEHAVIORAL HEALTH)
ORAL_HYGIENE: INDEPENDENT
ORAL_HYGIENE: INDEPENDENT

## 2021-08-07 NOTE — PROGRESS NOTES
MSSA scores of 5/6 pt requires no valium for alcohol withdrawal this shift and is observed to sleep throughout the noc.

## 2021-08-07 NOTE — PROGRESS NOTES
"Brief Medicine Note    Contacted by nursing regarding tachycardia, variable pulse ranging from . Patient denying chest pain or palpitations. Will obtained EKG to ensure sinus tachycardia.  Labs today significant for mild hypokalemia (3.3) will replace with 20 meq KDur.  Stable hepatic panel with (214) and (123). Will plan to repeat hepatic panel 8/9 if still inpatient.     Also note that TFT's were obtained and remarkable for elevated TSH (4.19) with normal T4 indicative of subclinical hypothyroidism likely 2/2 acute adrenergic state in the setting of acute alcohol withdrawal. Would recommend repeat TFT's and follow up with PCP in 4-6 weeks for monitoring.     Today's vital signs, medications, and nursing notes were reviewed.     Addendum:  EKG obtained showing normal sinus rhythm with ventricular rate of 91  Repeat potassium 3.6    IM will continue to  follow for  hepatic panel 8/9.     BP (!) 137/101   Pulse (!) 134   Temp 97.4  F (36.3  C) (Temporal)   Resp 16   Ht 1.753 m (5' 9\")   Wt 99.8 kg (220 lb)   SpO2 96%   BMI 32.49 kg/m        Juli Velasquez PA-C  Hospitalist Service  Pager [phone]      "

## 2021-08-07 NOTE — PLAN OF CARE
Pt MSSA score today is a 10, 2 mg po ativan administered. Main  of scoring is tachycardia with pulse of 116. Total ativan administration = 16 mg (did also receive 40 mg valium prior to change to ativan).   8/5 - 8mg ativan  8/6 - 6mg ativan  8/7 - 2 mg ativan (so far)    Anxiety and depression both rated 5 of 10 in severity (was rating depression 9 and anxiety 7 yesterday). Denies suicidal ideation plans or intent. MSSA score is a 10, 2 mg po ativan administered.    Outcome: Mild alcohol withdrawal but requiring medication. Depression and anxiety are reported to be high but improving compared to yesterday.    Will continue to monitor and intervene as warranted.Pt MSSA score today is a

## 2021-08-07 NOTE — PROVIDER NOTIFICATION
Updated Reema with internal medicine about K+ level of 3.3 and of pt tachycardia.  Pt denies any palpitations, chest pain, shortness of breath or visual disturbances. [facility] is placing orders.

## 2021-08-08 VITALS
BODY MASS INDEX: 32.58 KG/M2 | DIASTOLIC BLOOD PRESSURE: 99 MMHG | HEIGHT: 69 IN | WEIGHT: 220 LBS | SYSTOLIC BLOOD PRESSURE: 137 MMHG | RESPIRATION RATE: 16 BRPM | TEMPERATURE: 97.9 F | HEART RATE: 114 BPM | OXYGEN SATURATION: 97 %

## 2021-08-08 PROCEDURE — 250N000013 HC RX MED GY IP 250 OP 250 PS 637: Performed by: PSYCHIATRY & NEUROLOGY

## 2021-08-08 PROCEDURE — 99239 HOSP IP/OBS DSCHRG MGMT >30: CPT | Performed by: PSYCHIATRY & NEUROLOGY

## 2021-08-08 RX ORDER — MIRTAZAPINE 15 MG/1
15 TABLET, FILM COATED ORAL AT BEDTIME
Qty: 30 TABLET | Refills: 0 | Status: SHIPPED | OUTPATIENT
Start: 2021-08-08 | End: 2023-07-02

## 2021-08-08 RX ORDER — ARIPIPRAZOLE 2 MG/1
2 TABLET ORAL DAILY
Qty: 30 TABLET | Refills: 0 | Status: SHIPPED | OUTPATIENT
Start: 2021-08-09 | End: 2023-07-02

## 2021-08-08 RX ORDER — DULOXETIN HYDROCHLORIDE 60 MG/1
60 CAPSULE, DELAYED RELEASE ORAL DAILY
Qty: 30 CAPSULE | Refills: 0 | Status: SHIPPED | OUTPATIENT
Start: 2021-08-08 | End: 2023-07-02

## 2021-08-08 RX ORDER — DULOXETIN HYDROCHLORIDE 30 MG/1
60 CAPSULE, DELAYED RELEASE ORAL DAILY
Qty: 60 CAPSULE | Refills: 0 | Status: SHIPPED | OUTPATIENT
Start: 2021-08-08 | End: 2021-08-08

## 2021-08-08 RX ADMIN — Medication 1 TABLET: at 08:35

## 2021-08-08 RX ADMIN — THIAMINE HCL TAB 100 MG 100 MG: 100 TAB at 08:35

## 2021-08-08 RX ADMIN — ARIPIPRAZOLE 2 MG: 2 TABLET ORAL at 08:35

## 2021-08-08 RX ADMIN — DULOXETINE HYDROCHLORIDE 60 MG: 60 CAPSULE, DELAYED RELEASE ORAL at 08:35

## 2021-08-08 RX ADMIN — FOLIC ACID 1 MG: 1 TABLET ORAL at 08:35

## 2021-08-08 ASSESSMENT — ACTIVITIES OF DAILY LIVING (ADL)
HYGIENE/GROOMING: INDEPENDENT
ORAL_HYGIENE: INDEPENDENT
LAUNDRY: WITH SUPERVISION
DRESS: SCRUBS (BEHAVIORAL HEALTH)

## 2021-08-08 NOTE — PLAN OF CARE
"Problem: Alcohol Withdrawal  Goal: Alcohol Withdrawal Symptom Control  Outcome: Improving     Pt reports anxiety 3/10 and mood depressed 4/10 but denies any current thoughts plan or intent for self harm.  He reports he ate most of his dinner and denies any specific needs or concerns.  Affect is flat, isolative, not social with peers, pleasant and engaged on approach. He requested to have his vital signs done early to go to bed at 1900.  Pulse was 130, rechecked while in bed and it was 77.  Had EKG today and denies chest pain, palpitations, SOB and states that \"this always happens when I stop drinking.\"  MSSA 5 & 4 not requiring valium this shift.  Will continue to monitor.  "

## 2021-08-08 NOTE — PROGRESS NOTES
Pt given copy of their discharge instructions and medication administration instructions. All discharge plans and labs were discussed with patient. Pt reports no questions at this time regarding discharge plans, labs or medications. Pt denies any suicidal ideation, plans or intent at this time. [facility]. Patient plan is to return home and resume his treatment at the [facility] program. Patient is discharged at this time.

## 2021-08-08 NOTE — DISCHARGE SUMMARY
Psychiatric Discharge Summary    Xavier Odell MRN# [id number]   Age: 36 year old YOB: 1985     Date of Admission:  8/5/2021  Date of Discharge:  8/8/2021  1:25 PM  Admitting Physician:  Santa Pérez MD  Discharge Physician:  Emelia Bravo DO         Event Leading to Hospitalization:   Patient is 36-year-old  male who has chronic alcoholism he has history of depression generalized anxiety disorder gastric ulcer pancreatitis.  Patient was initially admitted to medical unit because of increasing lactate.  He was stabilized and transferred here for further care.     He is in outpatient dual diagnosis program for 3 weeks but he has been drinking and it is been drinking 15 CANS. he told his counselor who recommended that he get treatment.  Patient has been using the following substances: Alcohol  Started at age [age], became a problem at 26           Patient has tolerance, withdrawal, progressive use, loss of control, spending more time and more amount than intended. Patient has made attempts to quit, is experiencing cravings, and reports negative consequences.     He has no history of seizures but has some blotches that she is that he sees? Delirium tremens                                Denies thoughts of suicide or harming others.       Denies auditory or visual hallucinations.      Patient smokes 0 cigarettes     Patient denied any gambling       See Admission note by Santa Pérez MD on 8/6/21 for additional details.          Diagnoses:     Alcohol use disorder, severe  Alcohol withdrawal, severe, resolved  Major Depressive Disorder, recurrent, moderate without psychotic features  Hx of GI bleed  Thrombocytopenia  Transaminitis likely 2/2 alcohol induced hepatitis          Labs:     Recent Results (from the past 168 hour(s))   Alcohol breath test POCT    Collection Time: 08/04/21  5:35 PM   Result Value Ref Range    Alcohol Breath Test 0.184 (A) 0.00 - 0.01   iStat Gases  (lactate) venous, POCT    Collection Time: 08/04/21  6:23 PM   Result Value Ref Range    Lactic Acid POCT 3.3 (H) <=2.0 mmol/L    Bicarbonate Venous POCT 25 21 - 28 mmol/L    O2 Sat, Venous POCT 67 (L) 94 - 100 %    pCO2V Venous POCT 39 (L) 40 - 50 mm Hg    pH Venous POCT 7.42 7.32 - 7.43    pO2 Venous POCT 34 25 - 47 mm Hg   INR    Collection Time: 08/04/21  6:27 PM   Result Value Ref Range    INR 0.96 0.86 - 1.14   Partial thromboplastin time    Collection Time: 08/04/21  6:27 PM   Result Value Ref Range    aPTT 29 22 - 38 Seconds   Comprehensive metabolic panel    Collection Time: 08/04/21  6:27 PM   Result Value Ref Range    Sodium 137 133 - 144 mmol/L    Potassium 3.3 (L) 3.4 - 5.3 mmol/L    Chloride 100 94 - 109 mmol/L    Carbon Dioxide (CO2) 24 20 - 32 mmol/L    Anion Gap 13 3 - 14 mmol/L    Urea Nitrogen 10 7 - 30 mg/dL    Creatinine 0.92 0.66 - 1.25 mg/dL    Calcium 8.7 8.5 - 10.1 mg/dL    Glucose 84 70 - 99 mg/dL    Alkaline Phosphatase 100 40 - 150 U/L     (H) 0 - 45 U/L     (H) 0 - 70 U/L    Protein Total 8.4 6.8 - 8.8 g/dL    Albumin 3.4 3.4 - 5.0 g/dL    Bilirubin Total 0.7 0.2 - 1.3 mg/dL    GFR Estimate >90 >60 mL/min/1.73m2   Lipase    Collection Time: 08/04/21  6:27 PM   Result Value Ref Range    Lipase 269 73 - 393 U/L   Magnesium    Collection Time: 08/04/21  6:27 PM   Result Value Ref Range    Magnesium 2.3 1.6 - 2.3 mg/dL   Ethyl Alcohol Level    Collection Time: 08/04/21  6:27 PM   Result Value Ref Range    Alcohol ethyl 0.26 (H) <=0.01 g/dL   CBC with platelets and differential    Collection Time: 08/04/21  6:27 PM   Result Value Ref Range    WBC Count 4.6 4.0 - 11.0 10e3/uL    RBC Count 4.77 4.40 - 5.90 10e6/uL    Hemoglobin 14.0 13.3 - 17.7 g/dL    Hematocrit 42.1 40.0 - 53.0 %    MCV 88 78 - 100 fL    MCH 29.4 26.5 - 33.0 pg    MCHC 33.3 31.5 - 36.5 g/dL    RDW 15.0 10.0 - 15.0 %    Platelet Count 104 (L) 150 - 450 10e3/uL    % Neutrophils 76 %    % Lymphocytes 18 %    %  Monocytes 6 %    % Eosinophils 0 %    % Basophils 0 %    % Immature Granulocytes 0 %    NRBCs per 100 WBC 0 <1 /100    Absolute Neutrophils 3.5 1.6 - 8.3 10e3/uL    Absolute Lymphocytes 0.8 0.8 - 5.3 10e3/uL    Absolute Monocytes 0.3 0.0 - 1.3 10e3/uL    Absolute Eosinophils 0.0 0.0 - 0.7 10e3/uL    Absolute Basophils 0.0 0.0 - 0.2 10e3/uL    Absolute Immature Granulocytes 0.0 <=0.0 10e3/uL    Absolute NRBCs 0.0 10e3/uL   Drug abuse screen 1 urine (ED)    Collection Time: 08/04/21  6:30 PM   Result Value Ref Range    Amphetamines Urine Screen Negative Screen Negative    Barbiturates Urine Screen Negative Screen Negative    Benzodiazepines Urine Screen Positive (A) Screen Negative    Cannabinoids Urine Screen Positive (A) Screen Negative    Cocaine Urine Screen Negative Screen Negative    Opiates Urine Screen Negative Screen Negative   iStat Gases (lactate) venous, POCT    Collection Time: 08/04/21 10:15 PM   Result Value Ref Range    Lactic Acid POCT 2.9 (H) <=2.0 mmol/L    Bicarbonate Venous POCT 23 21 - 28 mmol/L    O2 Sat, Venous POCT 76 (L) 94 - 100 %    pCO2V Venous POCT 39 (L) 40 - 50 mm Hg    pH Venous POCT 7.38 7.32 - 7.43    pO2 Venous POCT 41 25 - 47 mm Hg   SARS-COV2 (COVID-19) Virus RT-PCR    Collection Time: 08/04/21 10:16 PM    Specimen: Nasopharyngeal; Swab   Result Value Ref Range    SARS CoV2 PCR Negative Negative   Comprehensive metabolic panel    Collection Time: 08/05/21  7:05 AM   Result Value Ref Range    Sodium 136 133 - 144 mmol/L    Potassium 3.7 3.4 - 5.3 mmol/L    Chloride 101 94 - 109 mmol/L    Carbon Dioxide (CO2) 26 20 - 32 mmol/L    Anion Gap 9 3 - 14 mmol/L    Urea Nitrogen 6 (L) 7 - 30 mg/dL    Creatinine 0.84 0.66 - 1.25 mg/dL    Calcium 8.3 (L) 8.5 - 10.1 mg/dL    Glucose 93 70 - 99 mg/dL    Alkaline Phosphatase 86 40 - 150 U/L     (H) 0 - 45 U/L     (H) 0 - 70 U/L    Protein Total 7.3 6.8 - 8.8 g/dL    Albumin 3.0 (L) 3.4 - 5.0 g/dL    Bilirubin Total 0.8 0.2 - 1.3  mg/dL    GFR Estimate >90 >60 mL/min/1.73m2   Lactic acid whole blood    Collection Time: 08/05/21  7:05 AM   Result Value Ref Range    Lactic Acid 0.6 (L) 0.7 - 2.0 mmol/L   Extra Purple Top Tube    Collection Time: 08/05/21  7:05 AM   Result Value Ref Range    Hold Specimen JIC    GGT    Collection Time: 08/06/21  7:54 AM   Result Value Ref Range     (H) 0 - 75 U/L   Lipid panel    Collection Time: 08/06/21  7:54 AM   Result Value Ref Range    Cholesterol 249 (H) <200 mg/dL    Triglycerides 226 (H) <150 mg/dL    Direct Measure HDL 70 >=40 mg/dL    LDL Cholesterol Calculated 134 (H) <=100 mg/dL    Non HDL Cholesterol 179 (H) <130 mg/dL    Patient Fasting > 8hrs? Yes    TSH with free T4 reflex and/or T3 as indicated    Collection Time: 08/06/21  7:54 AM   Result Value Ref Range    TSH 4.19 (H) 0.40 - 4.00 mU/L   Vitamin B12    Collection Time: 08/06/21  7:54 AM   Result Value Ref Range    Vitamin B12 558 193 - 986 pg/mL   Folate    Collection Time: 08/06/21  7:54 AM   Result Value Ref Range    Folic Acid 19.2 >=5.4 ng/mL   T4 free    Collection Time: 08/06/21  7:54 AM   Result Value Ref Range    Free T4 0.83 0.76 - 1.46 ng/dL   CBC with platelets    Collection Time: 08/07/21  6:54 AM   Result Value Ref Range    WBC Count 3.1 (L) 4.0 - 11.0 10e3/uL    RBC Count 4.60 4.40 - 5.90 10e6/uL    Hemoglobin 13.1 (L) 13.3 - 17.7 g/dL    Hematocrit 40.9 40.0 - 53.0 %    MCV 89 78 - 100 fL    MCH 28.5 26.5 - 33.0 pg    MCHC 32.0 31.5 - 36.5 g/dL    RDW 14.2 10.0 - 15.0 %    Platelet Count 103 (L) 150 - 450 10e3/uL   Comprehensive metabolic panel    Collection Time: 08/07/21  6:54 AM   Result Value Ref Range    Sodium 138 133 - 144 mmol/L    Potassium 3.3 (L) 3.4 - 5.3 mmol/L    Chloride 102 94 - 109 mmol/L    Carbon Dioxide (CO2) 27 20 - 32 mmol/L    Anion Gap 9 3 - 14 mmol/L    Urea Nitrogen 7 7 - 30 mg/dL    Creatinine 0.72 0.66 - 1.25 mg/dL    Calcium 8.6 8.5 - 10.1 mg/dL    Glucose 109 (H) 70 - 99 mg/dL    Alkaline  Phosphatase 80 40 - 150 U/L     (H) 0 - 45 U/L     (H) 0 - 70 U/L    Protein Total 7.5 6.8 - 8.8 g/dL    Albumin 3.0 (L) 3.4 - 5.0 g/dL    Bilirubin Total 0.7 0.2 - 1.3 mg/dL    GFR Estimate >90 >60 mL/min/1.73m2   EKG 12-lead, complete    Collection Time: 08/07/21  1:35 PM   Result Value Ref Range    Systolic Blood Pressure  mmHg    Diastolic Blood Pressure  mmHg    Ventricular Rate 91 BPM    Atrial Rate 91 BPM    LA Interval 154 ms    QRS Duration 98 ms     ms    QTc 457 ms    P Axis 49 degrees    R AXIS 47 degrees    T Axis 28 degrees    Interpretation ECG       Sinus rhythm  Normal ECG  When compared with ECG of 13-AUG-2019 13:25,  No significant change was found     Potassium    Collection Time: 08/07/21  3:53 PM   Result Value Ref Range    Potassium 3.6 3.4 - 5.3 mmol/L              Consults:     Consult Orders   Internal Medicine Adult IP Consult for BEH Detox on 3A: Patient to be seen: Routine within 24 hrs; Call back #: [phone]; alcohol withdrawal; Consultant may enter orders: Yes; Requesting provider? Attending physician [[id number]] ordered by Santa Pérez MD at 08/05/21 1641               []Hide copied text    []Hover for details  Circumstances of recent discharge and re-admittance noted. Please refer to recent medicine H&P by Tess Daly in charting dated 8/4/21, which was reviewed by this writer and is up to date. Please call the on-call [facility]NA for any f/u medical concerns if they arise.      In short, Xavier Odell is a 36 year old male with a past medical history of Alcohol abuse, depression, anxiety, and GI bleed who was admitted to Internal medicine service from 8/4-8/5 for alcohol dependence and acute withdrawal, and transferred to station 3A for continued management of alcohol detox.      Diagnoses:  # Alcohol abuse, dependence, acute withdrawal  # Depression, anxiety  # Hx of GI bleed  # Thrombocytopenia  # Transaminitis        Will repeat CMP and CBC 8/7 to  ensure stability of thrombocytopenia and transaminitis. Will peripherally follow for results and sign off if stable.         Juli Velasquez PA-C  Hospitalist Service                 [place] [facility] Course:   Xavier Odell was re-admitted to [address] from [facility] with attending Santa Nichols MD as a voluntary patient. The patient was placed under status 15 (15 minute checks) to ensure patient safety. Labs obtained as above.   MSSA protocol was initiated due to the patient's history of alcohol abuse and concern for withdrawal symptoms.    All outpatient medications were continued, he was also started on Abilify to further target his mood and ruminative thoughts.     Xavier Odell did participate in groups and was visible in the milieu.     The patient's symptoms of alcohol withdrawal, depression and anxiety improved. He required 13mg of lorazepam per MSSA protocol and was considered medically out of detox on 8/8 and requested to discharge and re-engage with his OP program.    Today Xavier Odell reports having no thoughts of harming self or others. In addition, he has notable risk factors for self-harm, including age, single status, anxiety and substance abuse. However, risk is mitigated by commitment to family, absence of past attempts, ability to volunteer a safety plan and history of seeking help when needed. Therefore, based on all available evidence including the factors cited above, he does not appear to be at imminent risk for self-harm, does not meet criteria for a 72-hr hold, and therefore remains appropriate for ongoing outpatient level of care.     Xavier Odell was discharged to home. At the time of discharge Xavier Odell was determined to not be a danger to himself or others.          Discharge Medications:     Current Discharge Medication List      START taking these medications    Details   ARIPiprazole (ABILIFY) 2 MG tablet Take 1 tablet (2 mg) by mouth daily  Qty: 30 tablet,  "Refills: 0    Associated Diagnoses: Severe episode of recurrent major depressive disorder, without psychotic features (H)         CONTINUE these medications which have CHANGED    Details   DULoxetine (CYMBALTA) 30 MG capsule Take 2 capsules (60 mg) by mouth daily  Qty: 60 capsule, Refills: 0    Associated Diagnoses: Major depressive disorder with single episode, remission status unspecified      mirtazapine (REMERON) 15 MG tablet Take 1 tablet (15 mg) by mouth At Bedtime  Qty: 30 tablet, Refills: 0    Associated Diagnoses: Major depressive disorder with single episode, remission status unspecified         CONTINUE these medications which have NOT CHANGED    Details   albuterol (PROAIR HFA/PROVENTIL HFA/VENTOLIN HFA) 108 (90 Base) MCG/ACT inhaler Inhale 2 puffs into the lungs every 6 hours as needed     Comments: Pharmacy may dispense brand covered by insurance (Proair, or proventil or ventolin or generic albuterol inhaler)      B Complex Vitamins (VITAMIN B COMPLEX PO) Take by mouth daily -Take 1 tablet of unknown dose by mouth every morning      EPINEPHrine (ANY BX GENERIC EQUIV) 0.3 MG/0.3ML injection 2-pack Inject 0.3 mg into the muscle as needed for anaphylaxis (Chicken Allergy)      folic acid (FOLVITE) 1 MG tablet Take 1 tablet (1 mg) by mouth daily    Associated Diagnoses: Chemical dependency (H)      multivitamin w/minerals (THERA-VIT-M) tablet Take 1 tablet by mouth daily    Associated Diagnoses: Chemical dependency (H)      thiamine (B-1) 100 MG tablet Take 1 tablet (100 mg) by mouth daily    Associated Diagnoses: Chemical dependency (H)         STOP taking these medications       cloNIDine (CATAPRES) 0.1 MG tablet Comments:   Reason for Stopping:                    Psychiatric Examination:   Appearance:  awake, alert and adequately groomed  Attitude:  cooperative  Eye Contact:  good  Mood:  \"situationally depressed about relapsing, but better\"  Affect:  appropriate and in normal range and mood " congruent  Speech:  clear, coherent and normal prosody  Psychomotor Behavior:  no evidence of tardive dyskinesia, dystonia, or tics  Thought Process:  logical, linear and goal oriented  Associations:  no loose associations  Thought Content:  no evidence of suicidal ideation or homicidal ideation and no evidence of psychotic thought  Insight:  good  Judgment:  intact  Oriented to:  time, person, and place  Attention Span and Concentration:  intact  Recent and Remote Memory:  intact  Language: English, fluent  Fund of Knowledge: appropriate  Muscle Strength and Tone: normal  Gait and Station: Normal         Discharge Plan:   Summary: You were admitted to Station 3A on 8/4/2021 for detoxification from alcohol.  A medical exam was performed that included lab work. You have met with a  and opted to return to the Dual Day OP treatment program.  Please take care and make your recovery a daily priority, Xavier!  It was a pleasure working with you and the entire treatment team here wishes you the very best in your recovery!      Recommendation:  Return to the Dual Day OP treatment program.     Main Diagnosis:  Per Dr. Santa Pérez MD;  303.90 (F10.20) Alcohol Use Disorder Severe     Major Treatments, Procedures and Findings:  You were treated for alcohol detoxification using ativan (lorazepam) administered based on the [facility] protocol. You did not require a chemical dependency assessment. You had labs drawn and those results were reviewed with you. Please take a copy of your lab work with you to your next primary care provider appointment.     Symptoms to Report:  If you experience more anxiety, confusion, sleeplessness, deep sadness or thoughts of suicide, notify your treatment team or notify your primary care provider. IF ANY OF THE SYMPTOMS YOU ARE EXPERIENCING ARE A MEDICAL EMERGENCY CALL 911 IMMEDIATELY.      Lifestyle Adjustment: Adjust your lifestyle to get enough sleep, relaxation, exercise and good  nutrition. Continue to develop healthy coping skills to decrease stress and promote a sober living environment. Do not use mood altering substances including alcohol, illegal drugs or addictive medications other than what is currently prescribed.      Disposition: Home/DDP    Attestation:  Patient has been seen and evaluated by me, Emelia Bravo DO on day of discharge. 34 minutes were spent in coordination of discharge planning.

## 2021-08-08 NOTE — PROGRESS NOTES
MSSA score of 3, pt does not require any Valium for alcohol withdrawal and is observed to sleep throughout the noc.

## 2021-08-08 NOTE — DISCHARGE INSTRUCTIONS
Behavioral Discharge Planning and Instructions  THANK YOU FOR CHOOSING [address]  [phone]    Summary: You were admitted to Station 3A on 8/4/2021 for detoxification from alcohol.  A medical exam was performed that included lab work. You have met with a  and opted to return to the Dual Day OP treatment program.  Please take care and make your recovery a daily priority, Xavier!  It was a pleasure working with you and the entire treatment team here wishes you the very best in your recovery!     Recommendation:  Return to the Dual Day OP treatment program.    Main Diagnosis:  Per Dr. Santa Pérez MD;  303.90 (F10.20) Alcohol Use Disorder Severe    Major Treatments, Procedures and Findings:  You were treated for alcohol detoxification using ativan (lorazepam) administered based on the [facility] protocol. You did not require a chemical dependency assessment. You had labs drawn and those results were reviewed with you. Please take a copy of your lab work with you to your next primary care provider appointment.    Symptoms to Report:  If you experience more anxiety, confusion, sleeplessness, deep sadness or thoughts of suicide, notify your treatment team or notify your primary care provider. IF ANY OF THE SYMPTOMS YOU ARE EXPERIENCING ARE A MEDICAL EMERGENCY CALL 911 IMMEDIATELY.     Lifestyle Adjustment: Adjust your lifestyle to get enough sleep, relaxation, exercise and good nutrition. Continue to develop healthy coping skills to decrease stress and promote a sober living environment. Do not use mood altering substances including alcohol, illegal drugs or addictive medications other than what is currently prescribed.     Disposition: Home/DDP    Facts about COVID19 at www.cdc.gov/COVID19 and www.[place].gov/covid19    Keeping hands clean is one of the most important steps we can take to avoid getting sick and spreading germs to others.  Please wash your hands frequently and lather with soap for at  "least 20 seconds!    Follow-up Appointment:   On Wednesday 8/11/2021 at 4:00 PM at [facility] with your provider stated as \"Gilberto\".     Recovery apps for your phone to locate current in person and zoom recovery meetings  [place] - meeting kaitlin  AA  - meeting kaitlin  Meeting guide - meeting kaitlin  Quick NA meeting - meeting kaitlin  Rochelle- has various apps    Resources:  *due to covid-19 most AA/NA meetings are being held online*  AA meetings online search for them at: https://aa-intergroup.org (worldwide meeting listings)  AA meetings for [place] area can be found online at: https://aamin[place].org (click local online meetings listings)  NA meetings for [place] area can be found online at: https://www.nam[place].org  (click find a meeting)  AA and NA Sponsors are excellent resources for support and you can find one at any support group meeting.   Alcoholics Anonymous (https://aa.org/): for information 24 hours/day  AA Intergroup service office in [place] (http://www.aast[place].org/) 453.535.8621  AA Intergroup service office in [facility]: [phone]. (http://www.aaminneaLittle Green Windmillis.org/)  Narcotics Anonymous (www.nam[place].org) (592) 436-9051  https://aafairviewriverside.org/meetings  SMART Recovery - self management for addiction recovery:  www.smartrecovery.org  Pathways ~ A Health Crisis Resource & Support Center:  265.964.4442.  https://prescribetoprevent.org/patient-education/videos/  http://www.harmreduction.org  [facility] 017-231-4154  Support Group:  AA/NA and Sponsor/support.  National [place] on Mental Illness (www.[place].martínez.org): 444.304.6089 or 132-178-6718.  Alcoholics Anonymous (www.alcoholics-anonymous.org): Check your phone book for your local chapter.  Suicide Awareness Voices of Education (SAVE) (www.save.org): 060-139-TWVE (0072)  National Suicide Prevention Line (www.mentalhealthmn.org): 326-907-ECRP (2096)  Mental Health Consumer/Survivor Network of [place] (www.mhcsn.net): " 888-983-3539 or 252-184-2819  Mental Health Association of [place] (www.mentalhealth.org): 402.339.6350 or 667-792-2960   Substance Abuse and Mental Health Services (www.samhsa.gov)  [place] Opioid Prevention Coalition: www.opioidcoalition.org    [place] Recovery Connection (MRC)  [facility] connects people seeking recovery to resources that help foster and sustain long-term recovery.  Whether you are seeking resources for treatment, transportation, housing, job training, education, health care or other pathways to recovery, [facility] is a great place to start.  249.142.8486.  www.[place].org    Great Pod casts for nutrition and wellness  Listen on Apple Podcasts  Dishing Up Nutrition   [employer]   Nutrition       Understand the connection between what you eat and how you feel. Hosted by licensed nutritionists and dietitians from [employer] we share practical, real-life solutions for healthier living through nutrition.     General Medication Instructions:   See your medication sheet(s) for instructions.   Take all medications as prescribed.  Make no changes unless your primary care provider suggests them.   Go to all your primary care provider visits.  Be sure to have all your required lab tests. This way, your medicines can be refilled on time.  Do not use any forms of alcohol.    Please Note:  If you have any questions at anytime after you are discharged please call [facility] detox unit 3AW at [phone].  [facility], Behavioral Intake [phone]  Medical Records call [phone]  Outpatient Behavioral Intake call [phone]  LP+ Wait List/Bed Availability call [phone]    Please remember to take all of your behavioral discharge planning and lab paperwork to any follow up appointments, it contains your lab results, diagnosis, medication list and discharge recommendations.      THANK YOU FOR CHOOSING [facility]

## 2021-08-09 ENCOUNTER — HOSPITAL ENCOUNTER (OUTPATIENT)
Dept: BEHAVIORAL HEALTH | Facility: CLINIC | Age: 36
End: 2021-08-09
Attending: PSYCHIATRY & NEUROLOGY
Payer: COMMERCIAL

## 2021-08-09 LAB
ATRIAL RATE - MUSE: 91 BPM
DIASTOLIC BLOOD PRESSURE - MUSE: NORMAL MMHG
INTERPRETATION ECG - MUSE: NORMAL
P AXIS - MUSE: 49 DEGREES
PR INTERVAL - MUSE: 154 MS
QRS DURATION - MUSE: 98 MS
QT - MUSE: 372 MS
QTC - MUSE: 457 MS
R AXIS - MUSE: 47 DEGREES
SYSTOLIC BLOOD PRESSURE - MUSE: NORMAL MMHG
T AXIS - MUSE: 28 DEGREES
VENTRICULAR RATE- MUSE: 91 BPM

## 2021-08-09 PROCEDURE — G0177 OPPS/PHP; TRAIN & EDUC SERV: HCPCS | Mod: GT,95

## 2021-08-09 PROCEDURE — 90853 GROUP PSYCHOTHERAPY: CPT | Mod: GT | Performed by: COUNSELOR

## 2021-08-09 NOTE — GROUP NOTE
Psychoeducation Group Note    PATIENT'S NAME: Xavier Odell  MRN:   [id number]  :   1985  ACCT. NUMBER: [id number]  DATE OF SERVICE: 21  START TIME: 11:00 AM  END TIME: 11:50 AM  FACILITATOR: Birgit Jacob, RN; Ozzy Carlson RN  TOPIC:  RN Group: [facility]  Telemedicine Visit: The patient's condition can be safely assessed and treated via synchronous audio and visual telemedicine encounter.       Reason for Telemedicine Visit: Covid 19     Originating Site (Patient Location): Patient's home     Distant Site (Provider Location): Provider Remote Setting- Home Office     Consent:  The patient/guardian has verbally consented to: the potential risks and benefits of telemedicine (video visit) versus in person care; bill my insurance or make self-payment for services provided; and responsibility for payment of non-covered services.      Patient would like the video invitation sent by:  My Chart     Mode of Communication:  Video Conference via Medical Zoom     As the provider I attest to compliance with applicable laws and regulations related to telemedicine.       [facility] Adult Dual Diagnosis Day Treatment  TRACK: DDP 1    NUMBER OF PARTICIPANTS: 7    Summary of Group / Topics Discussed:  Foundations of Health: Sleep: Sleep Check-in: Patients explored the connection between sleep and mental illness. Patients shared their experiences with their sleep quality, aspects of sleep and analyzing interruptions to sleep.      Patient Session Goals / Objectives:  ? Introduced concepts of sleep hygiene strategies to utilize next session    Introduced the connection between sleep disturbances and mental illness            Patient Participation / Response:  Fully participated with the group by sharing personal reflections / insights and openly received / provided feedback with other participants.    Demonstrated understanding of topics discussed through group discussion and  participation    Treatment Plan:  Patient has a current master individualized treatment plan.  See Epic treatment plan for more information.    Ozzy Carlson RN

## 2021-08-09 NOTE — GROUP NOTE
"Process Group Note    PATIENT'S NAME: Xavier Odell  MRN:   [id number]  :   1985  ACCT. NUMBER: [id number]  DATE OF SERVICE: 21  START TIME:  9:00 AM  END TIME:  9:50 AM  FACILITATOR: Suzanne Jacobson [facility]  TOPIC:  Process Group    Diagnoses:  296.33 (F33.2) Major Depressive Disorder, Recurrent Episode, Severe.  4. Other Diagnoses that is relevant to services:   Substance-Related & Addictive Disorders Alcohol Use Disorder   303.90 (F10.20) Severe.      [facility] Adult Dual Diagnosis Day Treatment  TRACK: 1    NUMBER OF PARTICIPANTS: 5                                      Service Modality:  Video Visit     Telemedicine Visit: The patient's condition can be safely assessed and treated via synchronous audio and visual telemedicine encounter.      Reason for Telemedicine Visit: Services only offered telehealth    Originating Site (Patient Location): Patient's home    Distant Site (Provider Location): Provider Remote Setting- Home Office    Consent:  The patient/guardian has verbally consented to: the potential risks and benefits of telemedicine (video visit) versus in person care; bill my insurance or make self-payment for services provided; and responsibility for payment of non-covered services.     Patient would like the video invitation sent by:  My Chart    Mode of Communication:  Video Conference via Medical Zoom    As the provider I attest to compliance with applicable laws and regulations related to telemedicine.                Data:    Session content: At the start of this group, patients were invited to check in by identifying themselves, describing their current emotional status, and identifying issues to address in this group.   Area(s) of treatment focus addressed in this session included Symptom Management, Personal Safety and Abstinence/Relapse Prevention.    Xavier reported feeling \"okay\" today.  He shared a bit about why he has been absent (in detox) with the group.  His goal for " the day is to finish cleaning up the mess he made while he was drinking.  Client declined additional process time but contributed to group discussion and provided feedback and support to peers.      Therapeutic Interventions/Treatment Strategies:  Psychotherapist offered support, feedback and validation.    Assessment:    Patient response:   Patient responded to session by accepting feedback, giving feedback and listening    Possible barriers to participation / learning include: and no barriers identified    Health Issues:   None reported       Substance Use Review:   Substance Use: No active concerns identified.    Mental Status/Behavioral Observations  Appearance:   Appropriate   Eye Contact:   Good   Psychomotor Behavior: Normal   Attitude:   Cooperative   Orientation:   All  Speech   Rate / Production: Normal    Volume:  Normal   Mood:    Normal  Affect:    Appropriate   Thought Content:   Clear  Thought Form:  Coherent  Logical     Insight:    Good     Plan:     Safety Plan: No current safety concerns identified.  Recommended that patient call 911 or go to the local ED should there be a change in any of these risk factors.     Barriers to treatment: None identified    Patient Contracts (see media tab):  None    Substance Use: Provided encouragement towards sobriety    Provided support and affirmation for steps taken towards sobriety      Continue or Discharge: Patient will continue in Adult Dual Disorder Program (DDP) as planned. Patient is likely to benefit from learning and using skills as they work toward the goals identified in their treatment plan.      Suzanne Jacobson, [facility]  August 9, 2021

## 2021-08-09 NOTE — GROUP NOTE
Psychoeducation Group Note    PATIENT'S NAME: Xavier Odell  MRN:   [id number]  :   1985  ACCT. NUMBER: [id number]  DATE OF SERVICE: 21  START TIME: 10:00 AM  END TIME: 10:50 AM  FACILITATOR: Adán Yu OTR/L  TOPIC: MH Life Skills Group: Resiliency Development                                    Service Modality:  Video Visit     Telemedicine Visit: The patient's condition can be safely assessed and treated via synchronous audio and visual telemedicine encounter.      Reason for Telemedicine Visit: Services only offered telehealth    Originating Site (Patient Location): Patient's home    Distant Site (Provider Location): Provider Remote Setting- Home Office    Consent:  The patient/guardian has verbally consented to: the potential risks and benefits of telemedicine (video visit) versus in person care; bill my insurance or make self-payment for services provided; and responsibility for payment of non-covered services.     Mode of Communication:  Video Conference via Medical Zoom    As the provider I attest to compliance with applicable laws and regulations related to telemedicine.       [facility] Adult Dual Diagnosis Day Treatment  TRACK: Group 1    NUMBER OF PARTICIPANTS: 5    Summary of Group / Topics Discussed:  Resiliency Development:  Coping Skills(Strategies to Build Self-Confidence): Patients were taught how to identify stressors, signs of stress, coping skills, and prevention strategies for overall stress management.  Patients were given the opportunity to identify both ongoing and acute mental health symptoms and how to effectively manage these symptoms by developing an effective aftercare plan.  Patients increased awareness of community based resources.    Patient Session Goals / Objectives:    Identified how using coping skills can be used for symptom and stress management       Improved awareness of individualed symptoms and stressors and how to effectively cope      Established a relapse prevention plan to practice these skills in their own environments    Practiced and reflected on how to generalize taught skills to their everyday life          Patient Participation / Response:  Fully participated with the group by sharing personal reflections / insights and openly received / provided feedback with other participants.    Demonstrated understanding of content through handout/group discussion , Verbalized understanding of content and Patient would benefit from additional opportunities to practice the content to be able to generalize it to their everyday life with increased intentionality, consistency, and efficacy in support of their psychiatric recovery    Treatment Plan:  Patient has a current master individualized treatment plan.  See Epic treatment plan for more information.    Adán Yu, OTR/L

## 2021-08-10 ENCOUNTER — HOSPITAL ENCOUNTER (OUTPATIENT)
Dept: BEHAVIORAL HEALTH | Facility: CLINIC | Age: 36
End: 2021-08-10
Attending: PSYCHIATRY & NEUROLOGY
Payer: COMMERCIAL

## 2021-08-10 PROCEDURE — 90853 GROUP PSYCHOTHERAPY: CPT | Mod: GT,95 | Performed by: COUNSELOR

## 2021-08-10 PROCEDURE — 90853 GROUP PSYCHOTHERAPY: CPT | Mod: GT,95 | Performed by: PSYCHOLOGIST

## 2021-08-10 PROCEDURE — G0177 OPPS/PHP; TRAIN & EDUC SERV: HCPCS | Mod: GT,95

## 2021-08-10 NOTE — GROUP NOTE
"Process Group Note    PATIENT'S NAME: Xavier Odell  MRN:   [id number]  :   1985  ACCT. NUMBER: [id number]  DATE OF SERVICE: 8/10/21  START TIME: 10:00 AM  END TIME: 10:50 AM  FACILITATOR: Suzanne Jacobson [facility]  TOPIC:  Process Group    Diagnoses:  296.33 (F33.2) Major Depressive Disorder, Recurrent Episode, Severe.  4. Other Diagnoses that is relevant to services:   Substance-Related & Addictive Disorders Alcohol Use Disorder   303.90 (F10.20) Severe.      [facility] Adult Dual Diagnosis Day Treatment  TRACK: 1    NUMBER OF PARTICIPANTS: 7                                      Service Modality:  Video Visit     Telemedicine Visit: The patient's condition can be safely assessed and treated via synchronous audio and visual telemedicine encounter.      Reason for Telemedicine Visit: Services only offered telehealth    Originating Site (Patient Location): Patient's home    Distant Site (Provider Location): Provider Remote Setting- Home Office    Consent:  The patient/guardian has verbally consented to: the potential risks and benefits of telemedicine (video visit) versus in person care; bill my insurance or make self-payment for services provided; and responsibility for payment of non-covered services.     Patient would like the video invitation sent by:  My Chart    Mode of Communication:  Video Conference via Medical Zoom    As the provider I attest to compliance with applicable laws and regulations related to telemedicine.                Data:    Session content: At the start of this group, patients were invited to check in by identifying themselves, describing their current emotional status, and identifying issues to address in this group.   Area(s) of treatment focus addressed in this session included Symptom Management, Personal Safety and Abstinence/Relapse Prevention.    Xavier reported feeling \"apathetic\" today.  His goal is to go to the grocery store.  Client declined additional process time " but contributed to group discussion and provided feedback and support to peers.      Therapeutic Interventions/Treatment Strategies:  Psychotherapist offered support, feedback and validation.    Assessment:    Patient response:   Patient responded to session by accepting feedback, giving feedback and listening    Possible barriers to participation / learning include: and no barriers identified    Health Issues:   None reported       Substance Use Review:   Substance Use: Substance use has decreased    Mental Status/Behavioral Observations  Appearance:   Appropriate   Eye Contact:   Good   Psychomotor Behavior: Normal   Attitude:   Cooperative   Orientation:   All  Speech   Rate / Production: Normal    Volume:  Normal   Mood:    Depressed   Affect:    Appropriate   Thought Content:   Clear  Thought Form:  Coherent  Logical     Insight:    Good     Plan:     Safety Plan: No current safety concerns identified.  Recommended that patient call 911 or go to the local ED should there be a change in any of these risk factors.     Barriers to treatment: None identified    Patient Contracts (see media tab):  None    Substance Use: Provided encouragement towards sobriety    Provided support and affirmation for steps taken towards sobriety      Continue or Discharge: Patient will continue in Adult Dual Disorder Program (DDP) as planned. Patient is likely to benefit from learning and using skills as they work toward the goals identified in their treatment plan.      Suzanne Jacobson, [facility]  August 10, 2021

## 2021-08-10 NOTE — GROUP NOTE
Psychoeducation Group Note    PATIENT'S NAME: Xavier Odell  MRN:   [id number]  :   1985  ACCT. NUMBER: [id number]  DATE OF SERVICE: 8/10/21  START TIME: 11:00 AM  END TIME: 11:50 AM  FACILITATOR: Birgit Jacob, RN; Ozzy Carlson RN  TOPIC: MH RN Group: [facility]                                    Service Modality:  Video Visit     Telemedicine Visit: The patient's condition can be safely assessed and treated via synchronous audio and visual telemedicine encounter.      Reason for Telemedicine Visit:  Covid 19    Originating Site (Patient Location): Patient's home    Distant Site (Provider Location): Provider Remote Setting- Home Office    Consent:  The patient/guardian has verbally consented to: the potential risks and benefits of telemedicine (video visit) versus in person care; bill my insurance or make self-payment for services provided; and responsibility for payment of non-covered services.     Patient would like the video invitation sent by:  My Chart    Mode of Communication:  Video Conference via Medical Zoom    As the provider I attest to compliance with applicable laws and regulations related to telemedicine.         [facility] Adult Dual Diagnosis Day Treatment  TRACK: DDP1    NUMBER OF PARTICIPANTS: 7    Summary of Group / Topics Discussed:  Foundations of Health: Sleep: Case study/sleep hygiene: Patients explored the connection between sleep and mental illness. Patients learned about how adequate sleep can improve health, productivity, wellness, quality of life, and safety.     Patient Session Goals / Objectives:  ? Demonstrated understanding of sleep hygiene practices and benefits of sleep  ? Identified sleep hygiene strategies to utilize     Discussed barriers to falling and staying asleep.        Patient Participation / Response:  Fully participated with the group by sharing personal reflections / insights and openly received / provided feedback with other  participants.    Demonstrated understanding of topics discussed through group discussion and participation    Treatment Plan:  Patient has a current master individualized treatment plan.  See Epic treatment plan for more information.    Ozzy Carlson RN

## 2021-08-10 NOTE — GROUP NOTE
Psychotherapy Group Note    PATIENT'S NAME: Xavier Odell  MRN:   [id number]  :   1985  ACCT. NUMBER: [id number]  DATE OF SERVICE: 8/10/21  START TIME:  9:00 AM  END TIME:  9:50 AM  FACILITATOR: Ozzy Archuleta LP  TOPIC:  EBP Group: Behavioral Activation  [facility] Adult Dual Diagnosis Day Treatment  TRACK: 1    NUMBER OF PARTICIPANTS 7                                     Service Modality:  Video Visit     Telemedicine Visit: The patient's condition can be safely assessed and treated via synchronous audio and visual telemedicine encounter.      Reason for Telemedicine Visit: Services only offered telehealth    Originating Site (Patient Location): Patient's home    Distant Site (Provider Location): Provider Remote Setting- Home Office    Consent:  The patient/guardian has verbally consented to: the potential risks and benefits of telemedicine (video visit) versus in person care; bill my insurance or make self-payment for services provided; and responsibility for payment of non-covered services.     Patient would like the video invitation sent by:  My Chart    Mode of Communication:  Video Conference via Medical Zoom    As the provider I attest to compliance with applicable laws and regulations related to telemedicine.         Summary of Group / Topics Discussed:  Behavioral Activation: Motivation and Procrastination: Patients explored how they currently spend their time, identifying thoughts and feelings that are motivating and serve to increase desired behaviors.  They also examined behaviors that contribute to procrastination.  Different types of procrastination behaviors were identified, and strategies to reduce individual procrastination and increase motivation were explored and practiced.  Patients identified ways to increase goal-directed activities to enhance mood and reduce symptoms.        Patient Session Goals / Objectives:    Identify current patterns of procrastination  behavior and how they influence thoughts and moods, and inhibit motivation.    Identify behaviors that can be implemented that contribute to improving thoughts and feelings, motivation, and reduce symptoms.    Identify and develop a plan to increase activities that promote a sense of accomplishment and competence.    Practice scheduling positive activities / behaviors into daily routines.      Patient Participation / Response:  Fully participated with the group by sharing personal reflections / insights and openly received / provided feedback with other participants.    Demonstrated understanding of topics discussed through group discussion and participation    Treatment Plan:  Patient has a current master individualized treatment plan.  See Epic treatment plan for more information.    Ozzy Archuleta, LP

## 2021-08-11 ENCOUNTER — LAB REQUISITION (OUTPATIENT)
Dept: LAB | Facility: CLINIC | Age: 36
End: 2021-08-11
Payer: COMMERCIAL

## 2021-08-11 ENCOUNTER — HOSPITAL ENCOUNTER (OUTPATIENT)
Dept: BEHAVIORAL HEALTH | Facility: CLINIC | Age: 36
End: 2021-08-11
Attending: PSYCHIATRY & NEUROLOGY
Payer: COMMERCIAL

## 2021-08-11 DIAGNOSIS — F10.20 ALCOHOL DEPENDENCE, UNCOMPLICATED (H): ICD-10-CM

## 2021-08-11 PROCEDURE — 90853 GROUP PSYCHOTHERAPY: CPT | Mod: GT,95

## 2021-08-11 PROCEDURE — 86803 HEPATITIS C AB TEST: CPT | Mod: ORL | Performed by: FAMILY MEDICINE

## 2021-08-11 PROCEDURE — 86706 HEP B SURFACE ANTIBODY: CPT | Mod: ORL | Performed by: FAMILY MEDICINE

## 2021-08-11 PROCEDURE — 87340 HEPATITIS B SURFACE AG IA: CPT | Mod: ORL | Performed by: FAMILY MEDICINE

## 2021-08-11 PROCEDURE — 90853 GROUP PSYCHOTHERAPY: CPT | Mod: GT,95 | Performed by: MARRIAGE & FAMILY THERAPIST

## 2021-08-11 PROCEDURE — 90853 GROUP PSYCHOTHERAPY: CPT | Mod: GT,95 | Performed by: COUNSELOR

## 2021-08-11 NOTE — GROUP NOTE
"Process Group Note    PATIENT'S NAME: Xavier Odell  MRN:   [id number]  :   1985  ACCT. NUMBER: [id number]  DATE OF SERVICE: 21  START TIME:  9:00 AM  END TIME:  9:50 AM  FACILITATOR: Suzanne Jacobson [facility]  TOPIC:  Process Group    Diagnoses:  296.33 (F33.2) Major Depressive Disorder, Recurrent Episode, Severe.  4. Other Diagnoses that is relevant to services:   Substance-Related & Addictive Disorders Alcohol Use Disorder   303.90 (F10.20) Severe.      [facility] Adult Dual Diagnosis Day Treatment  TRACK: 1    NUMBER OF PARTICIPANTS: 5                                      Service Modality:  Video Visit     Telemedicine Visit: The patient's condition can be safely assessed and treated via synchronous audio and visual telemedicine encounter.      Reason for Telemedicine Visit: Services only offered telehealth    Originating Site (Patient Location): Patient's home    Distant Site (Provider Location): Provider Remote Setting- Home Office    Consent:  The patient/guardian has verbally consented to: the potential risks and benefits of telemedicine (video visit) versus in person care; bill my insurance or make self-payment for services provided; and responsibility for payment of non-covered services.     Patient would like the video invitation sent by:  My Chart    Mode of Communication:  Video Conference via Medical Zoom    As the provider I attest to compliance with applicable laws and regulations related to telemedicine.                Data:    Session content: At the start of this group, patients were invited to check in by identifying themselves, describing their current emotional status, and identifying issues to address in this group.   Area(s) of treatment focus addressed in this session included Symptom Management, Personal Safety and Abstinence/Relapse Prevention.    Xavier reported feeling \"apprehensive\" today because he has a doctor appointment to establish primary care but he is " frustrated because his mother his picking him up to take him there, which he doesn't feel is necessary.  He took some process time to express his frustration with his family and received a lot of feedback from peers who encouraged him continue to communicate with them.     Therapeutic Interventions/Treatment Strategies:  Psychotherapist offered support, feedback and validation and reinforced use of skills. Treatment modalities used include Motivational Interviewing, Cognitive Behavioral Therapy and Dialectical Behavioral Therapy. Interventions include Relapse Prevention: Discussed the use of substances and its impact on their relationships.    Assessment:    Patient response:   Patient responded to session by accepting feedback, giving feedback and listening  .   Possible barriers to participation / learning include: and no barriers identified    Health Issues:   None reported       Substance Use Review:   Substance Use: Substance use has decreased    Mental Status/Behavioral Observations  Appearance:   Appropriate   Eye Contact:   Good   Psychomotor Behavior: Normal   Attitude:   Cooperative   Orientation:   All  Speech   Rate / Production: Normal    Volume:  Normal   Mood:    Depressed   Affect:    Appropriate   Thought Content:   Clear  Thought Form:  Coherent  Logical     Insight:    Good     Plan:     Safety Plan: No current safety concerns identified.  Recommended that patient call 911 or go to the local ED should there be a change in any of these risk factors.     Barriers to treatment: None identified    Patient Contracts (see media tab):  None    Substance Use: Provided encouragement towards sobriety    Provided support and affirmation for steps taken towards sobriety      Continue or Discharge: Patient will continue in Adult Dual Disorder Program (DDP) as planned. Patient is likely to benefit from learning and using skills as they work toward the goals identified in their treatment plan.      Suzanne LEMONS  [place], [facility]  August 11, 2021

## 2021-08-11 NOTE — GROUP NOTE
Psychoeducation Group Note    PATIENT'S NAME: Xavier Odell  MRN:   [id number]  :   1985  ACCT. NUMBER: [id number]  DATE OF SERVICE: 21  START TIME: 10:00 AM  END TIME: 10:50 AM  FACILITATOR: Margarita Powell LMFT  TOPIC: DARLEEN RN Group: [facility] Adult Dual Diagnosis Day Treatment  TRACK: 1    NUMBER OF PARTICIPANTS: 6    Summary of Group / Topics Discussed:  Foundations of Health: Sleep: Case study/sleep hygiene: Patients explored the connection between sleep and mental illness. Patients learned about how adequate sleep can improve health, productivity, wellness, quality of life, and safety.     Patient Session Goals / Objectives:  ? Demonstrated understanding of sleep hygiene practices and benefits of sleep  ? Identified sleep hygiene strategies to utilize     Described the connection between sleep disturbances and mental illness                                    Service Modality:  Video Visit     Telemedicine Visit: The patient's condition can be safely assessed and treated via synchronous audio and visual telemedicine encounter.      Reason for Telemedicine Visit: Services only offered telehealth    Originating Site (Patient Location): Patient's home    Distant Site (Provider Location): Provider Remote Setting- Home Office    Consent:  The patient/guardian has verbally consented to: the potential risks and benefits of telemedicine (video visit) versus in person care; bill my insurance or make self-payment for services provided; and responsibility for payment of non-covered services.     Patient would like the video invitation sent by:  My Chart    Mode of Communication:  Video Conference via Medical Zoom    As the provider I attest to compliance with applicable laws and regulations related to telemedicine.            Patient Participation / Response:  Fully participated with the group by sharing personal reflections / insights and openly received / provided feedback  with other participants.    Demonstrated understanding of topics discussed through group discussion and participation, Identified / Expressed personal readiness to practice skills and Verbalized understanding of foundations of health topic    Treatment Plan:  Patient has a current master individualized treatment plan.  See Epic treatment plan for more information.    BOBY Hernandez

## 2021-08-11 NOTE — GROUP NOTE
Psychotherapy Group Note    PATIENT'S NAME: Xavier Odell  MRN:   [id number]  :   1985  ACCT. NUMBER: [id number]  DATE OF SERVICE: 21  START TIME: 11:00 AM  END TIME: 11:50 AM  FACILITATOR: James Sequeira LMFT  TOPIC:  EBP Group: Behavioral Activation  [facility] Adult Dual Diagnosis Day Treatment  TRACK: 1    NUMBER OF PARTICIPANTS: 6                                      Service Modality:  Video Visit     Telemedicine Visit: The patient's condition can be safely assessed and treated via synchronous audio and visual telemedicine encounter.      Reason for Telemedicine Visit: Services only offered telehealth    Originating Site (Patient Location): Patient's home    Distant Site (Provider Location): Provider Remote Setting- Home Office    Consent:  The patient/guardian has verbally consented to: the potential risks and benefits of telemedicine (video visit) versus in person care; bill my insurance or make self-payment for services provided; and responsibility for payment of non-covered services.     Patient would like the video invitation sent by:  My Chart    Mode of Communication:  Video Conference via Medical Zoom    As the provider I attest to compliance with applicable laws and regulations related to telemedicine.         Summary of Group / Topics Discussed:  Behavioral Activation: Motivation and Procrastination: Patients explored how they currently spend their time, identifying thoughts and feelings that are motivating and serve to increase desired behaviors.  They also examined behaviors that contribute to procrastination.  Different types of procrastination behaviors were identified, and strategies to reduce individual procrastination and increase motivation were explored and practiced.  Patients identified ways to increase goal-directed activities to enhance mood and reduce symptoms.        Patient Session Goals / Objectives:    Identify current patterns of procrastination  behavior and how they influence thoughts and moods, and inhibit motivation.    Identify behaviors that can be implemented that contribute to improving thoughts and feelings, motivation, and reduce symptoms.    Identify and develop a plan to increase activities that promote a sense of accomplishment and competence.    Practice scheduling positive activities / behaviors into daily routines.      Patient Participation / Response:  Moderately participated, sharing some personal reflections / insights and adequately adequately received / provided feedback with other participants.    Demonstrated understanding of topics discussed through group discussion and participation, Expressed understanding of the relationship between behaviors, thoughts, and feelings and Shared experiences and challenges with making behavioral changes    Treatment Plan:  Patient has a current master individualized treatment plan.  See Epic treatment plan for more information.    David Sequeira, INEZFT

## 2021-08-12 ENCOUNTER — HOSPITAL ENCOUNTER (OUTPATIENT)
Dept: BEHAVIORAL HEALTH | Facility: CLINIC | Age: 36
End: 2021-08-12
Attending: PSYCHIATRY & NEUROLOGY
Payer: COMMERCIAL

## 2021-08-12 LAB
HBV SURFACE AB SERPL IA-ACNC: 2.77 M[IU]/ML
HBV SURFACE AG SERPL QL IA: NONREACTIVE
HCV AB SERPL QL IA: NONREACTIVE

## 2021-08-12 PROCEDURE — 90853 GROUP PSYCHOTHERAPY: CPT | Mod: GT | Performed by: COUNSELOR

## 2021-08-12 PROCEDURE — 90853 GROUP PSYCHOTHERAPY: CPT | Mod: GT,95 | Performed by: COUNSELOR

## 2021-08-12 PROCEDURE — 90853 GROUP PSYCHOTHERAPY: CPT | Mod: GT,95

## 2021-08-12 NOTE — GROUP NOTE
Psychotherapy Group Note    PATIENT'S NAME: Xavier Odell  MRN:   [id number]  :   1985  ACCT. NUMBER: [id number]  DATE OF SERVICE: 21  START TIME: 11:00 AM  END TIME: 11:50 AM  FACILITATOR: Suzanne Jacobson LPCC  TOPIC:  EBP Group: DDP Relapse Prevention  [facility] Adult Dual Diagnosis Day Treatment  TRACK: 1    NUMBER OF PARTICIPANTS: 6                                      Service Modality:  Video Visit     Telemedicine Visit: The patient's condition can be safely assessed and treated via synchronous audio and visual telemedicine encounter.      Reason for Telemedicine Visit: Services only offered telehealth    Originating Site (Patient Location): Patient's home    Distant Site (Provider Location): Provider Remote Setting- Home Office    Consent:  The patient/guardian has verbally consented to: the potential risks and benefits of telemedicine (video visit) versus in person care; bill my insurance or make self-payment for services provided; and responsibility for payment of non-covered services.     Patient would like the video invitation sent by:  My Chart    Mode of Communication:  Video Conference via Medical Zoom    As the provider I attest to compliance with applicable laws and regulations related to telemedicine.          Summary of Group / Topics Discussed:  DDP Relapse Prevention: Support Groups and Community Resources: Patients explored the different support groups and community resources that aid in recovery.  Patients examined their attitudes and preconceived notions about support groups including identifying challenges and barriers to participation and attendance in community supports. The importance of attending additional supports in the recovery journey was discussed.    Patient Session Goals / Objectives:    Identified reasons why support groups/community resources are helpful to recovery    Identified the challenges and barriers to participation and attendance to support  groups/community resources    [place] the differences between different types of support groups/community resources    Searched and found a support group/community resource that patient is willing to explore       Patient Participation / Response:  Fully participated with the group by sharing personal reflections / insights and openly received / provided feedback with other participants.    Demonstrated understanding of topics discussed through group discussion and participation, Demonstrated understanding of utilizing relapse prevention skills to manage urges and maintain sobriety and Identified / Expressed personal readiness to utilize relapse prevention skills    Treatment Plan:  Patient has a current master individualized treatment plan.  See Epic treatment plan for more information.    Suzanne Jacobson, [facility]

## 2021-08-12 NOTE — ADDENDUM NOTE
Encounter addended by: Suzanne Jacobson [facility] on: 8/12/2021 12:02 PM   Actions taken: Flowsheet accepted, Clinical Note Signed

## 2021-08-12 NOTE — PROGRESS NOTES
Adult Dual Disorder Program:   Acknowledgement of Current Treatment Plan     I have reviewed my treatment plan with my therapist on 8/12/21.   I agree with the plan as it is written in the electronic health record.    Name:                  Signature:  Xavier Odell     Unavailable to sign due to COVID-19       NASH Patterson, [facility]  Psychotherapist   NASH Mckinnon on 8/12/2021 at 12:02 PM       Kathy Del Rosario MD  Psychiatrist/Medical Director   Kathy Del Rosario MD on 9-12-21

## 2021-08-12 NOTE — GROUP NOTE
Psychotherapy Group Note    PATIENT'S NAME: Xavier Odell  MRN:   [id number]  :   1985  ACCT. NUMBER: [id number]  DATE OF SERVICE: 21  START TIME:  9:00 AM  END TIME:  9:50 AM  FACILITATOR: Margarita Powell LMFT  TOPIC:  EBP Group: Enhanced Mindfulness  [facility] Adult Dual Diagnosis Day Treatment  TRACK: 1    NUMBER OF PARTICIPANTS: 7    Summary of Group / Topics Discussed:  Enhanced Mindfulness: Body and Mind Integration: Patients received an overview and education regarding the importance of including the body in the management of emotional health and self-care and as a direct route to mindfulness practice.  Patients discussed various ways in which the body can serve as an informant to their physical and emotional experiences/need. Patients discussed the body as a direct link to the present moment and to mindfulness practice.  Patients discussed current relationship with body, self-awareness, mindfulness practice and barriers to connection with body.  Patients were guided through breath work and movement to facilitate greater self-awareness, grounding, self-expression, and connection to other.  Patients discussed how the experiential could be applied to better manage mental health and develop woodward connection to self.    Patient Session Goals / Objectives:    Identify how movement awareness could be used for grounding, stress management, self-expression, connection to other and self-regulation    Improved awareness of breath and movement preferences    Identify how movement and the body is used in mindfulness practice    Reflect on use of these practices in everyday life.    Identify barriers to attending to body                                      Service Modality:  Video Visit     Telemedicine Visit: The patient's condition can be safely assessed and treated via synchronous audio and visual telemedicine encounter.      Reason for Telemedicine Visit: Services only offered  telehealth    Originating Site (Patient Location): Patient's home    Distant Site (Provider Location): Provider Remote Setting- Home Office    Consent:  The patient/guardian has verbally consented to: the potential risks and benefits of telemedicine (video visit) versus in person care; bill my insurance or make self-payment for services provided; and responsibility for payment of non-covered services.     Patient would like the video invitation sent by:  My Chart    Mode of Communication:  Video Conference via Medical Zoom    As the provider I attest to compliance with applicable laws and regulations related to telemedicine.            Patient Participation / Response:  Fully participated with the group by sharing personal reflections / insights and openly received / provided feedback with other participants.    Demonstrated understanding of topics discussed through group discussion and participation, Identified / Expressed readiness to act on skill suggestions discussed in topic and Practiced skills in session    Treatment Plan:  Patient has a current master individualized treatment plan.  See Epic treatment plan for more information.    Margarita Powell LMFT

## 2021-08-12 NOTE — GROUP NOTE
"Process Group Note    PATIENT'S NAME: Xavier Odell  MRN:   [id number]  :   1985  ACCT. NUMBER: [id number]  DATE OF SERVICE: 21  START TIME: 10:00 AM  END TIME: 10:50 AM  FACILITATOR: Suzanne Jacobson [facility]  TOPIC:  Process Group    Diagnoses:  296.33 (F33.2) Major Depressive Disorder, Recurrent Episode, Severe.  4. Other Diagnoses that is relevant to services:   Substance-Related & Addictive Disorders Alcohol Use Disorder   303.90 (F10.20) Severe.      [facility] Adult Mental Health Day Treatment  TRACK: 1    NUMBER OF PARTICIPANTS: 7                                      Service Modality:  Video Visit     Telemedicine Visit: The patient's condition can be safely assessed and treated via synchronous audio and visual telemedicine encounter.      Reason for Telemedicine Visit: Services only offered telehealth    Originating Site (Patient Location): Patient's home    Distant Site (Provider Location): Provider Remote Setting- Home Office    Consent:  The patient/guardian has verbally consented to: the potential risks and benefits of telemedicine (video visit) versus in person care; bill my insurance or make self-payment for services provided; and responsibility for payment of non-covered services.     Patient would like the video invitation sent by:  My Chart    Mode of Communication:  Video Conference via Medical Zoom    As the provider I attest to compliance with applicable laws and regulations related to telemedicine.                Data:    Session content: At the start of this group, patients were invited to check in by identifying themselves, describing their current emotional status, and identifying issues to address in this group.   Area(s) of treatment focus addressed in this session included Symptom Management, Personal Safety and Abstinence/Relapse Prevention.    Xavier reported feeling \"better than yesterday\" today.  His goal for the day is to go fishing with his brother, which they " have never done before.  He reported he had a doctor appointment yesterday and had a few medication changes.  Client declined additional process time but contributed to group discussion and provided feedback and support to peers.      Therapeutic Interventions/Treatment Strategies:  Psychotherapist offered support, feedback and validation and reinforced use of skills.    Assessment:    Patient response:   Patient responded to session by accepting feedback, giving feedback and listening    Possible barriers to participation / learning include: and no barriers identified    Health Issues:   None reported       Substance Use Review:   Substance Use: Substance use has decreased    Mental Status/Behavioral Observations  Appearance:   Appropriate   Eye Contact:   Good   Psychomotor Behavior: Normal   Attitude:   Cooperative   Orientation:   All  Speech   Rate / Production: Normal    Volume:  Normal   Mood:    Depressed   Affect:    Appropriate   Thought Content:   Clear  Thought Form:  Coherent  Logical     Insight:    Good     Plan:     Safety Plan: No current safety concerns identified.  Recommended that patient call 911 or go to the local ED should there be a change in any of these risk factors.     Barriers to treatment: None identified    Patient Contracts (see media tab):  None    Substance Use: Provided encouragement towards sobriety    Provided support and affirmation for steps taken towards sobriety      Continue or Discharge: Patient will continue in Adult Dual Disorder Program (DDP) as planned. Patient is likely to benefit from learning and using skills as they work toward the goals identified in their treatment plan.      Suzanne Jacobson, [facility]  August 12, 2021

## 2021-08-13 ENCOUNTER — HOSPITAL ENCOUNTER (OUTPATIENT)
Dept: BEHAVIORAL HEALTH | Facility: CLINIC | Age: 36
End: 2021-08-13
Attending: PSYCHIATRY & NEUROLOGY
Payer: COMMERCIAL

## 2021-08-13 PROCEDURE — G0177 OPPS/PHP; TRAIN & EDUC SERV: HCPCS | Mod: GT

## 2021-08-13 PROCEDURE — 90853 GROUP PSYCHOTHERAPY: CPT | Mod: GT

## 2021-08-13 PROCEDURE — 90853 GROUP PSYCHOTHERAPY: CPT | Mod: GT,95 | Performed by: COUNSELOR

## 2021-08-13 NOTE — GROUP NOTE
Psychotherapy Group Note    PATIENT'S NAME: Xavier Odell  MRN:   [id number]  :   1985  ACCT. NUMBER: [id number]  DATE OF SERVICE: 21  START TIME: 10:00 AM  END TIME: 10:50 AM  FACILITATOR: Margarita Powell LMFT  TOPIC:  EBP Group: Enhanced Mindfulness  [facility] Adult Dual Diagnosis Day Treatment  TRACK: 1    NUMBER OF PARTICIPANTS: 6    Summary of Group / Topics Discussed:  Enhanced Mindfulness: Body and Mind Integration: Patients received an overview and education regarding the importance of including the body in the management of emotional health and self-care and as a direct route to mindfulness practice.  Patients discussed various ways in which the body can serve as an informant to their physical and emotional experiences/need. Patients discussed the body as a direct link to the present moment and to mindfulness practice.  Patients discussed current relationship with body, self-awareness, mindfulness practice and barriers to connection with body.  Patients were guided through breath work and movement to facilitate greater self-awareness, grounding, self-expression, and connection to other.  Patients discussed how the experiential could be applied to better manage mental health and develop woodward connection to self.    Patient Session Goals / Objectives:    Identify how movement awareness could be used for grounding, stress management, self-expression, connection to other and self-regulation    Improved awareness of breath and movement preferences    Identify how movement and the body is used in mindfulness practice    Reflect on use of these practices in everyday life.    Identify barriers to attending to body                                      Service Modality:  Video Visit     Telemedicine Visit: The patient's condition can be safely assessed and treated via synchronous audio and visual telemedicine encounter.      Reason for Telemedicine Visit: Services only offered  telehealth    Originating Site (Patient Location): Patient's home    Distant Site (Provider Location): Provider Remote Setting- Home Office    Consent:  The patient/guardian has verbally consented to: the potential risks and benefits of telemedicine (video visit) versus in person care; bill my insurance or make self-payment for services provided; and responsibility for payment of non-covered services.     Patient would like the video invitation sent by:  My Chart    Mode of Communication:  Video Conference via Medical Zoom    As the provider I attest to compliance with applicable laws and regulations related to telemedicine.            Patient Participation / Response:  Fully participated with the group by sharing personal reflections / insights and openly received / provided feedback with other participants.    Demonstrated understanding of topics discussed through group discussion and participation, Identified / Expressed readiness to act on skill suggestions discussed in topic, Verbalized understanding of ways to proactively manage illness, Identified plan to address barriers to practicing skills discussed in topic and Practiced skills in session    Treatment Plan:  Patient has a current master individualized treatment plan.  See Epic treatment plan for more information.    Margarita Powell LMFT

## 2021-08-13 NOTE — GROUP NOTE
"Process Group Note    PATIENT'S NAME: Xavier Odell  MRN:   [id number]  :   1985  ACCT. NUMBER: [id number]  DATE OF SERVICE: 21  START TIME: 11:00 AM  END TIME: 11:50 AM  FACILITATOR: Suzanne Jacobson [facility]  TOPIC:  Process Group    Diagnoses:  296.33 (F33.2) Major Depressive Disorder, Recurrent Episode, Severe.  4. Other Diagnoses that is relevant to services:   Substance-Related & Addictive Disorders Alcohol Use Disorder   303.90 (F10.20) Severe.      [facility] Adult Dual Diagnosis Day Treatment  TRACK: 1    NUMBER OF PARTICIPANTS: 6                                      Service Modality:  Video Visit     Telemedicine Visit: The patient's condition can be safely assessed and treated via synchronous audio and visual telemedicine encounter.      Reason for Telemedicine Visit: Services only offered telehealth    Originating Site (Patient Location): Patient's home    Distant Site (Provider Location): Provider Remote Setting- Home Office    Consent:  The patient/guardian has verbally consented to: the potential risks and benefits of telemedicine (video visit) versus in person care; bill my insurance or make self-payment for services provided; and responsibility for payment of non-covered services.     Patient would like the video invitation sent by:  My Chart    Mode of Communication:  Video Conference via Medical Zoom    As the provider I attest to compliance with applicable laws and regulations related to telemedicine.                Data:    Session content: At the start of this group, patients were invited to check in by identifying themselves, describing their current emotional status, and identifying issues to address in this group.   Area(s) of treatment focus addressed in this session included Symptom Management, Personal Safety and Abstinence/Relapse Prevention.    Xavier reported feeling \"good\" today but has some underlying anxiety.  His goal for the day is to spend time outside.  Client " declined additional process time but contributed to group discussion and provided feedback and support to peers.      Therapeutic Interventions/Treatment Strategies:  Psychotherapist offered support, feedback and validation.    Assessment:    Patient response:   Patient responded to session by accepting feedback, giving feedback and listening    Possible barriers to participation / learning include: and no barriers identified    Health Issues:   None reported       Substance Use Review:   Substance Use: Substance use has decreased    Mental Status/Behavioral Observations  Appearance:   Appropriate   Eye Contact:   Good   Psychomotor Behavior: Normal   Attitude:   Cooperative   Orientation:   All  Speech   Rate / Production: Normal    Volume:  Normal   Mood:    Anxious   Affect:    Appropriate   Thought Content:   Clear  Thought Form:  Coherent  Logical     Insight:    Good     Plan:     Safety Plan: No current safety concerns identified.  Recommended that patient call 911 or go to the local ED should there be a change in any of these risk factors.     Barriers to treatment: None identified    Patient Contracts (see media tab):  None    Substance Use: Provided encouragement towards sobriety    Provided support and affirmation for steps taken towards sobriety      Continue or Discharge: Patient will continue in Adult Dual Disorder Program (DDP) as planned. Patient is likely to benefit from learning and using skills as they work toward the goals identified in their treatment plan.      Suzanne Jacobson, [facility]  August 13, 2021

## 2021-08-13 NOTE — GROUP NOTE
Psychoeducation Group Note    PATIENT'S NAME: Xavier Odell  MRN:   [id number]  :   1985  ACCT. NUMBER: [id number]  DATE OF SERVICE: 21  START TIME:  9:00 AM  END TIME:  9:50 AM  FACILITATOR: Ozzy Carlson, RN; Birgit Jacob RN  TOPIC:  RN Group: Health Maintenance                                    Service Modality:  Video Visit     Telemedicine Visit: The patient's condition can be safely assessed and treated via synchronous audio and visual telemedicine encounter.      Reason for Telemedicine Visit: Covid 19    Originating Site (Patient Location): Patient's home    Distant Site (Provider Location): Provider Remote Setting- Home Office    Consent:  The patient/guardian has verbally consented to: the potential risks and benefits of telemedicine (video visit) versus in person care; bill my insurance or make self-payment for services provided; and responsibility for payment of non-covered services.     Patient would like the video invitation sent by:  My Chart    Mode of Communication:  Video Conference via Medical Zoom    As the provider I attest to compliance with applicable laws and regulations related to telemedicine.         [facility] Adult Dual Diagnosis Day Treatment  TRACK: DDP1    NUMBER OF PARTICIPANTS: 6    Summary of Group / Topics Discussed:  Health Maintenance: Weekend planning: Patients were given time to complete a weekend plan of what they will do to promote wellness and sobriety over the weekend when they do not have the structure of group. Patients were encouraged to review progress on their treatment goals and were challenged to identify ways to work toward meeting them. Patients identified and discussed foreseeable barriers to success over the weekend and then developed a plan to overcome them. Patients reviewed their distress coping skills and social support network and discussed this with the group.       Patient Session Goals / Objectives:    ?    Identified  activities to engage in that promote balance in wellness  ?    Distinguished possible barriers to success over the weekend and created a plan to overcome them  ?    Listed distress coping skills and identified social support network to utilize if in crisis during the weekend          Patient Participation / Response:  Fully participated with the group by sharing personal reflections / insights and openly received / provided feedback with other participants.    Demonstrated understanding of topics discussed through group discussion and participation    Treatment Plan:  Patient has a current master individualized treatment plan.  See Epic treatment plan for more information.    Ozzy Carlson RN

## 2021-08-16 ENCOUNTER — HOSPITAL ENCOUNTER (OUTPATIENT)
Dept: BEHAVIORAL HEALTH | Facility: CLINIC | Age: 36
End: 2021-08-16
Attending: PSYCHIATRY & NEUROLOGY
Payer: COMMERCIAL

## 2021-08-16 PROCEDURE — G0177 OPPS/PHP; TRAIN & EDUC SERV: HCPCS | Mod: GT,95 | Performed by: OCCUPATIONAL THERAPIST

## 2021-08-16 PROCEDURE — G0177 OPPS/PHP; TRAIN & EDUC SERV: HCPCS | Mod: GT,95

## 2021-08-16 PROCEDURE — 90853 GROUP PSYCHOTHERAPY: CPT | Mod: GT,95 | Performed by: COUNSELOR

## 2021-08-16 NOTE — GROUP NOTE
Process Group Note    PATIENT'S NAME: Xavier Odell  MRN:   [id number]  :   1985  ACCT. NUMBER: [id number]  DATE OF SERVICE: 21  START TIME:  9:00 AM  END TIME:  9:50 AM  FACILITATOR: Suzanne Jacobson [facility]  TOPIC:  Process Group    Diagnoses:  296.33 (F33.2) Major Depressive Disorder, Recurrent Episode, Severe.  4. Other Diagnoses that is relevant to services:   Substance-Related & Addictive Disorders Alcohol Use Disorder   303.90 (F10.20) Severe.      [facility] Adult Dual Diagnosis Day Treatment  TRACK: 1    NUMBER OF PARTICIPANTS: 6                                      Service Modality:  Video Visit     Telemedicine Visit: The patient's condition can be safely assessed and treated via synchronous audio and visual telemedicine encounter.      Reason for Telemedicine Visit: Services only offered telehealth    Originating Site (Patient Location): Patient's home    Distant Site (Provider Location): Provider Remote Setting- Home Office    Consent:  The patient/guardian has verbally consented to: the potential risks and benefits of telemedicine (video visit) versus in person care; bill my insurance or make self-payment for services provided; and responsibility for payment of non-covered services.     Patient would like the video invitation sent by:  My Chart    Mode of Communication:  Video Conference via Medical Zoom    As the provider I attest to compliance with applicable laws and regulations related to telemedicine.                Data:    Session content: At the start of this group, patients were invited to check in by identifying themselves, describing their current emotional status, and identifying issues to address in this group.   Area(s) of treatment focus addressed in this session included Symptom Management, Personal Safety and Abstinence/Relapse Prevention.    Xavier reported feeling hopeful today.  His goal for the day is to continue to clean his place and break it down into smaller  goals.  Client declined additional process time but contributed to group discussion and provided feedback and support to peers.      Therapeutic Interventions/Treatment Strategies:  Psychotherapist offered support, feedback and validation.    Assessment:    Patient response:   Patient responded to session by accepting feedback, giving feedback and listening    Possible barriers to participation / learning include: and no barriers identified    Health Issues:   None reported       Substance Use Review:   Substance Use: Substance use has decreased    Mental Status/Behavioral Observations  Appearance:   Appropriate   Eye Contact:   Good   Psychomotor Behavior: Normal   Attitude:   Cooperative   Orientation:   All  Speech   Rate / Production: Normal    Volume:  Normal   Mood:    Normal  Affect:    Appropriate   Thought Content:   Clear  Thought Form:  Coherent  Logical     Insight:    Good     Plan:     Safety Plan: No current safety concerns identified.  Recommended that patient call 911 or go to the local ED should there be a change in any of these risk factors.     Barriers to treatment: None identified    Patient Contracts (see media tab):  None    Substance Use: Provided encouragement towards sobriety    Provided support and affirmation for steps taken towards sobriety      Continue or Discharge: Patient will continue in Adult Dual Disorder Program (DDP) as planned. Patient is likely to benefit from learning and using skills as they work toward the goals identified in their treatment plan.      Suzanne Jacobson, [facility]  August 16, 2021

## 2021-08-16 NOTE — ADDENDUM NOTE
Encounter addended by: Suzanne Jacobson [facility] on: 8/16/2021 4:12 PM   Actions taken: Charge Capture section accepted

## 2021-08-16 NOTE — GROUP NOTE
Psychoeducation Group Note    PATIENT'S NAME: Xavier Odell  MRN:   [id number]  :   1985  ACCT. NUMBER: [id number]  DATE OF SERVICE: 21  START TIME: 10:00 AM  END TIME: 10:50 AM  FACILITATOR: Rebecca Denton OTR/L  TOPIC: MH Life Skills Group: Lifestyle Balance and Structure  [facility] Adult Dual Diagnosis Day Treatment  TRACK: 1    NUMBER OF PARTICIPANTS: 8                                      Service Modality:  Video Visit     Telemedicine Visit: The patient's condition can be safely assessed and treated via synchronous audio and visual telemedicine encounter.      Reason for Telemedicine Visit: Services only offered telehealth    Originating Site (Patient Location): Patient's home    Distant Site (Provider Location): [facility]    Consent:  The patient/guardian has verbally consented to: the potential risks and benefits of telemedicine (video visit) versus in person care; bill my insurance or make self-payment for services provided; and responsibility for payment of non-covered services.     Patient would like the video invitation sent by:  My Chart    Mode of Communication:  Video Conference via Medical Zoom    As the provider I attest to compliance with applicable laws and regulations related to telemedicine.         Summary of Group / Topics Discussed:  Lifestyle Balance and Strucure:  Goal-setting & integration: Patients were introduced to the process and benefits of setting realistic, timely, and achievable goals to help support their ability to follow through with meaningful personal, health, recovery and treatment goals that they would like to achieve to improve overall functioning.  Patients were also taught and then practiced techniques to manage a variety of obstacles to achieve their goals and how to break goals into manageable pieces.  Patients also reported on follow through of goals.     Patient Session Goals /  Objectives:    Facilitated the creation of a vision for recovery and wellbeing    Identified and wrote meaningful SMART goals to engage in meaningful activities of daily living     Identified and problem solved barriers to achieving goals     Identified plan to support follow through on goals and reflection on progress made          Patient Participation / Response:  Fully participated with the group by sharing personal reflections / insights and openly received / provided feedback with other participants.    Patient presentation: active in sharing goals with the group; seemed open to feedback/support from others, Verbalized understanding of content and Patient would benefit from additional opportunities to practice the content to be able to generalize it to their everyday life with increased intentionality, consistency, and efficacy in support of their psychiatric recovery    Treatment Plan:  Patient has a current master individualized treatment plan.  See Epic treatment plan for more information.    Rebecca Denton, OTR/L

## 2021-08-16 NOTE — GROUP NOTE
Psychoeducation Group Note    PATIENT'S NAME: Xavier Odell  MRN:   [id number]  :   1985  ACCT. NUMBER: [id number]  DATE OF SERVICE: 21  START TIME: 11:00 AM  END TIME: 11:50 AM  FACILITATOR: Ozzy Carlson RN  TOPIC: DARLEEN RN Group: Brain Health                                    Service Modality:  Video Visit     Telemedicine Visit: The patient's condition can be safely assessed and treated via synchronous audio and visual telemedicine encounter.      Reason for Telemedicine Visit:  Covid19    Originating Site (Patient Location): Patient's home    Distant Site (Provider Location): Provider Remote Setting- Home Office    Consent:  The patient/guardian has verbally consented to: the potential risks and benefits of telemedicine (video visit) versus in person care; bill my insurance or make self-payment for services provided; and responsibility for payment of non-covered services.     Patient would like the video invitation sent by:  My Chart    Mode of Communication:  Video Conference via Medical Zoom    As the provider I attest to compliance with applicable laws and regulations related to telemedicine.        [facility] Adult Dual Diagnosis Day Treatment  TRACK: DDP1    NUMBER OF PARTICIPANTS: 9    Summary of Group / Topics Discussed:  Brain Health:  Pathophysiology of stress and anxiety: Patients were educated on the difference between stress, chronic stress, and anxiety. The anatomy and pathophysiology of the body/brain were reviewed to illustrate the immediate effects of stress/anxiety in the body and the long term effects and increased risks for chronic disease that come from unmanaged stress/anxiety. Self-coping strategies to manage symptoms of stress were reviewed and pharmacologic, psychotherapeutic, and complementary treatment options were discussed.    Patient Session Goals / Objectives:  ? Described the differences between stress and anxiety and how the body responds to it  ? Listed  the long term effects and increased risks for chronic disease that can arise from unmanaged stress/anxiety  ? Identified and described pharmacologic, psychotherapeutic, and complementary treatment options        Patient Participation / Response:  Fully participated with the group by sharing personal reflections / insights and openly received / provided feedback with other participants.    Demonstrated understanding of topics discussed through group discussion and participation    Treatment Plan:  Patient has a current master individualized treatment plan.  See Epic treatment plan for more information.    Ozzy Carlson RN

## 2021-08-17 ENCOUNTER — HOSPITAL ENCOUNTER (OUTPATIENT)
Dept: BEHAVIORAL HEALTH | Facility: CLINIC | Age: 36
End: 2021-08-17
Attending: PSYCHIATRY & NEUROLOGY
Payer: COMMERCIAL

## 2021-08-17 PROCEDURE — G0177 OPPS/PHP; TRAIN & EDUC SERV: HCPCS | Mod: GT,95

## 2021-08-17 PROCEDURE — 90853 GROUP PSYCHOTHERAPY: CPT | Mod: GT | Performed by: PSYCHOLOGIST

## 2021-08-17 PROCEDURE — 90853 GROUP PSYCHOTHERAPY: CPT | Mod: GT,95 | Performed by: COUNSELOR

## 2021-08-17 NOTE — GROUP NOTE
"Process Group Note    PATIENT'S NAME: Xavier Odell  MRN:   [id number]  :   1985  ACCT. NUMBER: [id number]  DATE OF SERVICE: 21  START TIME: 10:00 AM  END TIME: 10:50 AM  FACILITATOR: Suzanne Jacobson [facility]  TOPIC:  Process Group    Diagnoses:  296.33 (F33.2) Major Depressive Disorder, Recurrent Episode, Severe.  4. Other Diagnoses that is relevant to services:   Substance-Related & Addictive Disorders Alcohol Use Disorder   303.90 (F10.20) Severe.      [facility] Adult Dual Diagnosis Day Treatment  TRACK: 1    NUMBER OF PARTICIPANTS: 8                                      Service Modality:  Video Visit     Telemedicine Visit: The patient's condition can be safely assessed and treated via synchronous audio and visual telemedicine encounter.      Reason for Telemedicine Visit: Services only offered telehealth    Originating Site (Patient Location): Patient's home    Distant Site (Provider Location): Provider Remote Setting- Home Office    Consent:  The patient/guardian has verbally consented to: the potential risks and benefits of telemedicine (video visit) versus in person care; bill my insurance or make self-payment for services provided; and responsibility for payment of non-covered services.     Patient would like the video invitation sent by:  My Chart    Mode of Communication:  Video Conference via Medical Zoom    As the provider I attest to compliance with applicable laws and regulations related to telemedicine.                Data:    Session content: At the start of this group, patients were invited to check in by identifying themselves, describing their current emotional status, and identifying issues to address in this group.   Area(s) of treatment focus addressed in this session included Symptom Management, Personal Safety and Abstinence/Relapse Prevention.    Xavier reported feeling \"positive\" but felt pretty negative when he woke up this morning.  His goal for the day is to " complete some tasks on his to-do list.  Client declined additional process time but contributed to group discussion and provided feedback and support to peers.      Therapeutic Interventions/Treatment Strategies:  Psychotherapist offered support, feedback and validation and reinforced use of skills.    Assessment:    Patient response:   Patient responded to session by accepting feedback, giving feedback and listening    Possible barriers to participation / learning include: and no barriers identified    Health Issues:   None reported       Substance Use Review:   Substance Use: Substance use has decreased    Mental Status/Behavioral Observations  Appearance:   Appropriate   Eye Contact:   Good   Psychomotor Behavior: Normal   Attitude:   Cooperative   Orientation:   All  Speech   Rate / Production: Normal    Volume:  Normal   Mood:    Depressed   Affect:    Appropriate   Thought Content:   Clear  Thought Form:  Coherent  Logical     Insight:    Good     Plan:     Safety Plan: No current safety concerns identified.  Recommended that patient call 911 or go to the local ED should there be a change in any of these risk factors.     Barriers to treatment: None identified    Patient Contracts (see media tab):  None    Substance Use: Provided encouragement towards sobriety    Provided support and affirmation for steps taken towards sobriety      Continue or Discharge: Patient will continue in Adult Dual Disorder Program (DDP) as planned. Patient is likely to benefit from learning and using skills as they work toward the goals identified in their treatment plan.      Suzanne Jacobson, [facility]  August 17, 2021

## 2021-08-17 NOTE — GROUP NOTE
Psychotherapy Group Note    PATIENT'S NAME: Xavier Odell  MRN:   [id number]  :   1985  ACCT. NUMBER: [id number]  DATE OF SERVICE: 21  START TIME:  9:00 AM  END TIME:  9:50 AM  FACILITATOR: Ozzy Archuleta LP  TOPIC:  EBP Group: Cognitive Restructuring  [facility] Adult Dual Diagnosis Day Treatment  TRACK: 1    NUMBER OF PARTICIPANTS: 7                                      Service Modality:  Video Visit     Telemedicine Visit: The patient's condition can be safely assessed and treated via synchronous audio and visual telemedicine encounter.      Reason for Telemedicine Visit: Services only offered telehealth    Originating Site (Patient Location): Patient's home    Distant Site (Provider Location): Provider Remote Setting- Home Office    Consent:  The patient/guardian has verbally consented to: the potential risks and benefits of telemedicine (video visit) versus in person care; bill my insurance or make self-payment for services provided; and responsibility for payment of non-covered services.     Patient would like the video invitation sent by:  My Chart    Mode of Communication:  Video Conference via Medical Zoom    As the provider I attest to compliance with applicable laws and regulations related to telemedicine.         Summary of Group / Topics Discussed:  Cognitive Restructuring: Distortions: Patients received an overview of how to identify common cognitive distortions. Patients will explore alternatives to cognitive distortions and practice challenging their negative thought patterns. The goal is to help patients target modify ineffective thought patterns.     Patient Session Goals / Objectives:    Familiarized self with ineffective / unhealthy thoughts and how they develop.      Explored impact of ineffective thoughts / distortions on mood and activity    Formulated new neutral/positive alternatives to challenge less helpful / ineffective thoughts.    Practiced and  plan to apply in daily life               Patient Participation / Response:  Fully participated with the group by sharing personal reflections / insights and openly received / provided feedback with other participants.    Demonstrated understanding of topics discussed through group discussion and participation    Treatment Plan:  Patient has a current master individualized treatment plan.  See Epic treatment plan for more information.    Ozzy Archuleta, LP

## 2021-08-17 NOTE — GROUP NOTE
Psychoeducation Group Note    PATIENT'S NAME: Xavier Odell  MRN:   [id number]  :   1985  ACCT. NUMBER: [id number]  DATE OF SERVICE: 21  START TIME: 11:00 AM  END TIME: 11:50 AM  FACILITATOR: Ozzy Carlson, RN; Birgit Jacob RN  TOPIC:  RN Group: Brain Health                                    Service Modality:  Video Visit     Telemedicine Visit: The patient's condition can be safely assessed and treated via synchronous audio and visual telemedicine encounter.      Reason for Telemedicine Visit:  Covid19    Originating Site (Patient Location): Patient's home    Distant Site (Provider Location): Provider Remote Setting- Home Office    Consent:  The patient/guardian has verbally consented to: the potential risks and benefits of telemedicine (video visit) versus in person care; bill my insurance or make self-payment for services provided; and responsibility for payment of non-covered services.     Patient would like the video invitation sent by:  My Chart    Mode of Communication:  Video Conference via Medical Zoom    As the provider I attest to compliance with applicable laws and regulations related to telemedicine.          [facility] Adult Dual Diagnosis Day Treatment  TRACK: DDP1    NUMBER OF PARTICIPANTS: 9    Summary of Group / Topics Discussed:  Brain Health:  Pathophysiology of stress and anxiety: Patients were educated on the difference between stress, chronic stress, and anxiety. The anatomy and pathophysiology of the body/brain were reviewed to illustrate the immediate effects of stress/anxiety in the body and the long term effects and increased risks for chronic disease that come from unmanaged stress/anxiety. Self-coping strategies to manage symptoms of stress were reviewed and pharmacologic, psychotherapeutic, and complementary treatment options were discussed.    Patient Session Goals / Objectives:  ? Described the differences between stress and anxiety and how the body  responds to it  ? Listed the long term effects and increased risks for chronic disease that can arise from unmanaged stress/anxiety  ? Identified and described pharmacologic, psychotherapeutic, and complementary treatment options        Patient Participation / Response:  Fully participated with the group by sharing personal reflections / insights and openly received / provided feedback with other participants.    Demonstrated understanding of topics discussed through group discussion and participation    Treatment Plan:  Patient has a current master individualized treatment plan.  See Epic treatment plan for more information.    Ozzy Carlson RN

## 2021-08-18 ENCOUNTER — HOSPITAL ENCOUNTER (OUTPATIENT)
Dept: BEHAVIORAL HEALTH | Facility: CLINIC | Age: 36
End: 2021-08-18
Attending: PSYCHIATRY & NEUROLOGY
Payer: COMMERCIAL

## 2021-08-18 ENCOUNTER — LAB REQUISITION (OUTPATIENT)
Dept: LAB | Facility: CLINIC | Age: 36
End: 2021-08-18
Payer: COMMERCIAL

## 2021-08-18 DIAGNOSIS — R74.01 ELEVATION OF LEVELS OF LIVER TRANSAMINASE LEVELS: ICD-10-CM

## 2021-08-18 LAB
ALBUMIN SERPL-MCNC: 3.6 G/DL (ref 3.4–5)
ALP SERPL-CCNC: 96 U/L (ref 40–150)
ALT SERPL W P-5'-P-CCNC: 317 U/L (ref 0–70)
ANION GAP SERPL CALCULATED.3IONS-SCNC: 8 MMOL/L (ref 3–14)
AST SERPL W P-5'-P-CCNC: 182 U/L (ref 0–45)
BILIRUB SERPL-MCNC: 0.4 MG/DL (ref 0.2–1.3)
BUN SERPL-MCNC: 6 MG/DL (ref 7–30)
CALCIUM SERPL-MCNC: 9.4 MG/DL (ref 8.5–10.1)
CHLORIDE BLD-SCNC: 105 MMOL/L (ref 94–109)
CO2 SERPL-SCNC: 22 MMOL/L (ref 20–32)
CREAT SERPL-MCNC: 0.92 MG/DL (ref 0.66–1.25)
GFR SERPL CREATININE-BSD FRML MDRD: >90 ML/MIN/1.73M2
GLUCOSE BLD-MCNC: 89 MG/DL (ref 70–99)
POTASSIUM BLD-SCNC: 3.7 MMOL/L (ref 3.4–5.3)
PROT SERPL-MCNC: 7.9 G/DL (ref 6.8–8.8)
SODIUM SERPL-SCNC: 135 MMOL/L (ref 133–144)

## 2021-08-18 PROCEDURE — 80053 COMPREHEN METABOLIC PANEL: CPT | Mod: ORL | Performed by: INTERNAL MEDICINE

## 2021-08-18 PROCEDURE — G0177 OPPS/PHP; TRAIN & EDUC SERV: HCPCS | Mod: GT,95

## 2021-08-18 PROCEDURE — 90853 GROUP PSYCHOTHERAPY: CPT | Mod: GT,95 | Performed by: MARRIAGE & FAMILY THERAPIST

## 2021-08-18 PROCEDURE — 90853 GROUP PSYCHOTHERAPY: CPT | Mod: GT | Performed by: COUNSELOR

## 2021-08-18 NOTE — GROUP NOTE
"Process Group Note    PATIENT'S NAME: Xavier Odell  MRN:   [id number]  :   1985  ACCT. NUMBER: [id number]  DATE OF SERVICE: 21  START TIME:  9:00 AM  END TIME:  9:50 AM  FACILITATOR: Suzanne Jacobson [facility]  TOPIC:  Process Group    Diagnoses:  296.33 (F33.2) Major Depressive Disorder, Recurrent Episode, Severe.  4. Other Diagnoses that is relevant to services:   Substance-Related & Addictive Disorders Alcohol Use Disorder   303.90 (F10.20) Severe.      [facility] Adult Dual Diagnosis Day Treatment  TRACK: 1    NUMBER OF PARTICIPANTS: 8                                      Service Modality:  Video Visit     Telemedicine Visit: The patient's condition can be safely assessed and treated via synchronous audio and visual telemedicine encounter.      Reason for Telemedicine Visit: Services only offered telehealth    Originating Site (Patient Location): Patient's home    Distant Site (Provider Location): Provider Remote Setting- Home Office    Consent:  The patient/guardian has verbally consented to: the potential risks and benefits of telemedicine (video visit) versus in person care; bill my insurance or make self-payment for services provided; and responsibility for payment of non-covered services.     Patient would like the video invitation sent by:  My Chart    Mode of Communication:  Video Conference via Medical Zoom    As the provider I attest to compliance with applicable laws and regulations related to telemedicine.                Data:    Session content: At the start of this group, patients were invited to check in by identifying themselves, describing their current emotional status, and identifying issues to address in this group.   Area(s) of treatment focus addressed in this session included Symptom Management, Personal Safety and Abstinence/Relapse Prevention.    Xavier reported feeling \"tired\" because he stayed up too late.  His goal is to attend a doctor appointment today.  He " reported that he has been struggling with thinking too far in the future or ruminating on the past but is working on catching those thoughts and bringing himself back to the present moment.  Client declined additional process time but contributed to group discussion and provided feedback and support to peers.      Therapeutic Interventions/Treatment Strategies:  Psychotherapist offered support, feedback and validation and reinforced use of skills.    Assessment:    Patient response:   Patient responded to session by accepting feedback, giving feedback and listening    Possible barriers to participation / learning include: and no barriers identified    Health Issues:   None reported       Substance Use Review:   Substance Use: Substance use has decreased    Mental Status/Behavioral Observations  Appearance:   Appropriate   Eye Contact:   Good   Psychomotor Behavior: Normal   Attitude:   Cooperative   Orientation:   All  Speech   Rate / Production: Normal    Volume:  Normal   Mood:    Anxious   Affect:    Appropriate   Thought Content:   Clear  Thought Form:  Coherent  Logical     Insight:    Good     Plan:     Safety Plan: No current safety concerns identified.  Recommended that patient call 911 or go to the local ED should there be a change in any of these risk factors.     Barriers to treatment: None identified    Patient Contracts (see media tab):  None    Substance Use: Provided encouragement towards sobriety    Provided support and affirmation for steps taken towards sobriety      Continue or Discharge: Patient will continue in Adult Dual Disorder Program (DDP) as planned. Patient is likely to benefit from learning and using skills as they work toward the goals identified in their treatment plan.      Suzanne Jacobson, [facility]  August 18, 2021

## 2021-08-18 NOTE — GROUP NOTE
Psychotherapy Group Note    PATIENT'S NAME: Xavier Odell  MRN:   [id number]  :   1985  [id number]  DATE OF SERVICE: 21  START TIME: 10:00 AM  END TIME: 10:50 AM  FACILITATOR: James Sequeira LMFT  TOPIC:  EBP Group: Cognitive Restructuring  [facility] Adult Dual Diagnosis Day Treatment  TRACK: 1    NUMBER OF PARTICIPANTS: 7                                      Service Modality:  Video Visit     Telemedicine Visit: The patient's condition can be safely assessed and treated via synchronous audio and visual telemedicine encounter.      Reason for Telemedicine Visit: Services only offered telehealth    Originating Site (Patient Location): Patient's home    Distant Site (Provider Location): Provider Remote Setting- Home Office    Consent:  The patient/guardian has verbally consented to: the potential risks and benefits of telemedicine (video visit) versus in person care; bill my insurance or make self-payment for services provided; and responsibility for payment of non-covered services.     Patient would like the video invitation sent by:  My Chart    Mode of Communication:  Video Conference via Medical Zoom    As the provider I attest to compliance with applicable laws and regulations related to telemedicine.         Summary of Group / Topics Discussed:  Cognitive Restructuring: Core Beliefs: Patients received an overview of what a core belief is, and how they develop. Patients then began to identify their negative core beliefs. Patients worked to modify core beliefs with the goal of improved self-image and functioning.     Patient Session Goals / Objectives:    Familiarize self with the concept of core beliefs and how they develop.      Explore personal core beliefs (positive and negative)    Develop / advance recognition of the connection between negative thoughts and negative core beliefs.    Formulate new neutral/positive core beliefs               Patient Participation /  Response:  Moderately participated, sharing some personal reflections / insights and adequately adequately received / provided feedback with other participants.    Demonstrated understanding of topics discussed through group discussion and participation and Demonstrated knowledge of personal thought patterns and how they impact their mood and behavior.    Treatment Plan:  Patient has a current master individualized treatment plan.  See Epic treatment plan for more information.    David Sequeira LMFT

## 2021-08-18 NOTE — GROUP NOTE
Psychoeducation Group Note    PATIENT'S NAME: Xavier Odell  MRN:   [id number]  :   1985  ACCT. NUMBER: [id number]  DATE OF SERVICE: 21  START TIME: 11:00 AM  END TIME: 11:50 AM  FACILITATOR: Birgit Jacob, RN; Ozzy Carlson RN  TOPIC:  RN Group: Mind/Body Practice & Complementary                                    Service Modality:  Video Visit     Telemedicine Visit: The patient's condition can be safely assessed and treated via synchronous audio and visual telemedicine encounter.      Reason for Telemedicine Visit:  covid19    Originating Site (Patient Location): Patient's home    Distant Site (Provider Location): Provider Remote Setting- Home Office    Consent:  The patient/guardian has verbally consented to: the potential risks and benefits of telemedicine (video visit) versus in person care; bill my insurance or make self-payment for services provided; and responsibility for payment of non-covered services.     Patient would like the video invitation sent by:  My Chart    Mode of Communication:  Video Conference via Medical Zoom    As the provider I attest to compliance with applicable laws and regulations related to telemedicine.          [facility] Adult Dual Diagnosis Day Treatment  TRACK: 1    NUMBER OF PARTICIPANTS: 6    Summary of Group / Topics Discussed:  Mind/Body Practice & Complementary Therapies:  Progressive Muscle Relaxation: In addition to affecting our mood and behavior, psychological stress can cause a myriad of physical symptoms in our body. Patients were educated on these effects and guided to increased self-awareness of how stress affects their body. The purpose, benefits, history, and techniques of progressive muscle relaxation were discussed. In an instructor guided experiential, patients were guided to practice PMR to help reduce physical symptoms of psychological stress and achieve a more balanced feeling of well-being.    Patient Session Goals /  Objectives:  ? Identified physiological symptoms of stress on the body  ? Listed & Explained the purpose and benefits to practicing PMR   ? Practiced progressive muscle relaxation experiential        Patient Participation / Response:  Fully participated with the group by sharing personal reflections / insights and openly received / provided feedback with other participants.    Demonstrated understanding of topics discussed through group discussion and participation and Identified / Expressed personal readiness to practice skills    Treatment Plan:  Patient has a current master individualized treatment plan.  See Epic treatment plan for more information.    Birgit Jacob RN

## 2021-08-19 ENCOUNTER — HOSPITAL ENCOUNTER (OUTPATIENT)
Dept: BEHAVIORAL HEALTH | Facility: CLINIC | Age: 36
End: 2021-08-19
Attending: PSYCHIATRY & NEUROLOGY
Payer: COMMERCIAL

## 2021-08-19 PROCEDURE — 90853 GROUP PSYCHOTHERAPY: CPT | Mod: GT,95 | Performed by: COUNSELOR

## 2021-08-19 PROCEDURE — G0177 OPPS/PHP; TRAIN & EDUC SERV: HCPCS | Mod: GT,95 | Performed by: OCCUPATIONAL THERAPIST

## 2021-08-19 NOTE — GROUP NOTE
Psychoeducation Group Note    PATIENT'S NAME: Xavier Odell  MRN:   [id number]  :   1985  ACCT. NUMBER: [id number]  DATE OF SERVICE: 21  START TIME:  9:00 AM  END TIME:  9:50 AM  FACILITATOR: Rebecca Denton OTR/L  TOPIC: MH Life Skills Group: Lifestyle Balance and Structure  [facility] Adult Dual Diagnosis Day Treatment  TRACK: 1    NUMBER OF PARTICIPANTS: 8                                      Service Modality:  Video Visit     Telemedicine Visit: The patient's condition can be safely assessed and treated via synchronous audio and visual telemedicine encounter.      Reason for Telemedicine Visit: Services only offered telehealth    Originating Site (Patient Location): Patient's home    Distant Site (Provider Location): [facility]    Consent:  The patient/guardian has verbally consented to: the potential risks and benefits of telemedicine (video visit) versus in person care; bill my insurance or make self-payment for services provided; and responsibility for payment of non-covered services.     Patient would like the video invitation sent by:  My Chart    Mode of Communication:  Video Conference via Medical Zoom    As the provider I attest to compliance with applicable laws and regulations related to telemedicine.         Summary of Group / Topics Discussed:  Lifestyle Balance and Strucure:  Time Management: Time for Tips and Tips for Time: Patients were introduced to how effective time management is beneficial to self esteem, relationships with others, life balance, and other aspects of daily life.   Patients were taught strategies on how to improve time management skills.  Also discussed barriers to effective time management and worked on problem solving these.     Patient Session Goals / Objectives:    Facilitated the discussion on challenges/barriers and personal situations with time management     Identified specific techniques  and strategies to improve time management to support mental health recovery       Identified a plan to implement strategies into daily life to support improved time management         Patient Participation / Response:  Fully participated with the group by sharing personal reflections / insights and openly received / provided feedback with other participants.    Patient presentation: constricted affect; active in discussion sharing barriers to time management for him, Verbalized understanding of content and Patient would benefit from additional opportunities to practice the content to be able to generalize it to their everyday life with increased intentionality, consistency, and efficacy in support of their psychiatric recovery    Treatment Plan:  Patient has a current master individualized treatment plan.  See Epic treatment plan for more information.    Rebecca Denton, OTR/L

## 2021-08-19 NOTE — GROUP NOTE
Psychotherapy Group Note    PATIENT'S NAME: Xavier Odell  MRN:   [id number]  :   1985  ACCT. NUMBER: [id number]  DATE OF SERVICE: 21  START TIME: 11:00 AM  END TIME: 11:50 AM  FACILITATOR: Suzanne Jacobson LPCC  TOPIC:  EBP Group: DDP Relapse Prevention  [facility] Adult Dual Diagnosis Day Treatment  TRACK: 1    NUMBER OF PARTICIPANTS: 8                                      Service Modality:  Video Visit     Telemedicine Visit: The patient's condition can be safely assessed and treated via synchronous audio and visual telemedicine encounter.      Reason for Telemedicine Visit: Services only offered telehealth    Originating Site (Patient Location): Patient's home    Distant Site (Provider Location): Provider Remote Setting- Home Office    Consent:  The patient/guardian has verbally consented to: the potential risks and benefits of telemedicine (video visit) versus in person care; bill my insurance or make self-payment for services provided; and responsibility for payment of non-covered services.     Patient would like the video invitation sent by:  My Chart    Mode of Communication:  Video Conference via Medical Zoom    As the provider I attest to compliance with applicable laws and regulations related to telemedicine.          Summary of Group / Topics Discussed:  DDP Relapse Prevention: Relationship Mapping: Patients identified different types of relationships they have in their life and examined if there is conflict or closeness, the degree of conflict or closeness, and the reason for conflict. The goal of this topic is to help patients gain awareness of the relationships they have with others, identify types of conflict in patients  lives and how they impact symptoms/functioning, and identify how they can improve relationships with relationship and interpersonal skills they have learned.     Patient Session Goals / Objectives:    Familiarized patients with awareness to the different  types of relationships they may have with different people and substances in their lives    Discussed and practiced strategies to promote healthier understanding of interpersonal relationships with a focus on awareness of conflict, the causes of conflict, and the ways to transform conflict    Discussed the use of substances and its impact on their relationships      Patient Participation / Response:  Fully participated with the group by sharing personal reflections / insights and openly received / provided feedback with other participants.    Demonstrated understanding of topics discussed through group discussion and participation, Demonstrated understanding of utilizing relapse prevention skills to manage urges and maintain sobriety and Identified / Expressed personal readiness to utilize relapse prevention skills    Treatment Plan:  Patient has a current master individualized treatment plan.  See Epic treatment plan for more information.    Suzanne Jacobson, [facility]

## 2021-08-19 NOTE — GROUP NOTE
"Process Group Note    PATIENT'S NAME: Xavier Odell  MRN:   [id number]  :   1985  ACCT. NUMBER: [id number]  DATE OF SERVICE: 21  START TIME: 10:00 AM  END TIME: 10:50 AM  FACILITATOR: Suzanne Jacobson [facility]  TOPIC:  Process Group    Diagnoses:  296.33 (F33.2) Major Depressive Disorder, Recurrent Episode, Severe.  4. Other Diagnoses that is relevant to services:   Substance-Related & Addictive Disorders Alcohol Use Disorder   303.90 (F10.20) Severe.    [facility] Adult Dual Diagnosis Day Treatment  TRACK: 1    NUMBER OF PARTICIPANTS: 8                                      Service Modality:  Video Visit     Telemedicine Visit: The patient's condition can be safely assessed and treated via synchronous audio and visual telemedicine encounter.      Reason for Telemedicine Visit: Services only offered telehealth    Originating Site (Patient Location): Patient's home    Distant Site (Provider Location): Provider Remote Setting- Home Office    Consent:  The patient/guardian has verbally consented to: the potential risks and benefits of telemedicine (video visit) versus in person care; bill my insurance or make self-payment for services provided; and responsibility for payment of non-covered services.     Patient would like the video invitation sent by:  My Chart    Mode of Communication:  Video Conference via Medical Zoom    As the provider I attest to compliance with applicable laws and regulations related to telemedicine.                Data:    Session content: At the start of this group, patients were invited to check in by identifying themselves, describing their current emotional status, and identifying issues to address in this group.   Area(s) of treatment focus addressed in this session included Symptom Management, Personal Safety and Abstinence/Relapse Prevention.    Xavier reported feeling \"anxious\" today.  He reported that someone showed up at his mom's house to serve him paper's and he " doesn't know what it's about.  His goal today is to stay calm.  Client declined additional process time but contributed to group discussion and provided feedback and support to peers.      Therapeutic Interventions/Treatment Strategies:  Psychotherapist offered support, feedback and validation and reinforced use of skills.    Assessment:    Patient response:   Patient responded to session by accepting feedback, giving feedback and listening    Possible barriers to participation / learning include: and no barriers identified    Health Issues:   None reported       Substance Use Review:   Substance Use: Substance use has decreased    Mental Status/Behavioral Observations  Appearance:   Appropriate   Eye Contact:   Good   Psychomotor Behavior: Normal   Attitude:   Cooperative   Orientation:   All  Speech   Rate / Production: Normal    Volume:  Normal   Mood:    Anxious   Affect:    Appropriate   Thought Content:   Clear  Thought Form:  Coherent  Logical     Insight:    Good     Plan:     Safety Plan: No current safety concerns identified.  Recommended that patient call 911 or go to the local ED should there be a change in any of these risk factors.     Barriers to treatment: None identified    Patient Contracts (see media tab):  None    Substance Use: Provided encouragement towards sobriety    Provided support and affirmation for steps taken towards sobriety      Continue or Discharge: Patient will continue in Adult Dual Disorder Program (DDP) as planned. Patient is likely to benefit from learning and using skills as they work toward the goals identified in their treatment plan.      Suzanne Jacobson, [facility]  August 19, 2021

## 2021-08-20 ENCOUNTER — HOSPITAL ENCOUNTER (OUTPATIENT)
Dept: BEHAVIORAL HEALTH | Facility: CLINIC | Age: 36
End: 2021-08-20
Attending: PSYCHIATRY & NEUROLOGY
Payer: COMMERCIAL

## 2021-08-20 PROCEDURE — G0177 OPPS/PHP; TRAIN & EDUC SERV: HCPCS | Mod: GT

## 2021-08-20 PROCEDURE — 90853 GROUP PSYCHOTHERAPY: CPT | Mod: GT | Performed by: COUNSELOR

## 2021-08-20 PROCEDURE — G0177 OPPS/PHP; TRAIN & EDUC SERV: HCPCS | Mod: GT | Performed by: OCCUPATIONAL THERAPIST

## 2021-08-20 NOTE — GROUP NOTE
"Process Group Note    PATIENT'S NAME: Xavier Odell  MRN:   [id number]  :   1985  ACCT. NUMBER: [id number]  DATE OF SERVICE: 21  START TIME: 10:00 AM  END TIME: 10:50 AM  FACILITATOR: Suzanne Jacobson [facility]  TOPIC:  Process Group    Diagnoses:  296.33 (F33.2) Major Depressive Disorder, Recurrent Episode, Severe.  4. Other Diagnoses that is relevant to services:   Substance-Related & Addictive Disorders Alcohol Use Disorder   303.90 (F10.20) Severe.      [facility] Adult Dual Diagnosis Day Treatment  TRACK: 1    NUMBER OF PARTICIPANTS: 7                                      Service Modality:  Video Visit     Telemedicine Visit: The patient's condition can be safely assessed and treated via synchronous audio and visual telemedicine encounter.      Reason for Telemedicine Visit: Services only offered telehealth    Originating Site (Patient Location): Patient's home    Distant Site (Provider Location): Provider Remote Setting- Home Office    Consent:  The patient/guardian has verbally consented to: the potential risks and benefits of telemedicine (video visit) versus in person care; bill my insurance or make self-payment for services provided; and responsibility for payment of non-covered services.     Patient would like the video invitation sent by:  My Chart    Mode of Communication:  Video Conference via Medical Zoom    As the provider I attest to compliance with applicable laws and regulations related to telemedicine.                Data:    Session content: At the start of this group, patients were invited to check in by identifying themselves, describing their current emotional status, and identifying issues to address in this group.   Area(s) of treatment focus addressed in this session included Symptom Management, Personal Safety and Abstinence/Relapse Prevention.    Xavier reported feeling \"relaxed\" today.  His goal for the day is to continue making progress on cleaning his place.  "     Therapeutic Interventions/Treatment Strategies:  Psychotherapist offered support, feedback and validation.    Assessment:    Patient response:   Patient responded to session by accepting feedback, giving feedback and listening    Possible barriers to participation / learning include: and no barriers identified    Health Issues:   None reported       Substance Use Review:   Substance Use: Substance use has decreased    Mental Status/Behavioral Observations  Appearance:   Appropriate   Eye Contact:   Good   Psychomotor Behavior: Normal   Attitude:   Cooperative   Orientation:   All  Speech   Rate / Production: Normal    Volume:  Normal   Mood:    Normal  Affect:    Appropriate   Thought Content:   Clear  Thought Form:  Coherent  Logical     Insight:    Good     Plan:     Safety Plan: No current safety concerns identified.  Recommended that patient call 911 or go to the local ED should there be a change in any of these risk factors.     Barriers to treatment: None identified    Patient Contracts (see media tab):  None    Substance Use: Provided encouragement towards sobriety    Provided support and affirmation for steps taken towards sobriety      Continue or Discharge: Patient will continue in Adult Day Treatment (ADT)  as planned. Patient is likely to benefit from learning and using skills as they work toward the goals identified in their treatment plan.      Suzanne Jacobson, [facility]  August 20, 2021

## 2021-08-20 NOTE — GROUP NOTE
Psychoeducation Group Note    PATIENT'S NAME: Xavier Odell  MRN:   [id number]  :   1985  ACCT. NUMBER: [id number]  DATE OF SERVICE: 21  START TIME:  9:00 AM  END TIME:  9:50 AM  FACILITATOR: Ozzy Carlson, RN; Birgit Jacob RN  TOPIC:  RN Group: Health Maintenance                                    Service Modality:  Video Visit     Telemedicine Visit: The patient's condition can be safely assessed and treated via synchronous audio and visual telemedicine encounter.      Reason for Telemedicine Visit:  Covid19    Originating Site (Patient Location): Patient's home    Distant Site (Provider Location): Provider Remote Setting- Home Office    Consent:  The patient/guardian has verbally consented to: the potential risks and benefits of telemedicine (video visit) versus in person care; bill my insurance or make self-payment for services provided; and responsibility for payment of non-covered services.     Patient would like the video invitation sent by:  My Chart    Mode of Communication:  Video Conference via Medical Zoom    As the provider I attest to compliance with applicable laws and regulations related to telemedicine.          [facility] Adult Dual Diagnosis Day Treatment  TRACK: DDP1    NUMBER OF PARTICIPANTS: 7    Summary of Group / Topics Discussed:  Health Maintenance: Weekend planning: Patients were given time to complete a weekend plan of what they will do to promote wellness and sobriety over the weekend when they do not have the structure of group. Patients were encouraged to review progress on their treatment goals and were challenged to identify ways to work toward meeting them. Patients identified and discussed foreseeable barriers to success over the weekend and then developed a plan to overcome them. Patients reviewed their distress coping skills and social support network and discussed this with the group.       Patient Session Goals / Objectives:    ?    Identified  activities to engage in that promote balance in wellness  ?    Distinguished possible barriers to success over the weekend and created a plan to overcome them  ?    Listed distress coping skills and identified social support network to utilize if in crisis during the weekend          Patient Participation / Response:  Fully participated with the group by sharing personal reflections / insights and openly received / provided feedback with other participants.    Demonstrated understanding of topics discussed through group discussion and participation    Treatment Plan:  Patient has a current master individualized treatment plan.  See Epic treatment plan for more information.    Ozzy Carlson RN

## 2021-08-20 NOTE — GROUP NOTE
"Psychoeducation Group Note    PATIENT'S NAME: Xavier Odell  MRN:   [id number]  :   1985  ACCT. NUMBER: [id number]  DATE OF SERVICE: 21  START TIME: 11:00 AM  END TIME: 11:50 AM  FACILITATOR: Rebecca Denton OTR/L  TOPIC: MH Life Skills Group: Resiliency Development  [facility] Adult Dual Diagnosis Day Treatment  TRACK: 1    NUMBER OF PARTICIPANTS: 5                                      Service Modality:  Video Visit     Telemedicine Visit: The patient's condition can be safely assessed and treated via synchronous audio and visual telemedicine encounter.      Reason for Telemedicine Visit: Services only offered telehealth    Originating Site (Patient Location): Patient's home    Distant Site (Provider Location): [facility] Outpatient Setting: [facility]    Consent:  The patient/guardian has verbally consented to: the potential risks and benefits of telemedicine (video visit) versus in person care; bill my insurance or make self-payment for services provided; and responsibility for payment of non-covered services.     Patient would like the video invitation sent by:  My Chart    Mode of Communication:  Video Conference via Medical Zoom    As the provider I attest to compliance with applicable laws and regulations related to telemedicine.         Summary of Group / Topics Discussed:  Resiliency Development:  Self Esteem and Self Compassion: Occupational Gifts: Patients were given the opportunity to reflect and identified different types of occupations (activities) they participate in to maintain and/or build resilience in their lives.  Patients were taught about how \"doing\" promotes mental health recovery by providing, routine and structure, empowerment, sense of self, and connectedness.  Patients identified occupations (activities) that promote mental health: connection, centering, creation, contemplation, and contribution.  Patients were also given the opportunity to improve " self efficacy, self sufficiency, quality of life and a sense of mastery and competency by identifying positive aspects of their life and to instill hope.       Patient Session Goals / Objectives:    Identified occupations (activities) they can use to promote mental health recovery and as a way to effectively manage both mental health and substance abuse/abuse symptoms     Improved awareness of positive qualities of occupations they currently participate in and new occupations they might try and how this contribute to the process of recovery and mental health wellbeing and resiliency      Established a plan for practice of these skills in their own environments    Practiced and reflected on how to generalize taught skills to their everyday life       Patient Participation / Response:  Fully participated with the group by sharing personal reflections / insights and openly received / provided feedback with other participants.    Patient presentation: active in group sharing insights and providing feedback to peers, Verbalized understanding of content and Patient would benefit from additional opportunities to practice the content to be able to generalize it to their everyday life with increased intentionality, consistency, and efficacy in support of their psychiatric recovery    Treatment Plan:  Patient has a current master individualized treatment plan.  See Epic treatment plan for more information.    Rebecca Denton, OTR/L

## 2021-08-23 ENCOUNTER — HOSPITAL ENCOUNTER (OUTPATIENT)
Dept: BEHAVIORAL HEALTH | Facility: CLINIC | Age: 36
End: 2021-08-23
Attending: PSYCHIATRY & NEUROLOGY
Payer: COMMERCIAL

## 2021-08-23 PROCEDURE — G0177 OPPS/PHP; TRAIN & EDUC SERV: HCPCS | Mod: GT,95

## 2021-08-23 PROCEDURE — 90853 GROUP PSYCHOTHERAPY: CPT | Mod: GT | Performed by: COUNSELOR

## 2021-08-23 PROCEDURE — G0177 OPPS/PHP; TRAIN & EDUC SERV: HCPCS | Mod: GT | Performed by: OCCUPATIONAL THERAPIST

## 2021-08-23 PROCEDURE — G0177 OPPS/PHP; TRAIN & EDUC SERV: HCPCS | Mod: GT,95 | Performed by: OCCUPATIONAL THERAPIST

## 2021-08-23 NOTE — GROUP NOTE
Psychoeducation Group Note    PATIENT'S NAME: Xavier Odell  MRN:   [id number]  :   1985  ACCT. NUMBER: [id number]  DATE OF SERVICE: 21  START TIME: 11:00 AM  END TIME: 11:50 AM  FACILITATOR: Ozzy Carlson RN  TOPIC: DARLEEN RN Group: Brain Health                                    Service Modality:  Video Visit     Telemedicine Visit: The patient's condition can be safely assessed and treated via synchronous audio and visual telemedicine encounter.      Reason for Telemedicine Visit:  Covid19    Originating Site (Patient Location): Patient's home    Distant Site (Provider Location): Provider Remote Setting- Home Office    Consent:  The patient/guardian has verbally consented to: the potential risks and benefits of telemedicine (video visit) versus in person care; bill my insurance or make self-payment for services provided; and responsibility for payment of non-covered services.     Patient would like the video invitation sent by:  My Chart    Mode of Communication:  Video Conference via Medical Zoom    As the provider I attest to compliance with applicable laws and regulations related to telemedicine.        [facility] Adult Dual Diagnosis Day Treatment  TRACK: DDP1    NUMBER OF PARTICIPANTS: 4    Summary of Group / Topics Discussed:  Brain Health:  Pathophysiology of Drugs on the Brain: Patients were educated on basic neuroscience and the normal role and function of the limbic system, diencephalon, cerebral cortex, and the anatomy of neurons and neurotransmitters. The natural reward circuit was discussed and the effect of drugs, how they work, and how use leads to addiction was also examined. Various pharmacologic, psychotherapeutic, and complementary treatment options were reviewed.     Patient Session Goals / Objectives:  ? Described basic neuroscience with emphasis on the reward system   ? Explained how drugs work in the brain and how chemical/substance use can lead to  addiction  ? Identified and described pharmacologic, psychotherapeutic, and complementary treatment options      Patient Participation / Response:  Fully participated with the group by sharing personal reflections / insights and openly received / provided feedback with other participants.    Demonstrated understanding of topics discussed through group discussion and participation    Treatment Plan:  Patient has a current master individualized treatment plan.  See Epic treatment plan for more information.    Ozzy Carlson RN

## 2021-08-23 NOTE — GROUP NOTE
Psychoeducation Group Note    PATIENT'S NAME: Xavier Odell  MRN:   [id number]  :   1985  ACCT. NUMBER: [id number]  DATE OF SERVICE: 21  START TIME: 10:00 AM  END TIME: 10:50 AM  FACILITATOR: Rebecca Denton OTR/L  TOPIC: MH Life Skills Group: Lifestyle Balance and Structure  [facility] Adult Dual Diagnosis Day Treatment  TRACK: 1    NUMBER OF PARTICIPANTS: 4                                      Service Modality:  Video Visit     Telemedicine Visit: The patient's condition can be safely assessed and treated via synchronous audio and visual telemedicine encounter.      Reason for Telemedicine Visit: Services only offered telehealth    Originating Site (Patient Location): Patient's home    Distant Site (Provider Location): [facility]    Consent:  The patient/guardian has verbally consented to: the potential risks and benefits of telemedicine (video visit) versus in person care; bill my insurance or make self-payment for services provided; and responsibility for payment of non-covered services.     Patient would like the video invitation sent by:  My Chart    Mode of Communication:  Video Conference via Medical Zoom    As the provider I attest to compliance with applicable laws and regulations related to telemedicine.         Summary of Group / Topics Discussed:  Lifestyle Balance and Strucure:  Goal-setting & integration: Patients were introduced to the process and benefits of setting realistic, timely, and achievable goals to help support their ability to follow through with meaningful personal, health, recovery and treatment goals that they would like to achieve to improve overall functioning.  Patients were also taught and then practiced techniques to manage a variety of obstacles to achieve their goals and how to break goals into manageable pieces.  Patients also reported on follow through of goals.     Patient Session Goals /  Objectives:    Identified and wrote meaningful SMART goals to engage in meaningful activities of daily living     Identified and problem solved barriers to achieving goals     Identified accomplishments from the previous week and shared with peers     Identified plan to support follow through on goals and reflection on progress made          Patient Participation / Response:  Fully participated with the group by sharing personal reflections / insights and openly received / provided feedback with other participants.    Patient presentation: active in group sharing progress made and goals for the week ahead , Verbalized understanding of content and Patient would benefit from additional opportunities to practice the content to be able to generalize it to their everyday life with increased intentionality, consistency, and efficacy in support of their psychiatric recovery    Treatment Plan:  Patient has a current master individualized treatment plan.  See Epic treatment plan for more information.    Rebecca Denton, OTR/L

## 2021-08-23 NOTE — GROUP NOTE
"Process Group Note    PATIENT'S NAME: Xavier Odell  MRN:   [id number]  :   1985  ACCT. NUMBER: [id number]  DATE OF SERVICE: 21  START TIME:  9:00 AM  END TIME:  9:50 AM  FACILITATOR: Suzanne Jacobson [facility]  TOPIC:  Process Group    Diagnoses:  296.33 (F33.2) Major Depressive Disorder, Recurrent Episode, Severe.  4. Other Diagnoses that is relevant to services:   Substance-Related & Addictive Disorders Alcohol Use Disorder   303.90 (F10.20) Severe.      [facility] Adult Dual Diagnosis Day Treatment  TRACK: 1    NUMBER OF PARTICIPANTS: 3                                      Service Modality:  Video Visit     Telemedicine Visit: The patient's condition can be safely assessed and treated via synchronous audio and visual telemedicine encounter.      Reason for Telemedicine Visit: Services only offered telehealth    Originating Site (Patient Location): Patient's home    Distant Site (Provider Location): Provider Remote Setting- Home Office    Consent:  The patient/guardian has verbally consented to: the potential risks and benefits of telemedicine (video visit) versus in person care; bill my insurance or make self-payment for services provided; and responsibility for payment of non-covered services.     Patient would like the video invitation sent by:  My Chart    Mode of Communication:  Video Conference via Medical Zoom    As the provider I attest to compliance with applicable laws and regulations related to telemedicine.                Data:    Session content: At the start of this group, patients were invited to check in by identifying themselves, describing their current emotional status, and identifying issues to address in this group.   Area(s) of treatment focus addressed in this session included Symptom Management, Personal Safety and Abstinence/Relapse Prevention.    Xavier reported feeling \"unmotivated\" today.  His goal is to be kind to himself and not beat himself up for not getting much " "done today.  He reported he hung out with his friends this weekend and had a really good time.  He reported that he felt a bit uncomfortable but was able to sit with the discomfort and not turn to drinking.     Therapeutic Interventions/Treatment Strategies:  Psychotherapist offered support, feedback and validation and reinforced use of skills. Treatment modalities used include Motivational Interviewing, Cognitive Behavioral Therapy and Dialectical Behavioral Therapy. Interventions include Coping Skills: Discussed how the use of intentional \"in the moment\" actions can help reduce distress.    Assessment:    Patient response:   Patient responded to session by accepting feedback, giving feedback and listening    Possible barriers to participation / learning include: and no barriers identified    Health Issues:   None reported       Substance Use Review:   Substance Use: No active concerns identified.    Mental Status/Behavioral Observations  Appearance:   Appropriate   Eye Contact:   Good   Psychomotor Behavior: Normal   Attitude:   Cooperative   Orientation:   All  Speech   Rate / Production: Normal    Volume:  Normal   Mood:    Normal  Affect:    Appropriate   Thought Content:   Clear  Thought Form:  Coherent  Logical     Insight:    Good     Plan:     Safety Plan: No current safety concerns identified.  Recommended that patient call 911 or go to the local ED should there be a change in any of these risk factors.     Barriers to treatment: None identified    Patient Contracts (see media tab):  None    Substance Use: Provided encouragement towards sobriety    Provided support and affirmation for steps taken towards sobriety      Continue or Discharge: Patient will continue in Adult Dual Disorder Program (DDP) as planned. Patient is likely to benefit from learning and using skills as they work toward the goals identified in their treatment plan.      Suzanne Jacobson, [facility]  August 23, 2021    "

## 2021-08-24 ENCOUNTER — HOSPITAL ENCOUNTER (OUTPATIENT)
Dept: BEHAVIORAL HEALTH | Facility: CLINIC | Age: 36
End: 2021-08-24
Attending: PSYCHIATRY & NEUROLOGY
Payer: COMMERCIAL

## 2021-08-24 PROCEDURE — 90853 GROUP PSYCHOTHERAPY: CPT | Mod: GT,95 | Performed by: COUNSELOR

## 2021-08-24 PROCEDURE — G0177 OPPS/PHP; TRAIN & EDUC SERV: HCPCS | Mod: GT

## 2021-08-24 NOTE — GROUP NOTE
Psychoeducation Group Note    PATIENT'S NAME: Xaiver Odell  MRN:   [id number]  :   1985  ACCT. NUMBER: [id number]  DATE OF SERVICE: 21  START TIME: 11:00 AM  END TIME: 11:50 AM  FACILITATOR: Ozzy Carlson RN  TOPIC: DARLEEN RN Group: Brain Health                                    Service Modality:  Video Visit     Telemedicine Visit: The patient's condition can be safely assessed and treated via synchronous audio and visual telemedicine encounter.      Reason for Telemedicine Visit:  covid19    Originating Site (Patient Location): Patient's home    Distant Site (Provider Location): Provider Remote Setting- Home Office    Consent:  The patient/guardian has verbally consented to: the potential risks and benefits of telemedicine (video visit) versus in person care; bill my insurance or make self-payment for services provided; and responsibility for payment of non-covered services.     Patient would like the video invitation sent by:  My Chart    Mode of Communication:  Video Conference via Medical Zoom    As the provider I attest to compliance with applicable laws and regulations related to telemedicine.        [facility] Adult Dual Diagnosis Day Treatment  TRACK: DDP1    NUMBER OF PARTICIPANTS: 3    Summary of Group / Topics Discussed:  Brain Health:  Pathophysiology of Drugs on the Brain: Patients were educated on basic neuroscience and the normal role and function of the limbic system, diencephalon, cerebral cortex, and the anatomy of neurons and neurotransmitters. The natural reward circuit was discussed and the effect of drugs, how they work, and how use leads to addiction was also examined. Various pharmacologic, psychotherapeutic, and complementary treatment options were reviewed.     Patient Session Goals / Objectives:  ? Described basic neuroscience with emphasis on the reward system   ? Explained how drugs work in the brain and how chemical/substance use can lead to  addiction  ? Identified and described pharmacologic, psychotherapeutic, and complementary treatment options      Patient Participation / Response:  Fully participated with the group by sharing personal reflections / insights and openly received / provided feedback with other participants.    Demonstrated understanding of topics discussed through group discussion and participation    Treatment Plan:  Patient has a current master individualized treatment plan.  See Epic treatment plan for more information.    Ozzy Carlson RN

## 2021-08-24 NOTE — ADDENDUM NOTE
Encounter addended by: Rebecca Denton OTR/L on: 8/24/2021 10:24 AM   Actions taken: Charge Capture section accepted

## 2021-08-24 NOTE — GROUP NOTE
"Process Group Note    PATIENT'S NAME: Xavier Odell  MRN:   [id number]  :   1985  ACCT. NUMBER: [id number]  DATE OF SERVICE: 21  START TIME: 10:00 AM  END TIME: 10:50 AM  FACILITATOR: Suzanne Jacobson [facility]  TOPIC:  Process Group    Diagnoses:  296.33 (F33.2) Major Depressive Disorder, Recurrent Episode, Severe.  4. Other Diagnoses that is relevant to services:   Substance-Related & Addictive Disorders Alcohol Use Disorder   303.90 (F10.20) Severe.      [facility] Adult Dual Diagnosis Day Treatment  TRACK: 1    NUMBER OF PARTICIPANTS: 3                                      Service Modality:  Video Visit     Telemedicine Visit: The patient's condition can be safely assessed and treated via synchronous audio and visual telemedicine encounter.      Reason for Telemedicine Visit: Services only offered telehealth    Originating Site (Patient Location): Patient's home    Distant Site (Provider Location): Provider Remote Setting- Home Office    Consent:  The patient/guardian has verbally consented to: the potential risks and benefits of telemedicine (video visit) versus in person care; bill my insurance or make self-payment for services provided; and responsibility for payment of non-covered services.     Patient would like the video invitation sent by:  My Chart    Mode of Communication:  Video Conference via Medical Zoom    As the provider I attest to compliance with applicable laws and regulations related to telemedicine.                Data:    Session content: At the start of this group, patients were invited to check in by identifying themselves, describing their current emotional status, and identifying issues to address in this group.   Area(s) of treatment focus addressed in this session included Symptom Management, Personal Safety and Abstinence/Relapse Prevention.    Xavier reported feeling \"irritable\" this morning but feeling better now that he's in group.  His goal for the day is to work " on addressing some bills that he has.      Therapeutic Interventions/Treatment Strategies:  Psychotherapist offered support, feedback and validation.    Assessment:    Patient response:   Patient responded to session by accepting feedback, giving feedback and listening    Possible barriers to participation / learning include: and no barriers identified    Health Issues:   None reported       Substance Use Review:   Substance Use: Substance use has decreased    Mental Status/Behavioral Observations  Appearance:   Appropriate   Eye Contact:   Good   Psychomotor Behavior: Normal   Attitude:   Cooperative   Orientation:   All  Speech   Rate / Production: Normal    Volume:  Normal   Mood:    Depressed  Irritable  Normal  Affect:    Appropriate   Thought Content:   Clear  Thought Form:  Coherent  Logical     Insight:    Good     Plan:     Safety Plan: No current safety concerns identified.  Recommended that patient call 911 or go to the local ED should there be a change in any of these risk factors.     Barriers to treatment: None identified    Patient Contracts (see media tab):  None    Substance Use: Provided encouragement towards sobriety    Provided support and affirmation for steps taken towards sobriety      Continue or Discharge: Patient will continue in Adult Dual Disorder Program (DDP) as planned. Patient is likely to benefit from learning and using skills as they work toward the goals identified in their treatment plan.      Suzanne Jacobson, [facility]  August 24, 2021

## 2021-08-25 ENCOUNTER — HOSPITAL ENCOUNTER (OUTPATIENT)
Dept: BEHAVIORAL HEALTH | Facility: CLINIC | Age: 36
End: 2021-08-25
Attending: PSYCHIATRY & NEUROLOGY
Payer: COMMERCIAL

## 2021-08-25 PROCEDURE — 90853 GROUP PSYCHOTHERAPY: CPT | Mod: GT,95 | Performed by: MARRIAGE & FAMILY THERAPIST

## 2021-08-25 PROCEDURE — G0177 OPPS/PHP; TRAIN & EDUC SERV: HCPCS | Mod: GT

## 2021-08-25 PROCEDURE — 90853 GROUP PSYCHOTHERAPY: CPT | Mod: GT,95 | Performed by: COUNSELOR

## 2021-08-25 NOTE — GROUP NOTE
Psychotherapy Group Note    PATIENT'S NAME: Xavier Odell  MRN:   [id number]  :   1985  ACCT. NUMBER: [id number]  DATE OF SERVICE: 21  START TIME: 10:00 AM  END TIME: 10:50 AM  FACILITATOR: James Sequeira LMFT  TOPIC:  EBP Group: Emotions Management  [facility] Adult Dual Diagnosis Day Treatment  TRACK: 1    NUMBER OF PARTICIPANTS: 6                                      Service Modality:  Video Visit     Telemedicine Visit: The patient's condition can be safely assessed and treated via synchronous audio and visual telemedicine encounter.      Reason for Telemedicine Visit: Services only offered telehealth    Originating Site (Patient Location): Patient's home    Distant Site (Provider Location): Provider Remote Setting- Home Office    Consent:  The patient/guardian has verbally consented to: the potential risks and benefits of telemedicine (video visit) versus in person care; bill my insurance or make self-payment for services provided; and responsibility for payment of non-covered services.     Patient would like the video invitation sent by:  My Chart    Mode of Communication:  Video Conference via Medical Zoom    As the provider I attest to compliance with applicable laws and regulations related to telemedicine.         Summary of Group / Topics Discussed:  Emotions Management: Model of Emotions: Patients were introduced to the cyclical model of emotions.  Explored emotions are shaped by different events and one s interpretation of events.  Group discussed how emotions begin with an event, followed by one s interpretation, followed by associated feelings.  Discussion included a review of personal urges and actions that can/do follow an emotional experience in the patient s life, and the end results.    Patient Session Goals / Objectives:    Demonstrate understanding of types various emotions.    Identify and discuss specific emotions and when they occur; understand  triggers.    Identify individual emotions and physical sensations that accompany them.    Discuss urges and actions, and how to influence the intensity of emotional reactions and disrupt the cycle.      Discuss barriers to emotional regulation.    Choose 1-2 strategies to assist with emotional response to potentially distressing situations.      Patient Participation / Response:  Moderately participated, sharing some personal reflections / insights and adequately adequately received / provided feedback with other participants.    Demonstrated understanding of topics discussed through group discussion and participation and Expressed understanding of the relevance / importance of emotions management skills at distressing times in life    Treatment Plan:  Patient has a current master individualized treatment plan.  See Epic treatment plan for more information.    David Sequeira LMFT

## 2021-08-25 NOTE — GROUP NOTE
Psychoeducation Group Note    PATIENT'S NAME: Xavier Odell  MRN:   [id number]  :   1985  ACCT. NUMBER: [id number]  DATE OF SERVICE: 21  START TIME: 11:00 AM  END TIME: 11:50 AM  FACILITATOR: Ozzy Carlson RN  TOPIC: DARLEEN RN Group: Brain Health                                    Service Modality:  Video Visit     Telemedicine Visit: The patient's condition can be safely assessed and treated via synchronous audio and visual telemedicine encounter.      Reason for Telemedicine Visit:  Covid19    Originating Site (Patient Location): Patient's home    Distant Site (Provider Location): Provider Remote Setting- Home Office    Consent:  The patient/guardian has verbally consented to: the potential risks and benefits of telemedicine (video visit) versus in person care; bill my insurance or make self-payment for services provided; and responsibility for payment of non-covered services.     Patient would like the video invitation sent by:  My Chart    Mode of Communication:  Video Conference via Medical Zoom    As the provider I attest to compliance with applicable laws and regulations related to telemedicine.        [facility] Adult Dual Diagnosis Day Treatment  TRACK: DDP1    NUMBER OF PARTICIPANTS: 6    Summary of Group / Topics Discussed:  Brain Health:  Pathophysiology of Drugs on the Brain: Patients were educated on basic neuroscience and the normal role and function of the limbic system, diencephalon, cerebral cortex, and the anatomy of neurons and neurotransmitters. The natural reward circuit was discussed and the effect of drugs, how they work, and how use leads to addiction was also examined. Various pharmacologic, psychotherapeutic, and complementary treatment options were reviewed.     Patient Session Goals / Objectives:  ? Described basic neuroscience with emphasis on the reward system   ? Explained how drugs work in the brain and how chemical/substance use can lead to  addiction  ? Identified and described pharmacologic, psychotherapeutic, and complementary treatment options  ? Discussed first pass effect  ? Gave further reading rec: Spoon theory       Patient Participation / Response:  Fully participated with the group by sharing personal reflections / insights and openly received / provided feedback with other participants.    Demonstrated understanding of topics discussed through group discussion and participation    Treatment Plan:  Patient has a current master individualized treatment plan.  See Epic treatment plan for more information.    Ozzy Carlson RN

## 2021-08-25 NOTE — GROUP NOTE
Psychotherapy Group Note    PATIENT'S NAME: Xavier Odell  MRN:   [id number]  :   1985  ACCT. NUMBER: [id number]  DATE OF SERVICE: 21  START TIME:  9:00 AM  END TIME:  9:50 AM  FACILITATOR: Ruddy Singh LMFT  TOPIC:  EBP Group: Emotions Management  [facility] Adult Dual Diagnosis Day Treatment  TRACK: DDP1    NUMBER OF PARTICIPANTS: 3    Summary of Group / Topics Discussed:  Emotions Management: Understanding Emotions: Patients discussed the purpose of emotions and function they serve in our lives.  Reviewed core emotions, why they happen (triggers), and how they occur. The group assisted one anothers' understanding that: emotional experiences are important; difficult emotions have a place in our lives; and the differences between various emotions.    Patient Session Goals / Objectives:    Demonstrate understanding of types various emotions    Identify and discuss specific emotions and when they occur; understand triggers    Discuss barriers to emotional regulation    Service Modality:  Video Visit     Telemedicine Visit: The patient's condition can be safely assessed and treated via synchronous audio and visual telemedicine encounter.      Reason for Telemedicine Visit: Services only offered telehealth and due to COVID-19.    Originating Site (Patient Location): Patient's home    Distant Site (Provider Location): Provider Remote Setting- Home Office    Consent:  The patient/guardian has verbally consented to: the potential risks and benefits of telemedicine (video visit) versus in person care; bill my insurance or make self-payment for services provided; and responsibility for payment of non-covered services.     Patient would like the video invitation sent by:  My Chart    Mode of Communication:  Video Conference via Medical Zoom    As the provider I attest to compliance with applicable laws and regulations related to telemedicine.      Patient Participation / Response:  Fully  participated with the group by sharing personal reflections / insights and openly received / provided feedback with other participants.    Demonstrated understanding of topics discussed through group discussion and participation, Expressed understanding of the relevance / importance of emotions management skills at distressing times in life, Self-aware of experiences with difficult emotions, and strategies to employ to manage them, Identified emotions management strategies that have helped maintain / improve symptoms in the past and Identified / Expressed personal readiness to practice new emotions management skills    Treatment Plan:  Patient has a current master individualized treatment plan.  See Epic treatment plan for more information.    Ruddy Singh LMFT

## 2021-08-25 NOTE — GROUP NOTE
Process Group Note    PATIENT'S NAME: Xavier Odell  MRN:   [id number]  :   1985  ACCT. NUMBER: [id number]  DATE OF SERVICE: 21  START TIME:  9:00 AM  END TIME:  9:50 AM  FACILITATOR: Suzanne Jacobson [facility]  TOPIC:  Process Group    Diagnoses:  296.33 (F33.2) Major Depressive Disorder, Recurrent Episode, Severe.  4. Other Diagnoses that is relevant to services:   Substance-Related & Addictive Disorders Alcohol Use Disorder   303.90 (F10.20) Severe.      [facility] Adult Dual Diagnosis Day Treatment  TRACK: 1    NUMBER OF PARTICIPANTS: 5                                      Service Modality:  Video Visit     Telemedicine Visit: The patient's condition can be safely assessed and treated via synchronous audio and visual telemedicine encounter.      Reason for Telemedicine Visit: Services only offered telehealth    Originating Site (Patient Location): Patient's home    Distant Site (Provider Location): Provider Remote Setting- Home Office    Consent:  The patient/guardian has verbally consented to: the potential risks and benefits of telemedicine (video visit) versus in person care; bill my insurance or make self-payment for services provided; and responsibility for payment of non-covered services.     Patient would like the video invitation sent by:  My Chart    Mode of Communication:  Video Conference via Medical Zoom    As the provider I attest to compliance with applicable laws and regulations related to telemedicine.                Data:    Session content: At the start of this group, patients were invited to check in by identifying themselves, describing their current emotional status, and identifying issues to address in this group.   Area(s) of treatment focus addressed in this session included Symptom Management, Personal Safety and Abstinence/Relapse Prevention.    Xavier reported feeling irritable upon waking up but is feeling better now.  His goal is to work on being kinder to himself.   Patient declined additional process time but contributed to group discussion and provided feedback and support to peers.      Therapeutic Interventions/Treatment Strategies:  Psychotherapist offered support, feedback and validation and reinforced use of skills.    Assessment:    Patient response:   Patient responded to session by accepting feedback, giving feedback and listening    Possible barriers to participation / learning include: and no barriers identified    Health Issues:   None reported       Substance Use Review:   Substance Use: No active concerns identified.    Mental Status/Behavioral Observations  Appearance:   Appropriate   Eye Contact:   Good   Psychomotor Behavior: Normal   Attitude:   Cooperative   Orientation:   All  Speech   Rate / Production: Normal    Volume:  Normal   Mood:    Depressed  Irritable   Affect:    Appropriate   Thought Content:   Clear  Thought Form:  Coherent  Logical     Insight:    Good     Plan:     Safety Plan: No current safety concerns identified.  Recommended that patient call 911 or go to the local ED should there be a change in any of these risk factors.     Barriers to treatment: None identified    Patient Contracts (see media tab):  None    Substance Use: Provided encouragement towards sobriety    Provided support and affirmation for steps taken towards sobriety      Continue or Discharge: Patient will continue in Adult Dual Disorder Program (DDP) as planned. Patient is likely to benefit from learning and using skills as they work toward the goals identified in their treatment plan.      Suzanne Jacobson, [facility]  August 25, 2021

## 2021-08-26 ENCOUNTER — HOSPITAL ENCOUNTER (OUTPATIENT)
Dept: BEHAVIORAL HEALTH | Facility: CLINIC | Age: 36
End: 2021-08-26
Attending: PSYCHIATRY & NEUROLOGY
Payer: COMMERCIAL

## 2021-08-26 ENCOUNTER — LAB REQUISITION (OUTPATIENT)
Dept: LAB | Facility: CLINIC | Age: 36
End: 2021-08-26
Payer: COMMERCIAL

## 2021-08-26 DIAGNOSIS — F10.20 ALCOHOL DEPENDENCE, UNCOMPLICATED (H): ICD-10-CM

## 2021-08-26 DIAGNOSIS — R74.01 ELEVATION OF LEVELS OF LIVER TRANSAMINASE LEVELS: ICD-10-CM

## 2021-08-26 LAB
ALBUMIN SERPL-MCNC: 3.8 G/DL (ref 3.4–5)
ALP SERPL-CCNC: 99 U/L (ref 40–150)
ALT SERPL W P-5'-P-CCNC: 218 U/L (ref 0–70)
ANION GAP SERPL CALCULATED.3IONS-SCNC: 12 MMOL/L (ref 3–14)
AST SERPL W P-5'-P-CCNC: 114 U/L (ref 0–45)
BILIRUB SERPL-MCNC: 0.4 MG/DL (ref 0.2–1.3)
BUN SERPL-MCNC: 9 MG/DL (ref 7–30)
CALCIUM SERPL-MCNC: 8.8 MG/DL (ref 8.5–10.1)
CHLORIDE BLD-SCNC: 103 MMOL/L (ref 94–109)
CO2 SERPL-SCNC: 20 MMOL/L (ref 20–32)
CREAT SERPL-MCNC: 0.91 MG/DL (ref 0.66–1.25)
GFR SERPL CREATININE-BSD FRML MDRD: >90 ML/MIN/1.73M2
GLUCOSE BLD-MCNC: 132 MG/DL (ref 70–99)
INR PPP: 0.92 (ref 0.85–1.15)
POTASSIUM BLD-SCNC: 3.8 MMOL/L (ref 3.4–5.3)
PROT SERPL-MCNC: 8.4 G/DL (ref 6.8–8.8)
SODIUM SERPL-SCNC: 135 MMOL/L (ref 133–144)

## 2021-08-26 PROCEDURE — 90853 GROUP PSYCHOTHERAPY: CPT | Mod: GT,95 | Performed by: COUNSELOR

## 2021-08-26 PROCEDURE — G0177 OPPS/PHP; TRAIN & EDUC SERV: HCPCS | Mod: GT

## 2021-08-26 PROCEDURE — 90853 GROUP PSYCHOTHERAPY: CPT | Mod: GT | Performed by: COUNSELOR

## 2021-08-26 PROCEDURE — 85610 PROTHROMBIN TIME: CPT | Mod: ORL | Performed by: INTERNAL MEDICINE

## 2021-08-26 PROCEDURE — 80053 COMPREHEN METABOLIC PANEL: CPT | Mod: ORL | Performed by: INTERNAL MEDICINE

## 2021-08-26 NOTE — GROUP NOTE
Process Group Note    PATIENT'S NAME: Xavier Odell  MRN:   [id number]  :   1985  ACCT. NUMBER: [id number]  DATE OF SERVICE: 21  START TIME: 10:00 AM  END TIME: 10:50 AM  FACILITATOR: Suzanne Jacobson [facility]  TOPIC:  Process Group    Diagnoses:  296.33 (F33.2) Major Depressive Disorder, Recurrent Episode, Severe.  4. Other Diagnoses that is relevant to services:   Substance-Related & Addictive Disorders Alcohol Use Disorder   303.90 (F10.20) Severe.      [facility] Adult Dual Diagnosis Day Treatment  TRACK: 1    NUMBER OF PARTICIPANTS: 5                                      Service Modality:  Video Visit     Telemedicine Visit: The patient's condition can be safely assessed and treated via synchronous audio and visual telemedicine encounter.      Reason for Telemedicine Visit: Services only offered telehealth    Originating Site (Patient Location): Patient's home    Distant Site (Provider Location): Provider Remote Setting- Home Office    Consent:  The patient/guardian has verbally consented to: the potential risks and benefits of telemedicine (video visit) versus in person care; bill my insurance or make self-payment for services provided; and responsibility for payment of non-covered services.     Patient would like the video invitation sent by:  My Chart    Mode of Communication:  Video Conference via Medical Zoom    As the provider I attest to compliance with applicable laws and regulations related to telemedicine.                Data:    Session content: At the start of this group, patients were invited to check in by identifying themselves, describing their current emotional status, and identifying issues to address in this group.   Area(s) of treatment focus addressed in this session included Symptom Management, Personal Safety and Abstinence/Relapse Prevention.    Xavier reported feeling good today.  His goal is to get back to [employer] about an interview offer.  Patient declined additional  process time but contributed to group discussion and provided feedback and support to peers.      Therapeutic Interventions/Treatment Strategies:  Psychotherapist offered support, feedback and validation and reinforced use of skills.    Assessment:    Patient response:   Patient responded to session by accepting feedback, giving feedback and listening    Possible barriers to participation / learning include: and no barriers identified    Health Issues:   None reported       Substance Use Review:   Substance Use: Substance use has decreased    Mental Status/Behavioral Observations  Appearance:   Appropriate   Eye Contact:   Good   Psychomotor Behavior: Normal   Attitude:   Cooperative   Orientation:   All  Speech   Rate / Production: Normal    Volume:  Normal   Mood:    Depressed   Affect:    Appropriate   Thought Content:   Clear  Thought Form:  Coherent  Logical     Insight:    Good     Plan:     Safety Plan: No current safety concerns identified.  Recommended that patient call 911 or go to the local ED should there be a change in any of these risk factors.     Barriers to treatment: None identified    Patient Contracts (see media tab):  None    Substance Use: Provided encouragement towards sobriety    Provided support and affirmation for steps taken towards sobriety      Continue or Discharge: Patient will continue in Adult Day Treatment (ADT)  as planned. Patient is likely to benefit from learning and using skills as they work toward the goals identified in their treatment plan.      Suzanne Jacobson, [facility]  August 26, 2021

## 2021-08-26 NOTE — GROUP NOTE
Psychotherapy Group Note    PATIENT'S NAME: Xavier Odell  MRN:   [id number]  :   1985  [id number]  DATE OF SERVICE: 21  START TIME: 11:00 AM  END TIME: 11:50 AM  FACILITATOR: Suzanne Jacobson LP[facility]  TOPIC:  EBP Group: DDP Relapse Prevention  [facility] Adult Dual Diagnosis Day Treatment  TRACK: 1    NUMBER OF PARTICIPANTS: 5                                      Service Modality:  Video Visit     Telemedicine Visit: The patient's condition can be safely assessed and treated via synchronous audio and visual telemedicine encounter.      Reason for Telemedicine Visit: Services only offered telehealth    Originating Site (Patient Location): Patient's home    Distant Site (Provider Location): Provider Remote Setting- Home Office    Consent:  The patient/guardian has verbally consented to: the potential risks and benefits of telemedicine (video visit) versus in person care; bill my insurance or make self-payment for services provided; and responsibility for payment of non-covered services.     Patient would like the video invitation sent by:  My Chart    Mode of Communication:  Video Conference via Medical Zoom    As the provider I attest to compliance with applicable laws and regulations related to telemedicine.          Summary of Group / Topics Discussed:  DDP Relapse Prevention: Observing and Describing Process of Relapse: Patients explored the process of relapse and what individual factors can contribute to relapse. This will assist patients in recognizing that relapse is a process that often begins well before the actual use of a substance. Patients discussed their own personal vulnerabilities, emotions, cravings, urges, situations, and thoughts that may lead to a relapse and what has led to past relapses.     Patient Session Goals / Objectives:    Verbalized understanding the importance of awareness of factors that contribute to relapse     Verbalized understanding that relapse  is a process    Shared personal vulnerabilities, thoughts, emotions, and situations that may lead to relapse     Described skills to manage factors that contribute to relapse         Patient Participation / Response:  Fully participated with the group by sharing personal reflections / insights and openly received / provided feedback with other participants.    Demonstrated understanding of topics discussed through group discussion and participation, Demonstrated understanding of utilizing relapse prevention skills to manage urges and maintain sobriety and Identified / Expressed personal readiness to utilize relapse prevention skills    Treatment Plan:  Patient has a current master individualized treatment plan.  See Epic treatment plan for more information.    [facility] Jacobson, [facility]

## 2021-08-26 NOTE — GROUP NOTE
Psychoeducation Group Note    PATIENT'S NAME: Xavier Odell  MRN:   [id number]  :   1985  ACCT. NUMBER: [id number]  DATE OF SERVICE: 21  START TIME:  9:00 AM  END TIME:  9:50 AM  FACILITATOR: Ijeoma Ordoñez MOTR/ROBBIE; Rebecca Denton OTR/L  TOPIC: MH Life Skills Group: Communication and Social Skills Development  [facility] Adult Dual Diagnosis Day Treatment  TRACK: 1    NUMBER OF PARTICIPANTS: 5                                      Service Modality:  Video Visit     Telemedicine Visit: The patient's condition can be safely assessed and treated via synchronous audio and visual telemedicine encounter.      Reason for Telemedicine Visit: Services only offered telehealth    Originating Site (Patient Location): Patient's home    Distant Site (Provider Location): Provider Remote Setting- Home Office    Consent:  The patient/guardian has verbally consented to: the potential risks and benefits of telemedicine (video visit) versus in person care; bill my insurance or make self-payment for services provided; and responsibility for payment of non-covered services.     Patient would like the video invitation sent by:  My Chart    Mode of Communication:  Video Conference via Medical Zoom    As the provider I attest to compliance with applicable laws and regulations related to telemedicine.       Summary of Group / Topics Discussed:  Communication and Social Skills Development: Social Supports: Healthy Relationships: Healthy Relationships:  Patients were taught and gained awareness of the importance of giving and receiving in relationships.  Patients were taught skills on how to nurture existing relationships as well as build new relationships.  Patients identified both personal strengths and opportunities for growth in this area to improve overall communication and connection with other people.      Patient Session Goals / Objectives:    Identified strengths and opportunities for growth in building and sustaining  healthy relationships and how this impacts their ability to communicate clearly with other people       Improved awareness of important aspects of healthy relationships and how this relates to mental health recovery in FLO    Established a plan for practice of these skills in their own environments    Practiced and reflected on how to generalize taught skills to their everyday life      Patient Participation / Response:  Fully participated with the group by sharing personal reflections / insights and openly received / provided feedback with other participants.    Patient presentation: engaged, shared personal experiences with group, gave feedback, Demonstrated understanding of content through discussion  and Patient would benefit from additional opportunities to practice the content to be able to generalize it to their everyday life with increased intentionality, consistency, and efficacy in support of their psychiatric recovery    Treatment Plan:  Patient has a current master individualized treatment plan.  See Epic treatment plan for more information.    Ijeoma Ordoñez MA, OTR/L

## 2021-08-27 ENCOUNTER — HOSPITAL ENCOUNTER (OUTPATIENT)
Dept: BEHAVIORAL HEALTH | Facility: CLINIC | Age: 36
End: 2021-08-27
Attending: PSYCHIATRY & NEUROLOGY
Payer: COMMERCIAL

## 2021-08-27 PROCEDURE — 90853 GROUP PSYCHOTHERAPY: CPT | Mod: GT,95 | Performed by: COUNSELOR

## 2021-08-27 PROCEDURE — G0177 OPPS/PHP; TRAIN & EDUC SERV: HCPCS | Mod: GT

## 2021-08-27 PROCEDURE — G0177 OPPS/PHP; TRAIN & EDUC SERV: HCPCS | Mod: GT | Performed by: OCCUPATIONAL THERAPIST

## 2021-08-27 NOTE — GROUP NOTE
"Process Group Note    PATIENT'S NAME: Xavier Odell  MRN:   [id number]  :   1985  ACCT. NUMBER: [id number]  DATE OF SERVICE: 21  START TIME: 10:00 AM  END TIME: 10:50 AM  FACILITATOR: Suzanne Jacobson [facility]  TOPIC:  Process Group    Diagnoses:  296.33 (F33.2) Major Depressive Disorder, Recurrent Episode, Severe.  4. Other Diagnoses that is relevant to services:   Substance-Related & Addictive Disorders Alcohol Use Disorder   303.90 (F10.20) Severe.      [facility] Adult Dual Diagnosis Day Treatment  TRACK: 1    NUMBER OF PARTICIPANTS: 7                                      Service Modality:  Video Visit     Telemedicine Visit: The patient's condition can be safely assessed and treated via synchronous audio and visual telemedicine encounter.      Reason for Telemedicine Visit: Services only offered telehealth    Originating Site (Patient Location): Patient's home    Distant Site (Provider Location): Provider Remote Setting- Home Office    Consent:  The patient/guardian has verbally consented to: the potential risks and benefits of telemedicine (video visit) versus in person care; bill my insurance or make self-payment for services provided; and responsibility for payment of non-covered services.     Patient would like the video invitation sent by:  My Chart    Mode of Communication:  Video Conference via Medical Zoom    As the provider I attest to compliance with applicable laws and regulations related to telemedicine.                Data:    Session content: At the start of this group, patients were invited to check in by identifying themselves, describing their current emotional status, and identifying issues to address in this group.   Area(s) of treatment focus addressed in this session included Symptom Management, Personal Safety and Abstinence/Relapse Prevention.    Xavier reported feeling \"okay\" today.  His goal is to prepare for Monday when he decided he will take a more serious look at " "all the \"problems he has to fix.\"  Patient declined additional process time but contributed to group discussion and provided feedback and support to peers.      Therapeutic Interventions/Treatment Strategies:  Psychotherapist offered support, feedback and validation and reinforced use of skills.    Assessment:    Patient response:   Patient responded to session by accepting feedback, giving feedback and listening    Possible barriers to participation / learning include: and no barriers identified    Health Issues:   None reported       Substance Use Review:   Substance Use: Substance use has decreased    Mental Status/Behavioral Observations  Appearance:   Appropriate   Eye Contact:   Good   Psychomotor Behavior: Normal   Attitude:   Cooperative   Orientation:   All  Speech   Rate / Production: Normal    Volume:  Normal   Mood:    Depressed   Affect:    Appropriate   Thought Content:   Clear  Thought Form:  Coherent  Logical     Insight:    Good     Plan:     Safety Plan: No current safety concerns identified.  Recommended that patient call 911 or go to the local ED should there be a change in any of these risk factors.     Barriers to treatment: None identified    Patient Contracts (see media tab):  None    Substance Use: Provided encouragement towards sobriety    Provided support and affirmation for steps taken towards sobriety      Continue or Discharge: Patient will continue in Adult Dual Disorder Program (DDP) as planned. Patient is likely to benefit from learning and using skills as they work toward the goals identified in their treatment plan.      Suzanne Jacobson, [facility]  August 27, 2021    "

## 2021-08-27 NOTE — GROUP NOTE
Psychoeducation Group Note    PATIENT'S NAME: Xavier Odell  MRN:   [id number]  :   1985  ACCT. NUMBER: [id number]  DATE OF SERVICE: 21  START TIME: 11:00 AM  END TIME: 11:50 AM  FACILITATOR: Rebecca Denton OTR/ROBBIE; Ijeoma Ordoñez OT  TOPIC: MH Life Skills Group: Communication and Social Skills Development  [facility] Adult Dual Diagnosis Day Treatment  TRACK: 1    NUMBER OF PARTICIPANTS: 7                                      Service Modality:  Video Visit     Telemedicine Visit: The patient's condition can be safely assessed and treated via synchronous audio and visual telemedicine encounter.      Reason for Telemedicine Visit: Services only offered telehealth    Originating Site (Patient Location): Patient's home    Distant Site (Provider Location): [facility]    Consent:  The patient/guardian has verbally consented to: the potential risks and benefits of telemedicine (video visit) versus in person care; bill my insurance or make self-payment for services provided; and responsibility for payment of non-covered services.     Patient would like the video invitation sent by:  My Chart    Mode of Communication:  Video Conference via Medical Zoom    As the provider I attest to compliance with applicable laws and regulations related to telemedicine.         Summary of Group / Topics Discussed:  Communication and Social Skills Development: Communication Styles: Communication Effectiveness: Patients were taught and gained awareness of effective communication skills in the following areas: Trust building, verbal communication, listening, and conflict management.  Patients identified both personal strengths and opportunities for growth in these areas to improve overall communication and connection with other people.     Patient Session Goals / Objectives:    Identified strengths and opportunities for growth in communication skills and how these   impact their ability to communicate clearly with other people       Improved awareness of important aspects of communication skills and how this relates to mental health and substance use recovery     Established a plan for practice of these skills in their own environments    Practiced and reflected on how to generalize taught skills to their everyday life        Patient Participation / Response:  Fully participated with the group by sharing personal reflections / insights and openly received / provided feedback with other participants.    Patient presentation: active in group discussion sharing ideas and giving feedback and support to others, Verbalized understanding of content and Patient would benefit from additional opportunities to practice the content to be able to generalize it to their everyday life with increased intentionality, consistency, and efficacy in support of their psychiatric recovery    Treatment Plan:  Patient has a current master individualized treatment plan.  See Epic treatment plan for more information.    Rebecca Denton, OTR/L

## 2021-08-27 NOTE — GROUP NOTE
Psychoeducation Group Note    PATIENT'S NAME: Xavier Odell  MRN:   [id number]  :   1985  ACCT. NUMBER: [id number]  DATE OF SERVICE: 21  START TIME:  9:00 AM  END TIME:  9:50 AM  FACILITATOR: Birgit Jacob, RN; Ozzy Carlson RN  TOPIC:  RN Group: Health Maintenance                                    Service Modality:  Video Visit     Telemedicine Visit: The patient's condition can be safely assessed and treated via synchronous audio and visual telemedicine encounter.      Reason for Telemedicine Visit:  Covid19    Originating Site (Patient Location): Patient's home    Distant Site (Provider Location): Provider Remote Setting- Home Office    Consent:  The patient/guardian has verbally consented to: the potential risks and benefits of telemedicine (video visit) versus in person care; bill my insurance or make self-payment for services provided; and responsibility for payment of non-covered services.     Patient would like the video invitation sent by:  My Chart    Mode of Communication:  Video Conference via Medical Zoom    As the provider I attest to compliance with applicable laws and regulations related to telemedicine.        [facility] Adult Dual Diagnosis Day Treatment  TRACK: DDP1    NUMBER OF PARTICIPANTS: 7    Summary of Group / Topics Discussed:  Health Maintenance: Weekend planning: Patients were given time to complete a weekend plan of what they will do to promote wellness and sobriety over the weekend when they do not have the structure of group. Patients were encouraged to review progress on their treatment goals and were challenged to identify ways to work toward meeting them. Patients identified and discussed foreseeable barriers to success over the weekend and then developed a plan to overcome them. Patients reviewed their distress coping skills and social support network and discussed this with the group.       Patient Session Goals / Objectives:    ?    Identified  activities to engage in that promote balance in wellness  ?    Distinguished possible barriers to success over the weekend and created a plan to overcome them  ?    Listed distress coping skills and identified social support network to utilize if in crisis during the weekend  Being spontaneous vs planning          Patient Participation / Response:  Fully participated with the group by sharing personal reflections / insights and openly received / provided feedback with other participants.    Demonstrated understanding of topics discussed through group discussion and participation    Treatment Plan:  Patient has a current master individualized treatment plan.  See Epic treatment plan for more information.    Ozzy Carlson RN

## 2021-08-30 ENCOUNTER — HOSPITAL ENCOUNTER (OUTPATIENT)
Dept: BEHAVIORAL HEALTH | Facility: CLINIC | Age: 36
End: 2021-08-30
Attending: PSYCHIATRY & NEUROLOGY
Payer: COMMERCIAL

## 2021-08-30 PROCEDURE — G0177 OPPS/PHP; TRAIN & EDUC SERV: HCPCS | Mod: GT | Performed by: OCCUPATIONAL THERAPIST

## 2021-08-30 PROCEDURE — G0177 OPPS/PHP; TRAIN & EDUC SERV: HCPCS | Mod: GT

## 2021-08-30 PROCEDURE — 90853 GROUP PSYCHOTHERAPY: CPT | Mod: GT,95 | Performed by: COUNSELOR

## 2021-08-30 NOTE — GROUP NOTE
Psychoeducation Group Note    PATIENT'S NAME: Xavier Odell  MRN:   [id number]  :   1985  ACCT. NUMBER: [id number]  DATE OF SERVICE: 21  START TIME: 11:00 AM  END TIME: 11:50 AM  FACILITATOR: Ozzy Carlson RN  TOPIC: DARLEEN RN Group: Medication Education and Management                                    Service Modality:  Video Visit     Telemedicine Visit: The patient's condition can be safely assessed and treated via synchronous audio and visual telemedicine encounter.      Reason for Telemedicine Visit:  Covid19    Originating Site (Patient Location): Patient's home    Distant Site (Provider Location): Provider Remote Setting- Home Office    Consent:  The patient/guardian has verbally consented to: the potential risks and benefits of telemedicine (video visit) versus in person care; bill my insurance or make self-payment for services provided; and responsibility for payment of non-covered services.     Patient would like the video invitation sent by:  My Chart    Mode of Communication:  Video Conference via Medical Zoom    As the provider I attest to compliance with applicable laws and regulations related to telemedicine.        [facility] Adult Dual Diagnosis Day Treatment  TRACK: DDP1    NUMBER OF PARTICIPANTS: 6    Summary of Group / Topics Discussed:  Medication Educations and Management:  Medication Jeopardy: Patients provided education regarding medication safety, antidepressants, side effects, neuroleptics, expected medication outcomes, knowledge of diagnosis, symptoms, and symptom management through an engaging jeopardy-style format.     Patient Session Goals / Objectives:    ? Participated in team-based Jeopardy game  ? Identified strategies for safe use, handling, and disposal of medications  ? Discussed basic aspects of medication safety, side effects, adverse outcomes and contraindications        Patient Participation / Response:  Fully participated with the group by sharing  personal reflections / insights and openly received / provided feedback with other participants.     Demonstrated understanding of topics discussed through group discussion and participation    Treatment Plan:  Patient has a current master individualized treatment plan.  See Epic treatment plan for more information.    Ozzy Carlson RN

## 2021-08-30 NOTE — GROUP NOTE
"Process Group Note    PATIENT'S NAME: Xavier Odell  MRN:   [id number]  :   1985  ACCT. NUMBER: [id number]  DATE OF SERVICE: 21  START TIME:  9:00 AM  END TIME:  9:50 AM  FACILITATOR: Suzanne Jacobson [facility]  TOPIC:  Process Group    Diagnoses:  296.33 (F33.2) Major Depressive Disorder, Recurrent Episode, Severe.  4. Other Diagnoses that is relevant to services:   Substance-Related & Addictive Disorders Alcohol Use Disorder   303.90 (F10.20) Severe.      [facility] Adult Dual Diagnosis Day Treatment  TRACK: 1    NUMBER OF PARTICIPANTS: 5                                      Service Modality:  Video Visit     Telemedicine Visit: The patient's condition can be safely assessed and treated via synchronous audio and visual telemedicine encounter.      Reason for Telemedicine Visit: Services only offered telehealth    Originating Site (Patient Location): Patient's home    Distant Site (Provider Location): Provider Remote Setting- Home Office    Consent:  The patient/guardian has verbally consented to: the potential risks and benefits of telemedicine (video visit) versus in person care; bill my insurance or make self-payment for services provided; and responsibility for payment of non-covered services.     Patient would like the video invitation sent by:  My Chart    Mode of Communication:  Video Conference via Medical Zoom    As the provider I attest to compliance with applicable laws and regulations related to telemedicine.                Data:    Session content: At the start of this group, patients were invited to check in by identifying themselves, describing their current emotional status, and identifying issues to address in this group.   Area(s) of treatment focus addressed in this session included Symptom Management, Personal Safety and Abstinence/Relapse Prevention.    Xavier reported feeling \"down\" today.  His goal is to \"just be okay\" with feeling this way and not fight against it.  Patient " declined additional process time but contributed to group discussion and provided feedback and support to peers.      Therapeutic Interventions/Treatment Strategies:  Psychotherapist offered support, feedback and validation and reinforced use of skills.    Assessment:    Patient response:   Patient responded to session by accepting feedback, giving feedback and listening    Possible barriers to participation / learning include: and no barriers identified    Health Issues:   None reported       Substance Use Review:   Substance Use: Substance use has decreased    Mental Status/Behavioral Observations  Appearance:   Appropriate   Eye Contact:   Good   Psychomotor Behavior: Normal   Attitude:   Cooperative   Orientation:   All  Speech   Rate / Production: Normal    Volume:  Normal   Mood:    Depressed   Affect:    Appropriate   Thought Content:   Clear  Thought Form:  Coherent  Logical     Insight:    Good     Plan:     Safety Plan: No current safety concerns identified.  Recommended that patient call 911 or go to the local ED should there be a change in any of these risk factors.     Barriers to treatment: None identified    Patient Contracts (see media tab):  None    Substance Use: Provided encouragement towards sobriety    Provided support and affirmation for steps taken towards sobriety      Continue or Discharge: Patient will continue in Adult Dual Disorder Program (DDP) as planned. Patient is likely to benefit from learning and using skills as they work toward the goals identified in their treatment plan.      Suzanne Jacobson, [facility]  August 30, 2021

## 2021-08-31 ENCOUNTER — HOSPITAL ENCOUNTER (OUTPATIENT)
Dept: BEHAVIORAL HEALTH | Facility: CLINIC | Age: 36
End: 2021-08-31
Attending: PSYCHIATRY & NEUROLOGY
Payer: COMMERCIAL

## 2021-08-31 PROCEDURE — 90853 GROUP PSYCHOTHERAPY: CPT | Mod: GT,95 | Performed by: COUNSELOR

## 2021-08-31 PROCEDURE — 90853 GROUP PSYCHOTHERAPY: CPT | Mod: GT | Performed by: PSYCHOLOGIST

## 2021-08-31 PROCEDURE — G0177 OPPS/PHP; TRAIN & EDUC SERV: HCPCS | Mod: GT

## 2021-08-31 NOTE — GROUP NOTE
Psychotherapy Group Note    PATIENT'S NAME: Xavier Odell  MRN:   [id number]  :   1985  ACCT. NUMBER: [id number]  DATE OF SERVICE: 21  START TIME:  9:00 AM  END TIME:  9:50 AM  FACILITATOR: Ozzy Archuleta LP  TOPIC:  EBP Group: Emotions Management  [facility] Adult Dual Diagnosis Day Treatment  TRACK: DDP1    NUMBER OF PARTICIPANTS: 5                                      Service Modality:  Video Visit     Telemedicine Visit: The patient's condition can be safely assessed and treated via synchronous audio and visual telemedicine encounter.      Reason for Telemedicine Visit: Services only offered telehealth    Originating Site (Patient Location): Patient's home    Distant Site (Provider Location): Provider Remote Setting- Home Office    Consent:  The patient/guardian has verbally consented to: the potential risks and benefits of telemedicine (video visit) versus in person care; bill my insurance or make self-payment for services provided; and responsibility for payment of non-covered services.     Patient would like the video invitation sent by:  My Chart    Mode of Communication:  Video Conference via Medical Zoom    As the provider I attest to compliance with applicable laws and regulations related to telemedicine.         Summary of Group / Topics Discussed:  Emotions Management: Opposite to Emotion: Patients discussed past and present struggles with knowing how to make changes in their lives due to difficult emotional experiences.  Explored desires to experience and feel less anger, sadness, guilt, and fear.  Reviewed the therapeutic skill of opposite action and patients explored opportunities to use their behaviors as a tool to reduce an emotion that they want to change.     Patient Session Goals / Objectives:    Review DBT concepts and focus on patient s experiences of distress and difficult emotional experiences.    Learn how to do the opposite of what an emotion makes  us want to do in an effort to decrease an unwanted emotional experience.    Demonstrate understanding of the skill of opposite action by sharing experiences where the technique could be useful in past / present situations.      Patient Participation / Response:  Fully participated with the group by sharing personal reflections / insights and openly received / provided feedback with other participants.    Demonstrated understanding of topics discussed through group discussion and participation    Treatment Plan:  Patient has a current master individualized treatment plan.  See Epic treatment plan for more information.    Ozzy Archuleta, LP

## 2021-08-31 NOTE — GROUP NOTE
Psychoeducation Group Note    PATIENT'S NAME: Xavier Odell  MRN:   [id number]  :   1985  ACCT. NUMBER: [id number]  DATE OF SERVICE: 21  START TIME: 11:00 AM  END TIME: 11:50 AM  FACILITATOR: Birgit Jacob, RN  TOPIC: DARLEEN RN Group: Medication Education and Management                                    Service Modality:  Video Visit     Telemedicine Visit: The patient's condition can be safely assessed and treated via synchronous audio and visual telemedicine encounter.      Reason for Telemedicine Visit:  covid19    Originating Site (Patient Location): Patient's home    Distant Site (Provider Location): Provider Remote Setting- Home Office    Consent:  The patient/guardian has verbally consented to: the potential risks and benefits of telemedicine (video visit) versus in person care; bill my insurance or make self-payment for services provided; and responsibility for payment of non-covered services.     Patient would like the video invitation sent by:  My Chart    Mode of Communication:  Video Conference via Medical Zoom    As the provider I attest to compliance with applicable laws and regulations related to telemedicine.          [facility] Adult Dual Diagnosis Day Treatment  TRACK: 1    NUMBER OF PARTICIPANTS: 4    Summary of Group / Topics Discussed:  Medication Educations and Management:  Medication Jeopardy: Patients provided education regarding medication safety, antidepressants, side effects, neuroleptics, expected medication outcomes, knowledge of diagnosis, symptoms, and symptom management through an engaging jeopardy-style format.     Patient Session Goals / Objectives:    ? Participated in team-based Jeopardy game  ? Identified strategies for safe use, handling, and disposal of medications  ? Discussed basic aspects of medication safety, side effects, adverse outcomes and contraindications        Patient Participation / Response:  Fully participated with the group by sharing  personal reflections / insights and openly received / provided feedback with other participants.     Demonstrated understanding of topics discussed through group discussion and participation and Identified / Expressed personal readiness to practice skills    Treatment Plan:  Patient has a current master individualized treatment plan.  See Epic treatment plan for more information.    Birgit Jacob RN

## 2021-08-31 NOTE — GROUP NOTE
Psychoeducation Group Note    PATIENT'S NAME: Xavier Odell  MRN:   [id number]  :   1985  ACCT. NUMBER: [id number]  DATE OF SERVICE: 21  START TIME: 10:00 AM  END TIME: 10:50 AM  FACILITATOR: Rebecca Denton OTR/ROBBIE; Ijeoma Ordoñez OT  TOPIC: MH Life Skills Group: Lifestyle Balance and Structure  [facility] Adult Dual Diagnosis Day Treatment  TRACK: 1    NUMBER OF PARTICIPANTS: 6                                      Service Modality:  Video Visit     Telemedicine Visit: The patient's condition can be safely assessed and treated via synchronous audio and visual telemedicine encounter.      Reason for Telemedicine Visit: Services only offered telehealth    Originating Site (Patient Location): Patient's home    Distant Site (Provider Location): [facility]    Consent:  The patient/guardian has verbally consented to: the potential risks and benefits of telemedicine (video visit) versus in person care; bill my insurance or make self-payment for services provided; and responsibility for payment of non-covered services.     Patient would like the video invitation sent by:  My Chart    Mode of Communication:  Video Conference via Medical Zoom    As the provider I attest to compliance with applicable laws and regulations related to telemedicine.         Summary of Group / Topics Discussed:  Lifestyle Balance and Strucure:  Goal-setting & integration: Patients were introduced to the process and benefits of setting realistic, timely, and achievable goals to help support their ability to follow through with meaningful personal, health, recovery and treatment goals that they would like to achieve to improve overall functioning.  Patients were also taught and then practiced techniques to manage a variety of obstacles to achieve their goals and how to break goals into manageable pieces.  Patients also reported on follow through of goals.     Patient  Session Goals / Objectives:    Facilitated the creation of a vision for recovery and wellbeing    Identified and wrote meaningful SMART goals to engage in meaningful activities of daily living     Identified and problem solved barriers to achieving goals     Identified plan to support follow through on goals and reflection on progress made          Patient Participation / Response:  Fully participated with the group by sharing personal reflections / insights and openly received / provided feedback with other participants.    Patient presentation: constricted affect which brightened at times; active in group discussion, Demonstrated understanding of content through identifying ongoing goals he is working on to support his recovery , Verbalized understanding of content and Patient would benefit from additional opportunities to practice the content to be able to generalize it to their everyday life with increased intentionality, consistency, and efficacy in support of their psychiatric recovery    Treatment Plan:  Patient has a current master individualized treatment plan.  See Epic treatment plan for more information.    Rebecca Denton, OTR/L

## 2021-08-31 NOTE — GROUP NOTE
"Process Group Note    PATIENT'S NAME: Xavier dOell  MRN:   [id number]  :   1985  ACCT. NUMBER: [id number]  DATE OF SERVICE: 21  START TIME: 10:00 AM  END TIME: 10:50 AM  FACILITATOR: Suzanne Jacobson [facility]  TOPIC:  Process Group    Diagnoses:  296.33 (F33.2) Major Depressive Disorder, Recurrent Episode, Severe.  4. Other Diagnoses that is relevant to services:   Substance-Related & Addictive Disorders Alcohol Use Disorder   303.90 (F10.20) Severe.      [facility] Adult Dual Diagnosis Day Treatment  TRACK: 1    NUMBER OF PARTICIPANTS: 5                                      Service Modality:  Video Visit     Telemedicine Visit: The patient's condition can be safely assessed and treated via synchronous audio and visual telemedicine encounter.      Reason for Telemedicine Visit: Services only offered telehealth    Originating Site (Patient Location): Patient's home    Distant Site (Provider Location): Provider Remote Setting- Home Office    Consent:  The patient/guardian has verbally consented to: the potential risks and benefits of telemedicine (video visit) versus in person care; bill my insurance or make self-payment for services provided; and responsibility for payment of non-covered services.     Patient would like the video invitation sent by:  My Chart    Mode of Communication:  Video Conference via Medical Zoom    As the provider I attest to compliance with applicable laws and regulations related to telemedicine.                Data:    Session content: At the start of this group, patients were invited to check in by identifying themselves, describing their current emotional status, and identifying issues to address in this group.   Area(s) of treatment focus addressed in this session included Symptom Management, Personal Safety and Abstinence/Relapse Prevention.    Xavier reported feeling \"irritated\" today because he doesn't want to talk about his feelings.  His goal is to address one of " "the big things on his \"laundry list of to-do's.\"  Patient declined additional process time but contributed to group discussion and provided feedback and support to peers.        Therapeutic Interventions/Treatment Strategies:  Psychotherapist offered support, feedback and validation and reinforced use of skills.    Assessment:    Patient response:   Patient responded to session by accepting feedback, giving feedback and listening    Possible barriers to participation / learning include: and no barriers identified    Health Issues:   None reported       Substance Use Review:   Substance Use: No active concerns identified.    Mental Status/Behavioral Observations  Appearance:   Appropriate   Eye Contact:   Good   Psychomotor Behavior: Normal   Attitude:   Cooperative   Orientation:   All  Speech   Rate / Production: Normal    Volume:  Normal   Mood:    Irritable   Affect:    Appropriate   Thought Content:   Clear  Thought Form:  Coherent  Logical     Insight:    Good     Plan:     Safety Plan: No current safety concerns identified.  Recommended that patient call 911 or go to the local ED should there be a change in any of these risk factors.     Barriers to treatment: None identified    Patient Contracts (see media tab):  None    Substance Use: Provided encouragement towards sobriety    Provided support and affirmation for steps taken towards sobriety      Continue or Discharge: Patient will continue in Adult Day Treatment (ADT)  as planned. Patient is likely to benefit from learning and using skills as they work toward the goals identified in their treatment plan.      Suzanne Jacobson, [facility]  August 31, 2021    "

## 2021-09-01 ENCOUNTER — HOSPITAL ENCOUNTER (OUTPATIENT)
Dept: BEHAVIORAL HEALTH | Facility: CLINIC | Age: 36
End: 2021-09-01
Attending: PSYCHIATRY & NEUROLOGY
Payer: COMMERCIAL

## 2021-09-01 PROCEDURE — 90853 GROUP PSYCHOTHERAPY: CPT | Mod: GT | Performed by: MARRIAGE & FAMILY THERAPIST

## 2021-09-01 PROCEDURE — 90853 GROUP PSYCHOTHERAPY: CPT | Mod: 95 | Performed by: COUNSELOR

## 2021-09-01 PROCEDURE — G0177 OPPS/PHP; TRAIN & EDUC SERV: HCPCS | Mod: 95

## 2021-09-01 NOTE — GROUP NOTE
Process Group Note    PATIENT'S NAME: Xavier Odell  MRN:   [id number]  :   1985  ACCT. NUMBER: [id number]  DATE OF SERVICE: 21  START TIME:  9:00 AM  END TIME:  9:50 AM  FACILITATOR: Suzanne Jacobson [facility]  TOPIC:  Process Group    Diagnoses:  296.33 (F33.2) Major Depressive Disorder, Recurrent Episode, Severe.  4. Other Diagnoses that is relevant to services:   Substance-Related & Addictive Disorders Alcohol Use Disorder   303.90 (F10.20) Severe.      [facility] Adult Dual Diagnosis Day Treatment  TRACK: 1    NUMBER OF PARTICIPANTS: 5                                      Service Modality:  Video Visit     Telemedicine Visit: The patient's condition can be safely assessed and treated via synchronous audio and visual telemedicine encounter.      Reason for Telemedicine Visit: Services only offered telehealth    Originating Site (Patient Location): Patient's home    Distant Site (Provider Location): Provider Remote Setting- Home Office    Consent:  The patient/guardian has verbally consented to: the potential risks and benefits of telemedicine (video visit) versus in person care; bill my insurance or make self-payment for services provided; and responsibility for payment of non-covered services.     Patient would like the video invitation sent by:  My Chart    Mode of Communication:  Video Conference via Medical Zoom    As the provider I attest to compliance with applicable laws and regulations related to telemedicine.               Data:    Session content: At the start of this group, patients were invited to check in by identifying themselves, describing their current emotional status, and identifying issues to address in this group.   Area(s) of treatment focus addressed in this session included Symptom Management, Personal Safety and Abstinence/Relapse Prevention.    Xavier reported feeling better today than the past few days.  His goal today was to make a phone call, which he already did.   Patient declined additional process time but contributed to group discussion and provided feedback and support to peers.      Therapeutic Interventions/Treatment Strategies:  Psychotherapist offered support, feedback and validation and reinforced use of skills.    Assessment:    Patient response:   Patient responded to session by accepting feedback, giving feedback and listening    Possible barriers to participation / learning include: and no barriers identified    Health Issues:   None reported       Substance Use Review:   Substance Use: Substance use has decreased    Mental Status/Behavioral Observations  Appearance:   Appropriate   Eye Contact:   Good   Psychomotor Behavior: Normal   Attitude:   Cooperative   Orientation:   All  Speech   Rate / Production: Normal    Volume:  Normal   Mood:    Depressed   Affect:    Appropriate   Thought Content:   Clear  Thought Form:  Coherent  Logical     Insight:    Good     Plan:     Safety Plan: No current safety concerns identified.  Recommended that patient call 911 or go to the local ED should there be a change in any of these risk factors.     Barriers to treatment: None identified    Patient Contracts (see media tab):  None    Substance Use: Provided encouragement towards sobriety    Provided support and affirmation for steps taken towards sobriety      Continue or Discharge: Patient will continue in Adult Dual Disorder Program (DDP) as planned. Patient is likely to benefit from learning and using skills as they work toward the goals identified in their treatment plan.      Suzanne Jacobson, [facility]  September 1, 2021

## 2021-09-01 NOTE — GROUP NOTE
Psychoeducation Group Note    PATIENT'S NAME: Xavier Odell  MRN:   [id number]  :   1985  ACCT. NUMBER: [id number]  DATE OF SERVICE: 21  START TIME: 11:00 AM  END TIME: 11:50 AM  FACILITATOR: Ozzy Carlson RN  TOPIC: DARLEEN RN Group: [facility]                                    Service Modality:  Video Visit     Telemedicine Visit: The patient's condition can be safely assessed and treated via synchronous audio and visual telemedicine encounter.      Reason for Telemedicine Visit:  Covid19    Originating Site (Patient Location): Patient's home    Distant Site (Provider Location): Provider Remote Setting- Home Office    Consent:  The patient/guardian has verbally consented to: the potential risks and benefits of telemedicine (video visit) versus in person care; bill my insurance or make self-payment for services provided; and responsibility for payment of non-covered services.     Patient would like the video invitation sent by:  My Chart    Mode of Communication:  Video Conference via Medical Zoom    As the provider I attest to compliance with applicable laws and regulations related to telemedicine.        [facility] Adult Dual Diagnosis Day Treatment  TRACK: DDP1    NUMBER OF PARTICIPANTS: 6    Summary of Group / Topics Discussed:  Foundations of Health: Sleep: Pathophysiology of sleep disorders: The anatomy of sleep was reviewed including structures, stages, mechanisms, and the purpose that sleep has on the brain. Sleep disorders were discussed within the group with a focus on gaining knowledge about the etiology and pathophysiology of insomnia, sleep apnea, restless leg syndrome, narcolepsy, medications that can cause risk for sleeping disorders, and other symptoms/factors that may interfere with sleep. Risk factors for developing sleep disorders were discussed and treatments/pharmacological options were explored.     Patient Session Goals / Objectives:  ? Described the effect and  purpose of sleep on the brain and identify the amount of sleep needed  ? Identified differences between sleep disorders and risks associated with sleep disorders  ? Described the connection between sleep disturbances and mental illness    Increased knowledge about treatments for sleep disorders    Discussed the impacts sleep has on your immune system, organs and DNA      Patient Participation / Response:  Fully participated with the group by sharing personal reflections / insights and openly received / provided feedback with other participants.    Demonstrated understanding of topics discussed through group discussion and participation    Treatment Plan:  Patient has a current master individualized treatment plan.  See Epic treatment plan for more information.    Ozzy Carlson RN

## 2021-09-01 NOTE — GROUP NOTE
Psychotherapy Group Note    PATIENT'S NAME: Xavier Odell  MRN:   [id number]  :   1985  ACCT. NUMBER: [id number]  DATE OF SERVICE: 21  START TIME: 10:00 AM  END TIME: 10:50 AM  FACILITATOR: James Sequeira LMFT  TOPIC:  EBP Group: Emotions Management  [facility] Adult Dual Diagnosis Day Treatment  TRACK: 1    NUMBER OF PARTICIPANTS: 5                                      Service Modality:  Video Visit     Telemedicine Visit: The patient's condition can be safely assessed and treated via synchronous audio and visual telemedicine encounter.      Reason for Telemedicine Visit: Services only offered telehealth    Originating Site (Patient Location): Patient's home    Distant Site (Provider Location): Provider Remote Setting- Home Office    Consent:  The patient/guardian has verbally consented to: the potential risks and benefits of telemedicine (video visit) versus in person care; bill my insurance or make self-payment for services provided; and responsibility for payment of non-covered services.     Patient would like the video invitation sent by:  My Chart    Mode of Communication:  Video Conference via Medical Zoom    As the provider I attest to compliance with applicable laws and regulations related to telemedicine.         Summary of Group / Topics Discussed:  Emotions Management: Guilt and Shame: Patients explored and shared personal experiences associated with feelings of guilt and shame.  Group explored how these feelings develop, what they mean to each individual, and how to increase acceptance and usefulness of these feelings.  Group members assisted one another to contextualize these concepts and promote healing.     Patient Session Goals / Objectives:    Discuss and review definitions and personal views/experiences with guilt and shame    Understand the differences between guilt and shame    Explore how feelings of guilt and shame impact functioning    Understand and practice  strategies to manage difficult emotions and move towards healing    Understand and normalize difficult emotions through group discussion    Understand the utility of guilt and shame    Target  unwanted  emotions for change      Patient Participation / Response:  Moderately participated, sharing some personal reflections / insights and adequately adequately received / provided feedback with other participants.    Demonstrated understanding of topics discussed through group discussion and participation, Expressed understanding of the relevance / importance of emotions management skills at distressing times in life and Self-aware of experiences with difficult emotions, and strategies to employ to manage them    Treatment Plan:  Patient has a current master individualized treatment plan.  See Epic treatment plan for more information.    David Sequeira, [facility]

## 2021-09-02 ENCOUNTER — HOSPITAL ENCOUNTER (OUTPATIENT)
Dept: BEHAVIORAL HEALTH | Facility: CLINIC | Age: 36
End: 2021-09-02
Attending: PSYCHIATRY & NEUROLOGY
Payer: COMMERCIAL

## 2021-09-02 PROCEDURE — 90853 GROUP PSYCHOTHERAPY: CPT | Mod: 95 | Performed by: COUNSELOR

## 2021-09-02 PROCEDURE — 90853 GROUP PSYCHOTHERAPY: CPT | Mod: GT | Performed by: COUNSELOR

## 2021-09-02 PROCEDURE — G0177 OPPS/PHP; TRAIN & EDUC SERV: HCPCS | Mod: GT

## 2021-09-02 NOTE — GROUP NOTE
"Process Group Note    PATIENT'S NAME: Xavier Odell  MRN:   [id number]  :   1985  ACCT. NUMBER: [id number]  DATE OF SERVICE: 21  START TIME: 10:00 AM  END TIME: 10:50 AM  FACILITATOR: Suzanne Jacobson [facility]  TOPIC:  Process Group    Diagnoses:  296.33 (F33.2) Major Depressive Disorder, Recurrent Episode, Severe.  4. Other Diagnoses that is relevant to services:   Substance-Related & Addictive Disorders Alcohol Use Disorder   303.90 (F10.20) Severe.      [facility] Adult Dual Diagnosis Day Treatment  TRACK: 1    NUMBER OF PARTICIPANTS: 6                                      Service Modality:  Video Visit     Telemedicine Visit: The patient's condition can be safely assessed and treated via synchronous audio and visual telemedicine encounter.      Reason for Telemedicine Visit: Services only offered telehealth    Originating Site (Patient Location): Patient's home    Distant Site (Provider Location): Provider Remote Setting- Home Office    Consent:  The patient/guardian has verbally consented to: the potential risks and benefits of telemedicine (video visit) versus in person care; bill my insurance or make self-payment for services provided; and responsibility for payment of non-covered services.     Patient would like the video invitation sent by:  My Chart    Mode of Communication:  Video Conference via Medical Zoom    As the provider I attest to compliance with applicable laws and regulations related to telemedicine.                Data:    Session content: At the start of this group, patients were invited to check in by identifying themselves, describing their current emotional status, and identifying issues to address in this group.   Area(s) of treatment focus addressed in this session included Symptom Management, Personal Safety and Abstinence/Relapse Prevention.    Xavier reported feeling \"a little lower than content\" today.  His goal for the day is to attend his doctor appointment.  " Patient declined additional process time but contributed to group discussion and provided feedback and support to peers.      Therapeutic Interventions/Treatment Strategies:  Psychotherapist offered support, feedback and validation and reinforced use of skills.    Assessment:    Patient response:   Patient responded to session by accepting feedback, giving feedback and listening    Possible barriers to participation / learning include: and no barriers identified    Health Issues:   None reported       Substance Use Review:   Substance Use: No active concerns identified.    Mental Status/Behavioral Observations  Appearance:   Appropriate   Eye Contact:   Good   Psychomotor Behavior: Normal   Attitude:   Cooperative   Orientation:   All  Speech   Rate / Production: Normal    Volume:  Normal   Mood:    Depressed   Affect:    Appropriate   Thought Content:   Clear  Thought Form:  Coherent  Logical     Insight:    Good     Plan:     Safety Plan: No current safety concerns identified.  Recommended that patient call 911 or go to the local ED should there be a change in any of these risk factors.     Barriers to treatment: None identified    Patient Contracts (see media tab):  None    Substance Use: Provided support and affirmation for steps taken towards sobriety      Continue or Discharge: Patient will continue in Adult Dual Disorder Program (DDP) as planned. Patient is likely to benefit from learning and using skills as they work toward the goals identified in their treatment plan.      Suzanne Jacobson, [facility]  September 2, 2021

## 2021-09-02 NOTE — GROUP NOTE
Psychoeducation Group Note    PATIENT'S NAME: Xavier Odell  MRN:   [id number]  :   1985  ACCT. NUMBER: [id number]  DATE OF SERVICE: 21  START TIME:  9:00 AM  END TIME:  9:50 AM  FACILITATOR: Rebecca Denton OTR/L; Ijeoma Ordoñez OT  TOPIC: MH Life Skills Group: Lifestyle Balance and Structure  [facility] Adult Dual Diagnosis Day Treatment  TRACK: DDP Group One    NUMBER OF PARTICIPANTS: 6                                      Service Modality:  Video Visit     Telemedicine Visit: The patient's condition can be safely assessed and treated via synchronous audio and visual telemedicine encounter.      Reason for Telemedicine Visit: Services only offered telehealth    Originating Site (Patient Location): Patient's home    Distant Site (Provider Location): Provider Remote Setting- Home Office    Consent:  The patient/guardian has verbally consented to: the potential risks and benefits of telemedicine (video visit) versus in person care; bill my insurance or make self-payment for services provided; and responsibility for payment of non-covered services.     Patient would like the video invitation sent by:  My Chart    Mode of Communication:  Video Conference via Medical Zoom    As the provider I attest to compliance with applicable laws and regulations related to telemedicine.         Summary of Group / Topics Discussed:  Lifestyle Balance and Strucure:  Benefits of Leisure on Mental Health: Patients explored and learned about the benefits and possibilities of leisure activity to create lifestyle balance that supports their mental and physical wellbeing.  Patients were assisted to identify individualized leisure values and interests, recognized the benefits of leisure activity on mental health, and problem solved barriers to leisure engagement and strategies to overcome.  Patient engaged in an experiential leisure activity to gain self-awareness and build milieu social aspects and reflected on the impact  the experiential activity had on their mood.       Patient Session Goals / Objectives:    Increased awareness of the importance of engagement in leisure activities to support lifestyle balance and perceived quality of life    Identified strategies to recognize and challenge barriers to leisure participation     Facilitated exploration of meaningful leisure interests and values    Practiced and reflected on how to generalize taught skills to their everyday life        Patient Participation / Response:  Fully participated with the group by sharing personal reflections / insights and openly received / provided feedback with other participants.    Patient presentation: congruent affect, Demonstrated understanding of content through sharing he enjoys travel and noting a sense of connection through this leisure activity. Pt noted similarities between cultures in leisure activities and offered insight to the group,  and Patient would benefit from additional opportunities to practice the content to be able to generalize it to their everyday life with increased intentionality, consistency, and efficacy in support of their psychiatric recovery    Treatment Plan:  Patient has a current master individualized treatment plan.  See Epic treatment plan for more information.    Ijeoma Ordoñez MA, OTR/L

## 2021-09-02 NOTE — GROUP NOTE
Psychotherapy Group Note    PATIENT'S NAME: Xavier Odell  MRN:   [id number]  :   1985  ACCT. NUMBER: [id number]  DATE OF SERVICE: 21  START TIME: 11:00 AM  END TIME: 11:50 AM  FACILITATOR: Suzanne Jacobson LPCC  TOPIC:  EBP Group: DDP Relapse Prevention  [facility] Adult Dual Diagnosis Day Treatment  TRACK: 1    NUMBER OF PARTICIPANTS: 1                                      Service Modality:  Video Visit     Telemedicine Visit: The patient's condition can be safely assessed and treated via synchronous audio and visual telemedicine encounter.      Reason for Telemedicine Visit: Services only offered telehealth    Originating Site (Patient Location): Patient's home    Distant Site (Provider Location): Provider Remote Setting- Home Office    Consent:  The patient/guardian has verbally consented to: the potential risks and benefits of telemedicine (video visit) versus in person care; bill my insurance or make self-payment for services provided; and responsibility for payment of non-covered services.     Patient would like the video invitation sent by:  My Chart    Mode of Communication:  Video Conference via Medical Zoom    As the provider I attest to compliance with applicable laws and regulations related to telemedicine.          Summary of Group / Topics Discussed:  DDP Relapse Prevention: Observing and Describing Triggers for Substance Use: Patients explored in greater depth factors that contribute to a relapse including: emotions, thoughts, and situations that trigger substance use. Goal of topic is to assist patients in increased recognition of specific emotions, thoughts, and situations they may experience that increases risk. Patients were able to identify and share their specific emotions, thoughts, and situations with the group. Patients also learned  bridge burning  skill to assist in removing means of acting on substance use.    Patient Session Goals / Objectives:    Verbalized  understanding of the importance of awareness of triggers for substance use    Identified their own individual triggers    [place] effective coping skills in response to triggers for substance use including  bridge burning  skill      Patient Participation / Response:  Fully participated with the group by sharing personal reflections / insights and openly received / provided feedback with other participants.    Demonstrated understanding of topics discussed through group discussion and participation, Demonstrated understanding of utilizing relapse prevention skills to manage urges and maintain sobriety and Identified / Expressed personal readiness to utilize relapse prevention skills    Treatment Plan:  Patient has a current master individualized treatment plan.  See Epic treatment plan for more information.    Suzanne Jacobson, [facility]

## 2021-09-03 ENCOUNTER — HOSPITAL ENCOUNTER (OUTPATIENT)
Dept: BEHAVIORAL HEALTH | Facility: CLINIC | Age: 36
End: 2021-09-03
Attending: PSYCHIATRY & NEUROLOGY
Payer: COMMERCIAL

## 2021-09-03 PROCEDURE — 90853 GROUP PSYCHOTHERAPY: CPT | Mod: GT | Performed by: COUNSELOR

## 2021-09-03 PROCEDURE — G0177 OPPS/PHP; TRAIN & EDUC SERV: HCPCS | Mod: 95

## 2021-09-03 PROCEDURE — G0177 OPPS/PHP; TRAIN & EDUC SERV: HCPCS | Mod: 95 | Performed by: OCCUPATIONAL THERAPIST

## 2021-09-03 NOTE — GROUP NOTE
Process Group Note    PATIENT'S NAME: Xavier Odell  MRN:   [id number]  :   1985  ACCT. NUMBER: [id number]  DATE OF SERVICE: 21  START TIME: 10:00 AM  END TIME: 10:50 AM  FACILITATOR: Suzanne Jacobson [facility]  TOPIC:  Process Group    Diagnoses:  296.33 (F33.2) Major Depressive Disorder, Recurrent Episode, Severe.  4. Other Diagnoses that is relevant to services:   Substance-Related & Addictive Disorders Alcohol Use Disorder   303.90 (F10.20) Severe.      [facility] Adult Dual Diagnosis Day Treatment  TRACK: 1    NUMBER OF PARTICIPANTS: 7                                      Service Modality:  Video Visit     Telemedicine Visit: The patient's condition can be safely assessed and treated via synchronous audio and visual telemedicine encounter.      Reason for Telemedicine Visit: Services only offered telehealth    Originating Site (Patient Location): Patient's home    Distant Site (Provider Location): Provider Remote Setting- Home Office    Consent:  The patient/guardian has verbally consented to: the potential risks and benefits of telemedicine (video visit) versus in person care; bill my insurance or make self-payment for services provided; and responsibility for payment of non-covered services.     Patient would like the video invitation sent by:  My Chart    Mode of Communication:  Video Conference via Medical Zoom    As the provider I attest to compliance with applicable laws and regulations related to telemedicine.                Data:    Session content: At the start of this group, patients were invited to check in by identifying themselves, describing their current emotional status, and identifying issues to address in this group.   Area(s) of treatment focus addressed in this session included Symptom Management, Personal Safety and Abstinence/Relapse Prevention.    Xavier reported feeling really good today although is sad that today is his last day.  He took time to reflect on his time in  group and the progress that he has made.      Therapeutic Interventions/Treatment Strategies:  Psychotherapist offered support, feedback and validation and reinforced use of skills.    Assessment:    Patient response:   Patient responded to session by accepting feedback, giving feedback and listening    Possible barriers to participation / learning include: and no barriers identified    Health Issues:   None reported       Substance Use Review:   Substance Use: No active concerns identified.    Mental Status/Behavioral Observations  Appearance:   Appropriate   Eye Contact:   Good   Psychomotor Behavior: Normal   Attitude:   Cooperative   Orientation:   All  Speech   Rate / Production: Normal    Volume:  Normal   Mood:    Normal  Affect:    Appropriate   Thought Content:   Clear  Thought Form:  Coherent  Logical     Insight:    Good     Plan:     Safety Plan: No current safety concerns identified.  Recommended that patient call 911 or go to the local ED should there be a change in any of these risk factors.     Barriers to treatment: None identified    Patient Contracts (see media tab):  None    Substance Use: Not addressed in session     Continue or Discharge: Patient will continue in Adult Dual Disorder Program (DDP) as planned. Patient is likely to benefit from learning and using skills as they work toward the goals identified in their treatment plan.      Suzanne Jacobson, [facility]  September 3, 2021

## 2021-09-03 NOTE — GROUP NOTE
Psychoeducation Group Note    PATIENT'S NAME: Xavier Odell  MRN:   [id number]  :   1985  ACCT. NUMBER: [id number]  DATE OF SERVICE: 21  START TIME: 11:00 AM  END TIME: 11:50 AM  FACILITATOR: Rebecca Denton OTR/L  TOPIC: MH Life Skills Group: Communication and Social Skills Development  [facility] Adult Dual Diagnosis Day Treatment  TRACK: 1    NUMBER OF PARTICIPANTS: 7                                      Service Modality:  Video Visit     Telemedicine Visit: The patient's condition can be safely assessed and treated via synchronous audio and visual telemedicine encounter.      Reason for Telemedicine Visit: Services only offered telehealth    Originating Site (Patient Location): Patient's home    Distant Site (Provider Location): [facility]    Consent:  The patient/guardian has verbally consented to: the potential risks and benefits of telemedicine (video visit) versus in person care; bill my insurance or make self-payment for services provided; and responsibility for payment of non-covered services.     Patient would like the video invitation sent by:  My Chart    Mode of Communication:  Video Conference via Medical Zoom    As the provider I attest to compliance with applicable laws and regulations related to telemedicine.         Summary of Group / Topics Discussed:  Communication and Social Skills Development: Social Supports: Social Risk Taking: Patients explored and evaluated how effective they are in different aspects of social risk taking.  Patients gained awareness of different areas in which they need/want to take risks, possible benefits of taking this risk, and action steps to take.  Patients identified both personal strengths and opportunities for growth in social risk taking to improve overall communication and connection with other people. Facilitated discussion on group changes and how transitions can be difficult.       Patient Session Goals / Objectives:    Identified strengths and opportunities for growth in social risk taking skills and how these impact their ability to communicate clearly with other people       Improved awareness of important aspects of social risk taking skills and how this relates to mental health recovery        Established a plan for practice of these skills in their own environments    Practiced and reflected on how to generalize taught skills to their everyday life        Patient Participation / Response:  Fully participated with the group by sharing personal reflections / insights and openly received / provided feedback with other participants.    Patient presentation: constricted affect that brightened at times; active in group discussion and Demonstrated understanding of content through tolerating positive feedback and encouragement from peers on his last day in the program; Xavier also provided peers with positive feedback   No safety concerns reported.    Treatment Plan:  Patient has See Epic Treatment Plan - Patient is discharging.  Xavier will be starting in Day Treatment next week.     Rebecca Denton OTR/L

## 2021-09-03 NOTE — PROGRESS NOTES
Admission Date: 9/7/2021    Identify any current concerns with potential impact to admission:     medication/medical concerns: None reported     immediate safety concerns: None reported     Does patient have safety plan? Yes  Note: Please copy safety plan copied into BEH Encounter     Other (insurance/childcare/transportation/housing/planned absences/etc): None reported    Patient's insurance is: [employer] .     Does patient need appointment with provider? No    Review patient's program schedule and inform them of any variation due to late days or holidays.                                                                                          Completed by: Suzanne Jacobson, [facility] Center[facility], LADC

## 2021-09-03 NOTE — DISCHARGE SUMMARY
Adult Dual Disorder Program   Discharge Summary/Instructions     Patient: Xavier Odell MRN: [id number]  : 1985 Age:  36 year old Sex:  male    Admission Date: 21  Discharge Date: 9/3/21  Diagnosis:   296.33 (F33.2) Major Depressive Disorder, Recurrent Episode, Severe.  4. Other Diagnoses that is relevant to services:   Substance-Related & Addictive Disorders Alcohol Use Disorder   303.90 (F10.20) Severe.      Focus of Treatment / Discharge Recommendations: You have completed the Dual Disorder IOP.  Recommendations are to start the Adult Day Treatment IOP on .  You will be in the 5A group which meets , , and .  Please also establish care with a therapist and psychiatrist, take medications as prescribed, and look into attending community support meetings.     Personal Safety/ Management of Symptoms:    * Follow your safety plan.  Report increased symptoms to your care team                    and/or use the crisis resources listed below.    Crisis Resources:    Suicide Prevention Lifeline: 1-743-600-QYYP (4236)    Crisis Text Line Service (available 24 hours a day, 7 days a week): Text [place] to 842887    Call  **CRISIS (420984) from a cell phone to talk to a team of professionals who can help you.  Crisis Services By [facility]: Phone Number:   [place]     [phone]   [place]    [phone]   [place]    [phone]   [place]    [phone]   [place]    [phone]   [place] [phone]   [place]     [phone]       Call 911 or go to my nearest emergency department.     Managing Symptoms / Abstinence / Preventing Relapse:    Take all medicines as directed.  Carry a current list of medicines with you.    Use coping skills: breathing, mindfulness, distraction, etc.    Do not use illicit (street) drugs, controlled substances (narcotics) or alcohol.    Go to all appointments.    Report symptoms to your care team including: thoughts of suicide, loss of sleep,  increased confusion, mood getting worse, feeling more aggressive, or substance use.    Develop/Improve Independent Living/Socialization Skills: Maintain daily structure/routine, take meds daily, maintain sleep hygeine, etc.    Community Resources/Supports and Discharge Planning:      Follow up with psychiatrist / main caregiver: N/A    Next visit: N/A    Follow up with your therapist: N/A  Next visit: N/A    Go to group therapy and / or support groups at: ADT 5A on 9/7 at 9am.    See your medical doctor about:  Any physical health concerns     Other:  N/A    Copy of summary sent to: sent to patient via [employer]      Client Signature:__unavailable to sign due to COVID-19_______________________________   Date / Time:___________    Staff Signature:___Suzanne Jacobson [facility] on 9/3/2021 at 12:22 PM  _______________________________   Date / Time:___________

## 2021-09-03 NOTE — GROUP NOTE
Psychoeducation Group Note    PATIENT'S NAME: Xavier Odell  MRN:   [id number]  :   1985  ACCT. NUMBER: [id number]  DATE OF SERVICE: 21  START TIME:  9:00 AM  END TIME:  9:50 AM  FACILITATOR: Ozzy Carlson RN  TOPIC: DARLEEN RN Group: [facility]                                    Service Modality:  Video Visit     Telemedicine Visit: The patient's condition can be safely assessed and treated via synchronous audio and visual telemedicine encounter.      Reason for Telemedicine Visit:  Covid19    Originating Site (Patient Location): Patient's home    Distant Site (Provider Location): Provider Remote Setting- Home Office    Consent:  The patient/guardian has verbally consented to: the potential risks and benefits of telemedicine (video visit) versus in person care; bill my insurance or make self-payment for services provided; and responsibility for payment of non-covered services.     Patient would like the video invitation sent by:  My Chart    Mode of Communication:  Video Conference via Medical Zoom    As the provider I attest to compliance with applicable laws and regulations related to telemedicine.        [facility] Adult Dual Diagnosis Day Treatment  TRACK: DDP1    NUMBER OF PARTICIPANTS: 7    Summary of Group / Topics Discussed:  Foundations of Health: Sleep: Patients explored the connection between sleep and mental illness. Patients learned about how adequate sleep can improve health, productivity, wellness, quality of life, and safety. How stress and anxiety impact sleep and why being mindful can promote relaxation.      Patient Session Goals / Objectives:  ? Demonstrated understanding of sleep hygiene practices and benefits of sleep  ? Identified sleep hygiene strategies to utilize     Described the connection between sleep disturbances and mental illness        Patient Participation / Response:  Fully participated with the group by sharing personal reflections / insights and  openly received / provided feedback with other participants.    Demonstrated understanding of topics discussed through group discussion and participation    Treatment Plan:  Patient has See Epic Treatment Plan - Patient is discharging. Starting ADT 5a next week.     Ozzy Carlson RN

## 2021-09-04 DIAGNOSIS — F33.2 SEVERE EPISODE OF RECURRENT MAJOR DEPRESSIVE DISORDER, WITHOUT PSYCHOTIC FEATURES (H): ICD-10-CM

## 2021-09-07 ENCOUNTER — HOSPITAL ENCOUNTER (OUTPATIENT)
Dept: BEHAVIORAL HEALTH | Facility: CLINIC | Age: 36
End: 2021-09-07
Attending: PSYCHIATRY & NEUROLOGY
Payer: COMMERCIAL

## 2021-09-07 PROCEDURE — 90853 GROUP PSYCHOTHERAPY: CPT | Mod: GT | Performed by: SOCIAL WORKER

## 2021-09-07 PROCEDURE — G0177 OPPS/PHP; TRAIN & EDUC SERV: HCPCS | Mod: GT

## 2021-09-07 PROCEDURE — 90853 GROUP PSYCHOTHERAPY: CPT | Mod: GT

## 2021-09-07 NOTE — GROUP NOTE
Process Group Note    PATIENT'S NAME: Xavier Odell  MRN:   [id number]  :   1985  ACCT. NUMBER: [id number]  DATE OF SERVICE: 21  START TIME:  9:00 AM  END TIME:  9:50 AM  FACILITATOR: Cayla Meehan LP; Cynthia Alston LICSW  TOPIC:  Process Group    Diagnoses:  296.33 (F33.2) Major Depressive Disorder, Recurrent Episode, Severe.  4. Other Diagnoses that is relevant to services:   Substance-Related & Addictive Disorders Alcohol Use Disorder   303.90 (F10.20) Severe.    [facility] Mental Health Day Treatment  TRACK: 5 A    NUMBER OF PARTICIPANTS: 5                                      Service Modality:  Video Visit     Telemedicine Visit: The patient's condition can be safely assessed and treated via synchronous audio and visual telemedicine encounter.      Reason for Telemedicine Visit: Services only offered telehealth    Originating Site (Patient Location): Patient's home    Distant Site (Provider Location): Provider Remote Setting- Home Office    Consent:  The patient/guardian has verbally consented to: the potential risks and benefits of telemedicine (video visit) versus in person care; bill my insurance or make self-payment for services provided; and responsibility for payment of non-covered services.     Patient would like the video invitation sent by:  My Chart    Mode of Communication:  Video Conference via Medical Zoom    As the provider I attest to compliance with applicable laws and regulations related to telemedicine.               Data:    Session content: At the start of this group, patients were invited to check in by identifying themselves, describing their current emotional status, and identifying issues to address in this group.   Area(s) of treatment focus addressed in this session included Symptom Management, Personal Safety and Community Resources/Discharge Planning.  Xavier reports that he is working on depression, anxiety, and sobriety. He reports being  sober since he started the group. 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 He reports that in aviation you are not allowed to have mental health diagnoses, so he struggled for a long time. He states that he quit his job to take time for the group. He recently completed the dual diagnosis program and reports that he is not having any cravings but struggling with his mental health. He reports missing his medications today but will take them on the break. His goals are to have better mental health and maintain sobriety. He states that he has supportive friends and family. He denies the need for additional processing time.      Therapeutic Interventions/Treatment Strategies:  Psychotherapist offered support, feedback and validation. Treatment modalities used include Motivational Interviewing. Interventions include Relapse Prevention: Assisted patient in identifying personal vulnerabilities, thoughts, emotions, and situations that may lead to relapse  and Symptoms Management: Promoted understanding of their diagnoses and how it impacts their functioning.    Assessment:    Patient response:   Patient responded to session by accepting feedback, listening, focusing on goals, being attentive, accepting support and appearing alert    Possible barriers to participation / learning include: and no barriers identified    Health Issues:   None reported       Substance Use Review:   Substance Use: Last use: before starting the dual diagnosis program    Mental Status/Behavioral Observations  Appearance:   Appropriate   Eye Contact:   Good   Psychomotor Behavior: Normal   Attitude:   Cooperative  Interested Pleasant  Orientation:   All  Speech   Rate / Production: Normal    Volume:  Normal   Mood:    Normal  Affect:    Appropriate   Thought Content:   Clear and Safety denies any current safety concerns including suicidal ideation,  self-harm, and homicidal ideation  Thought Form:  Coherent  Logical     Insight:    Good     Plan:     Safety Plan: No current safety concerns identified.  Recommended that patient call 911 or go to the local ED should there be a change in any of these risk factors.     Barriers to treatment: None identified    Patient Contracts (see media tab):  None    Substance Use: Provided encouragement towards sobriety     Continue or Discharge: Patient will continue in Adult Day Treatment (ADT)  as planned. Patient is likely to benefit from learning and using skills as they work toward the goals identified in their treatment plan.    Cayla Meehan, LGSW  September 7, 2021

## 2021-09-07 NOTE — GROUP NOTE
Psychotherapy Group Note    PATIENT'S NAME: Xavier Odell  MRN:   [id number]  :   1985  ACCT. NUMBER: [id number]  DATE OF SERVICE: 21  START TIME: 10:00 AM  END TIME: 10:50 AM  FACILITATOR: Cynthia Alston LICSW  TOPIC: MH EBP Group: Emotions Management  [facility] Adult Mental Health Day Treatment  TRACK: 5A    NUMBER OF PARTICIPANTS: 5    Summary of Group / Topics Discussed:  Emotions Management: Understanding Emotions: Patients discussed the purpose of emotions and function they serve in our lives.  Reviewed core emotions, why they happen (triggers), and how they occur. The group assisted one anothers' understanding that: emotional experiences are important; difficult emotions have a place in our lives; and the differences between various emotions.    Patient Session Goals / Objectives:    Demonstrate understanding of types various emotions    Identify and discuss specific emotions and when they occur; understand triggers    Discuss barriers to emotional regulation                                      Service Modality:  Video Visit     Telemedicine Visit: The patient's condition can be safely assessed and treated via synchronous audio and visual telemedicine encounter.      Reason for Telemedicine Visit: Services only offered telehealth    Originating Site (Patient Location): Patient's home    Distant Site (Provider Location): Provider Remote Setting- Home Office    Consent:  The patient/guardian has verbally consented to: the potential risks and benefits of telemedicine (video visit) versus in person care; bill my insurance or make self-payment for services provided; and responsibility for payment of non-covered services.     Patient would like the video invitation sent by:  My Chart    Mode of Communication:  Video Conference via Medical Zoom    As the provider I attest to compliance with applicable laws and regulations related to telemedicine.           Patient Participation /  Response:  Fully participated with the group by sharing personal reflections / insights and openly received / provided feedback with other participants.    Self-aware of experiences with difficult emotions, and strategies to employ to manage them    Treatment Plan:  Patient has a current master individualized treatment plan.  See Epic treatment plan for more information.    Cynthia Alston, LICSW

## 2021-09-07 NOTE — GROUP NOTE
Psychoeducation Group Note    PATIENT'S NAME: Xavier Odell  MRN:   [id number]  :   1985  ACCT. NUMBER: [id number]  DATE OF SERVICE: 21  START TIME: 11:00 AM  END TIME: 11:50 AM  FACILITATOR: Adán Yu OTR/L  TOPIC: MH Life Skills Group: Resiliency Development                                    Service Modality:  Video Visit     Telemedicine Visit: The patient's condition can be safely assessed and treated via synchronous audio and visual telemedicine encounter.      Reason for Telemedicine Visit: Services only offered telehealth    Originating Site (Patient Location): Patient's home    Distant Site (Provider Location): Provider Remote Setting- Home Office    Consent:  The patient/guardian has verbally consented to: the potential risks and benefits of telemedicine (video visit) versus in person care; bill my insurance or make self-payment for services provided; and responsibility for payment of non-covered services.     Mode of Communication:  Video Conference via Medical Zoom    As the provider I attest to compliance with applicable laws and regulations related to telemedicine.       [facility] Adult Mental Health Day Treatment  TRACK: 5A    NUMBER OF PARTICIPANTS: 4    Summary of Group / Topics Discussed:  Resiliency Development:  Coping Skills(Stress Management): Patients were taught how to identify stressors, signs of stress, coping skills, and prevention strategies for overall stress management.  Patients were given the opportunity to identify both ongoing and acute mental health symptoms and how to effectively manage these symptoms by developing an effective aftercare plan.  Patients increased awareness of community based resources.    Patient Session Goals / Objectives:    Identified how using coping skills can be used for symptom and stress management       Improved awareness of individualed symptoms and stressors and how to effectively cope     Established a relapse  prevention plan to practice these skills in their own environments    Practiced and reflected on how to generalize taught skills to their everyday life          Patient Participation / Response:  Fully participated with the group by sharing personal reflections / insights and openly received / provided feedback with other participants.    Demonstrated understanding of content through handout/video/ group discussion , Verbalized understanding of content and Patient would benefit from additional opportunities to practice the content to be able to generalize it to their everyday life with increased intentionality, consistency, and efficacy in support of their psychiatric recovery    Treatment Plan:  Patient has a current master individualized treatment plan.  See Epic treatment plan for more information.    Adán Yu, OTR/L

## 2021-09-08 DIAGNOSIS — F32.9 MAJOR DEPRESSIVE DISORDER WITH SINGLE EPISODE, REMISSION STATUS UNSPECIFIED: ICD-10-CM

## 2021-09-08 RX ORDER — ARIPIPRAZOLE 2 MG/1
TABLET ORAL
Qty: 30 TABLET | Refills: 0 | OUTPATIENT
Start: 2021-09-08

## 2021-09-08 NOTE — DISCHARGE SUMMARY
"       Adult Mental Health Intensive Outpatient Discharge Summary/Instructions      Patient: Xavier Odell MRN: [id number]   : 1985 Age: 36 year old     Admission Date: 21  Discharge Date: 21  Diagnosis: 296.33 (F33.2) Major Depressive Disorder, Recurrent Episode, Severe.  Substance-Related & Addictive Disorders Alcohol Use Disorder   303.90 (F10.20) Severe    Focus of Treatment / Progress    Personal Safety: 9/15/21 - Denied suicidal ideation (see PSS-3 screen)     * Follow your safety plan     * Call crisis lines as needed:    [facility] [phone] [facility] [phone]  [facility] [phone] Crisis Connection [phone]  [facility] [phone] [facility]E [phone]  [facility] [phone] National Suicide Prevention [phone]  [facility] [phone] Suicide Prevention [phone]  [facility] [phone]    Managing symptoms of:  Depression and chemical dependency    Community support/health:  [place] [address] ([phone]) [email or web address], [place] Crisis text line( Text [place] to 058754),  [facility]  [place], [place] ([phone])  [facility] [place], [place] ( [phone])  [facility] [address] [place] [address], [phone]    Managing Symptoms and Preventing Relapse    * Go to all of your appointments    * Take all medications as directed.      * Carry a current list if medication with you    * Do not use illicit (street) drugs.  Avoid alcohol    * Report these symptoms to your care team. These are early signs of relapse:   Thoughts of suicide   Losing more sleep   Increased confusion   Mood getting worse   Feeling more aggressive   Other:  Obsessive/ruminating thoughts about the past (see treatment plan)    *Use these skills daily:  Talk to someone you trust at least one time weekly, set boundaries and say \"no\", be assertive, act opposite of " negative feelings, accept challenges with a positive attitude, exercise at least three times per week for 30 minutes,  get enough sleep, eat healthy foods, get into a good routine    Copy of summary sent to: In Epic My Chart    Follow up with psychiatrist / main caregiver: Laurie    Next visit: Client will schedule    Follow up with your therapist: Laurie, looking for different therapist (per DDP TP note)   Next visit: Client will schedule    Patient is pursuing residential MICD treatment at [facility]/other. See relate DA on 9/15/21.    Go to group therapy and / or support groups at: [facility] Connection and Depression Bipolar [facility] [place](DBSA) support groups, AA, [facility]    See your medical doctor about:  For an annual physical exam or any general wellness or illness as needed.    Other:  Your treatment team appreciates having the opportunity to work with you and wishes you the best.    Client Signature:_Pt unable to sign in person d/t covid; pt reported plan to discharge to do residential MICD program  Date / Time:_9/16/21; 225pm  Staff Signature:__Birgit Jacob RN BSN PHN___   Date / Time:_9/16/21; 225pm__________

## 2021-09-08 NOTE — PROGRESS NOTES
"Adult Day Treatment Program:  Individualized Treatment Plan       Date of Plan: 21    Name: Xavier Odell MRN: [id number]    : 1985     Program: Adult Day Treatment Program (ADT)    Clinical Track: 5A    DSM5 Diagnosis:  296.33 (F33.2) Major Depressive Disorder, Recurrent Episode, Severe.  Substance-Related & Addictive Disorders Alcohol Use Disorder   303.90 (F10.20) Severe    Chief Complaint:   The reason for seeking services at this time is: \"I'm hoping to come to some sort of agreement. My mother is adamant that I go to an inpatient program. That isn't realistic for me. If I were to go to inpatient, I would lose my apartment.\" He explained that, \"she tends to overreact. You'll find out when you talk to her.\" He stated he is willing to anything other than inpatient. He would like an evening program, but that may change depending on his work schedule. He is hoping to stick with [facility].  explained the Dual Diagnosis Program as a level lower from inpatient because it meets 5 days per week. Patient stated his mother has been trying to get him to do the Dual Diagnosis Program. He is willing to do it.  asked if he could with a work schedule and he stated, \"I will make it work.\" The problem(s) began most recently in 2021. Patient has attempted to resolve these concerns in the past through outpatient substance use treatment.  Patient does not appear to be in severe withdrawal, an imminent safety risk to self or others, or requiring immediate medical attention and may proceed with the assessment interview.    ADT Multidisciplinary Team Members:  Dr Adis Frankel MD , Dr Kunal Gill MD, Cynthia Alston, [facility] CenterSW, Pedrito Yu, OTR/L, Birgit Jacob, RN, BSN, Cayla Meehan, [facility], Enrique Carlson RN  Xavier Odell will participate in the Adult Day Treatment Program 3 days per week, 3 hours per day.   Anticipated duration/discharge: 12 weeks    Due to COVID-19, services will be delivered " via telemedicine until further notice.     Program Start Date: 9/7/21  Anticipated Discharge Date: 9/16/21 (pending authorization/clinical changes)    NOTE: Complete CGI     Review Date: Does Xavier Odell continue to meet criteria to participate in the ADT Program, 3 days per week; 3 hours per day?   9/14/21 YES    11/9/21(60 Days)                  Client Strengths:  has a previous history of therapy, intelligent, support of family, friends and providers and work history     Client Participation in Plan:  Contributed to goals and plan     Areas of Vulnerability:  Anxiety  Depressive symptoms   Trauma/Abuse/Neglect  History of Substance Abuse (Alcohol)     Long-Term Goals:  Knowledge about illness and management of symptoms   Maintenance of personal safety   Maintenance of sobriety     Abuse Prevention Plan:  Safe, therapeutic environment   Safety coping plan as needed   Education regarding illness and skill development   Coordination with care providers     Discharge Criteria:  Satisfactory progress toward treatment goals   Improvement re: identified problems and symptoms   Ability to continue recovery at next level of service   Has a discharge plan in place   Has safety/coping plan in place   Ability to maintain sobriety     Areas of Treatment Focus        Area of Treatment Focus:   Personal Safety  Start Date:    9/14/21    Goal:  Target Date: 11/9/21 Status: [place] will notify staff when needing assistance to develop or implement a coping plan to manage suicidal or self injurious urges.Use coping plan for safety, as needed.  Maintain sobriety.      Progress:9/16/21 - Patient is discharging to residential MICD program/[facility]; see FLO DA on 9/15/21.           Treatment Strategies:   Assess / reassess level of potential for harm to self or others  Engage in safety planning when indicated  Facilitate increased self awareness          Area of Treatment Focus:   Symptom Stabilization and Management   "Start Date:    9/14/21    Goal:  Target Date: 11/9/21 Status: Yemi Park will learn and practice skills to manage depression, obsessive thinking and ruminating on the past as that often precipitated substance use.       Progress:9/16/21 - Patient is discharging to residential MICD program/[facility]; see FLO DA on 9/15/21.             Treatment Strategies:   Engage in safety planning when indicated  Facilitate increased self awareness  Teach adaptive coping skills and communication skills          Area of Treatment Focus:   Wellness and Mental Health Recovery  Start Date:    9/14/21    Goal:  Target Date: 11/9/21 Status: Yemi Park will improve wellness related behaviors by getting enough sleep,exercise, balanced nutrition and take medications (if prescribed) to maintain good mental health.        Progress: 9/16/21 - Patient is discharging to residential MICD program/[facility]; see FLO DA on 9/15/21.             Treatment Strategies:   Facilitate increased self awareness  Teach adaptive coping skills and communication skills          Area of Treatment Focus:   Community Resources / Support and Discharge Planning  Start Date:    9/14/21    Goal:  Target Date: 11/9/21 Status: Yemi Park will establish an aftercare plan to include medical providers and social supports by discharge.      Progress:9/16/21 - Patient is discharging to residential MICD program[facility]; see FLO DA on 9/15/21.             Treatment Strategies:   Assist clients in establishing / strengthening support network  Assist with discharge planning  Facilitate increased self awareness       Adán Yu, OTR/L      NOTE: Required signatures are completed manually and scanned into the electronic medical record. See \"Media\" tab in epic.    The Individualized Treatment Plan Signature Page has been routed to the provider for co-sign.        "

## 2021-09-09 ENCOUNTER — TELEPHONE (OUTPATIENT)
Dept: BEHAVIORAL HEALTH | Facility: CLINIC | Age: 36
End: 2021-09-09

## 2021-09-10 RX ORDER — MIRTAZAPINE 15 MG/1
TABLET, FILM COATED ORAL
Qty: 30 TABLET | Refills: 0 | OUTPATIENT
Start: 2021-09-10

## 2021-09-12 ENCOUNTER — HEALTH MAINTENANCE LETTER (OUTPATIENT)
Age: 36
End: 2021-09-12

## 2021-09-13 ENCOUNTER — TELEPHONE (OUTPATIENT)
Dept: BEHAVIORAL HEALTH | Facility: CLINIC | Age: 36
End: 2021-09-13

## 2021-09-13 NOTE — TELEPHONE ENCOUNTER
Pt returned writer's call.  Pt doesn't think he needs much right now. Going through withdrawal on own.   Would prefer to do DDP. Writer consulted with DDP team - recommended residential.  Pt's mom is coming to help facilitate connecting to [facility] access line.  Pt agreed to confirm with us his plan and so we can go through discharge paperwork.  Birgit Jacob RN on 9/13/2021 at 10:56 AM

## 2021-09-13 NOTE — TELEPHONE ENCOUNTER
"Pt's mom called requesting callback d/t concerns: \"Xavier has needed all along a step up I treatment\" \"he has really spiralled down\" he is a \"vulnerable adult right now\" \"has been drinking through the whole program\" \"isolating from mom and brother\", finally willing to do inpatient treatment.   [place] this year relapsed  He wants to die but mom not concerned about acting on it, more passively declining.  Gave access phone # for [facility].  Coordinating with Team to reach out to pt  Birgit Jacob RN on 9/13/2021 at 8:58 AM      "

## 2021-09-13 NOTE — TELEPHONE ENCOUNTER
Called and left VM to f/u on call from pt's mother and looking into higher level of care. Requested call back.  Birgit Jacob RN on 9/13/2021 at 10:39 AM

## 2021-09-15 ENCOUNTER — HOSPITAL ENCOUNTER (OUTPATIENT)
Dept: BEHAVIORAL HEALTH | Facility: CLINIC | Age: 36
Discharge: HOME | End: 2021-09-15
Attending: FAMILY MEDICINE | Admitting: FAMILY MEDICINE
Payer: COMMERCIAL

## 2021-09-15 ENCOUNTER — TELEPHONE (OUTPATIENT)
Dept: BEHAVIORAL HEALTH | Facility: CLINIC | Age: 36
End: 2021-09-15

## 2021-09-15 VITALS — WEIGHT: 230 LBS | HEIGHT: 69 IN | BODY MASS INDEX: 34.07 KG/M2

## 2021-09-15 PROCEDURE — H0001 ALCOHOL AND/OR DRUG ASSESS: HCPCS | Mod: GT

## 2021-09-15 ASSESSMENT — ANXIETY QUESTIONNAIRES
2. NOT BEING ABLE TO STOP OR CONTROL WORRYING: NEARLY EVERY DAY
GAD7 TOTAL SCORE: 11
6. BECOMING EASILY ANNOYED OR IRRITABLE: NOT AT ALL
3. WORRYING TOO MUCH ABOUT DIFFERENT THINGS: NEARLY EVERY DAY
4. TROUBLE RELAXING: SEVERAL DAYS
1. FEELING NERVOUS, ANXIOUS, OR ON EDGE: MORE THAN HALF THE DAYS
7. FEELING AFRAID AS IF SOMETHING AWFUL MIGHT HAPPEN: SEVERAL DAYS
5. BEING SO RESTLESS THAT IT IS HARD TO SIT STILL: SEVERAL DAYS

## 2021-09-15 ASSESSMENT — PAIN SCALES - GENERAL: PAINLEVEL: NO PAIN (0)

## 2021-09-15 ASSESSMENT — MIFFLIN-ST. JEOR: SCORE: 1968.65

## 2021-09-15 ASSESSMENT — PATIENT HEALTH QUESTIONNAIRE - PHQ9: SUM OF ALL RESPONSES TO PHQ QUESTIONS 1-9: 19
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
## 2021-09-15 NOTE — TELEPHONE ENCOUNTER
Writer called to follow up on plan and go through discharge paperwork. Left VM requesting callback. Send PHQ9 and GAD7 to [facility].   His chart also notes an FLO DA, as he had planned.  Birgit Jacob RN on 9/15/2021 at 4:26 PM

## 2021-09-16 ASSESSMENT — ANXIETY QUESTIONNAIRES: GAD7 TOTAL SCORE: 11

## 2021-09-16 NOTE — ADDENDUM NOTE
Encounter addended [facility]: [facility] on: 9/16/2021 7:52 AM   Actions taken: Clinical Note Signed

## 2021-09-21 ENCOUNTER — HOSPITAL ENCOUNTER (OUTPATIENT)
Dept: BEHAVIORAL HEALTH | Facility: CLINIC | Age: 36
End: 2021-09-21
Attending: FAMILY MEDICINE
Payer: COMMERCIAL

## 2021-09-21 VITALS
HEART RATE: 103 BPM | DIASTOLIC BLOOD PRESSURE: 94 MMHG | OXYGEN SATURATION: 97 % | TEMPERATURE: 97.2 F | SYSTOLIC BLOOD PRESSURE: 135 MMHG

## 2021-09-21 LAB — SARS-COV-2 RNA RESP QL NAA+PROBE: NEGATIVE

## 2021-09-21 PROCEDURE — H2035 A/D TX PROGRAM, PER HOUR: HCPCS | Mod: HQ

## 2021-09-21 PROCEDURE — 1002N00001 HC LODGING PLUS FACILITY CHARGE ADULT

## 2021-09-21 PROCEDURE — U0005 INFEC AGEN DETEC AMPLI PROBE: HCPCS | Performed by: FAMILY MEDICINE

## 2021-09-21 RX ORDER — GABAPENTIN 300 MG/1
300 CAPSULE ORAL 3 TIMES DAILY
COMMUNITY
End: 2023-07-02

## 2021-09-21 RX ORDER — IBUPROFEN 200 MG
400 TABLET ORAL EVERY 6 HOURS PRN
COMMUNITY
End: 2021-10-12

## 2021-09-21 RX ORDER — ACETAMINOPHEN 325 MG/1
325-650 TABLET ORAL EVERY 4 HOURS PRN
COMMUNITY
End: 2021-10-12

## 2021-09-21 RX ORDER — AMOXICILLIN 250 MG
2 CAPSULE ORAL DAILY PRN
COMMUNITY
End: 2021-10-12

## 2021-09-21 RX ORDER — NALTREXONE HYDROCHLORIDE 50 MG/1
50 TABLET, FILM COATED ORAL DAILY
COMMUNITY
End: 2023-07-02

## 2021-09-21 RX ORDER — HYDROXYZINE HYDROCHLORIDE 25 MG/1
25-50 TABLET, FILM COATED ORAL AT BEDTIME
COMMUNITY
End: 2023-07-02

## 2021-09-21 RX ORDER — LANOLIN ALCOHOL/MO/W.PET/CERES
3 CREAM (GRAM) TOPICAL
COMMUNITY
End: 2023-07-02

## 2021-09-21 RX ORDER — MAGNESIUM HYDROXIDE/ALUMINUM HYDROXICE/SIMETHICONE 120; 1200; 1200 MG/30ML; MG/30ML; MG/30ML
30 SUSPENSION ORAL EVERY 6 HOURS PRN
COMMUNITY
End: 2021-10-12

## 2021-09-21 RX ORDER — LORATADINE 10 MG/1
10 TABLET ORAL DAILY
COMMUNITY
End: 2021-10-12

## 2021-09-21 ASSESSMENT — ANXIETY QUESTIONNAIRES
5. BEING SO RESTLESS THAT IT IS HARD TO SIT STILL: SEVERAL DAYS
7. FEELING AFRAID AS IF SOMETHING AWFUL MIGHT HAPPEN: SEVERAL DAYS
3. WORRYING TOO MUCH ABOUT DIFFERENT THINGS: NEARLY EVERY DAY
2. NOT BEING ABLE TO STOP OR CONTROL WORRYING: MORE THAN HALF THE DAYS
6. BECOMING EASILY ANNOYED OR IRRITABLE: NOT AT ALL
GAD7 TOTAL SCORE: 11
4. TROUBLE RELAXING: SEVERAL DAYS
1. FEELING NERVOUS, ANXIOUS, OR ON EDGE: NEARLY EVERY DAY

## 2021-09-21 ASSESSMENT — PATIENT HEALTH QUESTIONNAIRE - PHQ9: SUM OF ALL RESPONSES TO PHQ QUESTIONS 1-9: 20

## 2021-09-21 NOTE — PROGRESS NOTES
Progress Note    This patient had a Comprehensive Substance Abuse assessment on 09/15/2021 completed by GUMARO Barry.  This patient was seen for a face to face update of the Comprehensive Substance Abuse assessment on 9/21/2021 by GUMARO Bennett.  INSIDE: The patient's Comprehensive Substance Abuse assessment completed on 09/15/2021 is in the patient's electronic medical record in Epic in the Chart Review section under the Notes/Trans Tab.    Alcohol/Drug use since the last CD evaluation (include date of last use):     No additional substances use since the last CD evaluation. Pt's last use of alcohol was 9/13/2021. His last use of CBD gummies was approximately a month ago. His last use of Xanax was 9/15/2021. He denies current withdrawal symptoms.      Please note any other clinical changes since the last CD evaluation (such as medication changes, additional legal charges, detoxification admissions, overdoses, etc.)     No significant changes since the last CD evaluation       ASAM Dimensions Original scores Current Scores   I.) Intoxication and Withdrawal: 1 0   II.) Biomedical:  1 1   III.) Emotional and Behavioral:  2 2   IV.) Readiness to Change:  2 2   V.) Relapse Potential: 4 4   VI.) Recovery Environmental: 3 3     Please list clinical justifications for the above ASAM score changes since the original comprehensive assessment:     The patient's score on Dimension I changed from a 1 to a 0.  The patient had not consumed any alcohol or drugs since 09/15/2021 and he does not appear to have any current risk of having significant withdrawal symptoms.        Current KATIA: Current UA:     0.000     Positive for Benzodiazepines and THC and negative for all other screened drugs.       PHQ-9, ADINA-7   PHQ-9 on 9/21/2021 ADINA-7 on 9/21/2021   The patient's PHQ-9 score was 20 out of 27, indicating severe depression.   The patient's ADINA-7 score was 11 out of 21, indicating moderate anxiety.        [place]-Suicide Severity Rating Scale Reassessment   Have you ever wished you were dead or that you could go to sleep and not wake up?  Past Month:  No     Have you actually had any thoughts of killing yourself?  Past Month:  No     Have you been thinking about how you might do this?     Past Month:  No   Lifetime:  No   Have you had these thoughts and had some intention of acting on them?     Past Month:  No   Lifetime:  No   Have you started to work out the details of how to kill yourself?   Past Month:  No   Lifetime:  No   Do you intend to carry out this plan?   No     When you have the thoughts how long do they last?  The patient denied ever having any suicidal thoughts in life.     Are there things - anyone or anything (i.e. family, Sikhism, pain of death) that stopped you from wanting to die or acting on thoughts of suicide?  Does not apply       2008  [employer]  Used with permission by Cayla Sun, PhD.       Guide to C-SSRS Risk Ratings   NO IDEATION:  with no active thoughts IDEATION: with a wish to die. IDEATION: with active thoughts. Risk Ratings   If Yes No No 0 - Very Low Risk   If NA Yes No 1 - Low Risk   If NA Yes Yes 2 - Low/moderate risk   IDEATION: associated thoughts of methods without intent or plan INTENT: Intent to follow through on suicide PLAN: Plan to follow through on suicide Risk Ratings cont...   If Yes No No 3 - Moderate Risk   If Yes Yes No 4 - High Risk   If Yes Yes Yes 5 - High Risk   The patient's ADDITIONAL RISK FACTORS and lack of PROTECTIVE FACTORS may increase their overall suicide risk ratings.     Additional Risk Factors:    Significant history of having untreated or poorly treated mental health symptoms     Tendency to be socially isolated and/or cut off from the support of others     A recent loss that was significant to the patient, i.e. loss of job, loss of home, divorce, break-up, etc.     Significant history of trauma and/or  "abuse issues   Protective Factors:    Having people in his/her life that would prevent the patient from considering a suicide attempt (i.e. young children, spouse, parents, etc.)     Having pet(s) that give companionship and/or would prevent the patient from considering a suicide attempt     An absence of chronic health problems or stable and well treated chronic health issues     A positive relationship with his/her clinical medical and/or mental health providers     Having easy access to supportive family members     Having a good community support network     Having restricted access to highly lethal means of suicide     Risk Status   0. - Very Low Risk:  Evaluation Counselors:  Document in Epic / SBAR to counselor \"Very Low Risk\".      Treatment Counselors:  Reassess upon admission as applicable, assess weekly in progress notes under Dimension 3 and summarize in Discharge / Treatment summary under Dimension 3.     Additional information to support suicide risk rating: There was no additional information to provide at this time.       "

## 2021-09-21 NOTE — PROGRESS NOTES
Initial Services Plan        Service Initiation Date: 9/21/2021    Immediate health and/or safety concerns: No    Identify health and safety concern(s) below and include plan to address:    None Identified    Treatment suggestions for client during the time between intake (admit date) and completion of the individual treatment plan:     Look for a sober support network, i.e. 12 step, [facility], [facility], etc  Tour the treatment center or outpatient clinic  Introduce yourself to your treatment group. Spend time getting to know your peers  Review your patient or client handbook  Begin working on your treatment goal list    Completed by: GUMARO Bennett  Date completed: 9/21/2021 at 10:39 AM
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
## 2021-09-21 NOTE — PROGRESS NOTES
[facility] Nursing Health Assessment      Vital signs:     See flowsheet for vitals     Direct admission    Counselor: joy  Drug of Choice: ETOH  Last use: 9/13/21  Home clinic/MD:   [facility]    [address] [place] [address] [phone]   Jeff Mahmood APRN,CNP    [address] [place] [address] [phone] (Work)    [phone] (Fax)         Psychiatrist/therapist: none    Medical history/current conditions:  Asthma-mild    H&P Screen:  H&P within the last 90 days: Yes.  Date: 9/15/21 Location: PCP      Mental Health diagnosis: depression and anxiety  Medication compliant?: yes  Recent sucidal thoughts? no     When? na  Current thought of self-harm? no    Plan? na    Pain assessment:   Pt. Experiencing pain at this time?  No    [facility] Medical Screen for COVID-19     Are you interested in receiving the COVID vaccine or flu vaccine while you are at [facility]?  Yes   Flu, already completed COVID     Do you have any of the following NEW or worsening symptoms NOT attributed to pre-existing conditions?    No,     o Fever of 100.0  F (37.8 C) or over  o Chills  o Cough  o Shortness of Breath  o Loss of taste or smell  o Generalized body aches  o Persistent headache  o Sore throat (or trouble eating or drinking in young children?)  o Nausea, Vomiting, or diarrhea (loose stools)    Did you test positive for COVID-19 in the last 14  days or are you waiting on the test results due to an exposure or symptoms?  No,     Has anyone told you to self-quarantine due to exposure to someone with COVID-19?  No,     Does the above COVID-19 screen need to be reviewed by Infection Prevention? No,     COVID-19 Test completed by [facility] ? Yes       COVID-19 - Pt informed of the following while at :    1)Staff will take temperature and O2Sats once daily    2) Practice good hand washing hygiene and avoid touching face    3) If pt has any of the symptoms below,  notify staff immediately.      Fever     Cough     Shortness of breath or difficulty breathing     Chills     Repeated shaking with chills    Muscle pain     4) COVID-19 testing may be initiated more than once during your stay.  Patient will be required to stay in room until lab results confirm negative. If COVID results are positive, You will have to exit the program, quarantine as recommended per CDC and then may return for CD treatment after symptoms have resolved    5) Per COVID protocol, during your stay at , social distancing is required AND mouth and nose must be covered at all times with facial mask while out in milieu.      6) Patients will not be allowed to go to any outside appointments, all outside appointments will need to be virtual or by phone    RN Assessment of Patient's Ability to Safely Manage and Self-Administer Respiratory Treatments    Has experience in the management of Respiratory (If NA, indicate and move to Integrative Therapies): Yes    Including knowledge and understanding of the importance of:    Does pt have any of the following Respiratory Illness/disorders?   Asthma, COPD, RAD, Bronchitis, Emphysema, Cystic fibrosis, HARLAN Yes    Which Acute or Chronic Respiratory Illness do you have which requires intervention? asthma   Did pt bring all prescribed respiratory supplies? CPAP, BiPap, Nebulizer, Nebulizer solution, scheduled inhaler, Rescue Inhaler  Yes   Pt understands they must carry  Rescue Inhalers  at all times Yes   Alerting staff with respiratory symptoms?  Wheezing, SOB, Tightness or pain in chest, Excessive Daytime Sleepiness Yes     Does the patient have the physical and mental ability to:     Perform respiratory cares? CPAP, BiPap, Nebulizer tx, scheduled inhaler, Rescue Inhaler tx, respiratory medicines Yes   Determine when and how often to use respiratory treatments? (CPAP, BiPap, Nebulizer tx, scheduled inhaler, Rescue Inhaler tx, respiratory medicines Yes    Nebulizer/CPAP/BiPAP accessories na   CPAP/BiPAP Therapy settings na     Therefore does the patient, present a risk of harm to themselves or other clients in the facility if allowed to self-administer Respiratory treatments.  Consider factors above.  No    I have assessed the patient to be able to safely administer respiratory treatments.  Yes    Integrative Therapies: Essential Oils        Patient was screened for kidney disease, asthma/reactive airway disease and rashes and wounds or 1st trimester of pregnancy    List Essential Oils requested by pt not offered      Patient tobacco use: none    Nutritional Assessment:    Have you ever purged, binged or restricted yourself as a way to control your weight?   No     Are you on a special diet?   No     Do you have any concerns regarding your nutritional status?   Yes, explain: most I have weighed in my life     Have you had any appetite changes in the last 3 months?   Yes, explain: eating more   Have you had weight loss or weight gain of more than 10 lbs in the last 3 months?   If patient gained or lost more than 10 lbs, then refer to program RN / attending Physician for assessment.   Yes, explain: increased intake gained weight   Was the patient informed of BMI?    Above,  Referral to primary care physician   Yes   Have you engaged in any risk-taking behavior that would put you at risk for exposure to blood-borne or sexually transmitted diseases?   No   Do you have any dental problems?   No             Nursing Assessment Summary:  Pt has a severe chicken allergy, but has never needed an epi pen. Pt will receive a chicken free diet while at [facility] Medical director has been consulted regarding need for ei pen, waiting on response.     On-going nursing intervention required?   No    Acute care visit recommended: no

## 2021-09-21 NOTE — PROGRESS NOTES
Comprehensive Assessment Summary     Based on client interview, review of previous assessments and   comprehensive assessment interview the following diagnosis and recommendations are:     Patient: Xavier Odell  MRN; [id number]   : 1985  Age: 36 year old Sex: male       Client meets criteria for:  Alcohol Use Disorder   303.90 (F10.20),  Severe     Dimension One: Acute Intoxication/Withdrawal Potential     Ratin   (Consider the client's ability to cope with withdrawal symptoms and current state of intoxication)   Patient reports his last use date for alcohol as 21. Patient denies any current withdrawal  that would interfere with treatment.       Dimension Two: Biomedical Condition and Complications    Ratin  (Consider the degree to which any physical disorder would interfere with treatment for substance abuse, and the client's ability to tolerate any related discomfort; determine the impact of continued chemical use on the unborn child if the client is pregnant)   Patient has a history of pancreatitis and GI bleed. Patient denies any current biomedical conditions that would interfere with treatment.     Dimension Three: Emotional/Behavioral/Cognitive Conditions & Complications  Ratin  (Determine the degree to which any condition or complications are likely to interfere with treatment for substance abuse or with functioning in significant life areas and the likelihood of risk of harm to self or others)   Patient has a history of depression, anxiety, and FLO. Patient notes that his parents divorce when he was 10 yrs old affected him significantly.     Dimension Four: Treatment Acceptance/Resistance     Ratin  (Consider the amount of support and encouragement necessary to keep the client involved in treatment)   Patient appears to be in the contemplation Stage of Change at this time. Patient is motivated with active reinforcement.     Dimension Five: Continued Use/Relaspe Prevention      Ratin  (Consider the degree to which the client's recognizes relapse issues and has the skills to prevent relapse of either substance use or mental health problems)   Patient has a history of previous treatments and longest period of sobriety is reported as one year. Patient needs to gain insight into the relapse process and find healthy coping skills.     Dimension Six: Recovery Environment     Rating:   3  (Consider the degree to which key areas of the client's life are supportive of or antagonistic to treatment participation and recovery)   Patient reports that he is currently on probation for a DWI in 2021. Patient reports being single with no children.     I have reviewed the information on the assessment, psychosocial and medical history and checklist:        it is current

## 2021-09-21 NOTE — PROGRESS NOTES
Name: Xavier Odell  Date: 9/21/2021  Medical Record: [id number]  Envelope Number: [id number]  List of Contents (List each item separately in new row):   One Cell phone, One  & cord.   Admission:  I am responsible for any personal items that are not sent to the safe or pharmacy.  [facility] is not responsible for loss, theft or damage of any property in my possession.    Patient Signature:  ___________________________________________       Date/Time:__________________________    Staff Signature: __________________________________       Date/Time:__________________________    2nd Staff person, if patient is unable/unwilling to sign:      __________________________________________________________       Date/Time: __________________________    Discharge:  [facility] has returned all of my personal belongings:    Patient Signature: ________________________________________     Date/Time: ____________________________________    Staff Signature: ______________________________________     Date/Time:_____________________________________

## 2021-09-21 NOTE — PROGRESS NOTES
Patient:  Xavier Odell    Date: September 21, 2021    Comprehensive Assessment UPDATE       Comprehensive Summary Update and Review  Counselor met with patient on 9-21-21 and reviewed the Comprehensive Assessment.    There were no changes/updates identified by patient or in chart entries.

## 2021-09-21 NOTE — PROGRESS NOTES
[facility]  [address] [place] [address] [address] and [address] [place] [address]        ADULT CD ASSESSMENT ADDENDUM      Patient Name: Xavier Odell  Cell Phone:   Home: [phone] (home)    Mobile:   Telephone Information:   Mobile [phone]       Email:  [email or web address]  Emergency Contact: Venessa Coles   Tel: [phone]    The patient reported being:  Single, no serious involvement    With which race do you identify? White    Initial Screening Questions     1. Are you currently having severe withdrawal symptoms that are putting yourself or others in danger?  No    2. Are you currently having severe medical problems that require immediate attention?  No    3. Are you currently having severe emotional or behavioral problems that are putting yourself or others at risk of harm?  No    4. Do you currently participate in community deidra activities, such as attending Latter day, temple, Catholic or Uatsdin services?  No    5. How does your spirituality impact your recovery?  It doesn't. Zero    6. Do you currently self-administer your medications?  Yes    Do you have a valid 's license?    No, explain: DUI, March 2021       Mental Health Status   Physical Appearance/Attire: Appears stated age, Attire appropriate to age/situation and Neat   Hygiene: well groomed   Eye Contact: at examiner   Speech Rate:  regular   Speech Volume: regular   Speech Quality: fluid   Cognitive/Perceptual:  reality based   Cognition: memory intact    Judgment: intact and able to concentrate   Insight: intact and able to concentrate   Orientation:  time, place, person and situation   Thought: logical    Hallucinations:  none   General Behavioral Tone: cooperative   Psychomotor Activity: tremors   Gait:  no problem   Mood: appropriate and anxious   Affect: congruence/appropriate   Counselor Notes: NA     Criteria for Diagnosis: DSM-5 Criteria for Substance Use Disorders      Alcohol Use Disorder  Severe - 303.90 (F10.20)  Depression NOS, per patient self-report  Anxiety disorder NOS, per patient self-report    Level of Care   I.) Intoxication and Withdrawal: 0   II.) Biomedical:  1   III.) Emotional and Behavioral:  2   IV.) Readiness to Change:  2   V.) Relapse Potential: 4   VI.) Recovery Environmental: 3     Initial Problem List     The patient lacks relapse prevention skills  The patient has poor coping skills  The patient has poor refusal skills   The patient has a tendency to isolate  The patient has dual issues of MI and CD  The patient lacks the ability to effectively manage his/her mental health issues  The patient has current legal issues    Patient/Client is willing to follow treatment recommendations.  Yes    Counselor: GUMARO Bennett

## 2021-09-22 ENCOUNTER — HOSPITAL ENCOUNTER (OUTPATIENT)
Dept: BEHAVIORAL HEALTH | Facility: CLINIC | Age: 36
End: 2021-09-22
Attending: FAMILY MEDICINE
Payer: COMMERCIAL

## 2021-09-22 VITALS — TEMPERATURE: 97 F | OXYGEN SATURATION: 98 %

## 2021-09-22 PROCEDURE — H2035 A/D TX PROGRAM, PER HOUR: HCPCS | Mod: HQ

## 2021-09-22 PROCEDURE — 1002N00001 HC LODGING PLUS FACILITY CHARGE ADULT

## 2021-09-22 ASSESSMENT — ANXIETY QUESTIONNAIRES: GAD7 TOTAL SCORE: 11

## 2021-09-22 NOTE — GROUP NOTE
Group Therapy Documentation    PATIENT'S NAME: Xavier Odell  MRN:   [id number]  :   1985  ACCT. NUMBER: [id number]  DATE OF SERVICE: 21  START TIME: 12:30 PM  END TIME:  2:30 PM  FACILITATOR(S): Jimbo Ulloa LADC; Fe Shah; Ramin Falcon  TOPIC: BEH Group Therapy  Number of patients attending the group:  9  Group Length:  2 Hours    Group Therapy Type: Recovery strategies    Summary of Group / Topics Discussed:    Recovery Principles      Group Attendance:  Attended group session    Patient's response to the group topic/interactions:  cooperative with task    Patient appeared to be Attentive.        Client specific details:  Carmleo attended PM Spiritual Care group. He took part in an exercise/discussion on the benefits of spirituality in recovery.

## 2021-09-22 NOTE — PROGRESS NOTES
Pt has a severe chicken allergy, but has never needed an epi pen. Pt will receive a chicken free diet while at [facility] Medical director has been consulted regarding need for ei pen---medical director is ok with pt not having epi pen. Appropriate staff have been alerted about patients allergy and what action steps to take.

## 2021-09-22 NOTE — GROUP NOTE
Group Therapy Documentation    PATIENT'S NAME: Xavier Odell  MRN:   [id number]  :   1985  [facility]: [id number]  DATE OF SERVICE: 21  START TIME:  9:45 AM  END TIME: 11:20 AM  FACILITATOR(S): Ramin Falcon; Rigoberto Jules [facility]; Jimbo Ulloa LADC  TOPIC: BEH Group Therapy  Number of patients attending the group:  9  Group Length:  2 Hours    Group Therapy Type: Recovery strategies    Summary of Group / Topics Discussed:    Recovery Principles, Spiritual Care, Sober coping skills, Relationship/socialization, Balanced lifestyle, Co-occurring illnesses symptom management, Trauma informed care, Disease of addiction, Emotions/expression, Leisure explorations/use of leisure time, Medication management, Relapse prevention, and Self-care activities      Group Attendance:  Attended group session    Patient's response to the group topic/interactions:  cooperative with task    Patient appeared to be Attentive and Engaged.        Client specific details:  Xavier attended morning group.  He checked in, and actively participated in the group discussion.

## 2021-09-22 NOTE — GROUP NOTE
Group Therapy Documentation    PATIENT'S NAME: Xavier Odell  MRN:   [id number]  :   1985  ACCT. NUMBER: [id number]  DATE OF SERVICE: 21  START TIME:  8:30 AM  END TIME:  9:30 AM  FACILITATOR(S): Jimbo Ulloa LADC; Rigoberto Jules LADC  TOPIC: BEH Group Therapy  Number of patients attending the group:  26  Group Length:  1 Hour    Group Therapy Type: Recovery strategies    Summary of Group / Topics Discussed:    Recovery Principles      Group Attendance:  Attended group session    Patient's response to the group topic/interactions:  cooperative with task    Patient appeared to be Attentive.        Client specific details:  Xavier attended AM lecture. Patient took part in a discussion on relapse and the promises of recovery.

## 2021-09-23 ENCOUNTER — HOSPITAL ENCOUNTER (OUTPATIENT)
Dept: BEHAVIORAL HEALTH | Facility: CLINIC | Age: 36
End: 2021-09-23
Attending: FAMILY MEDICINE
Payer: COMMERCIAL

## 2021-09-23 VITALS — OXYGEN SATURATION: 97 % | TEMPERATURE: 98.2 F

## 2021-09-23 PROCEDURE — H2035 A/D TX PROGRAM, PER HOUR: HCPCS | Mod: HQ

## 2021-09-23 PROCEDURE — 1002N00001 HC LODGING PLUS FACILITY CHARGE ADULT

## 2021-09-23 PROCEDURE — H2035 A/D TX PROGRAM, PER HOUR: HCPCS

## 2021-09-23 NOTE — GROUP NOTE
Group Therapy Documentation    PATIENT'S NAME: Xavier Odell  MRN:   [id number]  :   1985  ACCT. NUMBER: [id number]  DATE OF SERVICE: 21  START TIME:  9:00 AM  END TIME: 11:00 AM  FACILITATOR(S): Jimbo Ulloa LADC; Rigoberto Jules LADC; Ramin Falcon  TOPIC: BEH Group Therapy  Number of patients attending the group:  7  Group Length:  2 Hours    Group Therapy Type: Recovery strategies    Summary of Group / Topics Discussed:    Recovery Principles      Group Attendance:  Attended group session    Patient's response to the group topic/interactions:  cooperative with task    Patient appeared to be Attentive.        Client specific details:  Carmelo attended AM group. Patient checked in, was attentive and cooperative. Patient gave a peer appropriate feedback on a presented assignment, and also helped graduate a peer

## 2021-09-23 NOTE — GROUP NOTE
Group Therapy Documentation    PATIENT'S NAME: Xavier Odell  MRN:   [id number]  :   1985  ACCT. NUMBER: [id number]  DATE OF SERVICE: 21  START TIME:  3:00 PM  END TIME:  4:00 PM  FACILITATOR(S): Will Short LADC  TOPIC: BEH Group Therapy  Number of patients attending the group:  7  Group Length:  1 Hours    Group Therapy Type: Health and wellbeing     Summary of Group / Topics Discussed:    Disease of addiction      Group Attendance:  Attended group session    Patient's response to the group topic/interactions:  cooperative with task    Patient appeared to be Actively participating.        Client specific details:  Xavier participated and interacted appropriately with peers and staff in skills group. No triggers to use noted or discussed.

## 2021-09-23 NOTE — GROUP NOTE
Group Therapy Documentation    PATIENT'S NAME: Xavier Odell  MRN:   [id number]  :   1985  ACCT. NUMBER: [id number]  DATE OF SERVICE: 21  START TIME: 12:30 PM  END TIME:  2:30 PM  FACILITATOR(S): Rigoberto Jules LADC; Jimbo Ulloa LADC  TOPIC: BEH Group Therapy  Number of patients attending the group:  7  Group Length:  2 Hours    Group Therapy Type: Recovery strategies and Emotion processing    Summary of Group / Topics Discussed:    Recovery Principles, Sober coping skills, Balanced lifestyle, and Relapse prevention      Group Attendance:  Attended group session    Patient's response to the group topic/interactions:  cooperative with task    Patient appeared to be Actively participating, Attentive and Engaged.        Client specific details:  Xavier gave appropriate feedback..

## 2021-09-24 ENCOUNTER — HOSPITAL ENCOUNTER (OUTPATIENT)
Dept: BEHAVIORAL HEALTH | Facility: CLINIC | Age: 36
End: 2021-09-24
Attending: FAMILY MEDICINE
Payer: COMMERCIAL

## 2021-09-24 VITALS — OXYGEN SATURATION: 98 % | TEMPERATURE: 97.2 F

## 2021-09-24 PROCEDURE — 1002N00001 HC LODGING PLUS FACILITY CHARGE ADULT

## 2021-09-24 PROCEDURE — H2035 A/D TX PROGRAM, PER HOUR: HCPCS | Mod: HQ

## 2021-09-24 NOTE — GROUP NOTE
Group Therapy Documentation    PATIENT'S NAME: Xavier Odell  MRN:   [id number]  :   1985  ACCT. NUMBER: [id number]  DATE OF SERVICE: 21  START TIME: 12:30 PM  END TIME:  2:30 PM  FACILITATOR(S): Jimbo Ulloa LADC  TOPIC: BEH Group Therapy  Number of patients attending the group:  7  Group Length:  2 Hours    Group Therapy Type: Recovery strategies    Summary of Group / Topics Discussed:    Recovery Principles      Group Attendance:  Attended group session    Patient's response to the group topic/interactions:  cooperative with task    Patient appeared to be Attentive.        Client specific details:  Xavier attended PM. The group topic was overcoming adversity in recovery.

## 2021-09-24 NOTE — GROUP NOTE
Group Therapy Documentation    PATIENT'S NAME: Xavier Odell  MRN:   [id number]  :   1985  ACCT. NUMBER: [id number]  DATE OF SERVICE: 21  START TIME:  9:00 AM  END TIME: 11:00 AM  FACILITATOR(S): Jimbo Ulloa LADC  TOPIC: BEH Group Therapy  Number of patients attending the group:  7  Group Length:  2 Hours    Group Therapy Type: Recovery strategies    Summary of Group / Topics Discussed:    Recovery Principles      Group Attendance:  Attended group session    Patient's response to the group topic/interactions:  cooperative with task    Patient appeared to be Attentive.        Client specific details:  Xavier attended AM group. Patient took part in a discussion regarding what success means to him. Patient also checked in, and gave a peer feedback on a presented assignment  .

## 2021-09-25 ENCOUNTER — HOSPITAL ENCOUNTER (OUTPATIENT)
Dept: BEHAVIORAL HEALTH | Facility: CLINIC | Age: 36
End: 2021-09-25
Attending: FAMILY MEDICINE
Payer: COMMERCIAL

## 2021-09-25 VITALS — TEMPERATURE: 97.8 F | OXYGEN SATURATION: 98 %

## 2021-09-25 DIAGNOSIS — F17.200 NICOTINE DEPENDENCE: Primary | ICD-10-CM

## 2021-09-25 PROCEDURE — H2035 A/D TX PROGRAM, PER HOUR: HCPCS | Mod: HQ

## 2021-09-25 PROCEDURE — 1002N00001 HC LODGING PLUS FACILITY CHARGE ADULT

## 2021-09-25 NOTE — GROUP NOTE
Psychoeducation Group Documentation    PATIENT'S NAME: Xavier Odell  MRN:   [id number]  :   1985  ACCT. NUMBER: [id number]  DATE OF SERVICE: 21  START TIME: 12:30 PM  END TIME:  2:30 PM  FACILITATOR(S): Rigoberto Jules LADC  TOPIC: BEH Pyschoeducation  Number of patients attending the group:  8  Group Length:  2 Hours    Skills Group Therapy Type: Recovery skills    Summary of Group / Topics Discussed:    Relapse prevention skills          Group Attendance:  Attended group session    Patient's response to the group topic/interactions:  cooperative with task    Patient appeared to be Attentive.         Client specific details:  Xavier gave appropriate feedback..

## 2021-09-25 NOTE — GROUP NOTE
Psychoeducation Group Documentation    PATIENT'S NAME: Xavier Odell  MRN:   [id number]  :   1985  ACCT. NUMBER: [id number]  DATE OF SERVICE: 21  START TIME:  9:00 AM  END TIME: 11:00 AM  FACILITATOR(S): Rigoberto Jules LADC  TOPIC: BEH Pyschoeducation  Number of patients attending the group:  8  Group Length:  2 Hours    Skills Group Therapy Type: Recovery skills    Summary of Group / Topics Discussed:    Relapse prevention skills          Group Attendance:  Attended group session    Patient's response to the group topic/interactions:  cooperative with task    Patient appeared to be Actively participating.         Client specific details:  Carmelo gave appropriate feedback..

## 2021-09-25 NOTE — GROUP NOTE
Group Therapy Documentation    PATIENT'S NAME: Xavier Odell  MRN:   [id number]  :   1985  ACCT. NUMBER: [id number]  DATE OF SERVICE: 21  START TIME: 12:30 PM  END TIME:  2:30 PM  FACILITATOR(S): Nancy Mcgrath, HUYENT; Esau Nice [facility]; Rigoberto Jules [facility]  TOPIC: BEH Group Therapy  Number of patients attending the group:  26  Group Length:  2 Hours    Group Therapy Type: Recovery strategies and Daily living/independence skills    Summary of Group / Topics Discussed:    Spiritual Care and Relapse prevention    Group Attendance:  Attended group session    Patient's response to the group topic/interactions:  cooperative with task    Patient appeared to be Attentive and Engaged.        Client specific details:  Pt attended a lecture about relapse prevention as a component of spiritual health.

## 2021-09-26 ENCOUNTER — HOSPITAL ENCOUNTER (OUTPATIENT)
Dept: BEHAVIORAL HEALTH | Facility: CLINIC | Age: 36
End: 2021-09-26
Attending: FAMILY MEDICINE
Payer: COMMERCIAL

## 2021-09-26 VITALS — OXYGEN SATURATION: 97 % | TEMPERATURE: 98 F

## 2021-09-26 PROCEDURE — 1002N00001 HC LODGING PLUS FACILITY CHARGE ADULT

## 2021-09-26 PROCEDURE — H2035 A/D TX PROGRAM, PER HOUR: HCPCS | Mod: HQ

## 2021-09-26 NOTE — GROUP NOTE
Psychoeducation Group Documentation    PATIENT'S NAME: Xavier Odell  MRN:   [id number]  :   1985  ACCT. NUMBER: [id number]  DATE OF SERVICE: 21  START TIME:  8:40 AM  END TIME: 10:30 AM  FACILITATOR(S): Rigoberto Jules LADC; Jeni Gilliam RN  TOPIC: BEH Pyschoeducation  Number of patients attending the group:  8  Group Length:  2 Hours    Skills Group Therapy Type: Healthy behaviors development    Summary of Group / Topics Discussed:    Relationship/social skills and Balanced lifestyle skills          Group Attendance:  Attended group session    Patient's response to the group topic/interactions:  cooperative with task    Patient appeared to be Actively participating and Attentive.         Client specific details:  Xavier gave appropriate feedback..

## 2021-09-26 NOTE — GROUP NOTE
Psychoeducation Group Documentation    PATIENT'S NAME: Xavier Odell  MRN:   [id number]  :   1985  ACCT. NUMBER: [id number]  DATE OF SERVICE: 21  START TIME: 12:30 PM  END TIME:  1:30 PM  FACILITATOR(S): Rigoberto Jules LADC; Nancy Mcgrath LADC  TOPIC: BEH Pyschoeducation  Number of patients attending the group:  8  Group Length:  1 Hours    Skills Group Therapy Type: Recovery skills    Summary of Group / Topics Discussed:    Relationship/social skills and Balanced lifestyle skills          Group Attendance:  Attended group session    Patient's response to the group topic/interactions:  cooperative with task    Patient appeared to be Actively participating and Attentive.         Client specific details:  Xavier gave appropriate feedback..

## 2021-09-27 ENCOUNTER — HOSPITAL ENCOUNTER (OUTPATIENT)
Dept: BEHAVIORAL HEALTH | Facility: CLINIC | Age: 36
End: 2021-09-27
Attending: FAMILY MEDICINE
Payer: COMMERCIAL

## 2021-09-27 VITALS — OXYGEN SATURATION: 100 % | TEMPERATURE: 97.9 F

## 2021-09-27 PROCEDURE — 1002N00001 HC LODGING PLUS FACILITY CHARGE ADULT

## 2021-09-27 PROCEDURE — H2035 A/D TX PROGRAM, PER HOUR: HCPCS | Mod: HQ

## 2021-09-27 NOTE — PROGRESS NOTES
Patient:  Xavier Odell    ATTENDANCE for the following date span:  9-21-21 - 9-27-21  (date, type, and amount of each treatment service completed)       Day Monday Tuesday Wednesday Thursday Friday Saturday Brice   Group Hours   4 4 4 4 4 4 2   Skills Hours    1 1 1   1   Individual Session (LADC)     .5 .5      Individual Session  (psychotherapy)            Peer-led Recovery Group   1 1 1 1 1 1 1               Adult CD Progress Note and Treatment Plan Review     Attendance  Please refer to OP BEH CD Adult Attendance Record Documentation Flowsheet    Support group attended this week: yes    Reporting sobriety:  yes        Treatment Plan     Treatment Plan Review competed on: 9-27-21       Client preferred learning style: Visual  Hands on  Verbal    Staff Members contributing GUMARO Bradley, GUMARO Castillo, Ino Falcon, Intern                     Received Supervision: None  Client: contributed to goals and plan.    Client received copy of plan/revised plan: Yes    Client agrees with plan/revised plan: Yes        Changes to Treatment Plan: No    New Goals added since last review None needed    Goals worked on since last review See notes below    Strategies effective: yes    Strategies need these changes: None needed    1) Care Coordination Activities:  None   2) Medical, Mental Health and other appointments the client attended: none  3) Medication issues: pt reports he has no ongoing issues  4) Physical and mental health problems: pt reports improvement  5) Any changes in Vulnerable Adult Status?  No If yes, add to treatment plan and individual abuse prevention plan.  6) Review and evaluation of the individual abuse prevention plan: Current IAPP for this program is adequate for this client          ASAM Risk Rating:    Dimension 1 0 No change    Dimension 2 0 No change    Dimension 3 2 Pt reports he has had no suicidal ideation.  Pt is currently working on understanding his relationship between his  "addiction and his mental health.  Pt is currently working on his assignment \"Anxiety and recovery from Chemical Dependency.\"  Pt attended attended a Spiritual Care group session facilitated by Fe Shah.    Dimension 4 2 Pt expresses internal motivation to change.  Pt is active in group, accepts and provides feedback, had good insight, and appears engaged.  Pt is supportive of his group peers.  Pt reported that his family are what motivated him to be sober and to stay in treatment this week.    Dimension 5 4 Pt attended the relapse prevention workshop.  Pt was able to identify relapse warning signs and trigger in the areas of people, places, activities, and feelings.  Xavier continues to work on identifying high risk situations, and long term sober maintenance skills.    Dimension 6 3 Pt is attending 12 steps groups while in treatment.  Pt needs to obtain a male sponsor.  Pt lacks a sober living environment.        Guide to Risk Ratings for Suicidality:   IDEATION: Active thoughts of suicide? INTENT: Intent to follow on suicide? PLAN: Plan to follow through on suicide? Level of Risk:   IF Yes Yes Yes Patient = High Emergent   IF Yes Yes No Patient = High Urgent/Non-Emergent   IF Yes No No Patient = Moderate Non-Urgent   IF No No   No Patient = Low Risk   The patient's ADDITIONAL RISK FACTORS and lack of PROTECTIVE FACTORS may increase their overall suicide risk ratings.     Patient's/client's current risk rating:  Low Risk    Family Involvement:   REJI signed    Data:   offered feedback good insight client did participate client did actively participate      Intervention:   Behavior modification  Counselor feedback  Education  Emotional management  Group feedback  Relapse prevention  Twelve Step facilitation  Mental health education      Assessment:   Stages of Change Model  Contemplation    Cooperative  Motivated  Engaged      Plan:  Continue group threapy    GUMARO Gabriel        "

## 2021-09-27 NOTE — GROUP NOTE
Group Therapy Documentation    PATIENT'S NAME: Xavier Odell  MRN:   [id number]  :   1985  [id number]  DATE OF SERVICE: 21  START TIME: 12:30 PM  END TIME:  2:30 PM  FACILITATOR(S): Ramin Falcon; Fe Shah  TOPIC: BEH Group Therapy  Number of patients attending the group:  8  Group Length:  2 Hours    Group Therapy Type: Recovery strategies, Emotion processing, Daily living/independence skills, and Health and wellbeing     Summary of Group / Topics Discussed:    Recovery Principles, Spiritual Care, Sober coping skills, Relationship/socialization, Balanced lifestyle, Cognitive behavioral therapy skills, Mindfulness/Relaxation, Coping/DBT informed care, Trauma informed care, Emotions/expression, Leisure explorations/use of leisure time, and Self-care activities      Group Attendance:  Attended group session    Patient's response to the group topic/interactions:  cooperative with task    Patient appeared to be Attentive and Engaged.        Client specific details:  Xavier attended afternoon group for Spirituality.  He participated in the discussions and activities.

## 2021-09-28 ENCOUNTER — HOSPITAL ENCOUNTER (OUTPATIENT)
Dept: BEHAVIORAL HEALTH | Facility: CLINIC | Age: 36
End: 2021-09-28
Attending: FAMILY MEDICINE
Payer: COMMERCIAL

## 2021-09-28 VITALS — TEMPERATURE: 97.8 F | OXYGEN SATURATION: 98 %

## 2021-09-28 PROCEDURE — H2035 A/D TX PROGRAM, PER HOUR: HCPCS | Mod: HQ

## 2021-09-28 PROCEDURE — 1002N00001 HC LODGING PLUS FACILITY CHARGE ADULT

## 2021-09-28 NOTE — GROUP NOTE
Group Therapy Documentation    PATIENT'S NAME: Xavier Odell  MRN:   [id number]  :   1985  ACCT. NUMBER: [id number]  DATE OF SERVICE: 21  START TIME:  9:00 AM  END TIME: 11:00 AM  FACILITATOR(S): Jimbo Ulloa LADC; Rigoberto Jules LADC; Ramin Falcon  TOPIC: BEH Group Therapy  Number of patients attending the group:  6  Group Length:  2 Hours    Group Therapy Type: Recovery strategies    Summary of Group / Topics Discussed:    Recovery Principles      Group Attendance:  Attended group session    Patient's response to the group topic/interactions:  cooperative with task    Patient appeared to be Attentive.        Client specific details:  Xavier attended AM group. Patient checked in, gave peers feedback on assignments. Patient also helped  peers..

## 2021-09-28 NOTE — GROUP NOTE
Group Therapy Documentation    PATIENT'S NAME: Xavier Odell  MRN:   [id number]  :   1985  ACCT. NUMBER: [id number]  DATE OF SERVICE: 21  START TIME: 12:30 PM  END TIME:  2:30 PM  FACILITATOR(S): Jimbo Ulloa LADC; Rigoberto Jules LADC  TOPIC: BEH Group Therapy  Number of patients attending the group:  6  Group Length:  2 Hours    Group Therapy Type: Recovery strategies and Emotion processing    Summary of Group / Topics Discussed:    Recovery Principles, Sober coping skills, Balanced lifestyle, Disease of addiction, and Relapse prevention      Group Attendance:  Attended group session    Patient's response to the group topic/interactions:  cooperative with task    Patient appeared to be Actively participating, Attentive and Engaged.        Client specific details:  Xavier presented his first step assignment..

## 2021-09-28 NOTE — GROUP NOTE
Group Therapy Documentation    PATIENT'S NAME: Xavier Odell  MRN:   [id number]  :   1985  ACCT. NUMBER: [id number]  DATE OF SERVICE: 21  START TIME:  3:00 PM  END TIME:  4:00 PM  FACILITATOR(S): Margarita Hay LADC; Raven Lynne; Estephania Reid LADC  TOPIC: BEH Group Therapy  Number of patients attending the group: 19  Group Length:  1 Hours    Group Therapy Type: Recovery strategies    Summary of Group / Topics Discussed:    Disease of addiction and Leisure explorations/use of leisure time      Group Attendance:  Attended group session    Patient's response to the group topic/interactions:  cooperative with task    Patient appeared to be Attentive and Engaged.        Client specific details: Xavier was an active participant in skills group exploring the disease of addiction.

## 2021-09-29 ENCOUNTER — HOSPITAL ENCOUNTER (OUTPATIENT)
Dept: BEHAVIORAL HEALTH | Facility: CLINIC | Age: 36
End: 2021-09-29
Attending: FAMILY MEDICINE
Payer: COMMERCIAL

## 2021-09-29 VITALS — OXYGEN SATURATION: 99 % | TEMPERATURE: 97.3 F

## 2021-09-29 PROCEDURE — 1002N00001 HC LODGING PLUS FACILITY CHARGE ADULT

## 2021-09-29 PROCEDURE — H2035 A/D TX PROGRAM, PER HOUR: HCPCS | Mod: HQ

## 2021-09-29 NOTE — GROUP NOTE
Group Therapy Documentation    PATIENT'S NAME: Xavier Odell  MRN:   [id number]  :   1985  ACCT. NUMBER: [id number]  DATE OF SERVICE: 21  START TIME: 12:30 AM  END TIME:  2:30 PM  FACILITATOR(S): Jimbo Ulloa LADC; Raven Lynne; Ramin Falcon  TOPIC: BEH Group Therapy  Number of patients attending the group:  6  Group Length:  2 Hours    Group Therapy Type: Recovery strategies    Summary of Group / Topics Discussed:    Recovery Principles      Group Attendance:  Attended group session    Patient's response to the group topic/interactions:  cooperative with task    Patient appeared to be Attentive.        Client specific details:  Xavier attended PM group. Patient checked in, took part in a group discussion, and was attentive and engaged.

## 2021-09-29 NOTE — GROUP NOTE
Psychoeducation Group Documentation    PATIENT'S NAME: Xavier Odell  MRN:   [id number]  :   1985  ACCT. NUMBER: [id number]  DATE OF SERVICE: 21  START TIME:  8:30 AM  END TIME:  9:30 AM  FACILITATOR(S): Rigoberto Jules LADC; Neida Hernandez  TOPIC: BEH Pyschoeducation  Number of patients attending the group:  6  Group Length:  1 Hours    Skills Group Therapy Type: Emotion regulation skills    Summary of Group / Topics Discussed:    Relationship/social skills          Group Attendance:  Attended group session    Patient's response to the group topic/interactions:  cooperative with task    Patient appeared to be Attentive.         Client specific details:  Xavier gave appropriate feedback..

## 2021-09-29 NOTE — GROUP NOTE
Group Therapy Documentation    PATIENT'S NAME: Xavier Odell  MRN:   [id number]  [facility]   1985  [id number]  DATE OF SERVICE: 21  START TIME:  9:45 AM  END TIME: 11:20 AM  FACILITATOR(S): Jimbo Ulloa LADC; Rigoberto Jules LADC; Ramin Falcon  TOPIC: BEH Group Therapy  Number of patients attending the group:  6  Group Length:  2 Hours    Group Therapy Type: Recovery strategies    Summary of Group / Topics Discussed:    Recovery Principles      Group Attendance:  Attended group session    Patient's response to the group topic/interactions:  cooperative with task    Patient appeared to be Attentive.        Client specific details:  Xavier  attended AM group. Patient checked in, took part in a group discussion, and gave patient feedback on a presented assignment  .

## 2021-09-30 ENCOUNTER — HOSPITAL ENCOUNTER (OUTPATIENT)
Dept: BEHAVIORAL HEALTH | Facility: CLINIC | Age: 36
End: 2021-09-30
Attending: FAMILY MEDICINE
Payer: COMMERCIAL

## 2021-09-30 VITALS — OXYGEN SATURATION: 99 % | TEMPERATURE: 97.6 F

## 2021-09-30 PROCEDURE — 1002N00001 HC LODGING PLUS FACILITY CHARGE ADULT

## 2021-09-30 PROCEDURE — H2035 A/D TX PROGRAM, PER HOUR: HCPCS | Mod: HQ

## 2021-09-30 NOTE — ADDENDUM NOTE
Encounter addended by: Jimbo Ulloa LADC on: 9/30/2021 2:54 PM   Actions taken: Charge Capture section accepted

## 2021-09-30 NOTE — GROUP NOTE
Group Therapy Documentation    PATIENT'S NAME: Xavier Odell  MRN:   [id number]  :   1985  [id number]  DATE OF SERVICE: 21  START TIME:  9:00 AM  END TIME: 11:00 AM  FACILITATOR(S): Ramin Falcon; Jimbo Ulloa LADC  TOPIC: BEH Group Therapy  Number of patients attending the group:  6  Group Length:  2 Hours    Group Therapy Type: Recovery strategies    Summary of Group / Topics Discussed:    Recovery Principles, Balanced lifestyle, Cognitive behavioral therapy skills, Mindfulness/Relaxation, Emotions/expression, Leisure explorations/use of leisure time, and Self-care activities      Group Attendance:  Attended group session    Patient's response to the group topic/interactions:  cooperative with task    Patient appeared to be Attentive and Engaged.        Client specific details:  Xavier attended morning group.  He checked in, went on a mindfulness walk, and participated in group discussion on Cognitive Distortions.

## 2021-09-30 NOTE — GROUP NOTE
Group Therapy Documentation    PATIENT'S NAME: Xavier Odell  MRN:   [id number]  :   1985  ACCT. NUMBER: [id number]  DATE OF SERVICE: 21  START TIME: 12:30 PM  END TIME:  2:30 PM  FACILITATOR(S): Jimbo Ulloa LADC; Raven Lynne; Ramin Falcon  TOPIC: BEH Group Therapy  Number of patients attending the group:  6  Group Length:  2 Hours    Group Therapy Type: Recovery strategies    Summary of Group / Topics Discussed:    Recovery Principles      Group Attendance:  Attended group session    Patient's response to the group topic/interactions:  cooperative with task    Patient appeared to be Attentive.        Client specific details:  Xavier  attended PM group. He was attentive and engaged. Patient took part in a group discussion and gave peers feedback on presented assignments.

## 2021-10-01 ENCOUNTER — HOSPITAL ENCOUNTER (OUTPATIENT)
Dept: BEHAVIORAL HEALTH | Facility: CLINIC | Age: 36
End: 2021-10-01
Attending: FAMILY MEDICINE
Payer: COMMERCIAL

## 2021-10-01 VITALS — TEMPERATURE: 98 F | OXYGEN SATURATION: 97 %

## 2021-10-01 PROCEDURE — 1002N00001 HC LODGING PLUS FACILITY CHARGE ADULT

## 2021-10-01 PROCEDURE — H2035 A/D TX PROGRAM, PER HOUR: HCPCS | Mod: HQ

## 2021-10-01 NOTE — GROUP NOTE
Group Therapy Documentation    PATIENT'S NAME: Xavier Odell  MRN:   [id number]  :   1985  ACCT. NUMBER: [id number]  DATE OF SERVICE: 10/01/21  START TIME:  9:00 AM  END TIME: 11:00 AM  FACILITATOR(S): Jimbo Ulloa LADC  TOPIC: BEH Group Therapy  Number of patients attending the group:  5  Group Length:  2 Hours    Group Therapy Type: Recovery strategies    Summary of Group / Topics Discussed:    Recovery Principles      Group Attendance:  Attended group session    Patient's response to the group topic/interactions:  cooperative with task    Patient appeared to be Attentive.        Client specific details:  Xavier  attended AM group. Patient checked in, took part in a discussion on shame, stages of change, and helped to graduate a peer.

## 2021-10-01 NOTE — ADDENDUM NOTE
Encounter addended by: Alistair Byrd on: 10/1/2021 3:00 AM   Actions taken: Charge Capture section accepted

## 2021-10-02 ENCOUNTER — HOSPITAL ENCOUNTER (OUTPATIENT)
Dept: BEHAVIORAL HEALTH | Facility: CLINIC | Age: 36
End: 2021-10-02
Attending: FAMILY MEDICINE
Payer: COMMERCIAL

## 2021-10-02 VITALS — TEMPERATURE: 97.3 F | OXYGEN SATURATION: 98 %

## 2021-10-02 PROCEDURE — H2035 A/D TX PROGRAM, PER HOUR: HCPCS | Mod: HQ

## 2021-10-02 PROCEDURE — 1002N00001 HC LODGING PLUS FACILITY CHARGE ADULT

## 2021-10-02 NOTE — GROUP NOTE
Group Therapy Documentation    PATIENT'S NAME: Xavier Odell  MRN:   [id number]  :   1985  ACCT. NUMBER: [id number]  DATE OF SERVICE: 10/02/21  START TIME:  9:00 AM  END TIME: 11:00 AM  FACILITATOR(S): Jacqui Rosa; Jeevan Savage LADC; Esau Nice LADC  TOPIC: BEH Group Therapy  Number of patients attending the group:  20  Group Length:  2 Hours    Group Therapy Type: Recovery strategies    Summary of Group / Topics Discussed:    Relationship/socialization    Patient was attentive and interactive with group.      Group Attendance:  Attended group session    Patient's response to the group topic/interactions:  cooperative with task    Patient appeared to be Engaged.        Client specific details:  Pt participatory and engaged in group.

## 2021-10-02 NOTE — GROUP NOTE
Group Therapy Documentation    PATIENT'S NAME: Xavier Odell  MRN:   [id number]  :   1985  ACCT. NUMBER: [id number]  DATE OF SERVICE: 10/02/21  START TIME: 12:30 PM  END TIME:  2:00 PM  FACILITATOR(S): Jeevan Savage LADC  TOPIC: BEH Group Therapy  Number of patients attending the group:  20  Group Length:  1.5 Hours    Group Therapy Type: Recovery strategies    Summary of Group / Topics Discussed:    Relationship/socialization      Group Attendance:  Attended group session    Patient's response to the group topic/interactions:  cooperative with task    Patient appeared to be Actively participating.        Client specific details:  Patient shared insights into relationships.

## 2021-10-03 ENCOUNTER — HOSPITAL ENCOUNTER (OUTPATIENT)
Dept: BEHAVIORAL HEALTH | Facility: CLINIC | Age: 36
End: 2021-10-03
Attending: FAMILY MEDICINE
Payer: COMMERCIAL

## 2021-10-03 VITALS — OXYGEN SATURATION: 99 % | TEMPERATURE: 97.8 F

## 2021-10-03 PROCEDURE — H2035 A/D TX PROGRAM, PER HOUR: HCPCS | Mod: HQ

## 2021-10-03 PROCEDURE — 1002N00001 HC LODGING PLUS FACILITY CHARGE ADULT

## 2021-10-03 NOTE — GROUP NOTE
Group Therapy Documentation    PATIENT'S NAME: Xavier Odell  MRN:   [id number]  :   1985  ACCT. NUMBER: [id number]  DATE OF SERVICE: 10/03/21  START TIME: 12:30 PM  END TIME:  1:30 PM  FACILITATOR(S): Luis Felipe Jones LADC; Ofelia Avendaño LADC  TOPIC: BEH Group Therapy  Number of patients attending the group:  19  Group Length:  1 Hours    Group Therapy Type: Emotion processing    Summary of Group / Topics Discussed:    Relationship/socialization and Emotions/expression      Group Attendance:  Attended group session    Patient's response to the group topic/interactions:  cooperative with task    Patient appeared to be Attentive and Engaged.        Client specific details:  Xavier learned about trust in relationships.

## 2021-10-03 NOTE — GROUP NOTE
Group Therapy Documentation    PATIENT'S NAME: Xavier Odell  MRN:   [id number]  :   1985  ACCT. NUMBER: [id number]  DATE OF SERVICE: 10/03/21  START TIME:  8:45 AM  END TIME: 10:45 AM  FACILITATOR(S): Ofelia Avendaño LADC; Mitali Rendon RN; Luis Felipe Jones LADC  TOPIC: BEH Group Therapy  Number of patients attending the group:  19  Group Length:  2 Hours    Group Therapy Type: Health and wellbeing     Summary of Group / Topics Discussed:    HIV/AIDS      Group Attendance:  Attended group session    Patient's response to the group topic/interactions:  cooperative with task    Patient appeared to be Attentive and Engaged.        Client specific details:  Xavier learned about HIV/AIDS and prevention.

## 2021-10-04 ENCOUNTER — HOSPITAL ENCOUNTER (OUTPATIENT)
Dept: BEHAVIORAL HEALTH | Facility: CLINIC | Age: 36
End: 2021-10-04
Attending: FAMILY MEDICINE
Payer: COMMERCIAL

## 2021-10-04 VITALS — TEMPERATURE: 97.7 F | OXYGEN SATURATION: 97 %

## 2021-10-04 PROCEDURE — H2035 A/D TX PROGRAM, PER HOUR: HCPCS | Mod: HQ

## 2021-10-04 PROCEDURE — 1002N00001 HC LODGING PLUS FACILITY CHARGE ADULT

## 2021-10-04 NOTE — PROGRESS NOTES
Patient:  Xavier Odell    ATTENDANCE for the following date span:  9/27/21 - 10/3/21  (date, type, and amount of each treatment service completed)       Day Monday Tuesday Wednesday Thursday Friday Saturday Brice   Group Hours   4 4 5 4 4 4 2   Skills Hours    1  1   1   Individual Session (LADC)            Individual Session  (psychotherapy)            Peer-led Recovery Group   1 1 1 1 1 1 1               Adult CD Progress Note and Treatment Plan Review     Attendance  Please refer to OP BEH CD Adult Attendance Record Documentation Flowsheet    Support group attended this week: yes    Reporting sobriety:  yes        Treatment Plan     Treatment Plan Review competed on: 10-4-21       Client preferred learning style: Visual  Hands on  Verbal    Staff Members contributing KATHY Bradley, KATHY Castillo, KATHY Valdes Intern                     Received Supervision: None  Client: contributed to goals and plan.    Client received copy of plan/revised plan: Yes    Client agrees with plan/revised plan: Yes        Changes to Treatment Plan: No    New Goals added since last review None needed    Goals worked on since last review See notes below    Strategies effective: yes    Strategies need these changes: None needed    1) Care Coordination Activities:  None   2) Medical, Mental Health and other appointments the client attended: none  3) Medication issues: pt reports he has no ongoing issues  4) Physical and mental health problems: pt reports improvement  5) Any changes in Vulnerable Adult Status?  No If yes, add to treatment plan and individual abuse prevention plan.  6) Review and evaluation of the individual abuse prevention plan: Current IAPP for this program is adequate for this client          ASAM Risk Rating:    Dimension 1 0 No change    Dimension 2 0 No change    Dimension 3 2 Pt reports he has had no suicidal ideation.  Pt is currently working on understanding his relationship between his  "addiction and his mental health.  Pt is currently working on his assignment \"Anxiety and recovery from Chemical Dependency.\"  Pt attended attended a Spiritual Care group session facilitated by Fe Shah.    Dimension 4 2 Pt expresses internal motivation to change.  Pt is active in group, accepts and provides feedback, had good insight, and appears engaged.  Pt is supportive of his group peers.  Pt reported that his family are what motivated him to be sober and to stay in treatment this week.  Pt completed and presented his \"1st Step Assignment\" to the group.  Pt completed and presented his \"Stress and Recovery\" to group.  Pt reported that he is willing to consider going to a support group after completion of treatment.    Dimension 5 4 Pt continues to work on identifying high risk situations, and long term sober maintenance skills.  Pt is working on his \"Relapse Prevention Planning\", \"Identifying Relapse Triggers and Cues: Situations and Feelings\", and \"A Look at Cross Addiction\" assignments.    Dimension 6 3 Pt is attending 12 steps groups while in treatment.  Pt needs to obtain a male sponsor.    Pt attended the Relationships weekend workshop and learned about healthy/unhealthy relationships, assertive communication, boundaries, and co-dependency.      Guide to Risk Ratings for Suicidality:   IDEATION: Active thoughts of suicide? INTENT: Intent to follow on suicide? PLAN: Plan to follow through on suicide? Level of Risk:   IF Yes Yes Yes Patient = High Emergent   IF Yes Yes No Patient = High Urgent/Non-Emergent   IF Yes No No Patient = Moderate Non-Urgent   IF No No   No Patient = Low Risk   The patient's ADDITIONAL RISK FACTORS and lack of PROTECTIVE FACTORS may increase their overall suicide risk ratings.     Patient's/client's current risk rating:  Low Risk    Family Involvement:   REJI signed    Data:   offered feedback good insight client did participate client did actively participate      Intervention: "   Behavior modification  Counselor feedback  Education  Emotional management  Group feedback  Relapse prevention  Twelve Step facilitation  Mental health education      Assessment:   Stages of Change Model  Contemplation    Cooperative  Motivated  Engaged      Plan:  Continue group threapy    Ramin Falcon, [facility] Intern

## 2021-10-04 NOTE — GROUP NOTE
Group Therapy Documentation    PATIENT'S NAME: Xavier Odell  MRN:   [id number]  :   1985  ACCT. NUMBER: [id number]  DATE OF SERVICE: 10/04/21  START TIME: 12:30 PM  END TIME:  2:30 PM  FACILITATOR(S): Rigoberto Jules LADC  TOPIC: BEH Group Therapy  Number of patients attending the group:  8  Group Length:  2 Hours    Group Therapy Type: Recovery strategies and Health and wellbeing     Summary of Group / Topics Discussed:    Spiritual Care      Group Attendance:  Attended group session    Patient's response to the group topic/interactions:  cooperative with task    Patient appeared to be Actively participating, Attentive and Engaged.        Client specific details:  Xavier gave appropriate feedback..

## 2021-10-04 NOTE — GROUP NOTE
Group Therapy Documentation    PATIENT'S NAME: Xavier Odell  MRN:   [id number]  [facility]   1985  [id number]  DATE OF SERVICE: 10/04/21  START TIME:  9:00 AM  END TIME: 11:00 AM  FACILITATOR(S): Jimbo Ulloa LADC; Rigoberto Jules LADC; Ramin Falcon  TOPIC: BEH Group Therapy  Number of patients attending the group:  7  Group Length:  2 Hours    Group Therapy Type: Recovery strategies    Summary of Group / Topics Discussed:    Recovery Principles      Group Attendance:  Attended group session    Patient's response to the group topic/interactions:  cooperative with task    Patient appeared to be Attentive.        Client specific details:  Xavier attended AM group. Patient checked in, took part in a group discussion, and gave a peer feedback on a presented assignment.

## 2021-10-05 ENCOUNTER — HOSPITAL ENCOUNTER (OUTPATIENT)
Dept: BEHAVIORAL HEALTH | Facility: CLINIC | Age: 36
End: 2021-10-05
Attending: FAMILY MEDICINE
Payer: COMMERCIAL

## 2021-10-05 VITALS — TEMPERATURE: 96.5 F | OXYGEN SATURATION: 97 %

## 2021-10-05 PROCEDURE — H2035 A/D TX PROGRAM, PER HOUR: HCPCS | Mod: HQ

## 2021-10-05 PROCEDURE — 1002N00001 HC LODGING PLUS FACILITY CHARGE ADULT

## 2021-10-05 NOTE — GROUP NOTE
Group Therapy Documentation    PATIENT'S NAME: Xavier Odell  MRN:   [id number]  :   1985  ACCT. NUMBER: [id number]  DATE OF SERVICE: 10/05/21  START TIME: 12:30 PM  END TIME:  2:30 PM  FACILITATOR(S): Rigoberto Jules LADC; Jimbo Ulloa LADC  TOPIC: BEH Group Therapy  Number of patients attending the group:  7  Group Length:  2 Hours    Group Therapy Type: Recovery strategies    Summary of Group / Topics Discussed:    Recovery Principles, Balanced lifestyle, Disease of addiction, and Relapse prevention      Group Attendance:  Attended group session    Patient's response to the group topic/interactions:  cooperative with task    Patient appeared to be Actively participating, Attentive and Engaged.        Client specific details:  Xavier presented his drug use history assignment..

## 2021-10-05 NOTE — GROUP NOTE
Group Therapy Documentation    PATIENT'S NAME: Xavier Odell  MRN:   [id number]  :   1985  ACCT. NUMBER: [id number]  DATE OF SERVICE: 10/05/21  START TIME:  3:00 PM  END TIME:  4:00 PM  FACILITATOR(S): Will Short LADC  TOPIC: BEH Group Therapy  Number of patients attending the group:  7  Group Length:  1 Hours    Group Therapy Type: Recovery strategies    Summary of Group / Topics Discussed:    Recovery Principles      Group Attendance:  Attended group session    Patient's response to the group topic/interactions:  cooperative with task    Patient appeared to be Actively participating.        Client specific details:  Xavier participated and interacted appropriately with peers and staff in skills group. No triggers to use noted or discussed.

## 2021-10-05 NOTE — GROUP NOTE
Group Therapy Documentation    PATIENT'S NAME: Xavier Odell  MRN:   [id number]  :   1985  ACCT. NUMBER: [id number]  DATE OF SERVICE: 10/05/21  START TIME:  9:00 AM  END TIME: 11:00 AM  FACILITATOR(S): Jimbo Ulloa LADC; Rigoberto Jules LADC; Ramin Falcon  TOPIC: BEH Group Therapy  Number of patients attending the group:  7  Group Length:  2 Hours    Group Therapy Type: Recovery strategies    Summary of Group / Topics Discussed:    Recovery Principles      Group Attendance:  Attended group session    Patient's response to the group topic/interactions:  cooperative with task    Patient appeared to be Attentive.        Client specific details:  Xavier attended AM group. Patient checked in, gave a peer feedback, and helped graduate a peer..

## 2021-10-06 ENCOUNTER — HOSPITAL ENCOUNTER (OUTPATIENT)
Dept: BEHAVIORAL HEALTH | Facility: CLINIC | Age: 36
End: 2021-10-06
Attending: FAMILY MEDICINE
Payer: COMMERCIAL

## 2021-10-06 VITALS — TEMPERATURE: 97.4 F | OXYGEN SATURATION: 96 %

## 2021-10-06 PROCEDURE — H2035 A/D TX PROGRAM, PER HOUR: HCPCS | Mod: HQ

## 2021-10-06 PROCEDURE — 1002N00001 HC LODGING PLUS FACILITY CHARGE ADULT

## 2021-10-06 NOTE — GROUP NOTE
Group Therapy Documentation    PATIENT'S NAME: Xavier Odell  MRN:   [id number]  :   1985  [id number]  DATE OF SERVICE: 10/06/21  START TIME:  8:30 AM  END TIME:  9:30 AM  FACILITATOR(S): Jimbo Ulloa LADC; Rigoberto Jules LADC; Ramin Falcon  TOPIC: BEH Group Therapy  Number of patients attending the group:  23  Group Length:  1 Hours    Group Therapy Type: Recovery strategies    Summary of Group / Topics Discussed:    Recovery Principles, Sober coping skills, and Coping/DBT informed care      Group Attendance:  Attended group session    Patient's response to the group topic/interactions:  cooperative with task    Patient appeared to be Attentive.        Client specific details:  Xavier attended Wed AM lecture. Patient learned about the components/background of DBT and its uses. Patient was attentive and engaged and took part in a group discussion.

## 2021-10-06 NOTE — GROUP NOTE
Group Therapy Documentation    PATIENT'S NAME: Xavier Odell  MRN:   [id number]  :   1985  ACCT. NUMBER: [id number]  DATE OF SERVICE: 10/06/21  START TIME:  9:45 AM  END TIME: 11:20 AM  FACILITATOR(S): Jimbo Ulloa LADC  TOPIC: BEH Group Therapy  Number of patients attending the group:  9  Group Length:  2 Hours    Group Therapy Type: Recovery strategies    Summary of Group / Topics Discussed:    Recovery Principles      Group Attendance:  Attended group session    Patient's response to the group topic/interactions:  cooperative with task    Patient appeared to be Attentive.        Client specific details:  Xavier attended AM group. Patient checked in, took part in a discussion, and gave a peer feedback on a presented assignment.

## 2021-10-06 NOTE — GROUP NOTE
Group Therapy Documentation    PATIENT'S NAME: Xavier Odell  MRN:   [id number]  :   1985  ACCT. NUMBER: [id number]  DATE OF SERVICE: 10/06/21  START TIME: 12:30 PM  END TIME:  2:30 PM  FACILITATOR(S): Rigoberto Jules LADC; Raven Lynne  TOPIC: BEH Group Therapy  Number of patients attending the group:  9  Group Length:  2 Hours    Group Therapy Type: Recovery strategies    Summary of Group / Topics Discussed:    Recovery Principles, Sober coping skills, Balanced lifestyle, Disease of addiction, and Relapse prevention      Group Attendance:  Attended group session    Patient's response to the group topic/interactions:  cooperative with task    Patient appeared to be Actively participating, Attentive and Engaged.        Client specific details:  Xavier gave appropriate feedback..

## 2021-10-07 ENCOUNTER — HOSPITAL ENCOUNTER (OUTPATIENT)
Dept: BEHAVIORAL HEALTH | Facility: CLINIC | Age: 36
End: 2021-10-07
Attending: FAMILY MEDICINE
Payer: COMMERCIAL

## 2021-10-07 VITALS — TEMPERATURE: 97.6 F | OXYGEN SATURATION: 97 %

## 2021-10-07 PROCEDURE — 1002N00001 HC LODGING PLUS FACILITY CHARGE ADULT

## 2021-10-07 PROCEDURE — H2035 A/D TX PROGRAM, PER HOUR: HCPCS | Mod: HQ

## 2021-10-07 NOTE — GROUP NOTE
Group Therapy Documentation    PATIENT'S NAME: Xavier Odell  MRN:   [id number]  :   1985  [id number]  DATE OF SERVICE: 10/07/21  START TIME:  9:00 AM  END TIME: 11:00 AM  FACILITATOR(S): Raven Lynne  TOPIC: BEH Group Therapy  Number of patients attending the group:  10    Group Length:  2 Hours    Group Therapy Type: Recovery strategies, Emotion processing, and Daily living/independence skills    Summary of Group / Topics Discussed:    Recovery Principles, Spiritual Care, Sober coping skills, and Relapse prevention      Group Attendance:  Attended group session    Patient's response to the group topic/interactions:  cooperative with task    Patient appeared to be Actively participating, Attentive and Engaged.        Client specific details:  Patient was attentive and participative during group session.

## 2021-10-07 NOTE — GROUP NOTE
Psychoeducation Group Documentation    PATIENT'S NAME: Xavier Odell  MRN:   [id number]  :   1985  ACCT. NUMBER: [id number]  DATE OF SERVICE: 10/07/21  START TIME:  3:00 PM  END TIME:  4:00 PM  FACILITATOR(S): Birgit Olson LADC; Kunal Walton LADC; Raven Lynne  TOPIC: BEH Pyschoeducation  Number of patients attending the group:  25  Group Length:  1 Hours    Skills Group Therapy Type: Recovery skills    Summary of Group / Topics Discussed:    Lecture presentation on problem gambling and resources          Group Attendance:  Attended group session    Patient's response to the group topic/interactions:  cooperative with task    Patient appeared to be Actively participating, Attentive and Engaged.         Client specific details:  .

## 2021-10-07 NOTE — GROUP NOTE
Group Therapy Documentation    PATIENT'S NAME: Xavier Odell  MRN:   [id number]  :   1985  ACCT. NUMBER: [id number]  DATE OF SERVICE: 10/07/21  START TIME: 12:30 PM  END TIME:  2:30 PM  FACILITATOR(S): Rigoberto Jules LADC; Raven Lynne  TOPIC: BEH Group Therapy  Number of patients attending the group:  9  Group Length:  2 Hours    Group Therapy Type: Recovery strategies    Summary of Group / Topics Discussed:    Recovery Principles, Sober coping skills, Balanced lifestyle, Disease of addiction, and Relapse prevention      Group Attendance:  Attended group session    Patient's response to the group topic/interactions:  cooperative with task    Patient appeared to be Actively participating, Attentive and Engaged.        Client specific details:  Xaveir gave appropriate feedback..

## 2021-10-07 NOTE — GROUP NOTE
Group Therapy Documentation    PATIENT'S NAME: Xavier Odell  MRN:   [id number]  :   1985  ACCT. NUMBER: [id number]  DATE OF SERVICE: 10/07/21  START TIME:  9:00 AM  END TIME: 11:00 AM  FACILITATOR(S): Raven Lynne  TOPIC: BEH Group Therapy  Number of patients attending the group:  10    Group Length:  2 Hours    Group Therapy Type: Recovery strategies, Emotion processing, and Daily living/independence skills    Summary of Group / Topics Discussed:    Recovery Principles, Spiritual Care, Sober coping skills, Relationship/socialization, and Relapse prevention      Group Attendance:      Patient's response to the group topic/interactions:      Patient appeared to be .        Client specific details:  ***.

## 2021-10-08 ENCOUNTER — HOSPITAL ENCOUNTER (OUTPATIENT)
Dept: BEHAVIORAL HEALTH | Facility: CLINIC | Age: 36
End: 2021-10-08
Attending: FAMILY MEDICINE
Payer: COMMERCIAL

## 2021-10-08 VITALS — OXYGEN SATURATION: 97 % | TEMPERATURE: 97.3 F

## 2021-10-08 PROCEDURE — H2035 A/D TX PROGRAM, PER HOUR: HCPCS | Mod: HQ

## 2021-10-08 PROCEDURE — 1002N00001 HC LODGING PLUS FACILITY CHARGE ADULT

## 2021-10-08 NOTE — GROUP NOTE
Group Therapy Documentation    PATIENT'S NAME: Xavier Odell  MRN:   [id number]  :   1985  ACCT. NUMBER: [id number]  DATE OF SERVICE: 10/08/21  START TIME:  9:00 AM  END TIME: 11:00 AM  FACILITATOR(S): Rigoberto Jules LADC  TOPIC: BEH Group Therapy  Number of patients attending the group:  11  Group Length:  2 Hours    Group Therapy Type: Recovery strategies    Summary of Group / Topics Discussed:    Recovery Principles, Sober coping skills, Balanced lifestyle, Disease of addiction, Emotions/expression, and Relapse prevention      Group Attendance:  Attended group session    Patient's response to the group topic/interactions:  cooperative with task    Patient appeared to be Actively participating, Attentive and Engaged.        Client specific details:  Xavier gave appropriate feedback..

## 2021-10-08 NOTE — GROUP NOTE
Group Therapy Documentation    PATIENT'S NAME: Xavier Odell  MRN:   [id number]  :   1985  ACCT. NUMBER: [id number]  DATE OF SERVICE: 10/08/21  START TIME:12:30PM   END TIME: 2:30PM  FACILITATOR(S): Rigoberto Jules LADC; Raven Lynne  TOPIC: BEH Group Therapy  Number of patients attending the group:    Group Length:  2 Hours    Group Therapy Type: Recovery strategies    Summary of Group / Topics Discussed:    Recovery Principles, Sober coping skills, and Balanced lifestyle      Group Attendance:  Attended group session    Patient's response to the group topic/interactions:  cooperative with task    Patient appeared to be Attentive.        Client specific details:  Xavier gave appropriate feedback..

## 2021-10-09 ENCOUNTER — HOSPITAL ENCOUNTER (OUTPATIENT)
Dept: BEHAVIORAL HEALTH | Facility: CLINIC | Age: 36
End: 2021-10-09
Attending: FAMILY MEDICINE
Payer: COMMERCIAL

## 2021-10-09 VITALS — TEMPERATURE: 98 F | OXYGEN SATURATION: 98 %

## 2021-10-09 PROCEDURE — H2035 A/D TX PROGRAM, PER HOUR: HCPCS | Mod: HQ

## 2021-10-09 PROCEDURE — 1002N00001 HC LODGING PLUS FACILITY CHARGE ADULT

## 2021-10-09 NOTE — GROUP NOTE
Group Therapy Documentation    PATIENT'S NAME: Xavier Odell  MRN:   [id number]  :   1985  ACCT. NUMBER: [id number]  DATE OF SERVICE: 10/09/21  START TIME:  9:00 AM  END TIME: 11:00 AM  FACILITATOR(S): Jimbo Ulloa LADC; Margarita Hay LADC; Mitra Arreguin [facility]  TOPIC: BEH Group Therapy  Number of patients attending the group:  24  Group Length:  2 Hours    Group Therapy Type: Recovery strategies    Summary of Group / Topics Discussed:    Recovery Principles      Group Attendance:  Attended group session    Patient's response to the group topic/interactions:  cooperative with task    Patient appeared to be Attentive.        Client specific details:  Xavier attended AM group. Patient took part in an exercise/discussion on honesty in recovery..

## 2021-10-09 NOTE — GROUP NOTE
Group Therapy Documentation    PATIENT'S NAME: Xavier Odell  MRN:   [id number]  :   1985  ACCT. NUMBER: [id number]  DATE OF SERVICE: 10/09/21  START TIME: 12:30 PM  END TIME:  2:30 PM  FACILITATOR(S): Margarita Hay LADC; Jimbo Ulloa LADC  TOPIC: BEH Group Therapy  Number of patients attending the group: 24  Group Length:  2 Hours    Group Therapy Type: Recovery strategies    Summary of Group / Topics Discussed:    Relationship/socialization      Group Attendance:  Attended group session    Patient's response to the group topic/interactions:  cooperative with task    Patient appeared to be Attentive and Engaged.        Client specific details: Xavier was an active participant in afternoon session of weekend Relationships workshop.

## 2021-10-10 ENCOUNTER — HOSPITAL ENCOUNTER (OUTPATIENT)
Dept: BEHAVIORAL HEALTH | Facility: CLINIC | Age: 36
End: 2021-10-10
Attending: FAMILY MEDICINE
Payer: COMMERCIAL

## 2021-10-10 VITALS — TEMPERATURE: 97.7 F | OXYGEN SATURATION: 98 %

## 2021-10-10 PROCEDURE — 1002N00001 HC LODGING PLUS FACILITY CHARGE ADULT

## 2021-10-10 PROCEDURE — H2035 A/D TX PROGRAM, PER HOUR: HCPCS | Mod: HQ

## 2021-10-10 NOTE — GROUP NOTE
Psychoeducation Group Documentation    PATIENT'S NAME: Xavier Odell  MRN:   [id number]  :   1985  ACCT. NUMBER: [id number]  DATE OF SERVICE: 10/10/21  START TIME:  9:00 AM  END TIME: 11:00 AM  FACILITATOR(S): Mitra Arreguin [facility][note 2]; Jimbo Ulloa [facility] [place][note 2]; Jeni Gilliam RN  TOPIC: BEH Pyschoeducation  Number of patients attending the group:  23  Group Length:  2 Hours    Skills Group Therapy Type: Healthy behaviors development    Summary of Group / Topics Discussed:    Balanced lifestyle skills          Group Attendance:  Attended group session    Patient's response to the group topic/interactions:  cooperative with task    Patient appeared to be Attentive.         Client specific details:  Pt was attentive and appropriate during RN's Lecture on TB/Hep A, B, &C this AM.

## 2021-10-10 NOTE — PROGRESS NOTES
Patient:  Xavier Odell    ATTENDANCE for the following date span:  10/4/21-10/10/21  (date, type, and amount of each treatment service completed)       Day Monday Tuesday Wednesday Thursday Friday Saturday Brice   Group Hours   4 4 5 4 4 4 2   Skills Hours    1  1   1   Individual Session (LADC)            Individual Session  (psychotherapy)            Peer-led Recovery Group   1 1 1 1 1 1 1               Adult CD Progress Note and Treatment Plan Review     Attendance  Please refer to OP BEH CD Adult Attendance Record Documentation Flowsheet    Support group attended this week: yes    Reporting sobriety:  yes        Treatment Plan     Treatment Plan Review competed on: 10/10/21    Client preferred learning style: Visual  Hands on  Verbal    Staff Members contributing GUMARO Bradley, GUMARO Castillo, GUMARO Valdes Intern                      Received Supervision:  GUMARO Valdes Intern  Client: contributed to goals and plan.    Client received copy of plan/revised plan: Yes    Client agrees with plan/revised plan: Yes        Changes to Treatment Plan: No    New Goals added since last review None needed    Goals worked on since last review See notes below    Strategies effective: yes    Strategies need these changes: None needed    1) Care Coordination Activities:  None   2) Medical, Mental Health and other appointments the client attended: none  3) Medication issues: pt reports he has no ongoing issues  4) Physical and mental health problems: pt reports improvement. Patient has started working out daily.   5) Any changes in Vulnerable Adult Status?  No If yes, add to treatment plan and individual abuse prevention plan.  6) Review and evaluation of the individual abuse prevention plan: Current IAPP for this program is adequate for this client          ASAM Risk Rating:    Dimension 1 0 No change    Dimension 2 0 No change    Dimension 3 2 Pt reports he has had no suicidal ideation.  Pt is currently  "working on understanding his relationship between his addiction and his mental health.  Pt is currently working on his assignment \"Anxiety and Worry.\"  Pt attended attended a Spiritual Care group session facilitated by Fe Shah.    Dimension 4 1 Pt expresses internal motivation to change.  Pt is active in group, accepts and provides feedback, had good insight, and appears engaged.  Pt is supportive of his group peers.  Pt reported that possibilities of the future are what motivated him to be sober and to stay in treatment this week.  Pt completed and presented his \"Drug Use History\" assignment\" to the group.         Dimension 5 4 Pt continues to work on identifying high risk situations, and long term sober maintenance skills.  Pt is working on his \"Relapse Prevention Planning\", \"Identifying Relapse Triggers and Cues: Situations and Feelings\", and \"A Look at Cross Addiction\" assignments.     Dimension 6 3 Pt is attending 12 steps groups while in treatment.  Pt needs to obtain a male sponsor.    Pt attended the Relationships weekend workshop and learned about healthy/unhealthy relationships, assertive communication, boundaries, and co-dependency. Patient appears to be opening to aftercare options, but has not decided on aftercare at this time.       Guide to Risk Ratings for Suicidality:   IDEATION: Active thoughts of suicide? INTENT: Intent to follow on suicide? PLAN: Plan to follow through on suicide? Level of Risk:   IF Yes Yes Yes Patient = High Emergent   IF Yes Yes No Patient = High Urgent/Non-Emergent   IF Yes No No Patient = Moderate Non-Urgent   IF No No   No Patient = Low Risk   The patient's ADDITIONAL RISK FACTORS and lack of PROTECTIVE FACTORS may increase their overall suicide risk ratings.     Patient's/client's current risk rating:  Low Risk    Family Involvement:   REJI signed    Data:   offered feedback good insight client did participate client did actively participate      Intervention: "   Behavior modification  Counselor feedback  Education  Emotional management  Group feedback  Relapse prevention  Twelve Step facilitation  Mental health education      Assessment:   Stages of Change Model  Contemplation    Cooperative  Motivated  Engaged      Plan:  Continue group threapy    GUMARO Castillo

## 2021-10-11 ENCOUNTER — HOSPITAL ENCOUNTER (OUTPATIENT)
Dept: BEHAVIORAL HEALTH | Facility: CLINIC | Age: 36
End: 2021-10-11
Attending: FAMILY MEDICINE
Payer: COMMERCIAL

## 2021-10-11 VITALS — TEMPERATURE: 97 F | OXYGEN SATURATION: 97 %

## 2021-10-11 PROCEDURE — H2035 A/D TX PROGRAM, PER HOUR: HCPCS | Mod: HQ

## 2021-10-11 PROCEDURE — 1002N00001 HC LODGING PLUS FACILITY CHARGE ADULT

## 2021-10-11 NOTE — GROUP NOTE
Group Therapy Documentation    PATIENT'S NAME: Xavier Odell  MRN:   [id number]  :   1985  ACCT. NUMBER: [id number]  DATE OF SERVICE: 10/11/21  START TIME: 12:30 PM  END TIME:  2:30 PM  FACILITATOR(S): Ramin Falcon; Fe Shah  TOPIC: BEH Group Therapy  Number of patients attending the group:  10  Group Length:  2 Hours    Group Therapy Type: Recovery strategies, Emotion processing, Daily living/independence skills, Health and wellbeing , and Medication education    Summary of Group / Topics Discussed:    Spiritual Care      Group Attendance:  Attended group session    Patient's response to the group topic/interactions:  cooperative with task    Patient appeared to be Attentive and Engaged.        Client specific details:  Xavier participated in spirituality group.  He engaged in the discussions, an outside activity, and the readings.

## 2021-10-11 NOTE — GROUP NOTE
Group Therapy Documentation    PATIENT'S NAME: Xavier Odell  MRN:   [id number]  :   1985  ACCT. NUMBER: [id number]  DATE OF SERVICE: 10/11/21  START TIME:  9:00 AM  END TIME: 11:00 AM  FACILITATOR(S): Jimbo Ulloa LADC; Ramin Falcon  TOPIC: BEH Group Therapy  Number of patients attending the group:  10  Group Length:  2 Hours    Group Therapy Type: Recovery strategies    Summary of Group / Topics Discussed:    Recovery Principles      Group Attendance:  Attended group session    Patient's response to the group topic/interactions:  cooperative with task    Patient appeared to be Attentive.        Client specific details:  Xavier attended AM group. Patient took part in a discussion on conflict and resolution. Patient checked in, and gave a peer feedback on his presentation. Patient also graduated a peer.

## 2021-10-11 NOTE — PROGRESS NOTES
Pt had called [email or web address] stating that he needed Duloxetine, Gabapentin and Naltrexone filled.  Pt informed that his Naltrexone would need a PA and that he has no refills on Gabapentin.  Pt preferred that [email or web address] called his provider for the refills and PA rather than he call the clinic.    Pt understands there may be a delay in his receiving these medications      [facility] [place] given the provider information:    [facility]    [address] [place] [address] [phone]   Jeff Mahmood APRN,CNP    [address] [place] [address] [phone] (Work)    [phone] (Fax)

## 2021-10-12 ENCOUNTER — HOSPITAL ENCOUNTER (OUTPATIENT)
Dept: BEHAVIORAL HEALTH | Facility: CLINIC | Age: 36
End: 2021-10-12
Attending: FAMILY MEDICINE
Payer: COMMERCIAL

## 2021-10-12 VITALS — OXYGEN SATURATION: 97 % | TEMPERATURE: 97.4 F

## 2021-10-12 PROCEDURE — 1002N00001 HC LODGING PLUS FACILITY CHARGE ADULT

## 2021-10-12 PROCEDURE — H2035 A/D TX PROGRAM, PER HOUR: HCPCS | Mod: HQ

## 2021-10-12 NOTE — GROUP NOTE
Group Therapy Documentation    PATIENT'S NAME: Xavier Odell  MRN:   [id number]  :   1985  ACCT. NUMBER: [id number]  DATE OF SERVICE: 10/12/21  START TIME:  3:00 PM  END TIME:  4:00 PM  FACILITATOR(S): Raven Lynne; Nancy Mcgrath LADC; Kunal Walton [facility]  TOPIC: BEH Group Therapy  Number of patients attending the group:  22  Group Length:  1 Hours    Group Therapy Type: Recovery strategies    Summary of Group / Topics Discussed:    Recovery Principles, Relationship/socialization, and Disease of addiction      Group Attendance:  Attended group session    Patient's response to the group topic/interactions:  cooperative with task    Patient appeared to be Actively participating, Attentive and Engaged.        Client specific details:  .

## 2021-10-12 NOTE — GROUP NOTE
Group Therapy Documentation    PATIENT'S NAME: Xavier Odell  MRN:   [id number]  :   1985  ACCT. NUMBER: [id number]  DATE OF SERVICE: 10/12/21  START TIME: 12:30 PM  END TIME:  2:30 PM  FACILITATOR(S): Rigoberto Jules LADC; Jimbo Ulloa LADC  TOPIC: BEH Group Therapy  Number of patients attending the group:  9  Group Length:  2 Hours    Group Therapy Type: Recovery strategies    Summary of Group / Topics Discussed:    Recovery Principles, Sober coping skills, Balanced lifestyle, Disease of addiction, and Relapse prevention      Group Attendance:  Attended group session    Patient's response to the group topic/interactions:  cooperative with task    Patient appeared to be Actively participating, Attentive and Engaged.        Client specific details:  Xavier gave appropriate feedback..

## 2021-10-12 NOTE — GROUP NOTE
Group Therapy Documentation    PATIENT'S NAME: Xavier Odell  MRN:   [id number]  :   1985  ACCT. NUMBER: [id number]  DATE OF SERVICE: 10/12/21  START TIME:  9:00 AM  END TIME: 11:00 AM  FACILITATOR(S): Jimbo Ulloa LADC; Rigoberto Jules LADC  TOPIC: BEH Group Therapy  Number of patients attending the group:  9  Group Length:  2 Hours    Group Therapy Type: Recovery strategies    Summary of Group / Topics Discussed:    Recovery Principles      Group Attendance:  Attended group session    Patient's response to the group topic/interactions:  cooperative with task    Patient appeared to be Attentive.        Client specific details:  Xavier attended AM group.Patient checked in, and took part in a discussion on relapse and recovery. Patient was attentive and engaged.

## 2021-10-13 ENCOUNTER — HOSPITAL ENCOUNTER (OUTPATIENT)
Dept: BEHAVIORAL HEALTH | Facility: CLINIC | Age: 36
End: 2021-10-13
Attending: FAMILY MEDICINE
Payer: COMMERCIAL

## 2021-10-13 VITALS — OXYGEN SATURATION: 97 % | TEMPERATURE: 97 F

## 2021-10-13 DIAGNOSIS — F17.200 NICOTINE DEPENDENCE: Primary | ICD-10-CM

## 2021-10-13 PROCEDURE — 1002N00001 HC LODGING PLUS FACILITY CHARGE ADULT

## 2021-10-13 PROCEDURE — H2035 A/D TX PROGRAM, PER HOUR: HCPCS | Mod: HQ

## 2021-10-13 NOTE — GROUP NOTE
Group Therapy Documentation    PATIENT'S NAME: Xavier Odell  MRN:   [id number]  :   1985  ACCT. NUMBER: [id number]  DATE OF SERVICE: 10/13/21  START TIME:  8:30 AM  END TIME:  9:30 AM  FACILITATOR(S): Rigoberto Jules LADC  TOPIC: BEH Group Therapy  Number of patients attending the group:  9  Group Length:  1 Hours    Group Therapy Type: Recovery strategies    Summary of Group / Topics Discussed:    Balanced lifestyle      Group Attendance:  Attended group session    Patient's response to the group topic/interactions:  cooperative with task    Patient appeared to be Actively participating.        Client specific details:  Xavier gave appropriate feedback..

## 2021-10-13 NOTE — GROUP NOTE
Group Therapy Documentation    PATIENT'S NAME: Xavier Odell  MRN:   [id number]  :   1985  ACCT. NUMBER: [id number]  DATE OF SERVICE: 10/13/21  START TIME: 12:30 PM  END TIME:  2:30 PM  FACILITATOR(S): Rigoberto Jules LADC; Jimbo Ulloa LADC  TOPIC: BEH Group Therapy  Number of patients attending the group:  9  Group Length:  2 Hours    Group Therapy Type: Recovery strategies and Emotion processing    Summary of Group / Topics Discussed:    Recovery Principles, Sober coping skills, Balanced lifestyle, Disease of addiction, Emotions/expression, and Relapse prevention      Group Attendance:  Attended group session    Patient's response to the group topic/interactions:  cooperative with task    Patient appeared to be Actively participating, Attentive and Engaged.        Client specific details:  Xavier gave appropriate feedback..

## 2021-10-13 NOTE — GROUP NOTE
Group Therapy Documentation    PATIENT'S NAME: Xavier Odell  MRN:   [id number]  :   1985  ACCT. NUMBER: [id number]  DATE OF SERVICE: 10/13/21  START TIME:  9:45 AM  END TIME: 11:20 AM  FACILITATOR(S): Jimbo Ulloa LADC; Rigoberto Jules LADC  TOPIC: BEH Group Therapy  Number of patients attending the group:  8  Group Length:  1.5 Hours    Group Therapy Type: Recovery strategies    Summary of Group / Topics Discussed:    Recovery Principles      Group Attendance:  Attended group session    Patient's response to the group topic/interactions:  cooperative with task    Patient appeared to be Attentive.        Client specific details:  Xavier attended AM group. Patient checked in, and took part in a group discussion on fear in recovery.

## 2021-10-14 ENCOUNTER — HOSPITAL ENCOUNTER (OUTPATIENT)
Dept: BEHAVIORAL HEALTH | Facility: CLINIC | Age: 36
End: 2021-10-14
Attending: FAMILY MEDICINE
Payer: COMMERCIAL

## 2021-10-14 VITALS — OXYGEN SATURATION: 98 % | TEMPERATURE: 98 F

## 2021-10-14 PROCEDURE — H2035 A/D TX PROGRAM, PER HOUR: HCPCS | Mod: HQ

## 2021-10-14 PROCEDURE — 1002N00001 HC LODGING PLUS FACILITY CHARGE ADULT

## 2021-10-14 NOTE — GROUP NOTE
Group Therapy Documentation    PATIENT'S NAME: Xavier Odell  MRN:   [id number]  :   1985  ACCT. NUMBER: [id number]  DATE OF SERVICE: 10/14/21  START TIME: 12:30 PM  END TIME:  2:30 PM  FACILITATOR(S): Rigoberto Jules LADC; Jimbo Ulloa LADC  TOPIC: BEH Group Therapy  Number of patients attending the group:  10  Group Length:  2 Hours    Group Therapy Type: Recovery strategies and Emotion processing    Summary of Group / Topics Discussed:    Recovery Principles, Sober coping skills, Balanced lifestyle, Disease of addiction, Emotions/expression, and Relapse prevention      Group Attendance:  Attended group session    Patient's response to the group topic/interactions:  cooperative with task    Patient appeared to be Actively participating, Attentive and Engaged.        Client specific details:  Xavier gave appropriate feedback..

## 2021-10-14 NOTE — GROUP NOTE
Group Therapy Documentation    PATIENT'S NAME: Xavier Odell  MRN:   [id number]  :   1985  ACCT. NUMBER: [id number]  DATE OF SERVICE: 10/14/21  START TIME:  9:00 AM  END TIME: 11:00 AM  FACILITATOR(S): Jimbo Ulloa LADC; Rigoberto Jules LADC  TOPIC: BEH Group Therapy  Number of patients attending the group:  10  Group Length:  2 Hours    Group Therapy Type: Recovery strategies    Summary of Group / Topics Discussed:    Recovery Principles      Group Attendance:  Attended group session    Patient's response to the group topic/interactions:  cooperative with task    Patient appeared to be Attentive.        Client specific details:  Xavier attended AM group. Patient checked in,reflected on today's meditation, welcomed a new group peer, and gave and received feedback.

## 2021-10-14 NOTE — GROUP NOTE
Group Therapy Documentation    PATIENT'S NAME: Xavier Odell  MRN:   [id number]  :   1985  ACCT. NUMBER: [id number]  DATE OF SERVICE: 10/14/21  START TIME:  3:00 PM  END TIME:  4:00 PM  FACILITATOR(S): Margarita Hay LADC; Nancy Mcgrath LADC  TOPIC: BEH Group Therapy  Number of patients attending the group: 19  Group Length:  1 Hours    Group Therapy Type: Recovery strategies    Summary of Group / Topics Discussed:    Disease of addiction      Group Attendance:  Attended group session    Patient's response to the group topic/interactions:  cooperative with task    Patient appeared to be Attentive and Engaged.        Client specific details: Xavier was an active participant in Skills Group.

## 2021-10-15 ENCOUNTER — HOSPITAL ENCOUNTER (OUTPATIENT)
Dept: BEHAVIORAL HEALTH | Facility: CLINIC | Age: 36
End: 2021-10-15
Attending: FAMILY MEDICINE
Payer: COMMERCIAL

## 2021-10-15 VITALS — OXYGEN SATURATION: 98 % | TEMPERATURE: 97.8 F

## 2021-10-15 PROCEDURE — H2035 A/D TX PROGRAM, PER HOUR: HCPCS | Mod: HQ

## 2021-10-15 PROCEDURE — 1002N00001 HC LODGING PLUS FACILITY CHARGE ADULT

## 2021-10-15 NOTE — GROUP NOTE
Psychoeducation Group Documentation    PATIENT'S NAME: Xavier Odell  MRN:   [id number]  :   1985  ACCT. NUMBER: [id number]  DATE OF SERVICE: 10/15/21  START TIME: 12:30 PM  END TIME:  2:30 PM  FACILITATOR(S): Rigoberto Julse [facility]; Jimbo Ulloa LADC  TOPIC: BEH Pyschoeducation  Number of patients attending the group:  10  Group Length:  2 Hours    Skills Group Therapy Type: Recovery skills and Healthy behaviors development    Summary of Group / Topics Discussed:    Relationship/social skills, Balanced lifestyle skills, and Relapse prevention skills          Group Attendance:  Attended group session    Patient's response to the group topic/interactions:  cooperative with task    Patient appeared to be Actively participating, Attentive and Engaged.         Client specific details:  Xavier gave appropriate feedback..

## 2021-10-15 NOTE — GROUP NOTE
Group Therapy Documentation    PATIENT'S NAME: Xavier Odell  MRN:   [id number]  :   1985  ACCT. NUMBER: [id number]  DATE OF SERVICE: 10/15/21  START TIME:  9:00 AM  END TIME: 11:00 AM  FACILITATOR(S): Jimbo Ulloa LADC; Rigoberto Jules LADC  TOPIC: BEH Group Therapy  Number of patients attending the group:  10  Group Length:  2 Hours    Group Therapy Type: Recovery strategies    Summary of Group / Topics Discussed:    Recovery Principles      Group Attendance:  Attended group session    Patient's response to the group topic/interactions:  cooperative with task    Patient appeared to be Attentive.        Client specific details:  Xavier attended AM group. Patient checked in, talked about how he's feeling in early recovery, and gave and received feedback from group peers.

## 2021-10-16 ENCOUNTER — HOSPITAL ENCOUNTER (OUTPATIENT)
Dept: BEHAVIORAL HEALTH | Facility: CLINIC | Age: 36
End: 2021-10-16
Attending: FAMILY MEDICINE
Payer: COMMERCIAL

## 2021-10-16 VITALS — TEMPERATURE: 97.8 F | OXYGEN SATURATION: 97 %

## 2021-10-16 PROCEDURE — H2035 A/D TX PROGRAM, PER HOUR: HCPCS | Mod: HQ

## 2021-10-16 PROCEDURE — 1002N00001 HC LODGING PLUS FACILITY CHARGE ADULT

## 2021-10-16 NOTE — GROUP NOTE
Group Therapy Documentation    PATIENT'S NAME: Xavier Odell  MRN:   [id number]  :   1985  ACCT. NUMBER: [id number]  DATE OF SERVICE: 10/16/21  START TIME: 12:30 PM  END TIME:  2:30 PM  FACILITATOR(S): Estephania Reid LADC  TOPIC: BEH Group Therapy  Number of patients attending the group:  9  Group Length:  2 Hours    Group Therapy Type: Recovery strategies    Summary of Group / Topics Discussed:    Co-occurring illnesses symptom management and Relapse prevention      Group Attendance:  Attended group session    Patient's response to the group topic/interactions:  cooperative with task    Patient appeared to be Actively participating.        Client specific details:  Xavier participated in a teaching [place] activity on OCD and FLO.

## 2021-10-16 NOTE — GROUP NOTE
Group Therapy Documentation    PATIENT'S NAME: Xavier Odell  MRN:   [id number]  :   1985  [id number]  DATE OF SERVICE: 10/16/21  START TIME:  9:30 AM  END TIME: 11:30 AM  FACILITATOR(S): Raven Lynne  TOPIC: BEH Group Therapy  Number of patients attending the group:  10    Group Length:  2 Hours    Group Therapy Type: Recovery strategies, Emotion processing, and Daily living/independence skills    Summary of Group / Topics Discussed:    Relapse prevention      Group Attendance:  Attended group session    Patient's response to the group topic/interactions:  cooperative with task    Patient appeared to be Actively participating, Attentive and Engaged.        Client specific details:  Patient was attentive and participative during lecture.

## 2021-10-17 ENCOUNTER — HOSPITAL ENCOUNTER (OUTPATIENT)
Dept: BEHAVIORAL HEALTH | Facility: CLINIC | Age: 36
End: 2021-10-17
Attending: FAMILY MEDICINE
Payer: COMMERCIAL

## 2021-10-17 VITALS — OXYGEN SATURATION: 97 % | TEMPERATURE: 97.4 F

## 2021-10-17 PROCEDURE — H2035 A/D TX PROGRAM, PER HOUR: HCPCS | Mod: HQ

## 2021-10-17 PROCEDURE — 1002N00001 HC LODGING PLUS FACILITY CHARGE ADULT

## 2021-10-17 NOTE — PROGRESS NOTES
Nursing Discharge Planning Meeting    Writer completed discharge planning meeting with patient. Discharge is planned for 10/19/21    Discussed appropriate follow up care to manage FLO, MI and  medical and to obtain medication refills. Patient given a copy of their current medications for reference. Questions were answered at this time and the patient verbalized an understanding of the post-discharge follow up plan.    Patient to schedule an appointment with their PCP   [facility]    [address] [place] [address] [phone]   Jeff Mahmood APRN,CNP    [address] [place] [address] [phone] (Work)    [phone] (Fax)               Continue to support patient in discharge planning as needed to assure appropriate continuity of care.     Tobacco Cessation  Patient participated in the nicotine replacement therapy for tobacco cessation or reduction during their treatment programming: Yes, Decreased tobacco use with NRT products     The patient was provided with community resources for follow-up to continue tobacco cessation support once in the community. Also the patient was encouraged to discuss their tobacco cessation efforts with the primary care provider.

## 2021-10-17 NOTE — GROUP NOTE
Group Therapy Documentation    PATIENT'S NAME: Xavier Odell  MRN:   [id number]  :   1985  [id number]  DATE OF SERVICE: 10/17/21  START TIME:  8:45 AM  END TIME: 10:30 AM  FACILITATOR(S): Mitali Rendon, TOM; Ena Kingston LADC  TOPIC: BEH Group Therapy  Number of patients attending the group:  20  Group Length:  2 Hours    Group Therapy Type: Health and wellbeing     Summary of Group / Topics Discussed:    Self-care activities    Group Attendance: Attended group session.    Patients response to the group topic/interactions: cooperative with task.    Patient appeared to be: Actively participated, Attentive, and Engaged.     Client specific details:  Patient attended self-care workshop, presented by lodging plus nurse, Mitali Rendon RN. Patient was educated about diets, eating disorder, meditation, and sleep. Patient participated in all activities, including meditation.

## 2021-10-17 NOTE — GROUP NOTE
Group Therapy Documentation    PATIENT'S NAME: Xavier Odell  MRN:   [id number]  :   1985  ACCT. NUMBER: [id number]  DATE OF SERVICE: 10/17/21  START TIME: 12:30 PM  END TIME:  1:30 PM  FACILITATOR(S): Raven Lynne  TOPIC: BEH Group Therapy  Number of patients attending the group:  9    Group Length:  1 Hours    Group Therapy Type: Health and wellbeing     Summary of Group / Topics Discussed:    Self-care activities      Group Attendance:  Attended group session    Patient's response to the group topic/interactions:  cooperative with task    Patient appeared to be Actively participating, Attentive and Engaged.        Client specific details:  Patient attended a Smoking Cessation workshop and was attentive and participative.

## 2021-10-18 ENCOUNTER — HOSPITAL ENCOUNTER (OUTPATIENT)
Dept: BEHAVIORAL HEALTH | Facility: CLINIC | Age: 36
End: 2021-10-18
Attending: FAMILY MEDICINE
Payer: COMMERCIAL

## 2021-10-18 VITALS — OXYGEN SATURATION: 98 % | TEMPERATURE: 97.3 F

## 2021-10-18 PROCEDURE — 1002N00001 HC LODGING PLUS FACILITY CHARGE ADULT

## 2021-10-18 PROCEDURE — H2035 A/D TX PROGRAM, PER HOUR: HCPCS | Mod: HQ

## 2021-10-18 NOTE — PROGRESS NOTES
[address] [place] [address]          Xavier Odell, 1985, was admitted for evaluation/treatment of chemical dependency at [facility].  This person took part in these program(s):    ______ The Inpatient Program   ______ The Outpatient Program   _X_____ The Lodging Plus Program   ______ Lodging Day Outpatient       Date admitted: 9-21-21  Date discharged: 10-19-21    Type of discharge:   __X____ Satisfactory - completed evaluation / treatment   ______ Discharged without completing   ______ Behavioral discharge   ______ Transferred to another chemical dependency program   ______ Transferred to another type of service   ______ Left against medical advice (AMA) / Eloped       Comments:       Counselor: GUMARO Gabriel                       Date: 10/18/2021             Time: 7:21 AM

## 2021-10-18 NOTE — GROUP NOTE
Group Therapy Documentation    PATIENT'S NAME: Xavier Odell  MRN:   [id number]  :   1985  ACCT. NUMBER: [id number]  DATE OF SERVICE: 10/18/21  START TIME: 12:30 PM  END TIME:  2:30 PM  FACILITATOR(S): Jimbo Ulloa LADC; Fe Shah; Ramin Falcon  TOPIC: BEH Group Therapy  Number of patients attending the group:  7  Group Length:  2 Hours    Group Therapy Type: Recovery strategies    Summary of Group / Topics Discussed:    Recovery Principles and Spiritual Care      Group Attendance:  Attended group session    Patient's response to the group topic/interactions:  cooperative with task    Patient appeared to be Attentive.        Client specific details:  Xavier  attended PM Spiritual Care group. Patient took part in a group exercise/discussion on benefits of spirituality on one's recovery.

## 2021-10-18 NOTE — PROGRESS NOTES
[facility]D Discharge Summary/Instructions    Patient:  Xavier Odell    MRN: [id number]  : 1985 Age: 36 year old Sex: male   -   Focus of Treatment / Discharge Recommendations   Personal Safety/ Management of Symptoms   * Follow your safety plan. Report increased symptoms to your care team and /or go to the nearest Emergency Department.   * Call crisis lines as needed   [facility] [phone] [facility] [phone]   [facility] [phone] Crisis Connection [phone]   [facility] [phone] [facility] [phone]   [facility] [phone] National Suicide Prevention [phone]   [facility] [phone] Suicide Prevention [phone]   [facility] [phone]   Abstinence/Relapse Prevention   * Take all medicines as directed. Carry a current list of medicines with you.   * Use coping skills: Use peer support group,continue to attend 12 step meetings.Obtain and work with sponsor.   * Do not use illicit (street) drugs, controlled substances (narcotics) or alcohol.   Develop/Improve Independent Living/Socialization Skills: Continue to build relationship with family and sober support network.  Community Resources/Supports and Discharge Planning: Attend three 12 step meetings per week.Obtain sponsor.Attend [facility] treatment   Follow up with psychiatrist / main caregiver: TBD Next visit: TBD   Follow up with your therapist: N/A Next visit: N/A   Go to group therapy and / or support groups at: [facility] treatment program. and 12 step meetings by your home.   See your medical doctor about: N/A   Other:   Client Signature:_______________________ Date / Time:___________   Staff Signature:________________________ Date / Time:___________

## 2021-10-18 NOTE — GROUP NOTE
Group Therapy Documentation    PATIENT'S NAME: Xavier Odell  MRN:   [id number]  :   1985  ACCT. NUMBER: [id number]  DATE OF SERVICE: 10/18/21  START TIME:  9:00 AM  END TIME: 11:00 AM  FACILITATOR(S): Jimbo Ulloa LADC; Rigoberto Jules LADC; Ramin Falcon  TOPIC: BEH Group Therapy  Number of patients attending the group:  7  Group Length:  2 Hours    Group Therapy Type: Recovery strategies    Summary of Group / Topics Discussed:    Recovery Principles      Group Attendance:  Attended group session    Patient's response to the group topic/interactions:  cooperative with task    Patient appeared to be Attentive.        Client specific details:  Xavier  attended AM group. Patient checked in, and took part in a discussion about fathers. Patient also helped graduate a peer. Patient was attentive and engaged.

## 2021-10-18 NOTE — PROGRESS NOTES
Patient:  Xavier Odell    ATTENDANCE for the following date span:  10/11/21 - 10/17/21  (date, type, and amount of each treatment service completed)       Day Monday Tuesday Wednesday Thursday Friday Saturday Brice   Group Hours   4 4 4 4 4 4 2   Skills Hours    1 1 1   1   Individual Session (LADC)            Individual Session  (psychotherapy)            Peer-led Recovery Group   1 1 1 1 1 1 1               Adult CD Progress Note and Treatment Plan Review     Attendance  Please refer to OP BEH CD Adult Attendance Record Documentation Flowsheet    Support group attended this week: yes    Reporting sobriety:  yes        Treatment Plan     Treatment Plan Review competed on: 10-18-21       Client preferred learning style: Visual  Hands on  Verbal    Staff Members contributing GUMARO Bradley, GUMARO Humphrey                     Received Supervision: No    Client: contributed to goals and plan.    Client received copy of plan/revised plan: Yes    Client agrees with plan/revised plan: Yes        Changes to Treatment Plan: No    New Goals added since last review None needed.    Goals worked on since last review See ASAM notes below.    Strategies effective: yes    Strategies need these changes: None needed.    1) Care Coordination Activities:  None.  2) Medical, Mental Health and other appointments the client attended: None  3) Medication issues: Pt.is staying med compliant.  4) Physical and mental health problems: None reported.  5) Any changes in Vulnerable Adult Status?  No If yes, add to treatment plan and individual abuse prevention plan.  6) Review and evaluation of the individual abuse prevention plan: Current IAPP for this program is adequate for this client          ASAM Risk Rating:    Dimension 1 0 No chnage.    Dimension 2 0 No change.    Dimension 3 0 Pt.reports he has had no suicidal ideation. He continues to manage his mental health. He has gained insight into how his addiction has affected his mental  health.     Dimension 4 0 Xavier has worked hard on increasing his internal motivation to change his life style to maintain sobriety.     Dimension 5 3 Pt.continues to work on identifying his relapse warning signs and triggers in the areas of people,places,activites and feelings. Pt.seems motivated to attend 12 step groups which he never has in the past. Xavier needs to obtain a sponsor.    Dimension 6 3 Pt.wants to return home and  attend [facility] treatment program. Pt.did not want to look at sober house.       Guide to Risk Ratings for Suicidality:   IDEATION: Active thoughts of suicide? INTENT: Intent to follow on suicide? PLAN: Plan to follow through on suicide? Level of Risk:   IF Yes Yes Yes Patient = High Emergent   IF Yes Yes No Patient = High Urgent/Non-Emergent   IF Yes No No Patient = Moderate Non-Urgent   IF No No   No Patient = Low Risk   The patient's ADDITIONAL RISK FACTORS and lack of PROTECTIVE FACTORS may increase their overall suicide risk ratings.     Patient's/client's current risk rating:  Low Risk    Family Involvement:   REJI signed    Data:   offered feedback good insight client did actively participate      Intervention:   Aftercare planning  Behavior modification  Cognitive Behavioral Therapy  Counselor feedback  Education  Emotional management  Group feedback  Relapse prevention  Twelve Step facilitation  Mental health education      Assessment:   Stages of Change Model  Preparation/Determination    Appears/Sounds:  Cooperative  Motivated  Engaged      Plan:  Continue group therapy.      GUMARO Gabriel

## 2021-10-19 NOTE — PROGRESS NOTES
Visit Date: 10/18/2021    EVALUATION COUNSELOR:  GUMARO Barry    TREATMENT COUNSELORS:  EDE Bradley, [facility] [place]; EDE Hopkins, [facility] [place].    REFERRAL SOURCE:  Self.  Program [facility] program.    ADMISSION DATE:  09/21/2021     LAST SESSION DATE:  10/18/2021     ADMISSION DIAGNOSES:  Alcohol use disorder, severe, 303.90/F10.20.    DISCHARGE DIAGNOSES:  Alcohol use disorder, severe, 303.90/F10.20.    LAST USE DATE AS CLIENT REPORTED:  09/13/2021    DAYS OF TREATMENT COMPLETED:  [facility], 28 days.    PRESENTING INFORMATION:  The patient completed a chemical dependency assessment and evaluation on 09/15/2021.  The patient participated in the [facility] program in January 2020 and stated that he had almost a year of sobriety.  The patient relapsed and continued drinking.  The patient also tried the [facility], but continued to drink during that program.  During the evaluation, the patient was requesting residential treatment.  The patient met the qualifications of residential treatment and was put on the wait list for the [facility] Program.  The patient was admitted on 09/21/2021.    SERVICES PROVIDED:  Services included assessment, treatment planning, education regarding chemical dependency, individual and group therapy, spiritual care counseling and workshops dealing with the issues of depression, anxiety, relapse prevention and relationships    DIMENSION 1:  Acute intoxication and withdrawal:  Admission risk factor 0.  Discharge risk factor 0.  The patient stated that his last use was on 09/13/2021.    DIMENSION 2:  Biomedical conditions:  Admission risk factor 0.  Discharge risk factor 0.  The patient had no issues in this area.    DIMENSION 3:  Emotional, behavioral:  Admission risk factor 2.  Discharge risk factor 1.  Upon entering treatment, the patient reported diagnoses of depressive disorder, anxiety as well as substance  use disorder.  The patient lacked understanding of the compounding effects his alcoholism has had on his mental health issues.  The patient was given the following assignments to complete:  Read Anxiety and Recovery From Chemical Dependency and share a 2-page reflection paper in group, read Anxiety and Worry and share a 2-page reflection paper in group.  These assignments allowed the patient to accomplish his goal of understanding the relationship between his addiction and his mental health issues.  The patient participated in a suicide risk screening as part of the CD assessment.  The patient was given a low or no risk factor upon entering treatment.  Once the patient was in the [facility] Program he completed a safety plan.  The patient stated that at no time during the treatment program did he experience any suicidal ideation.    DIMENSION 4:  Readiness to change:  Admission risk factor 2.  Discharge risk factor 0.  Upon entering treatment, the patient lacked insight into how his addiction has impacted his life negatively.  The patient was asked to complete the first step assignment.  The patient did an outstanding job on this and received positive feedback from his peers.  The patient was asked to complete his drug use history and the consequences of his use.  These assignments allow the patient to increase his internal motivation to change his lifestyle to maintain long-term sobriety, which was his goal.  Upon entering treatment, the patient lacked insight into how his addiction has affected his life and how small his life had become, which led to anxiety and other mental health issues.  The patient was asked to read stress and recovery and write a 2-page reflection paper.  This allowed the patient to accomplish his goal of understanding how stress has affected his recovery in the past and what to do for early intervention in the future.    DIMENSION 5:  Relapse continued use potential:  Admission risk factor  4.  Discharge risk factor 3.  Upon entering treatment, the patient lacked a long-term sobriety.  The patient had maintained short-term sobriety in the past, but never had been 100% committed to long-term behavioral changes.  The patient was asked to complete and present relapse prevention planning.  The patient was asked to complete and present identifying relapse triggers and cues.  The patient completed these assignments and the patient was able to accomplish his goal of identifying and understanding his personal relapse process and his past self-sabotage patterns by identifying his individual relapse warning signs and triggers, which he had never been able to do in the past.  Upon entering treatment, the patient lacked insight into how cross addiction has affected his life.  The patient was given the assignment to read A Look At Cross-Addiction and write a 2-page reflection paper.  This allowed the patient to accomplish his goal of understanding the impact substance use has had on him and how cross-addiction has impacted his life.    DIMENSION 6:  Recovery environment:  Admission risk factor 3.  Discharge risk factor 3.  Upon entering treatment, the patient lacked a sober support network.  The patient was asked to attend 12-step meetings per week while in treatment.  This allowed him to accomplish his goal of building a sober support system.  The patient upon entering treatment did not have a sponsor.  The patient needs to obtain a temporary sponsor.  The patient was adamant that this time he will attend 12-step groups.  The patient lacked an aftercare plan upon entering treatment.  The patient was encouraged to work with the  from the [facility].  The patient was determined to return home and return to [facility] for outpatient programming.  The patient was asked to entertain the thought of sober housing, but the patient declined.    STRENGTHS:  The patient exhibited a positive  and open attitude throughout the treatment process.  The patient demonstrated consistent support towards his peers.  The patient gained valuable insight into chemical dependency.    PROGNOSIS:  Favorable.    LIVING ARRANGEMENTS AT DISCHARGE:  The patient will be returning home.    CONTINUING CARE RECOMMENDATIONS AND REFERRALS:    1.  The patient needs to abstain from all mood altering chemicals.  2.  Patient needs to attend a minimum of three 12-step meetings per week.  3.  The patient needs to obtain a sponsor and build an open and honest relationship with that sponsor.  4.  The patient must complete the [facility].  5.  The patient needs to stay medication compliant.    This information has been disclosed to you from records protected by Federal confidentiality rules (42 CFR part 2). The Federal rules prohibit you from making any further disclosure of this information unless further disclosure is expressly permitted by the written consent of the person to whom it pertains or as otherwise permitted by 42 CFR part 2. A general authorization for the release of medical or other information is NOT sufficient for this purpose. The Federal rules restrict any use of the information to criminally investigate or prosecute any alcohol or drug abuse patient.    EDE Gabriel, [facility]        D: 10/19/2021   T: 10/19/2021   MT: md    Name:     MAGO BATEMAN  MRN:      [id number]        Account:    [id number]   :      1985           Visit Date: 10/18/2021     Document: [id number]

## 2021-10-31 ENCOUNTER — HOSPITAL ENCOUNTER (INPATIENT)
Facility: CLINIC | Age: 36
LOS: 1 days | Discharge: OTHER INSTITUTION | End: 2021-11-01
Attending: INTERNAL MEDICINE | Admitting: PSYCHIATRY & NEUROLOGY
Payer: COMMERCIAL

## 2021-10-31 ENCOUNTER — TELEPHONE (OUTPATIENT)
Dept: BEHAVIORAL HEALTH | Facility: CLINIC | Age: 36
End: 2021-10-31

## 2021-10-31 DIAGNOSIS — Z11.52 ENCOUNTER FOR SCREENING LABORATORY TESTING FOR SEVERE ACUTE RESPIRATORY SYNDROME CORONAVIRUS 2 (SARS-COV-2): ICD-10-CM

## 2021-10-31 DIAGNOSIS — F10.10 ALCOHOL ABUSE: ICD-10-CM

## 2021-10-31 DIAGNOSIS — K70.10 ALCOHOLIC HEPATITIS WITHOUT ASCITES (H): ICD-10-CM

## 2021-10-31 DIAGNOSIS — F10.239 ALCOHOL DEPENDENCE WITH WITHDRAWAL WITH COMPLICATION (H): ICD-10-CM

## 2021-10-31 LAB
ALBUMIN SERPL-MCNC: 4.2 G/DL (ref 3.4–5)
ALP SERPL-CCNC: 88 U/L (ref 40–150)
ALT SERPL W P-5'-P-CCNC: 520 U/L (ref 0–70)
AMPHETAMINES UR QL SCN: NORMAL
ANION GAP SERPL CALCULATED.3IONS-SCNC: 7 MMOL/L (ref 3–14)
AST SERPL W P-5'-P-CCNC: 564 U/L (ref 0–45)
BARBITURATES UR QL: NORMAL
BASOPHILS # BLD AUTO: 0.1 10E3/UL (ref 0–0.2)
BASOPHILS NFR BLD AUTO: 1 %
BENZODIAZ UR QL: NORMAL
BILIRUB SERPL-MCNC: 1.4 MG/DL (ref 0.2–1.3)
BUN SERPL-MCNC: 10 MG/DL (ref 7–30)
CALCIUM SERPL-MCNC: 8.4 MG/DL (ref 8.5–10.1)
CANNABINOIDS UR QL SCN: NORMAL
CHLORIDE BLD-SCNC: 104 MMOL/L (ref 94–109)
CO2 SERPL-SCNC: 23 MMOL/L (ref 20–32)
COCAINE UR QL: NORMAL
CREAT SERPL-MCNC: 1.02 MG/DL (ref 0.66–1.25)
EOSINOPHIL # BLD AUTO: 0 10E3/UL (ref 0–0.7)
EOSINOPHIL NFR BLD AUTO: 0 %
ERYTHROCYTE [DISTWIDTH] IN BLOOD BY AUTOMATED COUNT: 12 % (ref 10–15)
ETHANOL SERPL-MCNC: 0.22 G/DL
GFR SERPL CREATININE-BSD FRML MDRD: >90 ML/MIN/1.73M2
GLUCOSE BLD-MCNC: 260 MG/DL (ref 70–99)
HCT VFR BLD AUTO: 50.6 % (ref 40–53)
HGB BLD-MCNC: 17 G/DL (ref 13.3–17.7)
IMM GRANULOCYTES # BLD: 0 10E3/UL
IMM GRANULOCYTES NFR BLD: 0 %
LYMPHOCYTES # BLD AUTO: 2.1 10E3/UL (ref 0.8–5.3)
LYMPHOCYTES NFR BLD AUTO: 21 %
MAGNESIUM SERPL-MCNC: 2 MG/DL (ref 1.6–2.3)
MCH RBC QN AUTO: 29.7 PG (ref 26.5–33)
MCHC RBC AUTO-ENTMCNC: 33.6 G/DL (ref 31.5–36.5)
MCV RBC AUTO: 88 FL (ref 78–100)
MONOCYTES # BLD AUTO: 0.8 10E3/UL (ref 0–1.3)
MONOCYTES NFR BLD AUTO: 8 %
NEUTROPHILS # BLD AUTO: 6.9 10E3/UL (ref 1.6–8.3)
NEUTROPHILS NFR BLD AUTO: 70 %
NRBC # BLD AUTO: 0 10E3/UL
NRBC BLD AUTO-RTO: 0 /100
OPIATES UR QL SCN: NORMAL
PLATELET # BLD AUTO: 516 10E3/UL (ref 150–450)
POTASSIUM BLD-SCNC: 4.2 MMOL/L (ref 3.4–5.3)
PROT SERPL-MCNC: 8.4 G/DL (ref 6.8–8.8)
RBC # BLD AUTO: 5.73 10E6/UL (ref 4.4–5.9)
SARS-COV-2 RNA RESP QL NAA+PROBE: NEGATIVE
SODIUM SERPL-SCNC: 134 MMOL/L (ref 133–144)
WBC # BLD AUTO: 10 10E3/UL (ref 4–11)

## 2021-10-31 PROCEDURE — 82077 ASSAY SPEC XCP UR&BREATH IA: CPT | Performed by: INTERNAL MEDICINE

## 2021-10-31 PROCEDURE — 128N000004 HC R&B CD ADULT

## 2021-10-31 PROCEDURE — 87340 HEPATITIS B SURFACE AG IA: CPT | Performed by: PHYSICIAN ASSISTANT

## 2021-10-31 PROCEDURE — 83735 ASSAY OF MAGNESIUM: CPT | Performed by: INTERNAL MEDICINE

## 2021-10-31 PROCEDURE — 99285 EMERGENCY DEPT VISIT HI MDM: CPT | Performed by: INTERNAL MEDICINE

## 2021-10-31 PROCEDURE — 80143 DRUG ASSAY ACETAMINOPHEN: CPT | Performed by: PHYSICIAN ASSISTANT

## 2021-10-31 PROCEDURE — 80074 ACUTE HEPATITIS PANEL: CPT | Performed by: PHYSICIAN ASSISTANT

## 2021-10-31 PROCEDURE — 250N000013 HC RX MED GY IP 250 OP 250 PS 637: Performed by: INTERNAL MEDICINE

## 2021-10-31 PROCEDURE — 250N000011 HC RX IP 250 OP 636: Performed by: PSYCHIATRY & NEUROLOGY

## 2021-10-31 PROCEDURE — 86709 HEPATITIS A IGM ANTIBODY: CPT | Performed by: PHYSICIAN ASSISTANT

## 2021-10-31 PROCEDURE — 36415 COLL VENOUS BLD VENIPUNCTURE: CPT | Performed by: INTERNAL MEDICINE

## 2021-10-31 PROCEDURE — 80053 COMPREHEN METABOLIC PANEL: CPT | Performed by: INTERNAL MEDICINE

## 2021-10-31 PROCEDURE — C9803 HOPD COVID-19 SPEC COLLECT: HCPCS | Performed by: INTERNAL MEDICINE

## 2021-10-31 PROCEDURE — 80307 DRUG TEST PRSMV CHEM ANLYZR: CPT | Performed by: INTERNAL MEDICINE

## 2021-10-31 PROCEDURE — 250N000013 HC RX MED GY IP 250 OP 250 PS 637: Performed by: PSYCHIATRY & NEUROLOGY

## 2021-10-31 PROCEDURE — 86706 HEP B SURFACE ANTIBODY: CPT | Performed by: PHYSICIAN ASSISTANT

## 2021-10-31 PROCEDURE — U0003 INFECTIOUS AGENT DETECTION BY NUCLEIC ACID (DNA OR RNA); SEVERE ACUTE RESPIRATORY SYNDROME CORONAVIRUS 2 (SARS-COV-2) (CORONAVIRUS DISEASE [COVID-19]), AMPLIFIED PROBE TECHNIQUE, MAKING USE OF HIGH THROUGHPUT TECHNOLOGIES AS DESCRIBED BY CMS-2020-01-R: HCPCS | Performed by: INTERNAL MEDICINE

## 2021-10-31 PROCEDURE — 85025 COMPLETE CBC W/AUTO DIFF WBC: CPT | Performed by: INTERNAL MEDICINE

## 2021-10-31 RX ORDER — ALBUTEROL SULFATE 90 UG/1
2 AEROSOL, METERED RESPIRATORY (INHALATION) EVERY 6 HOURS PRN
Status: DISCONTINUED | OUTPATIENT
Start: 2021-10-31 | End: 2021-11-01 | Stop reason: HOSPADM

## 2021-10-31 RX ORDER — FOLIC ACID 1 MG/1
1 TABLET ORAL DAILY
Status: DISCONTINUED | OUTPATIENT
Start: 2021-10-31 | End: 2021-10-31

## 2021-10-31 RX ORDER — HYDROXYZINE HYDROCHLORIDE 25 MG/1
25-50 TABLET, FILM COATED ORAL AT BEDTIME
Status: DISCONTINUED | OUTPATIENT
Start: 2021-10-31 | End: 2021-11-01 | Stop reason: HOSPADM

## 2021-10-31 RX ORDER — ARIPIPRAZOLE 2 MG/1
2 TABLET ORAL DAILY
Status: DISCONTINUED | OUTPATIENT
Start: 2021-10-31 | End: 2021-11-01 | Stop reason: HOSPADM

## 2021-10-31 RX ORDER — LANOLIN ALCOHOL/MO/W.PET/CERES
3 CREAM (GRAM) TOPICAL
Status: DISCONTINUED | OUTPATIENT
Start: 2021-10-31 | End: 2021-11-01 | Stop reason: HOSPADM

## 2021-10-31 RX ORDER — LANOLIN ALCOHOL/MO/W.PET/CERES
100 CREAM (GRAM) TOPICAL DAILY
Status: DISCONTINUED | OUTPATIENT
Start: 2021-10-31 | End: 2021-10-31

## 2021-10-31 RX ORDER — ATENOLOL 50 MG/1
50 TABLET ORAL DAILY PRN
Status: DISCONTINUED | OUTPATIENT
Start: 2021-10-31 | End: 2021-10-31

## 2021-10-31 RX ORDER — LOPERAMIDE HCL 2 MG
4 CAPSULE ORAL 4 TIMES DAILY PRN
Status: DISCONTINUED | OUTPATIENT
Start: 2021-10-31 | End: 2021-11-01 | Stop reason: HOSPADM

## 2021-10-31 RX ORDER — NALTREXONE HYDROCHLORIDE 50 MG/1
50 TABLET, FILM COATED ORAL DAILY
Status: DISCONTINUED | OUTPATIENT
Start: 2021-11-01 | End: 2021-11-01

## 2021-10-31 RX ORDER — MIRTAZAPINE 15 MG/1
15 TABLET, FILM COATED ORAL AT BEDTIME
Status: DISCONTINUED | OUTPATIENT
Start: 2021-10-31 | End: 2021-11-01 | Stop reason: HOSPADM

## 2021-10-31 RX ORDER — DULOXETIN HYDROCHLORIDE 60 MG/1
60 CAPSULE, DELAYED RELEASE ORAL DAILY
Status: DISCONTINUED | OUTPATIENT
Start: 2021-10-31 | End: 2021-11-01 | Stop reason: HOSPADM

## 2021-10-31 RX ORDER — LORAZEPAM 1 MG/1
1-4 TABLET ORAL EVERY 30 MIN PRN
Status: DISCONTINUED | OUTPATIENT
Start: 2021-10-31 | End: 2021-11-01 | Stop reason: HOSPADM

## 2021-10-31 RX ORDER — LORAZEPAM 1 MG/1
1-4 TABLET ORAL EVERY 30 MIN PRN
Status: DISCONTINUED | OUTPATIENT
Start: 2021-10-31 | End: 2021-10-31

## 2021-10-31 RX ORDER — MAGNESIUM HYDROXIDE/ALUMINUM HYDROXICE/SIMETHICONE 120; 1200; 1200 MG/30ML; MG/30ML; MG/30ML
30 SUSPENSION ORAL EVERY 4 HOURS PRN
Status: DISCONTINUED | OUTPATIENT
Start: 2021-10-31 | End: 2021-11-01 | Stop reason: HOSPADM

## 2021-10-31 RX ORDER — LANOLIN ALCOHOL/MO/W.PET/CERES
100 CREAM (GRAM) TOPICAL DAILY
Status: DISCONTINUED | OUTPATIENT
Start: 2021-11-01 | End: 2021-11-01 | Stop reason: HOSPADM

## 2021-10-31 RX ORDER — ONDANSETRON 4 MG/1
4 TABLET, ORALLY DISINTEGRATING ORAL EVERY 6 HOURS PRN
Status: DISCONTINUED | OUTPATIENT
Start: 2021-10-31 | End: 2021-11-01 | Stop reason: HOSPADM

## 2021-10-31 RX ORDER — AMOXICILLIN 250 MG
1 CAPSULE ORAL 2 TIMES DAILY PRN
Status: DISCONTINUED | OUTPATIENT
Start: 2021-10-31 | End: 2021-11-01 | Stop reason: HOSPADM

## 2021-10-31 RX ORDER — MULTIPLE VITAMINS W/ MINERALS TAB 9MG-400MCG
1 TAB ORAL DAILY
Status: DISCONTINUED | OUTPATIENT
Start: 2021-10-31 | End: 2021-10-31

## 2021-10-31 RX ORDER — GABAPENTIN 300 MG/1
300 CAPSULE ORAL 3 TIMES DAILY
Status: DISCONTINUED | OUTPATIENT
Start: 2021-10-31 | End: 2021-11-01 | Stop reason: HOSPADM

## 2021-10-31 RX ORDER — HYDROXYZINE HYDROCHLORIDE 25 MG/1
25 TABLET, FILM COATED ORAL EVERY 4 HOURS PRN
Status: DISCONTINUED | OUTPATIENT
Start: 2021-10-31 | End: 2021-11-01 | Stop reason: HOSPADM

## 2021-10-31 RX ORDER — MULTIPLE VITAMINS W/ MINERALS TAB 9MG-400MCG
1 TAB ORAL DAILY
Status: DISCONTINUED | OUTPATIENT
Start: 2021-11-01 | End: 2021-11-01 | Stop reason: HOSPADM

## 2021-10-31 RX ORDER — FOLIC ACID 1 MG/1
1 TABLET ORAL DAILY
Status: DISCONTINUED | OUTPATIENT
Start: 2021-11-01 | End: 2021-11-01 | Stop reason: HOSPADM

## 2021-10-31 RX ADMIN — LORAZEPAM 2 MG: 1 TABLET ORAL at 15:25

## 2021-10-31 RX ADMIN — MIRTAZAPINE 15 MG: 15 TABLET, FILM COATED ORAL at 21:44

## 2021-10-31 RX ADMIN — THIAMINE HCL TAB 100 MG 100 MG: 100 TAB at 14:51

## 2021-10-31 RX ADMIN — GABAPENTIN 300 MG: 300 CAPSULE ORAL at 21:44

## 2021-10-31 RX ADMIN — LORAZEPAM 2 MG: 1 TABLET ORAL at 21:44

## 2021-10-31 RX ADMIN — ONDANSETRON 4 MG: 4 TABLET, ORALLY DISINTEGRATING ORAL at 21:44

## 2021-10-31 RX ADMIN — HYDROXYZINE HYDROCHLORIDE 50 MG: 25 TABLET, FILM COATED ORAL at 21:44

## 2021-10-31 RX ADMIN — LORAZEPAM 1 MG: 1 TABLET ORAL at 15:56

## 2021-10-31 RX ADMIN — LORAZEPAM 1 MG: 1 TABLET ORAL at 14:06

## 2021-10-31 RX ADMIN — ATENOLOL 50 MG: 50 TABLET ORAL at 15:01

## 2021-10-31 RX ADMIN — MULTIPLE VITAMINS W/ MINERALS TAB 1 TABLET: TAB at 14:51

## 2021-10-31 RX ADMIN — LORAZEPAM 1 MG: 1 TABLET ORAL at 14:51

## 2021-10-31 RX ADMIN — FOLIC ACID 1 MG: 1 TABLET ORAL at 14:51

## 2021-10-31 ASSESSMENT — ENCOUNTER SYMPTOMS
EYE REDNESS: 0
ARTHRALGIAS: 0
CONFUSION: 0
NECK STIFFNESS: 0
FEVER: 0
HEADACHES: 0
ABDOMINAL PAIN: 0
COLOR CHANGE: 0
SHORTNESS OF BREATH: 0
DIFFICULTY URINATING: 0
TREMORS: 1

## 2021-10-31 ASSESSMENT — ACTIVITIES OF DAILY LIVING (ADL)
WEAR_GLASSES_OR_BLIND: NO
TOILETING_ISSUES: NO
DIFFICULTY_COMMUNICATING: NO
CONCENTRATING,_REMEMBERING_OR_MAKING_DECISIONS_DIFFICULTY: NO
FALL_HISTORY_WITHIN_LAST_SIX_MONTHS: NO
DRESSING/BATHING_DIFFICULTY: NO
DOING_ERRANDS_INDEPENDENTLY_DIFFICULTY: NO
DIFFICULTY_EATING/SWALLOWING: NO
WALKING_OR_CLIMBING_STAIRS_DIFFICULTY: NO

## 2021-10-31 ASSESSMENT — MIFFLIN-ST. JEOR: SCORE: 2211.32

## 2021-10-31 NOTE — TELEPHONE ENCOUNTER
R: The pt is currently on the [facility] awaiting bed placement.  3:40pm: Intake called unit charge nurse for patient placement and report time; unit charge nurse report time as 4:30pm. Intake informed ER nurse of patient placement and report time.

## 2021-10-31 NOTE — ED TRIAGE NOTES
Pt here for detox and states his last drink was very early this morning.  Pt states no hx of seizures coming alcohol.

## 2021-10-31 NOTE — TELEPHONE ENCOUNTER
S: 3:26p, Dr. Pierre called w/ clinical on a 36/M present in the [facility] requesting detox.       B: The pt arrived at the [facility] requesting detox. The pt is requesting alcohol detox. The pt drinks up to 12 seltzer drinks daily off and on for the last month. Last known drink was yesterday evening.     Pt denies any additional substance use.    The pt s does currently have withdrawal symptoms: pt has been shaking and tachycardic.     The pt does not have a concern/Hx for DT s, the pt does not have a Hx/concern for seizures.     The pt has no medical concerns.      The pt can ambulated independently. The pt is drinking in the ER.      There are no MH concerns w/ admission.    The pt has been in detox before.    No concern for aggression or HI.     Covid lab is in process.   Utox results all negative.     CBC labs resulted- see Epic for results.    Comp labs are as follows: Sodium 134; Potassium 4.2; ; . Pt denies any abdominal pain.   Ethyl Alcohol lab level resulted as .22 .    Recent vital signs as of 10/31/2021 at 1:14p: temp 97.4; Pulse 138; BP not done; Resp Rate 16, SpO2 99%.     A: Vol    R: The pt is currently on the [facility] awaiting bed placement.    2:53p The pt has been added to the work-list. Intake requested an up to date BP, BP not done at admission.     New VS 10/31/2021 at 2:57p are as follows: /78; Resp Rate 22; SpO2 99%; Pulse 140.     2:57p Intake paged provider to present for Unit 3A ([facility]).     3:05p Provider returned intakes page- provider accepts the pt pending a negative Covid.     3:10p Intake called Unit 3A Charge RN to go over admission in que. Charge RN is in report. Intake request a call back to go over admission in que.     3:11p Intake called [facility] to go over bed placement information.

## 2021-10-31 NOTE — ED PROVIDER NOTES
[facility] EMERGENCY DEPARTMENT ([facility])    10/31/21        History     Chief Complaint   Patient presents with     Drug / Alcohol Assessment     The history is provided by the patient and medical records.     Xavier Odell is a 36 year old male with a history of alcohol dependence who presents to the Emergency Department seeking detox from alcohol. Patient reports he drinks about 12 seltzers per day. His last drink was last night around 9-10 pm. He states he was recently sober for about 1 year then he relapsed. He recently went through detox in August. He is unsure currently how long he was sober after that stay. Patient denies history of alcohol withdrawal seizures. He is shaky. Patient reports he is depressed. No SI.     Past Medical History  Past Medical History:   Diagnosis Date     GI bleed      Substance abuse (H)      Uncomplicated asthma      Past Surgical History:   Procedure Laterality Date     ORTHOPEDIC SURGERY       albuterol (PROAIR HFA/PROVENTIL HFA/VENTOLIN HFA) 108 (90 Base) MCG/ACT inhaler  ARIPiprazole (ABILIFY) 2 MG tablet  B Complex Vitamins (VITAMIN B COMPLEX PO)  DULoxetine (CYMBALTA) 60 MG capsule  EPINEPHrine (ANY BX GENERIC EQUIV) 0.3 MG/0.3ML injection 2-pack  folic acid (FOLVITE) 1 MG tablet  gabapentin (NEURONTIN) 300 MG capsule  hydrOXYzine (ATARAX) 25 MG tablet  melatonin 3 MG tablet  mirtazapine (REMERON) 15 MG tablet  multivitamin w/minerals (THERA-VIT-M) tablet  naltrexone (DEPADE/REVIA) 50 MG tablet  nicotine polacrilex (NICORETTE) 4 MG gum  nicotine polacrilex (NICORETTE) 4 MG gum  thiamine (B-1) 100 MG tablet      Allergies   Allergen Reactions     Banana      Mild throat irritation per pt     Chicken Allergy      Anaphalxis     Family History  Family History   Problem Relation Age of Onset     Depression Mother      Depression Father      Substance Abuse Father      Social History   Social History     Tobacco Use     Smoking status: Never Smoker     Smokeless  tobacco: Never Used   Substance Use Topics     Alcohol use: Yes     Comment: Drinking cooking wine and mouthwash     Drug use: Not Currently      Past medical history, past surgical history, medications, allergies, family history, and social history were reviewed with the patient. No additional pertinent items.       Review of Systems   Constitutional: Negative for fever.   HENT: Negative for congestion.    Eyes: Negative for redness.   Respiratory: Negative for shortness of breath.    Cardiovascular: Negative for chest pain.   Gastrointestinal: Negative for abdominal pain.   Genitourinary: Negative for difficulty urinating.   Musculoskeletal: Negative for arthralgias and neck stiffness.   Skin: Negative for color change.   Neurological: Positive for tremors. Negative for headaches.   Psychiatric/Behavioral: Negative for confusion and suicidal ideas.   All other systems reviewed and are negative.    A complete review of systems was performed with pertinent positives and negatives noted in the HPI, and all other systems negative.    Physical Exam   BP: 133/78  Pulse: (!) 138  Temp: 97.4  F (36.3  C)  Resp: 16  Weight: 103.4 kg (228 lb)  SpO2: 99 %  Physical Exam  Constitutional:       General: He is not in acute distress.     Appearance: He is not diaphoretic.   HENT:      Head: Atraumatic.   Eyes:      General: No scleral icterus.     Pupils: Pupils are equal, round, and reactive to light.   Cardiovascular:      Rate and Rhythm: Regular rhythm. Tachycardia present.      Heart sounds: Normal heart sounds. No murmur heard.   No friction rub. No gallop.    Pulmonary:      Effort: Pulmonary effort is normal. No respiratory distress.      Breath sounds: Normal breath sounds. No stridor. No wheezing, rhonchi or rales.   Chest:      Chest wall: No tenderness.   Abdominal:      General: Abdomen is flat. Bowel sounds are normal. There is no distension.      Palpations: Abdomen is soft. There is no mass.      Tenderness:  There is no abdominal tenderness. There is no right CVA tenderness, left CVA tenderness, guarding or rebound.      Hernia: No hernia is present.   Musculoskeletal:         General: No tenderness.      Cervical back: Neck supple.   Skin:     General: Skin is warm.      Findings: No rash.   Neurological:      General: No focal deficit present.      Cranial Nerves: No cranial nerve deficit.   Psychiatric:         Mood and Affect: Mood normal.         Behavior: Behavior normal.         Thought Content: Thought content normal.         Judgment: Judgment normal.         ED Course   1:27 PM  The patient was seen and examined by Nancy Rodriguez MD in Room ED16A.      Procedures              Results for orders placed or performed during the hospital encounter of 10/31/21   Drug abuse screen 1 urine (ED)     Status: Normal   Result Value Ref Range    Amphetamines Urine Screen Negative Screen Negative    Barbiturates Urine Screen Negative Screen Negative    Benzodiazepines Urine Screen Negative Screen Negative    Cannabinoids Urine Screen Negative Screen Negative    Cocaine Urine Screen Negative Screen Negative    Opiates Urine Screen Negative Screen Negative   Comprehensive metabolic panel     Status: Abnormal   Result Value Ref Range    Sodium 134 133 - 144 mmol/L    Potassium 4.2 3.4 - 5.3 mmol/L    Chloride 104 94 - 109 mmol/L    Carbon Dioxide (CO2) 23 20 - 32 mmol/L    Anion Gap 7 3 - 14 mmol/L    Urea Nitrogen 10 7 - 30 mg/dL    Creatinine 1.02 0.66 - 1.25 mg/dL    Calcium 8.4 (L) 8.5 - 10.1 mg/dL    Glucose 260 (H) 70 - 99 mg/dL    Alkaline Phosphatase 88 40 - 150 U/L     (HH) 0 - 45 U/L     (HH) 0 - 70 U/L    Protein Total 8.4 6.8 - 8.8 g/dL    Albumin 4.2 3.4 - 5.0 g/dL    Bilirubin Total 1.4 (H) 0.2 - 1.3 mg/dL    GFR Estimate >90 >60 mL/min/1.73m2   Magnesium     Status: Normal   Result Value Ref Range    Magnesium 2.0 1.6 - 2.3 mg/dL   Ethyl Alcohol Level     Status: Abnormal   Result Value Ref Range     Alcohol ethyl 0.22 (H) <=0.01 g/dL   Asymptomatic COVID-19 Virus (Coronavirus) by PCR Nasopharyngeal     Status: Normal    Specimen: Nasopharyngeal; Swab   Result Value Ref Range    SARS CoV2 PCR Negative Negative    Narrative    Testing was performed using the warren  SARS-CoV-2 & Influenza A/B Assay on the warren  [place]  System.  This test should be ordered for the detection of SARS-COV-2 in individuals who meet SARS-CoV-2 clinical and/or epidemiological criteria. Test performance is unknown in asymptomatic patients.  This test is for in vitro diagnostic use under the FDA EUA for laboratories certified under CLIA to perform moderate and/or high complexity testing. This test has not been FDA cleared or approved.  A negative test does not rule out the presence of PCR inhibitors in the specimen or target RNA in concentration below the limit of detection for the assay. The possibility of a false negative should be considered if the patient's recent exposure or clinical presentation suggests COVID-19.  [facility] are certified under the Clinical Laboratory Improvement Amendments of 1988 (CLIA-88) as qualified to perform moderate and/or high complexity laboratory testing.   CBC with platelets and differential     Status: Abnormal   Result Value Ref Range    WBC Count 10.0 4.0 - 11.0 10e3/uL    RBC Count 5.73 4.40 - 5.90 10e6/uL    Hemoglobin 17.0 13.3 - 17.7 g/dL    Hematocrit 50.6 40.0 - 53.0 %    MCV 88 78 - 100 fL    MCH 29.7 26.5 - 33.0 pg    MCHC 33.6 31.5 - 36.5 g/dL    RDW 12.0 10.0 - 15.0 %    Platelet Count 516 (H) 150 - 450 10e3/uL    % Neutrophils 70 %    % Lymphocytes 21 %    % Monocytes 8 %    % Eosinophils 0 %    % Basophils 1 %    % Immature Granulocytes 0 %    NRBCs per 100 WBC 0 <1 /100    Absolute Neutrophils 6.9 1.6 - 8.3 10e3/uL    Absolute Lymphocytes 2.1 0.8 - 5.3 10e3/uL    Absolute Monocytes 0.8 0.0 - 1.3 10e3/uL    Absolute Eosinophils 0.0 0.0 - 0.7 10e3/uL    Absolute  Basophils 0.1 0.0 - 0.2 10e3/uL    Absolute Immature Granulocytes 0.0 <=0.0 10e3/uL    Absolute NRBCs 0.0 10e3/uL   Urine Drugs of Abuse Screen     Status: Normal    Narrative    The following orders were created for panel order Urine Drugs of Abuse Screen.  Procedure                               Abnormality         Status                     ---------                               -----------         ------                     Drug abuse screen 1 urin...[[id number]]  Normal              Final result                 Please view results for these tests on the individual orders.   CBC with platelets differential     Status: Abnormal    Narrative    The following orders were created for panel order CBC with platelets differential.  Procedure                               Abnormality         Status                     ---------                               -----------         ------                     CBC with platelets and d...[[id number]]  Abnormal            Final result                 Please view results for these tests on the individual orders.     Medications   LORazepam (ATIVAN) tablet 1-4 mg (2 mg Oral Given 10/31/21 1525)   atenolol (TENORMIN) tablet 50 mg (50 mg Oral Given 10/31/21 1501)   thiamine (B-1) tablet 100 mg (100 mg Oral Given 10/31/21 1451)   folic acid (FOLVITE) tablet 1 mg (1 mg Oral Given 10/31/21 1451)   multivitamin w/minerals (THERA-VIT-M) tablet 1 tablet (1 tablet Oral Given 10/31/21 1451)        Assessments & Plan (with Medical Decision Making)  Alcohol abuse and dependence here for detox, up to 12 selzer a day last one 9 pm yesterday, h/o shaking but no seizure or hallucinations, ambulate ok and tolerating clear liquids here, labs worse LFTs consistent with active ETOH and 0.22, urine tox neg, HR down to 110's after ativan and atenolol, vitamine and folate also given, D/W MH intake-admit to Detox. COVID neg.       I have reviewed the nursing notes. I have reviewed the findings, diagnosis,  plan and need for follow up with the patient.    New Prescriptions    No medications on file       Final diagnoses:   Alcohol dependence with withdrawal with complication (H)   Alcohol abuse   Alcoholic hepatitis without ascites     I, Amber Rjoo, am serving as a trained medical scribe to document services personally performed by Nancy Rodriguez MD, based on the provider's statements to me.      INancy MD, was physically present and have reviewed and verified the accuracy of this note documented by Amber Rojo.     --  Nancy Rodriguez MD  [facility] EMERGENCY DEPARTMENT  10/31/2021     Nancy Rodriguez MD  10/31/21 [id number]

## 2021-10-31 NOTE — PROGRESS NOTES
10/31/21 1510   Patient Belongings   Did you bring any home meds/supplements to the hospital?  No   Patient Belongings other (see comments)  (medroom bin)   Patient Belongings Remaining with Patient other (see comments)   Belongings Search Yes   Clothing Search Yes   Second Staff Abdullahi Medellin   Medroom bin: cell phone, wallet, shorts, key, env #[id number]: Passport, $72 cash, visa  A               Admission:  I am responsible for any personal items that are not sent to the safe or pharmacy.  [facility] is not responsible for loss, theft or damage of any property in my possession.    Signature:  _________________________________ Date: _______  Time: _____                                              Staff Signature:  ____________________________ Date: ________  Time: _____      2nd Staff person, if patient is unable/unwilling to sign:    Signature: ________________________________ Date: ________  Time: _____     Discharge:  [facility] has returned all of my personal belongings:    Signature: _________________________________ Date: ________  Time: _____                                          Staff Signature:  ____________________________ Date: ________  Time: _____

## 2021-11-01 ENCOUNTER — HOSPITAL ENCOUNTER (INPATIENT)
Facility: CLINIC | Age: 36
LOS: 2 days | Discharge: HOME | End: 2021-11-03
Attending: PEDIATRICS | Admitting: PEDIATRICS
Payer: COMMERCIAL

## 2021-11-01 ENCOUNTER — APPOINTMENT (OUTPATIENT)
Dept: ULTRASOUND IMAGING | Facility: CLINIC | Age: 36
End: 2021-11-01
Attending: PHYSICIAN ASSISTANT
Payer: COMMERCIAL

## 2021-11-01 VITALS
HEIGHT: 70 IN | HEART RATE: 103 BPM | BODY MASS INDEX: 40.09 KG/M2 | WEIGHT: 280 LBS | TEMPERATURE: 97.1 F | RESPIRATION RATE: 16 BRPM | SYSTOLIC BLOOD PRESSURE: 141 MMHG | DIASTOLIC BLOOD PRESSURE: 99 MMHG | OXYGEN SATURATION: 97 %

## 2021-11-01 DIAGNOSIS — K75.9 HEPATITIS: Primary | ICD-10-CM

## 2021-11-01 LAB
ALBUMIN SERPL-MCNC: 3.7 G/DL (ref 3.4–5)
ALBUMIN SERPL-MCNC: 3.8 G/DL (ref 3.4–5)
ALP SERPL-CCNC: 86 U/L (ref 40–150)
ALP SERPL-CCNC: 86 U/L (ref 40–150)
ALT SERPL W P-5'-P-CCNC: 1110 U/L (ref 0–70)
ALT SERPL W P-5'-P-CCNC: 1307 U/L (ref 0–70)
ANION GAP SERPL CALCULATED.3IONS-SCNC: 8 MMOL/L (ref 3–14)
ANION GAP SERPL CALCULATED.3IONS-SCNC: 8 MMOL/L (ref 3–14)
APAP SERPL-MCNC: <2 MG/L (ref 10–30)
AST SERPL W P-5'-P-CCNC: 1500 U/L (ref 0–45)
AST SERPL W P-5'-P-CCNC: 779 U/L (ref 0–45)
AST SERPL W P-5'-P-CCNC: ABNORMAL U/L
BILIRUB DIRECT SERPL-MCNC: 0.7 MG/DL (ref 0–0.2)
BILIRUB SERPL-MCNC: 1.6 MG/DL (ref 0.2–1.3)
BILIRUB SERPL-MCNC: 1.8 MG/DL (ref 0.2–1.3)
BUN SERPL-MCNC: 15 MG/DL (ref 7–30)
BUN SERPL-MCNC: 15 MG/DL (ref 7–30)
CALCIUM SERPL-MCNC: 8.5 MG/DL (ref 8.5–10.1)
CALCIUM SERPL-MCNC: 8.7 MG/DL (ref 8.5–10.1)
CHLORIDE BLD-SCNC: 101 MMOL/L (ref 94–109)
CHLORIDE BLD-SCNC: 101 MMOL/L (ref 94–109)
CK SERPL-CCNC: 226 U/L (ref 30–300)
CO2 SERPL-SCNC: 22 MMOL/L (ref 20–32)
CO2 SERPL-SCNC: 23 MMOL/L (ref 20–32)
CREAT SERPL-MCNC: 0.96 MG/DL (ref 0.66–1.25)
CREAT SERPL-MCNC: 1.1 MG/DL (ref 0.66–1.25)
ERYTHROCYTE [DISTWIDTH] IN BLOOD BY AUTOMATED COUNT: 11.9 % (ref 10–15)
ERYTHROCYTE [DISTWIDTH] IN BLOOD BY AUTOMATED COUNT: 11.9 % (ref 10–15)
GFR SERPL CREATININE-BSD FRML MDRD: 87 ML/MIN/1.73M2
GFR SERPL CREATININE-BSD FRML MDRD: >90 ML/MIN/1.73M2
GGT SERPL-CCNC: 228 U/L (ref 0–75)
GLUCOSE BLD-MCNC: 161 MG/DL (ref 70–99)
GLUCOSE BLD-MCNC: 166 MG/DL (ref 70–99)
GLUCOSE BLDC GLUCOMTR-MCNC: 145 MG/DL (ref 70–99)
GLUCOSE BLDC GLUCOMTR-MCNC: 164 MG/DL (ref 70–99)
GLUCOSE BLDC GLUCOMTR-MCNC: 179 MG/DL (ref 70–99)
HAV IGG SER QL IA: REACTIVE
HBA1C MFR BLD: 5.3 % (ref 0–5.6)
HBV SURFACE AB SERPL IA-ACNC: >1000 M[IU]/ML
HBV SURFACE AG SERPL QL IA: NONREACTIVE
HCT VFR BLD AUTO: 48.7 % (ref 40–53)
HCT VFR BLD AUTO: 49.5 % (ref 40–53)
HCV AB SERPL QL IA: NONREACTIVE
HGB BLD-MCNC: 16.1 G/DL (ref 13.3–17.7)
HGB BLD-MCNC: 16.3 G/DL (ref 13.3–17.7)
INR PPP: 1.18 (ref 0.85–1.15)
INR PPP: 1.31 (ref 0.86–1.14)
LIPASE SERPL-CCNC: 78 U/L (ref 73–393)
MAGNESIUM SERPL-MCNC: 1.7 MG/DL (ref 1.6–2.3)
MAGNESIUM SERPL-MCNC: 1.7 MG/DL (ref 1.6–2.3)
MCH RBC QN AUTO: 29.4 PG (ref 26.5–33)
MCH RBC QN AUTO: 29.5 PG (ref 26.5–33)
MCHC RBC AUTO-ENTMCNC: 32.9 G/DL (ref 31.5–36.5)
MCHC RBC AUTO-ENTMCNC: 33.1 G/DL (ref 31.5–36.5)
MCV RBC AUTO: 89 FL (ref 78–100)
MCV RBC AUTO: 90 FL (ref 78–100)
PHOSPHATE SERPL-MCNC: 4 MG/DL (ref 2.5–4.5)
PHOSPHATE SERPL-MCNC: 5.4 MG/DL (ref 2.5–4.5)
PLATELET # BLD AUTO: 322 10E3/UL (ref 150–450)
PLATELET # BLD AUTO: 420 10E3/UL (ref 150–450)
POTASSIUM BLD-SCNC: 4.2 MMOL/L (ref 3.4–5.3)
POTASSIUM BLD-SCNC: 4.2 MMOL/L (ref 3.4–5.3)
PROT SERPL-MCNC: 7.8 G/DL (ref 6.8–8.8)
PROT SERPL-MCNC: 7.8 G/DL (ref 6.8–8.8)
RBC # BLD AUTO: 5.48 10E6/UL (ref 4.4–5.9)
RBC # BLD AUTO: 5.52 10E6/UL (ref 4.4–5.9)
SODIUM SERPL-SCNC: 131 MMOL/L (ref 133–144)
SODIUM SERPL-SCNC: 132 MMOL/L (ref 133–144)
T4 FREE SERPL-MCNC: 1.01 NG/DL (ref 0.76–1.46)
TSH SERPL DL<=0.005 MIU/L-ACNC: 4.26 MU/L (ref 0.4–4)
WBC # BLD AUTO: 10.7 10E3/UL (ref 4–11)
WBC # BLD AUTO: 8.9 10E3/UL (ref 4–11)

## 2021-11-01 PROCEDURE — 250N000012 HC RX MED GY IP 250 OP 636 PS 637: Performed by: STUDENT IN AN ORGANIZED HEALTH CARE EDUCATION/TRAINING PROGRAM

## 2021-11-01 PROCEDURE — 83036 HEMOGLOBIN GLYCOSYLATED A1C: CPT | Performed by: PHYSICIAN ASSISTANT

## 2021-11-01 PROCEDURE — 85610 PROTHROMBIN TIME: CPT | Performed by: PHYSICIAN ASSISTANT

## 2021-11-01 PROCEDURE — 84155 ASSAY OF PROTEIN SERUM: CPT | Performed by: STUDENT IN AN ORGANIZED HEALTH CARE EDUCATION/TRAINING PROGRAM

## 2021-11-01 PROCEDURE — 82247 BILIRUBIN TOTAL: CPT | Performed by: STUDENT IN AN ORGANIZED HEALTH CARE EDUCATION/TRAINING PROGRAM

## 2021-11-01 PROCEDURE — 93975 VASCULAR STUDY: CPT

## 2021-11-01 PROCEDURE — 84443 ASSAY THYROID STIM HORMONE: CPT | Performed by: PHYSICIAN ASSISTANT

## 2021-11-01 PROCEDURE — 250N000013 HC RX MED GY IP 250 OP 250 PS 637: Performed by: PSYCHIATRY & NEUROLOGY

## 2021-11-01 PROCEDURE — 82977 ASSAY OF GGT: CPT | Performed by: PSYCHIATRY & NEUROLOGY

## 2021-11-01 PROCEDURE — 85027 COMPLETE CBC AUTOMATED: CPT | Performed by: STUDENT IN AN ORGANIZED HEALTH CARE EDUCATION/TRAINING PROGRAM

## 2021-11-01 PROCEDURE — 84100 ASSAY OF PHOSPHORUS: CPT | Performed by: STUDENT IN AN ORGANIZED HEALTH CARE EDUCATION/TRAINING PROGRAM

## 2021-11-01 PROCEDURE — 86256 FLUORESCENT ANTIBODY TITER: CPT | Performed by: PHYSICIAN ASSISTANT

## 2021-11-01 PROCEDURE — 82247 BILIRUBIN TOTAL: CPT | Performed by: PHYSICIAN ASSISTANT

## 2021-11-01 PROCEDURE — 83516 IMMUNOASSAY NONANTIBODY: CPT | Performed by: PHYSICIAN ASSISTANT

## 2021-11-01 PROCEDURE — 36415 COLL VENOUS BLD VENIPUNCTURE: CPT | Performed by: PSYCHIATRY & NEUROLOGY

## 2021-11-01 PROCEDURE — 258N000003 HC RX IP 258 OP 636: Performed by: PHYSICIAN ASSISTANT

## 2021-11-01 PROCEDURE — 99223 1ST HOSP IP/OBS HIGH 75: CPT | Mod: AI | Performed by: PEDIATRICS

## 2021-11-01 PROCEDURE — 80069 RENAL FUNCTION PANEL: CPT | Performed by: STUDENT IN AN ORGANIZED HEALTH CARE EDUCATION/TRAINING PROGRAM

## 2021-11-01 PROCEDURE — 82550 ASSAY OF CK (CPK): CPT | Performed by: PHYSICIAN ASSISTANT

## 2021-11-01 PROCEDURE — 99223 1ST HOSP IP/OBS HIGH 75: CPT | Performed by: PHYSICIAN ASSISTANT

## 2021-11-01 PROCEDURE — 85027 COMPLETE CBC AUTOMATED: CPT | Performed by: PHYSICIAN ASSISTANT

## 2021-11-01 PROCEDURE — 85610 PROTHROMBIN TIME: CPT | Performed by: STUDENT IN AN ORGANIZED HEALTH CARE EDUCATION/TRAINING PROGRAM

## 2021-11-01 PROCEDURE — 99236 HOSP IP/OBS SAME DATE HI 85: CPT | Performed by: PSYCHIATRY & NEUROLOGY

## 2021-11-01 PROCEDURE — 84155 ASSAY OF PROTEIN SERUM: CPT | Performed by: PHYSICIAN ASSISTANT

## 2021-11-01 PROCEDURE — 99207 PR CONSULT E&M CHANGED TO INITIAL LEVEL: CPT | Performed by: PHYSICIAN ASSISTANT

## 2021-11-01 PROCEDURE — 83735 ASSAY OF MAGNESIUM: CPT | Performed by: PHYSICIAN ASSISTANT

## 2021-11-01 PROCEDURE — 36415 COLL VENOUS BLD VENIPUNCTURE: CPT | Performed by: PEDIATRICS

## 2021-11-01 PROCEDURE — 250N000011 HC RX IP 250 OP 636: Performed by: STUDENT IN AN ORGANIZED HEALTH CARE EDUCATION/TRAINING PROGRAM

## 2021-11-01 PROCEDURE — 84439 ASSAY OF FREE THYROXINE: CPT | Performed by: PHYSICIAN ASSISTANT

## 2021-11-01 PROCEDURE — 258N000003 HC RX IP 258 OP 636: Performed by: STUDENT IN AN ORGANIZED HEALTH CARE EDUCATION/TRAINING PROGRAM

## 2021-11-01 PROCEDURE — 80069 RENAL FUNCTION PANEL: CPT | Performed by: PHYSICIAN ASSISTANT

## 2021-11-01 PROCEDURE — 83735 ASSAY OF MAGNESIUM: CPT | Performed by: STUDENT IN AN ORGANIZED HEALTH CARE EDUCATION/TRAINING PROGRAM

## 2021-11-01 PROCEDURE — HZ2ZZZZ DETOXIFICATION SERVICES FOR SUBSTANCE ABUSE TREATMENT: ICD-10-PCS | Performed by: PSYCHIATRY & NEUROLOGY

## 2021-11-01 PROCEDURE — 36415 COLL VENOUS BLD VENIPUNCTURE: CPT | Performed by: STUDENT IN AN ORGANIZED HEALTH CARE EDUCATION/TRAINING PROGRAM

## 2021-11-01 PROCEDURE — 86038 ANTINUCLEAR ANTIBODIES: CPT | Performed by: PHYSICIAN ASSISTANT

## 2021-11-01 PROCEDURE — 36415 COLL VENOUS BLD VENIPUNCTURE: CPT | Performed by: PHYSICIAN ASSISTANT

## 2021-11-01 PROCEDURE — 120N000002 HC R&B MED SURG/OB UMMC

## 2021-11-01 PROCEDURE — 93975 VASCULAR STUDY: CPT | Mod: 26 | Performed by: RADIOLOGY

## 2021-11-01 PROCEDURE — 250N000013 HC RX MED GY IP 250 OP 250 PS 637: Performed by: STUDENT IN AN ORGANIZED HEALTH CARE EDUCATION/TRAINING PROGRAM

## 2021-11-01 PROCEDURE — 83690 ASSAY OF LIPASE: CPT | Performed by: PHYSICIAN ASSISTANT

## 2021-11-01 PROCEDURE — 86708 HEPATITIS A ANTIBODY: CPT | Performed by: PHYSICIAN ASSISTANT

## 2021-11-01 RX ORDER — LANOLIN ALCOHOL/MO/W.PET/CERES
100 CREAM (GRAM) TOPICAL DAILY
Status: DISCONTINUED | OUTPATIENT
Start: 2021-11-01 | End: 2021-11-03 | Stop reason: HOSPADM

## 2021-11-01 RX ORDER — NICOTINE POLACRILEX 4 MG
15-30 LOZENGE BUCCAL
Status: DISCONTINUED | OUTPATIENT
Start: 2021-11-01 | End: 2021-11-03 | Stop reason: HOSPADM

## 2021-11-01 RX ORDER — DEXTROSE MONOHYDRATE 25 G/50ML
25-50 INJECTION, SOLUTION INTRAVENOUS
Status: DISCONTINUED | OUTPATIENT
Start: 2021-11-01 | End: 2021-11-03 | Stop reason: HOSPADM

## 2021-11-01 RX ORDER — FOLIC ACID 1 MG/1
1 TABLET ORAL DAILY
Status: DISCONTINUED | OUTPATIENT
Start: 2021-11-01 | End: 2021-11-03 | Stop reason: HOSPADM

## 2021-11-01 RX ORDER — HYDROXYZINE HYDROCHLORIDE 25 MG/1
25-50 TABLET, FILM COATED ORAL AT BEDTIME
Status: DISCONTINUED | OUTPATIENT
Start: 2021-11-01 | End: 2021-11-02

## 2021-11-01 RX ORDER — LIDOCAINE 40 MG/G
CREAM TOPICAL
Status: DISCONTINUED | OUTPATIENT
Start: 2021-11-01 | End: 2021-11-03 | Stop reason: HOSPADM

## 2021-11-01 RX ORDER — MULTIPLE VITAMINS W/ MINERALS TAB 9MG-400MCG
1 TAB ORAL DAILY
Status: DISCONTINUED | OUTPATIENT
Start: 2021-11-01 | End: 2021-11-03 | Stop reason: HOSPADM

## 2021-11-01 RX ORDER — LORAZEPAM 1 MG/1
1-2 TABLET ORAL EVERY 30 MIN PRN
Status: DISCONTINUED | OUTPATIENT
Start: 2021-11-01 | End: 2021-11-03 | Stop reason: HOSPADM

## 2021-11-01 RX ORDER — GABAPENTIN 300 MG/1
300 CAPSULE ORAL 3 TIMES DAILY
Status: DISCONTINUED | OUTPATIENT
Start: 2021-11-01 | End: 2021-11-03 | Stop reason: HOSPADM

## 2021-11-01 RX ORDER — ALBUTEROL SULFATE 90 UG/1
2 AEROSOL, METERED RESPIRATORY (INHALATION) EVERY 6 HOURS PRN
Status: DISCONTINUED | OUTPATIENT
Start: 2021-11-01 | End: 2021-11-03 | Stop reason: HOSPADM

## 2021-11-01 RX ADMIN — ALBUTEROL SULFATE 2 PUFF: 90 AEROSOL, METERED RESPIRATORY (INHALATION) at 19:30

## 2021-11-01 RX ADMIN — B-COMPLEX W/ C & FOLIC ACID TAB 1 TABLET: TAB at 09:30

## 2021-11-01 RX ADMIN — ACETYLCYSTEINE 6.25 MG/KG/HR: 200 INJECTION, SOLUTION INTRAVENOUS at 23:38

## 2021-11-01 RX ADMIN — LORAZEPAM 1 MG: 1 TABLET ORAL at 18:06

## 2021-11-01 RX ADMIN — SODIUM CHLORIDE, POTASSIUM CHLORIDE, SODIUM LACTATE AND CALCIUM CHLORIDE 1000 ML: 600; 310; 30; 20 INJECTION, SOLUTION INTRAVENOUS at 12:35

## 2021-11-01 RX ADMIN — LORAZEPAM 2 MG: 1 TABLET ORAL at 12:32

## 2021-11-01 RX ADMIN — HYDROXYZINE HYDROCHLORIDE 50 MG: 25 TABLET, FILM COATED ORAL at 22:12

## 2021-11-01 RX ADMIN — ACETYLCYSTEINE 5.2 G: 200 INJECTION, SOLUTION INTRAVENOUS at 19:11

## 2021-11-01 RX ADMIN — FOLIC ACID 1 MG: 1 TABLET ORAL at 09:30

## 2021-11-01 RX ADMIN — GABAPENTIN 300 MG: 300 CAPSULE ORAL at 09:30

## 2021-11-01 RX ADMIN — ARIPIPRAZOLE 2 MG: 2 TABLET ORAL at 09:30

## 2021-11-01 RX ADMIN — THIAMINE HCL TAB 100 MG 100 MG: 100 TAB at 09:30

## 2021-11-01 RX ADMIN — DULOXETINE HYDROCHLORIDE 60 MG: 60 CAPSULE, DELAYED RELEASE ORAL at 09:30

## 2021-11-01 RX ADMIN — LORAZEPAM 1 MG: 1 TABLET ORAL at 19:11

## 2021-11-01 RX ADMIN — LORAZEPAM 2 MG: 1 TABLET ORAL at 09:30

## 2021-11-01 RX ADMIN — ACETYLCYSTEINE 15.62 G: 200 INJECTION, SOLUTION INTRAVENOUS at 17:57

## 2021-11-01 RX ADMIN — LORAZEPAM 2 MG: 1 TABLET ORAL at 00:57

## 2021-11-01 RX ADMIN — GABAPENTIN 300 MG: 300 CAPSULE ORAL at 20:16

## 2021-11-01 RX ADMIN — INSULIN ASPART 1 UNITS: 100 INJECTION, SOLUTION INTRAVENOUS; SUBCUTANEOUS at 20:15

## 2021-11-01 RX ADMIN — LORAZEPAM 1 MG: 1 TABLET ORAL at 17:12

## 2021-11-01 RX ADMIN — MULTIPLE VITAMINS W/ MINERALS TAB 1 TABLET: TAB at 09:30

## 2021-11-01 ASSESSMENT — ACTIVITIES OF DAILY LIVING (ADL)
DOING_ERRANDS_INDEPENDENTLY_DIFFICULTY: NO
FALL_HISTORY_WITHIN_LAST_SIX_MONTHS: NO
HYGIENE/GROOMING: INDEPENDENT
ADLS_ACUITY_SCORE: 5
HEARING_DIFFICULTY_OR_DEAF: NO
ORAL_HYGIENE: INDEPENDENT
CONCENTRATING,_REMEMBERING_OR_MAKING_DECISIONS_DIFFICULTY: NO
ADLS_ACUITY_SCORE: 5
ADLS_ACUITY_SCORE: 5
WEAR_GLASSES_OR_BLIND: NO
ADLS_ACUITY_SCORE: 5
DRESS: SCRUBS (BEHAVIORAL HEALTH)
PATIENT_/_FAMILY_COMMUNICATION_STYLE: SPOKEN LANGUAGE (ENGLISH OR BILINGUAL)
DIFFICULTY_EATING/SWALLOWING: NO
ADLS_ACUITY_SCORE: 5
TOILETING_ISSUES: NO
WALKING_OR_CLIMBING_STAIRS_DIFFICULTY: NO
ADLS_ACUITY_SCORE: 5
ADLS_ACUITY_SCORE: 5
DRESSING/BATHING_DIFFICULTY: NO
DIFFICULTY_COMMUNICATING: NO
LAUNDRY: WITH SUPERVISION

## 2021-11-01 NOTE — PROGRESS NOTES
MSSA scores of 8/6,pt recieved 2mg of Ativan for alcohol withdrawal and is observed to sleep throughout the noc.

## 2021-11-01 NOTE — PROGRESS NOTES
Ambulance arrived to pick the patient up and transfer him to [address] [place] [address]. Updated his Mother Venessa that patient is going to [address] [place] [address]. Venessa given the phone number to his room [id number] [phone] and the unit number [phone].

## 2021-11-01 NOTE — PROGRESS NOTES
Pt arrived from [facility] ED. 11/1 ALT 1307, AST 1500.  Pt transferred to  for further evaluation, possible consults. Admission is progress, Pt understands visitor policy.  Some belongings sent to security (passport, cash and visa)(belongings already in envelope from [facility]).  Shirt/shorts/flip flops with pt and Cell phone and wallet.     Admission/Transfer from: [facility] ED  2 RN skin assessment completed. Yes/No  Significant findings include: None  WOC Nurse Consult Ordered? No  Was NST/NA able to join during assessment? No  Bed algorithm can be found in [facility]CS flowsheets and forms binder, was this used for this assessment? yes  Was a pulsate mattress ordered? No

## 2021-11-01 NOTE — PLAN OF CARE
Pt MSSA scores today are 10 and 9, 2 mg po ativan administered x 2. Total ativan administered since arrival to unit 3a - 8 mg po (he also received 5 mg po ativan in ER prior to arrival to unit 3a) Pt denies suicidal ideation plans or intent. Endorses anxiety and depression both rated 9 of 10 in severity. States feeling sadness and loneliness. Periodically pt is confused on month stating it is October 1st. Knows he is in [facility], knows who the President is and he denies any hallucinations. Pt did not eat breakfast and was subsequently ordered an ultrasound after his liver enzymes increased. He did receive am abilify and cymbalta which were then held (remeron also held but is hs medication).   ALT was 520 and increased to 1307   was and increased to 1500    Internal medicine Mary Ellen updated and pt transfer to [address] [place] [address] on the [place] ordered. Pt denies any abdominal pain or tenderness with this enzyme increase. Denies nausea. Denies chest pain/sob. Denies dizziness or lightheadedness. Denies headache.     Outcome: Liver enzyme elevation leading to transfer to medical unit.    Report given to 5a RN and once ambulace arrived Pt transferred via ambulance to [address] [place] [address].

## 2021-11-01 NOTE — PROGRESS NOTES
Writer met with patient on 11/1/21 to discuss aftercare plans. Patient presented lethargic and struggled at times to answer questions. Patient stated that his plans were to return home due to a job interview on 11/5/21.     Writer expressed concern for his sobriety since the patient lives alone and has minimal support for his substance use. Writer suggested that the patient consider enrolling in an OP treatment program for more support, but the patient stated that he wanted to attend his job interview, verify his work shift, so he could plan any treatment options around his future work schedule. Writer suggested that patient inquire to see if he can  temporarily stay with his parents to help maintain sobriety, since he mentioned them as a strong support.    Patient also has hospital admissions on 8/4/21, 3/5/21, 8/12/19 11/22/18, and 9/26/18. Patient recently graduated from [employer] on 10/18/21.    It was recommend that the patient complete the OP treatment program and [facility] per the Continuing Care Recommendations and Referrals in his Discharge. Patient stated that he did not return to [facility] for OP treatment.    Patient appeared to be struggling to remember details when answering questions, so writer informed him that writer would return later in the PM with some follow up questions.     Writer will follow up with patient on 11/1/21 in the pm to verify aftercare plans.     UPDATE:   Writer was informed by nursing that patient will be transferred to medical.

## 2021-11-01 NOTE — PHARMACY-CONSULT NOTE
Pharmacy Consult Note:      Pharmacist was consulted by FARZANA Paredes, re: elevated LFTs, please assess which medications could be contributing to transaminitis.    Labs:   Date ALT AST   11/1 1,307 1,500   10/31 520 564   8/26 218 114   8/18 317 182     Pharmacist reviewed medication list for medications that may be contributing to elevated LFTs.   Per [facility]:     Medication Incidence Recommendation   Aripiprazole Rare, unlikely Hold medication until LFTs start trending down      Duloxetine ~1%, usually within 1 to 6 months of therapy Hold medication until LFTs start trending down     Gabapentin Rare, unlikely  Ok to continue      Hydroxyzine  Rare, unlikely   Ok to continue    Mirtazapine  Up to 10%, but usually only modest LFT elevations   Hold medication until LFTs start trending down    Naltrexone  ~1%   (medication not reordered on admission)       Assessment:   Medications are unlikely the cause of elevated LFTs and further medical workup is recommended. Since many of the patient s PTA medications are hepatically metabolized, recommend holding certain medications until LFTs start trending down (see table above for recommendations)     Thank you,  Zaynab Tamayo, Pharm.D., [facility]  Behavioral Health Inpatient Pharmacist  [facility] ([facility])  Phone: *[phone] ([employer]) or [phone]

## 2021-11-01 NOTE — PROGRESS NOTES
[place] Prescription Drug Control Monitoring Note:      NARX SCORES  Narcotic  000  Sedative  000  Stimulant  000  Explanation and GuidanceOVERDOSE RISK SCORE 000  (Range 000-999)  Explanation and Guidance  ADDITIONAL RISK INDICATORS (0)  Explanation and GuidanceThis NarxCare report is based on search criteria supplied and the data entered by the dispensing pharmacy. For more information about any prescription, please contact the dispensing pharmacy or the prescriber. NarxCare scores and reports are intended to aid, not replace, medical decision making. None of the information presented should be used as sole justification for providing or refusing to provide medications. The information on this report is not warranted as accurate or complete.  Graphs  RX GRAPH   Narcotic  Buprenorphine  Sedative  Stimulant  Other  Prescribers  1 - Jeff Mahmood  Timeline  11/01  2m  6m  1y  2y  Buprenorphine mg  16  4  0  28  Timeline  11/01  2m  6m  1y  2y  Morphine MgEq (MME)  200  80  0  320  Timeline  11/01  2m  6m  1y  2y  Lorazepam MgEq (LME)  10  2  0  18  Timeline  11/01  2m  6m  1y  2y  *Per CDC guidance, the MME conversion factors prescribed or provided as part of the medication-assisted treatment for opioid use disorder should not be used to benchmark against dosage thresholds meant for opioids prescribed for pain. Buprenorphine products have no agreed upon morphine equivalency, and as partial opioid agonists, are not expected to be associated with overdose risk in the same dose-dependent manner as doses for full agonist opioids. MME = morphine milligram equivalents. LME = Lorazepam milligram equivalents. mg = dose in milligrams.  Summary  Summary  Total Prescriptions:  2  Total Prescribers:  1  Total Pharmacies:  2  Narcotics* (excluding Buprenorphine)  Current Qty:  0  Current MME/day:  0.00  30 Day Avg MME/day:  0.00  Sedatives*  Current Qty:  0  Current LME/day:  0.00  30 Day Avg  LME/day:  0.00  Buprenorphine*  Current Qty:  0  Current mg/day:  0.00  30 Day Avg mg/day:  0.00  Rx Data  PRESCRIPTIONS  Total Prescriptions: 2  Total Private Pay: 0  Fill Date ID Written Sold Drug Qty Days Prescriber Rx # Pharmacy Refill Daily Dose * Pymt Type   10/15/2021 1 10/15/2021 10/17/2021 Gabapentin 300 Mg Capsule  90.00 [address] [id number] [facility] ([phone]) 0/0  [place]icaid [place]  09/15/2021 2 09/15/2021 09/15/2021 Gabapentin 300 Mg Capsule  90.00 30 [address] [id number] [facility] ([phone]) 0/0  [place]icaid [place]  *Per CDC guidance, the MME conversion factors prescribed or provided as part of the medication-assisted treatment for opioid use disorder should not be used to benchmark against dosage thresholds meant for opioids prescribed for pain. Buprenorphine products have no agreed upon morphine equivalency, and as partial opioid agonists, are not expected to be associated with overdose risk in the same dose-dependent manner as doses for full agonist opioids. MME = morphine milligram equivalents. LME = Lorazepam milligram equivalents. mg = dose in milligrams.  Providers  Total Providers: 1  Name Address [facility] Zipcode Phone  Jeff Mahmood [address] [facility] [address] [phone]  Pharmacies  Total Pharmacies: 2  Name Address [facility] Zipcode Phone  [facility] [phone]) [address] [facility] [address] [phone]  [facility] ([phone]) [address] [place] [address] [phone]  The report provided is based upon the search criteria entered and the corresponding data as it has been reported by dispenser(s). If erroneous information is identified or additional information is needed, please contact the dispenser or the prescriber provided on the report. Date Sold signifies the date the prescription was sold (left the pharmacy). The absence of Date Sold does not necessarily indicate the prescription was not dispensed. Fill Date represents the date the medication was filled or prepared by the  pharmacy. Note, federal regulation (CFR Title 42: Part 2) requires patient consent prior to releasing certain patient data from federally funded opioid treatment programs (OTPs). As such, controlled substances dispensed from OTPs for medication-assisted treatment may not appear in the [place]  report. Morphine milligram equivalent (MME) conversion factors published by the CDC are used in the MME calculation. Per the CDC, the MME conversion factor is intended only for analytic purposes where prescription data are used to retrospectively calculate daily MME to inform analyses of risks associated with opioid prescribing. This value does not constitute clinical guidance or recommendations for converting patients from one form of opioid analgesic to another. Per the CDC, the conversion factors for drugs prescribed or provided, as part of medication-assisted treatment for opioid use disorder should not be used to benchmark against MME dosage thresholds meant for opioids prescribed for pain. Buprenorphine products listed in the CDC s MME file do not have an associated conversion factor. Lastly, the CDC notes, in clinical practice, calculating MME for methadone often involves a sliding-scale approach, whereby the conversion factor increases with increasing dose. The conversion factor of 3 for methadone presented in this file could underestimate MME for a given patient. This report contains confidential information, including patient identifiers, and is not a public record. The information on this report must be treated as protected health information and is only to be disclosed to others as authorized by applicable state and Federal regulations.

## 2021-11-01 NOTE — PLAN OF CARE
"  Problem: Substance Withdrawal  Goal: Substance Withdrawal  Description: Signs and symptoms of listed problems will be absent or manageable.  Outcome: Declining    S = Situation:   Xavier Odell is a 36 year old year old male with a chief complaint of Drug / Alcohol Assessment    Voluntary admission to [facility] for alcohol withdrawal and detox.    B  = Background:   Background information is limited based on pt's level of sedation.    Substance use history: pt reports drinking 12 seltzer drinks daily for the last month (of note, pt was in [facility] 9/21/21-10/18/21). Long history of alcohol use disorder, per chart review.  Mental health history: depression, anxiety  Medical history: , . Hx of GI bleed r/t alcohol use (per chart review)  Treatment history: Has been to multiple residential and outpatient programs       A  =  Assessment:   During admission interview, pt was very sedated, lethargic, unable to participate. He received ativan 5 mg and atenolol in ED prior to arriving on unit  MSSA 14 at 2138  /86 (BP Location: Right arm)   Pulse 105   Temp 98.6  F (37  C) (Temporal)   Resp 16   Ht 1.778 m (5' 10\")   Wt 127 kg (280 lb)   SpO2 98%   BMI 40.18 kg/m       R =   Request or Recommendation:   Alcohol withdrawal will be monitored and treated using MSSA with ativan  Pt will meet with psychiatry, internal medicine, and case management tomorrow.   "

## 2021-11-01 NOTE — H&P
[facility]    History and Physical - [place] [facility] Service        Date of Admission:  11/1/2021    Assessment & Plan      Xavier Odell is a 36 year old male admitted on 11/1/2021. He has a history of alcohol use disorder, history of GI bleed (2019), depression, and anxiety who has been transferred from [facility] for worsening liver function tests.    # Hepatitis, worsening  # Hepatocellular injury pattern LFTs >15x ULN  # Alcohol withdrawal  # Alcohol use disorder  # Coagulopathy  Suspect alcoholic hepatitis. , . T Bili 1.4, ALP WNL. Denies tylenol use, OTC herbal supplements, IVDU/illicit drug use or new medications in the past month. Utox negative on admission. COVID negative. Denies any abdominal pain. RUQ US in 5/2019 in care everywhere demonstrates hepatomegaly with hepatic steatosis. No clear e/o overt cirrhosis, however, question of L hepatic love surface nodularity is seen at that time. Abdominal exam benign today. Tylenol level added onto 10/31 labs was <2. However, repeat hepatic panel today notable for ALT 1307, AST 1500 and T bili 1.8. INR 1.31. No s/sx of hepatic encephalopathy, A&Ox3 on exam w/o asterixis. No history of heart failure. VSS stable w/ stable BP.  Worsening hepatitis- unlikely to be solely from alcohol. Patient denies ingestion of tylenol (last took years ago). Denies any other illicit drug use.   - Abdominal US limited with doppler: hepatomegaly with   - Hepatitis A IgM, Hepatitis B surface antibody/antigen and core antibody, and HCV antibody in process  - on CIWA protocol with Ativan PRN  - IV N-acetylcysteine initiated after discussion with GI  - MARGARET, F-actin, AMA, AMSA in rpocess  - ok to hold off on steroids per GI  - BID MELD labs  - Mg/K/Phos checks BID  - q4h neuro checks and glucose checks  - GI hepatology consulted, to formally see in the AM   > close monitor for signs of altered mental status, worsening INR >1.5  -  consulting chem dep, patient amenable to treatment  - hold PTA naltrexone (not started at [facility])  - continue PTA vitamins, folate, thiamine     # Depression  # Anxiety:  On PTA Cymbalta 60mg daily, Gabapentin 300mg TID, Atarax 25-50mg at bedtime, Abilify 2mg daily, Mirtazepine 15mg at bedtime. He reports that he has been on these medications for awhile, Most recent new medication was abilify and this was started ~2 months ago. No SI/HI. Endorses hopelessness  - holding PTA Abilify, Cymbalta and mirtazapine given his LFT elevation  - continue PTA hydroxyzine    # History of GI bleed 2/2 gastric ulcer (2019):  Admitted 5/2019 with massive hematemesis and hemorrhagic shock 2/2 grade D esophagitis with superficial gastric ulcers. Denies any s/sx of bleeding. Hgb stable. Not currently on PPI.  - CBC in the AM     # Thrombocytosis, resolved:   Plts 516K on admission -> improved to 420K today. Likely reactive elevation on admission.  - Monitor for s/sx of bleeding     # Hyponatremia, mild  # Hyperphosphatemia:  Na 132 on admission. Likely in setting of alcohol use/poor oral intake.   Phos    # Abnormal TSH:  TSH 4.19 in 8/2021, Free T4 at that time WNL.  - Repeat TFTs      # Elevated Cr:  BL Cr 0.8-1.0. Currently Creatinine 1.10. Having poor PO intake.  - Avoid nephrotoxins  - Trend BMP in AM        Diet: Combination Diet Regular Diet Adult    DVT Prophylaxis: Pneumatic Compression Devices  Carrillo Catheter: Not present  Fluids: none  Central Lines: None  Code Status: Full Code      Clinically Significant Risk Factors Present on Admission     Disposition Plan   Expected discharge: 3-5 days recommended to prior living arrangement once  hepatitis improved .     The patient's care was discussed with the Attending Physician, Dr. Corea .    Birgit Lee MD  [address]  Securely message with the Vocera Web Console (learn more here)  Text page via [employer]  Paging/Directory    Please see sign in/sign out for up to date coverage information  ______________________________________________________________________    Chief Complaint   Worsening liver function tests    History is obtained from the patient and chart review    History of Present Illness   Xavier Odell is a 36 year old male admitted on 11/1/2021. He has a history of Xavier CONTRERAS Odell is a 36 year old male with a history of alcohol use disorder, history of GI bleed (2019), depression, and anxiety who has been transferred from [facility] for worsening liver function tests.    Patient initially presented to psychiatry unit at [facility] for alcohol withdrawal. On admission, noted to have elevated AST/ALT, which worsened the next day. Patient was transferred to [place] after consultation with GI.     Patient states he is feeling anxious, has withdrawal symptoms including anxiety, tremors. Denies diarrhea, abdominal pain. Feels hopeless about having alcohol withdrawal again. He was sober for 1 year back in 2018. Has been to inpatient treatment programs. Is interested in quitting alcohol and would like help. Currently denies confusion, fever, chills, weakness, headache, chest pain, palpitation, lower extremity swelling. Endorses loss of appetite. Has not been eating well since admission. States he has not had tylenol in years. Denies abnormal ingestion or street drug use. Denies ever having withdrawal seizures. He hallucinated before during one of his previous admissions.     Review of Systems    The 10 point Review of Systems is negative other than noted in the HPI or here.     Past Medical History    I have reviewed this patient's medical history and updated it with pertinent information if needed.   Past Medical History:   Diagnosis Date    GI bleed     Substance abuse (H)     Uncomplicated asthma         Past Surgical History   I have reviewed this patient's surgical history and updated it with pertinent  information if needed.  Past Surgical History:   Procedure Laterality Date    ORTHOPEDIC SURGERY          Social History   I have reviewed this patient's social history and updated it with pertinent information if needed. Xavier Odell  reports that he has never smoked. He has never used smokeless tobacco. He reports current alcohol use. He reports previous drug use.    Family History   I have reviewed this patient's family history and updated it with pertinent information if needed.  Family History   Problem Relation Age of Onset    Depression Mother     Depression Father     Substance Abuse Father        Prior to Admission Medications   Prior to Admission Medications   Prescriptions Last Dose Informant Patient Reported? Taking?   ARIPiprazole (ABILIFY) 2 MG tablet 11/1/2021 at AM  No Yes   Sig: Take 1 tablet (2 mg) by mouth daily   B Complex Vitamins (VITAMIN B COMPLEX PO) 11/1/2021 at AM  Yes Yes   Sig: Take by mouth daily -Take 1 tablet of unknown dose by mouth every morning   DULoxetine (CYMBALTA) 60 MG capsule 11/1/2021 at AM  No Yes   Sig: Take 1 capsule (60 mg) by mouth daily   EPINEPHrine (ANY BX GENERIC EQUIV) 0.3 MG/0.3ML injection 2-pack   Yes No   Sig: Inject 0.3 mg into the muscle as needed for anaphylaxis (Chicken Allergy)   Patient not taking: Reported on 9/21/2021   albuterol (PROAIR HFA/PROVENTIL HFA/VENTOLIN HFA) 108 (90 Base) MCG/ACT inhaler  at PRN  Yes Yes   Sig: Inhale 2 puffs into the lungs every 6 hours as needed    folic acid (FOLVITE) 1 MG tablet 11/1/2021 at AM  No Yes   Sig: Take 1 tablet (1 mg) by mouth daily   gabapentin (NEURONTIN) 300 MG capsule 11/1/2021 at 0930  Yes Yes   Sig: Take 300 mg by mouth 3 times daily   hydrOXYzine (ATARAX) 25 MG tablet 10/31/2021 at 2144  Yes Yes   Sig: Take 25-50 mg by mouth At Bedtime   melatonin 3 MG tablet   Yes No   Sig: Take 3 mg by mouth nightly as needed for sleep   mirtazapine (REMERON) 15 MG tablet 10/31/2021 at 2144  No Yes   Sig: Take 1  tablet (15 mg) by mouth At Bedtime   multivitamin w/minerals (THERA-VIT-M) tablet 11/1/2021 at AM  No Yes   Sig: Take 1 tablet by mouth daily   naltrexone (DEPADE/REVIA) 50 MG tablet 10/30/2021  Yes Yes   Sig: Take 50 mg by mouth daily   nicotine polacrilex (NICORETTE) 4 MG gum   No No   Sig: Place 1 each (4 mg) inside cheek every hour as needed for smoking cessation   nicotine polacrilex (NICORETTE) 4 MG gum   No No   Sig: Place 1 each (4 mg) inside cheek every hour as needed for smoking cessation   thiamine (B-1) 100 MG tablet 11/1/2021 at AM  No Yes   Sig: Take 1 tablet (100 mg) by mouth daily      Facility-Administered Medications: None     Allergies   Allergies   Allergen Reactions    Banana      Mild throat irritation per pt    Chicken Allergy      Anaphalxis       Physical Exam   Vital Signs:                    Weight: 229 lbs 6.4 oz    General Appearance: obese young appearing male resting comfortably in bed, in NAD  HEENT: NCAT, EOMI, MMM  Respiratory: CTAB, no wheezes or crackles, on RA  Cardiovascular: RRR, no m/r/g  GI: soft, obese, nontender, active BS  Skin: no lesions on exposed skin  Neurologic: alert, oriented to self, location, time. Non focal  Psychiatric: appropriate    Data   Data reviewed today: I reviewed all medications, new labs and imaging results over the last 24 hours. I personally reviewed the ultrasound abd image(s) showing hepatomegaly with steatosis .    Recent Labs   Lab 11/01/21  1756 11/01/21  1744 11/01/21  0921 10/31/21  1350 10/31/21  1349   WBC 8.9  --  10.7 10.0  --    HGB 16.3  --  16.1 17.0  --    MCV 90  --  89 88  --      --  420 516*  --    INR 1.18*  --  1.31*  --   --    NA  --   --  132*  --  134   POTASSIUM  --   --  4.2  --  4.2   CHLORIDE  --   --  101  --  104   CO2  --   --  23  --  23   BUN  --   --  15  --  10   CR  --   --  1.10  --  1.02   ANIONGAP  --   --  8  --  7   NEHA  --   --  8.5  --  8.4*   GLC  --  164* 166*  --  260*   ALBUMIN  --   --  3.7   --  4.2   PROTTOTAL  --   --  7.8  --  8.4   BILITOTAL  --   --  1.8*  --  1.4*   ALKPHOS  --   --  86  --  88   ALT  --   --  1,307*  --  520*   AST  --   --  1,500*  --  564*   LIPASE  --   --  78  --   --      Most Recent 3 CBC's:Recent Labs   Lab Test 11/01/21  1756 11/01/21  0921 10/31/21  1350   WBC 8.9 10.7 10.0   HGB 16.3 16.1 17.0   MCV 90 89 88    420 516*     Most Recent 3 BMP's:Recent Labs   Lab Test 11/01/21  1744 11/01/21  0921 10/31/21  1349 08/26/21  1626 08/26/21  1626   NA  --  132* 134  --  135   POTASSIUM  --  4.2 4.2  --  3.8   CHLORIDE  --  101 104  --  103   CO2  --  23 23  --  20   BUN  --  15 10  --  9   CR  --  1.10 1.02  --  0.91   ANIONGAP  --  8 7  --  12   NEHA  --  8.5 8.4*  --  8.8   * 166* 260*   < > 132*    < > = values in this interval not displayed.     Most Recent 2 LFT's:Recent Labs   Lab Test 11/01/21  0921 10/31/21  1349   AST 1,500* 564*   ALT 1,307* 520*   ALKPHOS 86 88   BILITOTAL 1.8* 1.4*     Most Recent 3 INR's:Recent Labs   Lab Test 11/01/21  1756 11/01/21 0921 08/26/21  1657   INR 1.18* 1.31* 0.92     Recent Results (from the past 24 hour(s))   US Abdomen Complete w Doppler Complete    Narrative    EXAMINATION: US ABDOMEN COMPLETE WITH DOPPLER COMPLETE  11/1/2021  11:48 AM      CLINICAL HISTORY: elevated LFTs, evaluate for underlying liver and  biliary tree    COMPARISON: None        FINDINGS:  The right lobe of the liver measures 20 cm in craniocaudal dimension.  The liver is diffusely echogenic and difficult to sonographically  penetrate, limiting evaluation for mass. There is an area of fatty  sparing along the gallbladder fossa. There is no intrahepatic or  extrahepatic biliary ductal dilatation. The common bile duct measures  4 mm. The gallbladder is normal, without gallstones, wall thickening,  or pericholecystic fluid.    Dampened waveforms in the hepatic veins and portal veins. Hepatic  arterial, hepatic venous and portal venous waveforms are  usual in  direction as documented by both color and spectral Doppler evaluation.  The visualized upper abdominal aorta and inferior vena cava are  normal.    The spleen measures maximally 11.0 cm and is normal in appearance. The  visualized portions of the pancreas are normal in echogenicity.    The visualized upper abdominal aorta and inferior vena cava are  normal.      The kidneys are normal in position and echogenicity. The right kidney  measures 12.4 cm and the left kidney measures 12.5 cm. There is no  significant urinary tract dilation.       Impression    IMPRESSION:   Hepatomegaly with diffusely increased hepatic echogenicity most  suspicious for steatosis. Patent Doppler evaluation of the liver, with  dampened hepatic venous and portal venous waveforms.    JHONY CASTELLON MD         SYSTEM ID:  [id number]

## 2021-11-01 NOTE — PHARMACY-ADMISSION MEDICATION HISTORY
Admission Medication History Completed by Pharmacy    See [facility] Admission Navigator for allergy information, preferred outpatient pharmacy and prior to admission medications.     Medication History Sources:     Prescription fill history ([facility] in [place]) via [employer] data    Additional Information:  PTA medication list updated based on fill history only. All medication directions consistent with fill records and all medications refilled within the past two months.  Pharmacist did not interview patient; attempted x1 but pt unavailable. Last doses in table below were marked by admission RN   Any over the counter products, vitamins or supplements may not be accurately reflected in the PTA medication list.    [place] :  1) 9/15: gabapentin 300mg capsules, qty #90 for 30 days    Prior to Admission medications    Medication Sig Last Dose  Auth Provider   albuterol (PROAIR HFA/PROVENTIL HFA/VENTOLIN HFA) 108 (90 Base) MCG/ACT inhaler     Inhale 2 puffs into the lungs every 6 hours as needed  Past Month at Unknown time  Unknown, Entered By History   ARIPiprazole (ABILIFY) 2 MG tablet Take 1 tablet (2 mg) by mouth daily 10/30/2021 at Unknown time      Emelia Bravo MD   B Complex Vitamins (VITAMIN B COMPLEX PO) Take by mouth daily -Take 1 tablet of unknown dose by mouth every morning 10/30/2021 at Unknown time      Unknown, Entered By History   DULoxetine (CYMBALTA) 60 MG capsule Take 1 capsule (60 mg) by mouth daily 10/30/2021 at Unknown time      Emelia Bravo MD   folic acid (FOLVITE) 1 MG tablet Take 1 tablet (1 mg) by mouth daily 10/30/2021 at Unknown time      Alex Rojas MD   gabapentin (NEURONTIN) 300 MG capsule Take 300 mg by mouth 3 times daily 10/30/2021 at Unknown time      Reported, Patient   hydrOXYzine (ATARAX) 25 MG tablet Take 25-50 mg by mouth At Bedtime 10/30/2021 at Unknown time      Reported, Patient   melatonin 3 MG tablet Take 3 mg by mouth nightly as needed  for sleep Past Month at Unknown time      Reported, Patient   mirtazapine (REMERON) 15 MG tablet Take 1 tablet (15 mg) by mouth At Bedtime 10/30/2021 at Unknown time      Emelia Bravo MD   multivitamin w/minerals (THERA-VIT-M) tablet Take 1 tablet by mouth daily 10/30/2021 at Unknown time      Alex Rojas MD   naltrexone (DEPADE/REVIA) 50 MG tablet Take 50 mg by mouth daily 10/30/2021 at Unknown time      Reported, Patient   nicotine polacrilex (NICORETTE) 4 MG gum Place 1 each (4 mg) inside cheek every hour as needed for smoking cessation Unknown at Unknown time      Mark Granados MD   nicotine polacrilex (NICORETTE) 4 MG gum Place 1 each (4 mg) inside cheek every hour as needed for smoking cessation Unknown at Unknown time      Mark Granados MD   thiamine (B-1) 100 MG tablet Take 1 tablet (100 mg) by mouth daily 10/30/2021 at Unknown time      Alex Rojas MD   EPINEPHrine (ANY BX GENERIC EQUIV) 0.3 MG/0.3ML injection 2-pack Inject 0.3 mg into the muscle as needed for anaphylaxis (Chicken Allergy)  Patient not taking: Reported on 9/21/2021       Unknown, Entered By History     Date completed: 11/01/21    Medication history completed by:   Zaynab Tamayo, Pharm.D., [facility]  Behavioral Health Inpatient Pharmacist  [facility])  Phone: *[phone] ([place]) or [phone]

## 2021-11-01 NOTE — H&P
Pt seen admission H&P done,     [id number]    IDENTIFYING INFORMATION:  The patient is a 36-year-old  male who came to the Emergency Room wanting help for his drinking.      History of present illness the patient reports alcohol is his drug of choice.  He has been drinking since age 13.  He reports that he completed [facility] on 10/18.  He was supposed to do an aftercare program that did not happen and then he relapsed and he has been drinking 12 seltzers a *  He realized that he needs to get help and brought himself to the Emergency Room.  Alcohol is his drug of choice.  He has tolerance, withdrawal, progressive use, loss of control.  He has used despite having negative consequences impacting his health.     The patient began to drink alcohol at the age of 16, but it became a problem at 26.  He has no history of seizures.  There is a questionable history of delirium tremens because when he goes through withdrawal he sees blotches.  He does not smoke.  He denies any gambling.  He denies any suicidal ideation, plan or intent.     PSYCHIATRIC REVIEW OF SYSTEMS:  The patient reports at times he feels depressed, isolated, lack of energy, lack of motivation, he has no purpose.  The patient denied any neurovegetative symptoms other than this.     KARIN:  The patient denied any distractibility, impulsivity, racing thoughts, mood swings.  The patient denied any auditory or visual hallucinations.  The patient does have history of excessive worry and not able to stop worrying, poor concentration.  The patient denies any symptoms of PTSD.  Denies any eating disorder.  The patient reports that he has ruminative thoughts, but he does not need things in symmetry.  The patient denies symptoms of ADHD.  Denies any borderline personality disorder.     PAST PSYCHIATRIC HISTORY:  The patient was previously given diagnosis of depression and anxiety.  He has never had any civil commitments, never made any suicide attempts.  He  was in [facility].  He was an outpatient at [facility].  He was in dual diagnosis program.     PAST PSYCHIATRIC HISTORY:  The patient takes Cymbalta and Remeron.     SOCIAL HISTORY:  He is single.  He works in aviation.     FAMILY HISTORY:  Depression in both sides of the family.  Father has alcoholism.     MEDICAL HISTORY:  The patient has elevated liver enzymes.       The patient's vitals are as below:  Blood pressure of 141/99, temperature is 97.1, pulse is 103, respiratory rate of 16.  10 point review system done and is negative except as above      The patient has history of GI bleed, uncomplicated asthma.  Family history reviewed and is unremarkable except having family history of alcoholism  MENTAL STATUS EXAMINATION:  The patient is lying in bed.  He is quite restless.  He is adequately groomed.  He is cooperative, but is restless.  Good eye contact.  Mood is depressed.  Affect is congruent.  Speech is spontaneous, normal in rate, rhythm, and volume.  No psychomotor abnormalities.  Linear thought process.  No loosening of associations.  Does not have any active suicidal ideation, plan or intent.  Insight is fair.  Judgment is fair.  Alert, oriented x3.  Attention and concentration is intact.  Recent and remote memory intact.  Language and fund of knowledge intact.  Normal muscle strength, normal gait.  The patient does not have any abnormal psychotic thoughts, preoccupations.  No overvalued ideas.  No ruminations.  No obsessions.  No compulsions.  No hypochondriasis.  He is very restless, but normal gait.     ASSESSMENT AND PLAN:  The patient has a biological predisposition family history of alcoholism.  Psychologically, the patient is experiencing alcohol use disorder with tolerance, withdrawal progress, increasing depression and anxiety.  Stressors include relapse from lack sober support.     DIAGNOSES:  AXIS I:   Alcohol use disorder; alcohol withdrawal, severe; major depressive  disorder, moderate, recurrent, without psychosis; generalized anxiety disorder.     PLAN:  During hospitalization, the patient will be detoxed off alcohol using [facility] protocol on Ativan because of elevated liver enzymes.  The patient has a pulse of 103, blood pressure 141/99.  The patient has tremor, paroxysmal sweats, temperature is normal, pulse is elevated.  He has received 4 mg of Ativan since his admission.  The patient has received 30 mg of lorazepam.  The patient has elevated liver enzymes  AST was 564, ALT was 520, ALT climbed up to 1307 and AST climbed up to 1500.  Bilirubin went from 1.1 to 1.8.  The patient has elevated phosphorus of 5.4.  He has hyponatremia, 32.  Medicine was called asap.  His INR was 1.31.  Medicine did an ultrasound of his abdomen.  Ultrasound showed right frontal lobe of liver measuring 20 cm, diffuse echogenic.  Because of decompensation from a medical standpoint, the patient was [place] [facility] [place].  The patient is still in alcohol withdrawal.     Patient will benefit from doing CD treatment

## 2021-11-01 NOTE — PROGRESS NOTES
Brief GI note:    Called by Ms. Maria Teresa PRINGLE regarding Mr. Odell.    In brief, he is a 36-year-old man admitted with alcohol intoxication to detox yesterday, 10/31.  He was found to have ETOH level of 0.22, negative drug screen, AST and ALT in the 500s.  Now, today after the night in detox his AST is 1500, ALT 1307.  INR 1.31.    Denied acetaminophen use and level was <2 yesterday.  He has been in treatment for alcohol use in 2017 and multiple times in 2018, unfortunately had a recent relapse.  Recently hepatitis B surface Ag negative but Antibody only 2.77, HCV non-reactive    Plan:  35 yo M with hepatocellular injury pattern LFT >15 times upper limit of normal      - This level of elevation is more than we would expect with alcohol alone.    - Recommend starting IV N-acetylcysteine given potential acetaminophen or ALI in general since it is unclear if he took something else or something without realizing it.    - Please obtain twice daily MELD labs (including INR), extended electrolytes (Mg/phos/K), Q4h glucose checks for now and neuro exams, call GI if INR worsens beyond 1.5 or he develops progressive mental status changes.    - Await US with doppler.    - Please send / follow-up Hepatitis A IgM, MARGARET, AMA, ASMA.    - When he recovers as an outpatient will need repeat hepatitis B vaccine given low levels of antibody.    - Could consider broad coverage with a cephalosporin pending initial evaluation / course.    - Please consult social work / chemical dependence when appropriate.    - Please consult hepatology formally for tomorrow when he arrives and we will see him in the morning.

## 2021-11-01 NOTE — DISCHARGE INSTRUCTIONS
Behavioral Discharge Planning and Instructions  THANK YOU FOR CHOOSING [address]  [phone]    Summary: You were admitted to [facility] on 10/31/2021 for detoxification from alcohol.  A medical exam was performed that included lab work. You have met with a  and opted to ***.  Please take care and make your recovery a daily priority, Xavier!  It was a pleasure working with you and the entire treatment team here wishes you the very best in your recovery!     Recommendation:  ***    Main Diagnoses:  Per Dr. Santa Pérez MD;  {Substance Related Use Disorders:228799}    Major Treatments, Procedures and Findings:  You were treated for alcohol detoxification using lorazepam (ativan). You *** a chemical dependency assessment. You had labs drawn and those results were reviewed with you. Please take a copy of your lab work with you to your next primary care provider appointment.    Symptoms to Report:  If you experience more anxiety, confusion, sleeplessness, deep sadness or thoughts of suicide, notify your treatment team or notify your primary care provider. IF ANY OF THE SYMPTOMS YOU ARE EXPERIENCING ARE A MEDICAL EMERGENCY CALL 911 IMMEDIATELY.     Lifestyle Adjustment: Adjust your lifestyle to get enough sleep, relaxation, exercise and good nutrition. Continue to develop healthy coping skills to decrease stress and promote a sober living environment. Do not use mood altering substances including alcohol, illegal drugs or addictive medications other than what is currently prescribed.     Disposition: ***    Facts about COVID19 at www.cdc.gov/COVID19 and www.[place].gov/covid19    Keeping hands clean is one of the most important steps we can take to avoid getting sick and spreading germs to others.  Please wash your hands frequently and lather with soap for at least 20 seconds!    Follow-up Appointment:   Appointment Date/Time: ***    Psychiatrist/Primary Care Giver: ***    Address: ***    Phone  Number: ***      Recovery apps for your phone to locate current in person and zoom recovery meetings  [place] - meeting kaitlin  AA  - meeting kaitlin  Meeting guide - meeting kaitlin  Quick NA meeting - meeting kaitlin  Stephenmarismar- has various apps    Resources:  *due to covid-19 most AA/NA meetings are being held online*  AA meetings online search for them at: https://aaEvolv Technologiesintergroup.org (worldwide meeting listings)  AA meetings for [place] area can be found online at: https://a[place].org (click local online meetings listings)  NA meetings for [place] area can be found online at: https://www.nam[place].org  (click find a meeting)  AA and NA Sponsors are excellent resources for support and you can find one at any support group meeting.   Alcoholics Anonymous (https://aa.org/): for information 24 hours/day  AA Intergroup service office in [place] (http://www.aastpaul.org/) [phone]  AA Intergroup service office in [facility]: [phone]. (http://www.aaNokter.org/)  Narcotics Anonymous (www.nam[place].org) (894) 468-1107  https://aafairviewriverside.org/meetings  SMART Recovery - self management for addiction recovery:  www.Change Healthcarerecovery.org  Pathways ~ A Health Crisis Resource & Support Center:  975.170.4206.  https://prescribetoprevent.org/patient-education/videos/  http://www.harmreduction.org  [facility] 405-446-3533  Support Group:  AA/NA and Sponsor/support.  National [place] on Mental Illness (www.[place].martínez.org): 910.879.8860 or 154-452-6361.  Alcoholics Anonymous (www.alcoholics-anonymous.org): Check your phone book for your local chapter.  Suicide Awareness Voices of Education (SAVE) (www.save.org): 782-490-AFRU (2420)  National Suicide Prevention Line (www.mentalhealthmn.org): 849-271-SYTT (7813)  Mental Health Consumer/Survivor Network of [place] (www.mhcsn.net): 381.352.4305 or 440-155-7344  Mental Health Association of [place] (www.mentalhealth.org): 438.225.2743 or 519-154-7177    Substance Abuse and Mental Health Services (www.samhsa.gov)  [place] Opioid Prevention Coalition: www.opioidcoalition.org    [place] Recovery Connection ([facility])  [facility] connects people seeking recovery to resources that help foster and sustain long-term recovery.  Whether you are seeking resources for treatment, transportation, housing, job training, education, health care or other pathways to recovery, [facility] is a great place to start.  697.649.8641.  www.[employer].Tripbod    Great Pod casts for nutrition and wellness  Listen on Apple Podcasts  Dishing Up Nutrition   [employer]   Nutrition       Understand the connection between what you eat and how you feel. Hosted by licensed nutritionists and dietitians from [employer] we share practical, real-life solutions for healthier living through nutrition.     General Medication Instructions:   See your medication sheet(s) for instructions.   Take all medications as prescribed.  Make no changes unless your primary care provider suggests them.   Go to all your primary care provider visits.  Be sure to have all your required lab tests. This way, your medicines can be refilled on time.  Do not use any forms of alcohol.    Please Note:  If you have any questions at anytime after you are discharged please call [facility] detox unit [facility] at [phone].  [facility], Behavioral Intake [phone]  Medical Records call [phone]  Outpatient Behavioral Intake call [phone]  LP+ Wait List/Bed Availability call [phone]    Please remember to take all of your behavioral discharge planning and lab paperwork to any follow up appointments, it contains your lab results, diagnosis, medication list and discharge recommendations.      THANK YOU FOR CHOOSING [facility]

## 2021-11-01 NOTE — PHARMACY-ADMISSION MEDICATION HISTORY
Admission Medication History Completed by Pharmacy    See [facility] Admission Navigator for allergy information, preferred outpatient pharmacy, prior to admission medications and immunization status.     Medication History Sources:     Med hx from [facility] (no pt interview)    Review of [facility] pharmacy filling history     Attempted to call pt @ 0150 but no answer, med orders in so did best attempt based on fill history    MAR from [facility] for last administration times    Changes made to PTA medication list (reason):    Added: None    Deleted: None    Changed: None    Additional Information:    Scheduled medications listed below being filled in a manner that suggests he is getting medications regularly, but again patient couldn't be reached verify.    Prior to Admission medications    Medication Sig Last Dose Taking? Auth Provider   albuterol (PROAIR HFA/PROVENTIL HFA/VENTOLIN HFA) 108 (90 Base) MCG/ACT inhaler Inhale 2 puffs into the lungs every 6 hours as needed   at PRN Yes Unknown, Entered By History   ARIPiprazole (ABILIFY) 2 MG tablet Take 1 tablet (2 mg) by mouth daily 11/1/2021 at AM Yes mEelia Bravo MD   B Complex Vitamins (VITAMIN B COMPLEX PO) Take by mouth daily -Take 1 tablet of unknown dose by mouth every morning 11/1/2021 at AM Yes Unknown, Entered By History   DULoxetine (CYMBALTA) 60 MG capsule Take 1 capsule (60 mg) by mouth daily 11/1/2021 at AM Yes Emelia Bravo MD   folic acid (FOLVITE) 1 MG tablet Take 1 tablet (1 mg) by mouth daily 11/1/2021 at AM Yes Alex Rojas MD   gabapentin (NEURONTIN) 300 MG capsule Take 300 mg by mouth 3 times daily 11/1/2021 at 0930 Yes Reported, Patient   hydrOXYzine (ATARAX) 25 MG tablet Take 25-50 mg by mouth At Bedtime 10/31/2021 at 2144 Yes Reported, Patient   mirtazapine (REMERON) 15 MG tablet Take 1 tablet (15 mg) by mouth At Bedtime 10/31/2021 at 2144 Yes Emelia Bravo MD   multivitamin w/minerals (THERA-VIT-M) tablet  Take 1 tablet by mouth daily 11/1/2021 at AM Yes Aelx Rojas MD   naltrexone (DEPADE/REVIA) 50 MG tablet Take 50 mg by mouth daily 10/30/2021 Yes Reported, Patient   thiamine (B-1) 100 MG tablet Take 1 tablet (100 mg) by mouth daily 11/1/2021 at AM Yes Alex Rojas MD   EPINEPHrine (ANY BX GENERIC EQUIV) 0.3 MG/0.3ML injection 2-pack Inject 0.3 mg into the muscle as needed for anaphylaxis (Chicken Allergy)  Patient not taking: Reported on 9/21/2021   Unknown, Entered By History   melatonin 3 MG tablet Take 3 mg by mouth nightly as needed for sleep   Reported, Patient   nicotine polacrilex (NICORETTE) 4 MG gum Place 1 each (4 mg) inside cheek every hour as needed for smoking cessation   Mark Granados MD   nicotine polacrilex (NICORETTE) 4 MG gum Place 1 each (4 mg) inside cheek every hour as needed for smoking cessation   Mark Granados MD       Date completed: 11/01/21    Medication history completed by:     Brown Zurita PharmD, [facility]    [phone]  Pager [phone]

## 2021-11-01 NOTE — PROVIDER NOTIFICATION
Lab called with critical values.    AST 1500 (was 564 10/31 at 1349)  ALT 1307 (was 520 10/31 at 1349)    Updated Dr. Pérez and Internal Medicine [place]

## 2021-11-01 NOTE — CONSULTS
Internal Medicine Initial Visit      Xavier Odell MRN# [id number]   YOB: 1985 Age: 36 year old   Date of Admission: 10/31/2021  PCP: [facility]    Referring Provider: Behavioral Health - Santa Pérez MD  Reason for Visit: General Medical Evaluation          Assessment and Recommendations:   Xavier Odell is a 36 year old male with a history of alcohol use disorder, history of GI bleed (2019), depression, and anxiety who is admitted to station 3A with alcohol withdrawal. Internal Medicine consultation was ordered by Dr. Pérez for co-management of alcohol withdrawal.        # Alcohol use disorder with acute withdrawal:  Patient has been drinking 12 seltzers per day for the past ~3 weeks. Denies history of JOSE EDUARDO or DTs. Last drink ~9PM on 10/31. MSSA scores 8-10. Currently patient reports anxiety related to withdrawal. He recently was at [facility] from 9/21-10/18.  - Management per psychiatry team  - Agree with MVI, folic acid and thiamine supplementation     # Hepatitis:  Suspect alcoholic hepatitis. , . T Bili 1.4, ALP WNL. Denies tylenol use, OTC herbal supplements, IVDU/illicit drug use or new medications in the past month. Utox negative on admission. COVID negative. Denies any abdominal pain. RUQ US in 5/2019 in care everywhere demonstrates hepatomegaly with hepatic steatosis. No clear e/o overt cirrhosis, however, question of L hepatic love surface nodularity is seen at that time. Abdominal exam benign today. Tylenol level added onto 10/31 labs was <2. However, repeat hepatic panel today notable for ALT 1307, AST 1500 and T bili 1.8. INR 1.31. No s/sx of hepatic encephalopathy, A&Ox3 on exam w/o asterixis. No history of heart failure. VSS stable w/ stable BP.  - Will discuss case with [place] [facility] to assess if they can go to [place] for GI assessment  -> pending discussion will determine placement for medical transfer   - Abdominal US limited with doppler  ordered    - Hepatitis A IgM, Hepatitis B surface antibody/antigen and core antibody, and HCV antibody added  - CK and Lipase added  - Hold acetaminophen  - Already on MSSA with Ativan (Avoid Valium)  - Pharmacy consulted to assess medications that can cause LFT elevations  - Addendum:         - Discussed case with Dr. Burkett, Medicine Triage who agreed with transfer to [place], however, there is delay in bed availabilities. Recommended consideration of N-acetylcysteine given LFT elevations. Discussed case with GI who recommended N-acetylcysteine protocol while assessing cause of hepatitis. Also recommended MARGARET, F-actin and mitochondrial antibodies along with hepatitis studies as above. Agreeable for GI consult upon transfer. Given need for N-acetylcysteine and risk for delaying care, will transfer to [place] ED to start this given detox unit is unable to give N acetylcysteine. Called and gave report to [place] ED provider who is aware and agrees with transfer.   - Will hold Abilify, Cymbalta, Mirtazapine per pharmacy consult note. Reviewed ultrasound with hepatosplenomegaly with steatosis.  - Update from patient placement confirmed patient has a bed on station 5 on [place]. However, there is delay in transport. Discussed with Medical Director [facility] is aware of need for urgent transport to start N-acetylcysteine and will prioritize ride.   - Consider steroids upon transfer to [place] pending GI recommendations.     # Depression, anxiety:  On PTA Cymbalta 60mg daily, Gabapentin 300mg TID, Atarax 25-50mg at bedtime, Abilify 2mg daily, Mirtazepine 15mg at bedtime. He reports that he has been on these medications for awhile, Most recent new medication was abilify and this was started ~2 months ago. No SI/HI.  - Management per psychiatry team    # History of GI bleed 2/2 gastric ulcer (2019):  Admitted 5/2019 with massive hematemesis and hemorrhagic shock 2/2 grade D esophagitis  with superficial gastric ulcers. Denies any s/sx of bleeding. Hgb stable. Not currently on PPI.    # Thrombocytosis, resolved:   Plts 516K on admission -> improved to 420K today. Likely reactive elevation on admission.  - Monitor for s/sx of bleeding    # Hyponatremia, mild  # Hyperphosphatemia:  Na 134 on admission. Likely in setting of alcohol use/poor oral intake. Na 132 today.   - Give 1L bolus of LR over 4 hours  - Trend BMP in AM   - Phos elevated at 5.4, Trend in AM     # Abnormal TSH:  TSH 4.19 in 8/2021, Free T4 at that time WNL.  - Repeat TFTs     # Elevated Cr:  BL Cr 0.8-1.0. Currently Creatinine 1.10. Having poor PO intake.  - IVF as above  - Avoid nephrotoxins  - Trend BMP in AM    Addendum -   # Hyperglycemia  Had BG ~200s on admission. HgbA1c checked and was normal at 5.3. BG improved to 166 today. Monitor.      Discussed case with Dr. Hough, attending physician who agrees with plan of care.    Medicine will continue to follow closely, please page with any additional concerns.     Mary Ellen Morel PA-C  Hospitalist Service   Pager: [phone]  7a-6p M-F and 7a-3p weekends/holidays  Otherwise page job code [phone] ([facility] and )  Text paging via [employer] is appreciated        Reason for Admission:   Alcohol withdrawal         History of Present Illness:   History is obtained from the patient and medical record.     Xavier Odell is a 36 year old male with a history of alcohol use disorder, history of GI bleed (2019), depression, and anxiety who is admitted to station 3A with alcohol withdrawal. Internal Medicine consultation was ordered by Dr. Pérez for co-management of alcohol withdrawal.     Patient reports drinking 12 seltzers per day for the past 3 weeks since he was discharged from [facility]. Last drink was PTA. He reports anxiety as his withdrawal symptoms. Having poor PO intake. He denies tylenol use, OTC herbal supplement use or illicit drug use. He  reports Abilify is his newest medication started about 2 months ago. He denies abdominal pain. No hematochezia, melena or hematemesis. Denies fevers or chills. Denies chest pain or dyspnea. Denies dysuria. Denies myalgias.             Review of Systems:    ROS: 10 point ROS neg other than the symptoms noted above in the HPI.             Past Medical History:   Reviewed and updated in Epic.  Past Medical History:   Diagnosis Date     GI bleed      Substance abuse (H)      Uncomplicated asthma              Past Surgical History:   Reviewed and updated in Epic.  Past Surgical History:   Procedure Laterality Date     ORTHOPEDIC SURGERY               Social History:   Lives in apartment in [place]. Unemployed currently.  Social History     Tobacco Use     Smoking status: Never Smoker     Smokeless tobacco: Never Used   Substance Use Topics     Alcohol use: Yes     Comment: Drinking cooking wine and mouthwash     Drug use: Not Currently             Family History:   Reviewed and updated in Epic.  Family History   Problem Relation Age of Onset     Depression Mother      Depression Father      Substance Abuse Father              Allergies:     Allergies   Allergen Reactions     Banana      Mild throat irritation per pt     Chicken Allergy      Anaphalxis             Medications:     Medications Prior to Admission   Medication Sig Dispense Refill Last Dose     albuterol (PROAIR HFA/PROVENTIL HFA/VENTOLIN HFA) 108 (90 Base) MCG/ACT inhaler Inhale 2 puffs into the lungs every 6 hours as needed    Past Month at Unknown time     ARIPiprazole (ABILIFY) 2 MG tablet Take 1 tablet (2 mg) by mouth daily 30 tablet 0 10/30/2021 at Unknown time     B Complex Vitamins (VITAMIN B COMPLEX PO) Take by mouth daily -Take 1 tablet of unknown dose by mouth every morning   10/30/2021 at Unknown time     DULoxetine (CYMBALTA) 60 MG capsule Take 1 capsule (60 mg) by mouth daily 30 capsule 0 10/30/2021 at Unknown time     folic acid (FOLVITE) 1 MG  tablet Take 1 tablet (1 mg) by mouth daily   10/30/2021 at Unknown time     gabapentin (NEURONTIN) 300 MG capsule Take 300 mg by mouth 3 times daily   10/30/2021 at Unknown time     hydrOXYzine (ATARAX) 25 MG tablet Take 25-50 mg by mouth At Bedtime   10/30/2021 at Unknown time     melatonin 3 MG tablet Take 3 mg by mouth nightly as needed for sleep   Past Month at Unknown time     mirtazapine (REMERON) 15 MG tablet Take 1 tablet (15 mg) by mouth At Bedtime 30 tablet 0 10/30/2021 at Unknown time     multivitamin w/minerals (THERA-VIT-M) tablet Take 1 tablet by mouth daily   10/30/2021 at Unknown time     naltrexone (DEPADE/REVIA) 50 MG tablet Take 50 mg by mouth daily   10/30/2021 at Unknown time     nicotine polacrilex (NICORETTE) 4 MG gum Place 1 each (4 mg) inside cheek every hour as needed for smoking cessation 220 each 1 Unknown at Unknown time     nicotine polacrilex (NICORETTE) 4 MG gum Place 1 each (4 mg) inside cheek every hour as needed for smoking cessation 220 each 1 Unknown at Unknown time     thiamine (B-1) 100 MG tablet Take 1 tablet (100 mg) by mouth daily   10/30/2021 at Unknown time     EPINEPHrine (ANY BX GENERIC EQUIV) 0.3 MG/0.3ML injection 2-pack Inject 0.3 mg into the muscle as needed for anaphylaxis (Chicken Allergy) (Patient not taking: Reported on 9/21/2021)           Current Facility-Administered Medications   Medication     albuterol (PROAIR HFA/PROVENTIL HFA/VENTOLIN HFA) 108 (90 Base) MCG/ACT inhaler 2 puff     alum & mag hydroxide-simethicone (MAALOX) suspension 30 mL     ARIPiprazole (ABILIFY) tablet 2 mg     DULoxetine (CYMBALTA) DR capsule 60 mg     EPINEPHrine (ADRENALIN) kit 0.3 mg     folic acid (FOLVITE) tablet 1 mg     gabapentin (NEURONTIN) capsule 300 mg     hydrOXYzine (ATARAX) tablet 25 mg     hydrOXYzine (ATARAX) tablet 25-50 mg     loperamide (IMODIUM) capsule 4 mg     LORazepam (ATIVAN) tablet 1-4 mg     melatonin tablet 3 mg     mirtazapine (REMERON) tablet 15 mg      "multivitamin w/minerals (THERA-VIT-M) tablet 1 tablet     naltrexone (DEPADE/REVIA) tablet 50 mg     nicotine (NICORETTE) gum 4 mg     ondansetron (ZOFRAN-ODT) ODT tab 4 mg     senna-docusate (SENOKOT-S/PERICOLACE) 8.6-50 MG per tablet 1 tablet     thiamine (B-1) tablet 100 mg     vitamin B complex with vitamin C (STRESS TAB) tablet 1 tablet     Facility-Administered Medications Ordered in Other Encounters   Medication     Self Administer Medications: Behavioral Services            Physical Exam:   Blood pressure 122/80, pulse 83, temperature 98.6  F (37  C), temperature source Temporal, resp. rate 16, height 1.778 m (5' 10\"), weight 127 kg (280 lb), SpO2 98 %.  Body mass index is 40.18 kg/m .  GENERAL: Alert and oriented x 3. NAD. Mild hand tremor.   HEENT: Anicteric sclera. Mucous membranes moist.   CV: RRR. S1, S2. No murmurs appreciated.   RESPIRATORY: Effort normal on room air. Lungs CTAB with no wheezing, rales, rhonchi.   GI: Abdomen soft, non distended, non tender. No guarding, rigidity or rebound tenderness. Negative valencia's sign.  MUSCULOSKELETAL: No joint swelling or tenderness.  NEUROLOGICAL: No focal deficits. Moves all extremities.   EXTREMITIES: No peripheral edema. Intact bilateral pedal pulses.   SKIN: No jaundice. No rashes.           Data:   CBC:  Recent Labs   Lab Test 10/31/21  1350   WBC 10.0   RBC 5.73   HGB 17.0   HCT 50.6   MCV 88   MCH 29.7   MCHC 33.6   RDW 12.0   *       CMP:  Recent Labs   Lab Test 10/31/21  1349      POTASSIUM 4.2   CHLORIDE 104   NEHA 8.4*   CO2 23   BUN 10   CR 1.02   *   *   *   BILITOTAL 1.4*   ALBUMIN 4.2   PROTTOTAL 8.4   ALKPHOS 88       TSH:  TSH   Date Value Ref Range Status   08/06/2021 4.19 (H) 0.40 - 4.00 mU/L Final       Tox screen: negative    Unresulted Labs Ordered in the Past 30 Days of this Admission     Date and Time Order Name Status Description    11/1/2021 12:30 AM GGT In process              "

## 2021-11-01 NOTE — PLAN OF CARE
Behavioral Team Discussion: (11/1/2021)    Continued Stay Criteria/Rationale: Patient admitted for Chemical Use Issues.  Plan: The following services will be provided to the patient; psychiatric assessment, medication management, therapeutic milieu, individual and group support, and skills groups.   Participants: 3A Provider: Dr. Santa Pérez MD; 3A RN: Adebayo Kaminski, RN; 3A CM's: Terri Chávez  and Germaine Nielson.  Summary/Recommendation: Providers will assess today for treatment recommendations, discharge planning, and aftercare plans. CM will meet with pt for discharge planning.   Medical/Physical: Per ED note:    GI bleed       Substance abuse (H)       Uncomplicated asthma      Precautions:   Behavioral Orders   Procedures     Code 1 - Restrict to Unit     Routine Programming     As clinically indicated     Status 15     Every 15 minutes.     Withdrawal precautions     Rationale for change in precautions or plan: N/A  Progress: Initial.

## 2021-11-02 LAB
ALBUMIN SERPL-MCNC: 3.1 G/DL (ref 3.4–5)
ALBUMIN SERPL-MCNC: 3.4 G/DL (ref 3.4–5)
ALP SERPL-CCNC: 76 U/L (ref 40–150)
ALP SERPL-CCNC: 81 U/L (ref 40–150)
ALT SERPL W P-5'-P-CCNC: 628 U/L (ref 0–70)
ALT SERPL W P-5'-P-CCNC: 794 U/L (ref 0–70)
ANA SER QL IF: NEGATIVE
ANION GAP SERPL CALCULATED.3IONS-SCNC: 5 MMOL/L (ref 3–14)
ANION GAP SERPL CALCULATED.3IONS-SCNC: 9 MMOL/L (ref 3–14)
AST SERPL W P-5'-P-CCNC: 302 U/L (ref 0–45)
AST SERPL W P-5'-P-CCNC: 458 U/L (ref 0–45)
BILIRUB SERPL-MCNC: 0.9 MG/DL (ref 0.2–1.3)
BILIRUB SERPL-MCNC: 1.1 MG/DL (ref 0.2–1.3)
BUN SERPL-MCNC: 15 MG/DL (ref 7–30)
BUN SERPL-MCNC: 17 MG/DL (ref 7–30)
CALCIUM SERPL-MCNC: 8.3 MG/DL (ref 8.5–10.1)
CALCIUM SERPL-MCNC: 8.5 MG/DL (ref 8.5–10.1)
CHLORIDE BLD-SCNC: 101 MMOL/L (ref 94–109)
CHLORIDE BLD-SCNC: 104 MMOL/L (ref 94–109)
CO2 SERPL-SCNC: 24 MMOL/L (ref 20–32)
CO2 SERPL-SCNC: 27 MMOL/L (ref 20–32)
CREAT SERPL-MCNC: 1 MG/DL (ref 0.66–1.25)
CREAT SERPL-MCNC: 1.11 MG/DL (ref 0.66–1.25)
ERYTHROCYTE [DISTWIDTH] IN BLOOD BY AUTOMATED COUNT: 11.8 % (ref 10–15)
ERYTHROCYTE [DISTWIDTH] IN BLOOD BY AUTOMATED COUNT: 11.9 % (ref 10–15)
GFR SERPL CREATININE-BSD FRML MDRD: 86 ML/MIN/1.73M2
GFR SERPL CREATININE-BSD FRML MDRD: >90 ML/MIN/1.73M2
GLUCOSE BLD-MCNC: 106 MG/DL (ref 70–99)
GLUCOSE BLD-MCNC: 150 MG/DL (ref 70–99)
GLUCOSE BLDC GLUCOMTR-MCNC: 118 MG/DL (ref 70–99)
GLUCOSE BLDC GLUCOMTR-MCNC: 120 MG/DL (ref 70–99)
GLUCOSE BLDC GLUCOMTR-MCNC: 122 MG/DL (ref 70–99)
GLUCOSE BLDC GLUCOMTR-MCNC: 138 MG/DL (ref 70–99)
GLUCOSE BLDC GLUCOMTR-MCNC: 147 MG/DL (ref 70–99)
HAV IGM SERPL QL IA: NONREACTIVE
HBV CORE IGM SERPL QL IA: NONREACTIVE
HCT VFR BLD AUTO: 45.2 % (ref 40–53)
HCT VFR BLD AUTO: 45.8 % (ref 40–53)
HGB BLD-MCNC: 14.9 G/DL (ref 13.3–17.7)
HGB BLD-MCNC: 15.2 G/DL (ref 13.3–17.7)
INR PPP: 1 (ref 0.85–1.15)
INR PPP: 1.12 (ref 0.85–1.15)
MAGNESIUM SERPL-MCNC: 2 MG/DL (ref 1.6–2.3)
MAGNESIUM SERPL-MCNC: 2.2 MG/DL (ref 1.6–2.3)
MCH RBC QN AUTO: 29.5 PG (ref 26.5–33)
MCH RBC QN AUTO: 29.9 PG (ref 26.5–33)
MCHC RBC AUTO-ENTMCNC: 33 G/DL (ref 31.5–36.5)
MCHC RBC AUTO-ENTMCNC: 33.2 G/DL (ref 31.5–36.5)
MCV RBC AUTO: 90 FL (ref 78–100)
MCV RBC AUTO: 90 FL (ref 78–100)
MITOCHONDRIA M2 IGG SER-ACNC: 2.4 U/ML
PHOSPHATE SERPL-MCNC: 3.2 MG/DL (ref 2.5–4.5)
PHOSPHATE SERPL-MCNC: 3.6 MG/DL (ref 2.5–4.5)
PLATELET # BLD AUTO: 242 10E3/UL (ref 150–450)
PLATELET # BLD AUTO: 271 10E3/UL (ref 150–450)
POTASSIUM BLD-SCNC: 3.2 MMOL/L (ref 3.4–5.3)
POTASSIUM BLD-SCNC: 3.4 MMOL/L (ref 3.4–5.3)
POTASSIUM BLD-SCNC: 3.8 MMOL/L (ref 3.4–5.3)
PROT SERPL-MCNC: 6.9 G/DL (ref 6.8–8.8)
PROT SERPL-MCNC: 7 G/DL (ref 6.8–8.8)
RBC # BLD AUTO: 5.05 10E6/UL (ref 4.4–5.9)
RBC # BLD AUTO: 5.09 10E6/UL (ref 4.4–5.9)
SODIUM SERPL-SCNC: 134 MMOL/L (ref 133–144)
SODIUM SERPL-SCNC: 136 MMOL/L (ref 133–144)
WBC # BLD AUTO: 6.3 10E3/UL (ref 4–11)
WBC # BLD AUTO: 7.2 10E3/UL (ref 4–11)

## 2021-11-02 PROCEDURE — 99222 1ST HOSP IP/OBS MODERATE 55: CPT | Mod: GC | Performed by: INTERNAL MEDICINE

## 2021-11-02 PROCEDURE — 84155 ASSAY OF PROTEIN SERUM: CPT | Performed by: STUDENT IN AN ORGANIZED HEALTH CARE EDUCATION/TRAINING PROGRAM

## 2021-11-02 PROCEDURE — 99232 SBSQ HOSP IP/OBS MODERATE 35: CPT | Mod: GC | Performed by: STUDENT IN AN ORGANIZED HEALTH CARE EDUCATION/TRAINING PROGRAM

## 2021-11-02 PROCEDURE — 36415 COLL VENOUS BLD VENIPUNCTURE: CPT | Performed by: PEDIATRICS

## 2021-11-02 PROCEDURE — 85610 PROTHROMBIN TIME: CPT | Performed by: STUDENT IN AN ORGANIZED HEALTH CARE EDUCATION/TRAINING PROGRAM

## 2021-11-02 PROCEDURE — 250N000013 HC RX MED GY IP 250 OP 250 PS 637

## 2021-11-02 PROCEDURE — 82247 BILIRUBIN TOTAL: CPT | Performed by: STUDENT IN AN ORGANIZED HEALTH CARE EDUCATION/TRAINING PROGRAM

## 2021-11-02 PROCEDURE — 99207 PR CDG-MDM COMPONENT: MEETS MODERATE - DOWN CODED: CPT | Performed by: STUDENT IN AN ORGANIZED HEALTH CARE EDUCATION/TRAINING PROGRAM

## 2021-11-02 PROCEDURE — 999N000127 HC STATISTIC PERIPHERAL IV START W US GUIDANCE

## 2021-11-02 PROCEDURE — 84100 ASSAY OF PHOSPHORUS: CPT | Performed by: STUDENT IN AN ORGANIZED HEALTH CARE EDUCATION/TRAINING PROGRAM

## 2021-11-02 PROCEDURE — 250N000013 HC RX MED GY IP 250 OP 250 PS 637: Performed by: STUDENT IN AN ORGANIZED HEALTH CARE EDUCATION/TRAINING PROGRAM

## 2021-11-02 PROCEDURE — 83735 ASSAY OF MAGNESIUM: CPT | Performed by: STUDENT IN AN ORGANIZED HEALTH CARE EDUCATION/TRAINING PROGRAM

## 2021-11-02 PROCEDURE — 120N000002 HC R&B MED SURG/OB UMMC

## 2021-11-02 PROCEDURE — 999N000147 HC STATISTIC PT IP EVAL DEFER

## 2021-11-02 PROCEDURE — 999N000216 HC STATISTIC ADULT CD FACE TO FACE-NO CHRG

## 2021-11-02 PROCEDURE — 85027 COMPLETE CBC AUTOMATED: CPT | Performed by: STUDENT IN AN ORGANIZED HEALTH CARE EDUCATION/TRAINING PROGRAM

## 2021-11-02 PROCEDURE — 36415 COLL VENOUS BLD VENIPUNCTURE: CPT | Performed by: STUDENT IN AN ORGANIZED HEALTH CARE EDUCATION/TRAINING PROGRAM

## 2021-11-02 PROCEDURE — 84132 ASSAY OF SERUM POTASSIUM: CPT | Performed by: PEDIATRICS

## 2021-11-02 RX ORDER — POTASSIUM CHLORIDE 750 MG/1
40 TABLET, EXTENDED RELEASE ORAL ONCE
Status: COMPLETED | OUTPATIENT
Start: 2021-11-02 | End: 2021-11-02

## 2021-11-02 RX ORDER — HYDROXYZINE HYDROCHLORIDE 25 MG/1
25-50 TABLET, FILM COATED ORAL EVERY 6 HOURS PRN
Status: DISCONTINUED | OUTPATIENT
Start: 2021-11-02 | End: 2021-11-03 | Stop reason: HOSPADM

## 2021-11-02 RX ADMIN — Medication 1 TABLET: at 08:05

## 2021-11-02 RX ADMIN — FOLIC ACID 1 MG: 1 TABLET ORAL at 08:05

## 2021-11-02 RX ADMIN — HYDROXYZINE HYDROCHLORIDE 50 MG: 25 TABLET, FILM COATED ORAL at 21:51

## 2021-11-02 RX ADMIN — POTASSIUM CHLORIDE 40 MEQ: 750 TABLET, EXTENDED RELEASE ORAL at 08:05

## 2021-11-02 RX ADMIN — GABAPENTIN 300 MG: 300 CAPSULE ORAL at 13:42

## 2021-11-02 RX ADMIN — THIAMINE HCL TAB 100 MG 100 MG: 100 TAB at 08:05

## 2021-11-02 RX ADMIN — INSULIN ASPART 1 UNITS: 100 INJECTION, SOLUTION INTRAVENOUS; SUBCUTANEOUS at 08:05

## 2021-11-02 RX ADMIN — GABAPENTIN 300 MG: 300 CAPSULE ORAL at 08:05

## 2021-11-02 RX ADMIN — Medication 1 MG: at 21:50

## 2021-11-02 RX ADMIN — HYDROXYZINE HYDROCHLORIDE 50 MG: 25 TABLET, FILM COATED ORAL at 13:42

## 2021-11-02 RX ADMIN — GABAPENTIN 300 MG: 300 CAPSULE ORAL at 19:12

## 2021-11-02 RX ADMIN — POTASSIUM CHLORIDE 40 MEQ: 750 TABLET, EXTENDED RELEASE ORAL at 13:41

## 2021-11-02 ASSESSMENT — ACTIVITIES OF DAILY LIVING (ADL)
ADLS_ACUITY_SCORE: 5

## 2021-11-02 NOTE — DISCHARGE SUMMARY
Service Date: 11/01/2021  Discharge Date: 11/01/2021      Discharge diagnosis    Alcohol use disorder severe alcohol withdrawal severe  Transaminitis  IDENTIFYING INFORMATION:  The patient is a 36-year-old  male who came to the Emergency Room wanting help for his drinking.  The patient reports alcohol is his drug of choice.  He has been drinking since age 13.  He reports that he completed [facility] on 10/18.  He was supposed to do an aftercare program that did not happen and then he relapsed and he has been drinking 12 seltzers a.  He realized that he needs to get help and brought himself to the Emergency Room.  Alcohol is his drug of choice.  He has tolerance, withdrawal, progressive use, loss of control.  He has used despite having negative consequences impacting his health.    The patient began to drink alcohol at the age of 16, but it became a problem at 26.  He has no history of seizures.  There is a questionable history of delirium tremens because when he goes through withdrawal he sees blotches.  He does not smoke.  He denies any gambling.  He denies any suicidal ideation, plan or intent.    PSYCHIATRIC REVIEW OF SYSTEMS:  The patient reports at times he feels depressed, isolated, lack of energy, lack of motivation, he has no purpose.  The patient denied any neurovegetative symptoms other than this.    KARIN:  The patient denied any distractibility, impulsivity, racing thoughts, mood swings.  The patient denied any auditory or visual hallucinations.  The patient does have history of excessive worry and not able to stop worrying, poor concentration.  The patient denies any symptoms of PTSD.  Denies any eating disorder.  The patient reports that he has ruminative thoughts, but he does not need things in symmetry.  The patient denies symptoms of ADHD.  Denies any borderline personality disorder.    PAST PSYCHIATRIC HISTORY:  The patient was previously given diagnosis of depression and anxiety.  He  has never had any civil commitments, never made any suicide attempts.  He was in [facility] twice.  He was an outpatient at [facility] [place], operated by [facility].  He was in dual diagnosis program.    PAST PSYCHIATRIC HISTORY:  The patient takes Cymbalta and Remeron.    SOCIAL HISTORY:  He is single.  He works in aviation.    FAMILY HISTORY:  Depression in both sides of the family.  Father has alcoholism.    MEDICAL HISTORY:  The patient has elevated liver enzymes.      The patient's vitals are as below:  Blood pressure of 141/99, temperature is 97.1, pulse is 103, respiratory rate of 16.    FAMILY HISTORY:  The patient has history of GI bleed, uncomplicated asthma.    MENTAL STATUS EXAMINATION:  The patient is lying in bed.  He is quite restless.  He is adequately groomed.  He is cooperative, but is restless.  Good eye contact.  Mood is depressed.  Affect is congruent.  Speech is spontaneous, normal in rate, rhythm, and volume.  No psychomotor abnormalities.  Linear thought process.  No loosening of associations.  Does not have any active suicidal ideation, plan or intent.  Insight is fair.  Judgment is fair.  Alert, oriented x3.  Attention and concentration is intact.  Recent and remote memory intact.  Language and fund of knowledge intact.  Normal muscle strength, normal gait.  The patient does not have any abnormal psychotic thoughts, preoccupations.  No overvalued ideas.  No ruminations.  No obsessions.  No compulsions.  No hypochondriasis.  He is very restless, but normal gait.    ASSESSMENT AND PLAN:  The patient has a biological predisposition family history of alcoholism.  Psychologically, the patient is experiencing alcohol use disorder with tolerance, withdrawal progress, increasing depression and anxiety.  Stressors include relapse from lack sober support.    DIAGNOSES:  AXIS I:   Alcohol use disorder; alcohol withdrawal, severe; major depressive disorder, moderate, recurrent, without psychosis; generalized anxiety  disorder.    PLAN:  During hospitalization, the patient will be detoxed off alcohol using [facility] protocol on Ativan because of elevated liver enzymes.  The patient has a pulse of 103, blood pressure 141/99.  The patient has tremor, paroxysmal sweats, temperature is normal, pulse is elevated.  He has received 4 mg of  since his admission.  The patient has received 30 mg of lorazepam.  The patient has elevated liver enzymes.  During his hospitalization, the patient's liver enzymes were repeated.  The patient's liver enzymes went up.  AST was 564, ALT was 520, ALT climbed up to 1307 and AST climbed up to 1500.  Bilirubin went from 1.1 to 1.8.  The patient has elevated phosphorus of 5.4.  He has hyponatremia, 32.  Medicine was called asap.  His INR was 1.31.  Medicine did an ultrasound of his abdomen.  Ultrasound showed right frontal lobe of liver measuring 20 cm, diffuse echogenic.  Because of decompensation from a medical standpoint, the patient was transferred to [facility] for further care.  The patient is still in alcohol withdrawal.    HOSPITAL COURSE:  During the hospital course, the patient was being detoxed.  He had elevated liver enzymes.  He was seen by Medicine and because of that, he was transferred to Medicine for further care.      Discharge medicines examination as above    Discharge medications all medications held because patient's liver enzymes are elevated  Santa Pérez MD        D: 2021   T: 2021   MT: [id number]    Name:     MAGO BATEMAN  MRN:      [id number]        Account:      [id number]   :      1985           Service Date: 2021                                  Discharge Date: 2021     Document: [id number]

## 2021-11-02 NOTE — PLAN OF CARE
Time 1675-9049     Reason for admission: Elevated LFT's  Vitals: Temp: 98.3  F (36.8  C) Temp src: Axillary BP: (!) 157/83 Pulse: 104   Resp: 16 SpO2: 100 % O2 Device: None (Room air)    Activity: Independent   Pain: Denies pain  Neuro: A&Ox4  Cardiac: WNL  Respiratory: WNL  GI/: Voiding without issues   Diet: Regular diet, good appetite   Lines: PIV saline locked   Wounds: Skin intact  Labs/imaging: ,  INR 1.12      New changes this shift: Acetylcysteine completed. CIWA 1,4. Pt asking when he can re-start his PTA anti-anxiety meds. Per MD needs to wait until LFTs are normal again. Atarax ordered Q6 to help with anxiety. Waiting on chem dep to consult for treatment programs.      Plan: Chem dep vs. Home      Continue to monitor and follow POC

## 2021-11-02 NOTE — CONSULTS
[facility]    Hepatology consult note    Consult requested by Mary Ellen Morel PA-C     Primary hepatologist: None    36 year old man with alcohol use disorder who presented for detox and was found to have abnormal liver enzymes    Chief complaint: Abnormal liver enzymes    HPI:  Mr. Odell is a 36-year-old man with alcohol use disorder and hepatology is consulted for abnormal LFT.    He presented to detox on 10/31/2021 in the setting of drinking 12 seltzers per day.  He was found to have an AST of 564 and ALT of 520.  He was started on Ativan per MSSA score and received 7 mg in total yesterday and 4 mg on 11/1/2021.  Then, his repeat LFT 11/1 returned with AST 1500 and ALT 1307 - prompting the medicine team to let us know about his impending transfer to [place].    He denies any acetaminophen use.  His acetaminophen level was <2 on 10/31. Drugs of abuse screen was negative.  ETOH positive at 0.22.  PLT were 420, Hgb 16.1, WBC 10.7.    Today, 11/2/2021 AST is now 458 and ALT is now 794.    No recent travel.  No IV or inhalational drug use.  He specifically denies any acetaminophen, OTC meds for pain, herbal supplements or workout supplements.      Medical hx Surgical hx   Past Medical History:   Diagnosis Date     GI bleed      Substance abuse (H)      Uncomplicated asthma       Past Surgical History:   Procedure Laterality Date     ORTHOPEDIC SURGERY            Medications  Prior to Admission medications    Medication Sig Start Date End Date Taking? Authorizing Provider   albuterol (PROAIR HFA/PROVENTIL HFA/VENTOLIN HFA) 108 (90 Base) MCG/ACT inhaler Inhale 2 puffs into the lungs every 6 hours as needed    Yes Unknown, Entered By History   ARIPiprazole (ABILIFY) 2 MG tablet Take 1 tablet (2 mg) by mouth daily 8/9/21  Yes Emelia Bravo MD   B Complex Vitamins (VITAMIN B COMPLEX PO) Take by mouth daily -Take 1 tablet of unknown dose by mouth every morning   Yes Unknown, Entered  By History   DULoxetine (CYMBALTA) 60 MG capsule Take 1 capsule (60 mg) by mouth daily 8/8/21  Yes Emelia Bravo MD   folic acid (FOLVITE) 1 MG tablet Take 1 tablet (1 mg) by mouth daily 8/6/21  Yes Alex Rojas MD   gabapentin (NEURONTIN) 300 MG capsule Take 300 mg by mouth 3 times daily   Yes Reported, Patient   hydrOXYzine (ATARAX) 25 MG tablet Take 25-50 mg by mouth At Bedtime   Yes Reported, Patient   mirtazapine (REMERON) 15 MG tablet Take 1 tablet (15 mg) by mouth At Bedtime 8/8/21  Yes Emelia Bravo MD   multivitamin w/minerals (THERA-VIT-M) tablet Take 1 tablet by mouth daily 8/6/21  Yes Alex Rojas MD   naltrexone (DEPADE/REVIA) 50 MG tablet Take 50 mg by mouth daily   Yes Reported, Patient   thiamine (B-1) 100 MG tablet Take 1 tablet (100 mg) by mouth daily 8/6/21  Yes Alex Rojas MD   EPINEPHrine (ANY BX GENERIC EQUIV) 0.3 MG/0.3ML injection 2-pack Inject 0.3 mg into the muscle as needed for anaphylaxis (Chicken Allergy)  Patient not taking: Reported on 9/21/2021    Unknown, Entered By History   melatonin 3 MG tablet Take 3 mg by mouth nightly as needed for sleep    Reported, Patient   nicotine polacrilex (NICORETTE) 4 MG gum Place 1 each (4 mg) inside cheek every hour as needed for smoking cessation 10/13/21   Mark Granados MD   nicotine polacrilex (NICORETTE) 4 MG gum Place 1 each (4 mg) inside cheek every hour as needed for smoking cessation 9/25/21   Mark Granados MD       Allergies  Allergies   Allergen Reactions     Banana      Mild throat irritation per pt     Chicken Allergy      Anaphalxis       Family hx Social hx   Family History   Problem Relation Age of Onset     Depression Mother      Depression Father      Substance Abuse Father       Social History     Tobacco Use     Smoking status: Never Smoker     Smokeless tobacco: Never Used   Substance Use Topics     Alcohol use: Yes     Comment: Drinking cooking wine and  mouthwash     Drug use: Not Currently          Review of systems  A 10-point review of systems was negative.    Examination  /81 (BP Location: Right arm)   Pulse 116   Temp 99.1  F (37.3  C) (Oral)   Resp 16   Wt 104.1 kg (229 lb 6.4 oz)   SpO2 99%   BMI 32.92 kg/m    Body mass index is 32.92 kg/m .    Constitutional: Male, lying in bed, NAD.  HEENT: No conjunctival icterus, moist mucus membranes.  CV: No edema.  Respiratory: Unlabored breathing  Abdomen:    Non-distended   Non-tender  Skin: No jaundice, warm  Neuro: Alert, moves all extremities, no asterixis.  Psych: Normal affect, answers questions appropriately.    Laboratory   down from 1500   down from 1307  Bilirubin 1.1  INR 1.12  Potassium 3.2  Sodium 134  Creatinine 1.00    Radiology  US abdomen complete with doppler 11/1/2021  IMPRESSION:   Hepatomegaly with diffusely increased hepatic echogenicity most  suspicious for steatosis. Patent Doppler evaluation of the liver, with  dampened hepatic venous and portal venous waveforms.      Assessment  Mr. Odell is a 36-year-old man with alcohol use disorder who presented with abnormal liver enzymes with hepatocellular injury pattern >15 ULN.    His LFTs continue to improve and he finished the NAC protocol.  MELD labs remain stable.  Clinically he is going through uncomplicated withdrawal.  Hep A/C were negative.  Hepatitis B he is immune.  Autoimmune testing is pending but unlikely to be the cause.  We suspect this is alcohol/medication induced though he has no clear culprit medications or acetaminophen ingestions.    Discussed alcohol cessation at length.  He is motivated to quit.  He has had long periods of sobriety in the past.  I stressed the importance of this for his liver health.  He plans (and was already scheduled) to enroll in outpatient chemical dependence after discharge.    #1 Abnormal LFT, hepatocellular injury pattern, >15 upper limit of normal  #2 Alcohol use disorder  with active use, multiple prior chemical dependence treatments    Plan:  - OK to stop NAC  - Trend AST/ALT daily while inpatient but showing great improvements  - Await MARGARET, AMA and ASMA  - He was immune with high levels of Hep B antibody and does not need repeat outpatient vaccination  - Please consult chem dep / social work when appropriate  - We stressed the importance of alcohol cessation and he wishes to continue outpatient chem dep    Too Moreno MD  Gastroenterology Fellow, PGY-5    Staffed with attending, Dr. Ty.

## 2021-11-02 NOTE — PLAN OF CARE
PT: PT orders received.  Pt demonstrating IND with all mobility, ambulated in halls with no concerns noted.  Pt noting slight dizziness but has not been sitting up or drinking water.  Continue to encourage being upright and ambulation.  At this time pt no further in-patient PT needs, PT orders completed.

## 2021-11-02 NOTE — CONSULTS
11/2/2021    CD consult completed.   Spoke with pt via bedside phone, pt reports he plans on getting established with [facility] for outpatient treatment. Pt had completed [facility] on 10/18/2021, was referred to OP tx as a step down. Pt did not want to seek any other treatment at this time. Pt did agree to an email of resources, pt confirmed his email address with me.     Jessie Fong, [facility]  [email or web address]  Direct phone: [phone]

## 2021-11-02 NOTE — PLAN OF CARE
Pt admitted for ETOH, elevated LFT's.  Pt on step 2 of Acetylcysteine regime.  Left hand PIV red but patent. Pt c/o mild pain in right leg from a past leg injury from years ago and mild left hip pain. Small blanchable area on right knee- pt states tripped recently at home- hit knee. No skin concerns. Pt steady on feet to bathroom. Urine orange/tea color. No bm's. CIWA scores increased this evening 8-9's. Ativan given per CIWA protocol. Neuro's intact. Pt tolerating reg diet, bg q 4hrs- trying to coordinate with meals. No coverage for supper d/t insulin pen not on unit. Mom updated on plan. AST/ALT elevated. K and Mag recheck in a.m. per protocol. Albuterol inhaler at bedside. Pt can make needs known, calls appropriately Will continue to follow POC/monitor.

## 2021-11-02 NOTE — PROGRESS NOTES
[facility]    Progress Note - [facility]        Date of Admission:  11/1/2021    Assessment & Plan             Changes today:  - appreciate hepatology input  - finished N-acetylcysteine   - continuing BID MELD labs  - awaiting chem dep consult for AUD    Xavier CONTRERAS Odell is a 36 year old male admitted on 11/1/2021. He has a history of alcohol use disorder, history of GI bleed (2019), depression, and anxiety who has been transferred from [facility] for worsening liver function tests.     # Hepatocellular injury pattern, improving  # Alcohol withdrawal, improving  # Alcohol use disorder  # Coagulopathy, improved  Suspect alcoholic hepatitis. , . T Bili 1.4, ALP WNL. Denies tylenol use, OTC herbal supplements, IVDU/illicit drug use or new medications in the past month. Utox negative on admission. COVID negative. Denies any abdominal pain. RUQ US in 5/2019 in care everywhere demonstrates hepatomegaly with hepatic steatosis. No clear e/o overt cirrhosis, however, question of L hepatic love surface nodularity is seen at that time. Abdominal exam benign today. Tylenol level added onto 10/31 labs was <2. However, repeat hepatic panel today notable for ALT 1307, AST 1500 and T bili 1.8. INR 1.31. No s/sx of hepatic encephalopathy, A&Ox3 on exam w/o asterixis. No history of heart failure. VSS stable w/ stable BP.  Worsening hepatitis- unlikely to be solely from alcohol. Patient denies ingestion of tylenol (last took years ago). Denies any other illicit drug use.   - GI hepatology consulted, to formally see in the AM              > close monitor for signs of altered mental status, worsening INR >1.5  - Abdominal US limited with doppler: hepatomegaly with steatosis  - Hep A immune, Hep B immune  - on CIWA protocol with Ativan PRN  - s/p IV N-acetylcysteine on 11/1-11/2  - MARGARET, F-actin, AMSA in process   > AMA negative  - ok to hold off on steroids per GI  -  BID MELD labs  - Mg/K/Phos checks BID, on repletion protocol  - q4h neuro checks and glucose checks  - consulting chem dep, patient amenable to treatment (prefers outpatient)  - hold PTA naltrexone (not started at [facility])  - continue PTA vitamins, folate, thiamine     # Depression  # Anxiety:  On PTA Cymbalta 60mg daily, Gabapentin 300mg TID, Atarax 25-50mg at bedtime, Abilify 2mg daily, Mirtazepine 15mg at bedtime. He reports that he has been on these medications for awhile, Most recent new medication was abilify and this was started ~2 months ago. No SI/HI. Endorses hopelessness  - continue to hold PTA Abilify, Cymbalta and mirtazapine given his LFT elevation  - continue PTA hydroxyzine: made it q6h PRN from bedtime dosing    # History of GI bleed 2/2 gastric ulcer (2019):  Admitted 5/2019 with massive hematemesis and hemorrhagic shock 2/2 grade D esophagitis with superficial gastric ulcers. Denies any s/sx of bleeding. Hgb stable. Not currently on PPI.  No anemia. Hgb 15.2  - monitor     # Thrombocytosis, resolved:   Plts 516K on admission -> improved to 420K today. Likely reactive elevation on admission.  - Monitor for s/sx of bleeding      # Hyponatremia, mild  # Hyperphosphatemia:  Na 132 on admission. Likely in setting of alcohol use/poor oral intake.   Phos     # Abnormal TSH:  TSH 4.19 in 8/2021, Free T4 at that time WNL.  - Repeat TFTs      # Elevated Cr:  BL Cr 0.8-1.0. Currently Creatinine 1.10. Having poor PO intake.  - Avoid nephrotoxins  - Trend BMP in AM     Diet: Combination Diet Regular Diet Adult    DVT Prophylaxis: Enoxaparin (Lovenox) SQ  Carrillo Catheter: Not present  Fluids: none  Central Lines: None  Code Status: Full Code      Disposition Plan   Expected discharge: 11/05/2021   recommended to prior living arrangement once hepatitis improved.     The patient's care was discussed with the Attending Physician, Dr. Chiang.    Birgit Lee MD  [address]  [facility]  Securely message with the Vocera Web Console (learn more here)  Text page via [facility] Paging/Directory    Please see sign in/sign out for up to date coverage information      ______________________________________________________________________    Interval History   NAEON. Still feeling anxious but better. Received PO ativan overnight for CIWA. CIWA improving from 8 to 3. Denies confusion, headache, chest pain, nausea, vomiting, abdominal pain. Had bowel movement yesterday.     Data reviewed today: I reviewed all medications, new labs and imaging results over the last 24 hours. I personally reviewed no images or EKG's today.    Physical Exam   Vital Signs: Temp: 98.3  F (36.8  C) Temp src: Axillary BP: (!) 157/83 Pulse: 104   Resp: 16 SpO2: 100 % O2 Device: None (Room air)    Weight: 229 lbs 6.4 oz  General Appearance: obese young appearing male resting comfortably in bed, in NAD  HEENT: NCAT, EOMI, MMM  Respiratory: CTAB, no wheezes or crackles, on RA  Cardiovascular: RRR, no m/r/g  GI: soft, obese, nontender, active BS  Skin: no lesions on exposed skin  Neurologic: alert, oriented to self, location, time. Non focal  Psychiatric: appropriate    Data   Recent Labs   Lab 11/02/21  1233 11/02/21  1143 11/02/21  0757 11/02/21  0612 11/01/21  2216 11/01/21  2034 11/01/21  2004 11/01/21  1756 11/01/21  1744 11/01/21  0921   WBC  --   --   --  6.3  --   --   --  8.9  --  10.7   HGB  --   --   --  15.2  --   --   --  16.3  --  16.1   MCV  --   --   --  90  --   --   --  90  --  89   PLT  --   --   --  242  --   --   --  322  --  420   INR  --   --   --  1.12  --   --   --  1.18*  --  1.31*   NA  --   --   --  134  --   --   --  131*  --  132*   POTASSIUM  --  3.4  --  3.2*  --   --   --  4.2  --  4.2   CHLORIDE  --   --   --  101  --   --   --  101  --  101   CO2  --   --   --  24  --   --   --  22  --  23   BUN  --   --   --  15  --   --   --  15  --  15   CR  --   --   --  1.00  --   --   --   0.96  --  1.10   ANIONGAP  --   --   --  9  --   --   --  8  --  8   NEHA  --   --   --  8.3*  --   --   --  8.7  --  8.5   *  --  147* 150*   < >  --    < > 161*   < > 166*   ALBUMIN  --   --   --  3.1*  --   --   --  3.8  --  3.7   PROTTOTAL  --   --   --  6.9  --   --   --  7.8  --  7.8   BILITOTAL  --   --   --  1.1  --   --   --  1.6*  --  1.8*   ALKPHOS  --   --   --  76  --   --   --  86  --  86   ALT  --   --   --  794*  --   --   --  1,110*  --  1,307*   AST  --   --   --  458*  --  779*  --   --   --  1,500*   LIPASE  --   --   --   --   --   --   --   --   --  78    < > = values in this interval not displayed.     No results found for this or any previous visit (from the past 24 hour(s)).  Medications     acetylcysteine (ACETADOTE) infusion *second dose* 5.2 g (11/01/21 1911)     acetylcysteine (ACETADOTE) infusion *third dose - elevated AST* Stopped (11/02/21 1114)       folic acid  1 mg Oral Daily     gabapentin  300 mg Oral TID     insulin aspart  1-3 Units Subcutaneous TID AC     insulin aspart  1-3 Units Subcutaneous At Bedtime     multivitamin w/minerals  1 tablet Oral Daily     sodium chloride (PF)  3 mL Intracatheter Q8H     thiamine  100 mg Oral Daily

## 2021-11-02 NOTE — PLAN OF CARE
Time: 1900-0730    Reason for admission: ETOH withdrawal  Vitals: VSS on RA, ex. HTN  Activity:  Independent  Pain: Denies pain  Neuro:  AOx4, able to make needs known.  CIWA at 3, d/t tremors in hands.  Cardiac:   HTN  Respiratory: WDL, rescue inhaler at betside.  GI/: Voids without difficulty, BM this shift.   Diet:  Regular  Lines:  R PIV infusing acetylcysteine step 3 at 55 mL/hr.  L PIV occluded, not removed as Pt wanted to sleep.   Skin/Wounds: Intact.  Labs/imaging: , ALT 1110, K and Mg recheck in AM.     New changes this shift: CIWA trended downward from 8 to 3.    Plan:  Discharge to prior living arrangement when hepatitis improves.

## 2021-11-02 NOTE — PLAN OF CARE
OT: per chart and discussion with pt, he is IND with ADLS and in room mobility without use of AE. Per PT, pt IND with mobility. No acute OT needs identified, will sign off and complete orders.

## 2021-11-03 VITALS
DIASTOLIC BLOOD PRESSURE: 79 MMHG | SYSTOLIC BLOOD PRESSURE: 142 MMHG | BODY MASS INDEX: 32.92 KG/M2 | HEART RATE: 101 BPM | TEMPERATURE: 98.6 F | RESPIRATION RATE: 16 BRPM | WEIGHT: 229.4 LBS | OXYGEN SATURATION: 99 %

## 2021-11-03 LAB
ALBUMIN SERPL-MCNC: 3.2 G/DL (ref 3.4–5)
ALP SERPL-CCNC: 76 U/L (ref 40–150)
ALT SERPL W P-5'-P-CCNC: 459 U/L (ref 0–70)
ANION GAP SERPL CALCULATED.3IONS-SCNC: 5 MMOL/L (ref 3–14)
AST SERPL W P-5'-P-CCNC: 174 U/L (ref 0–45)
BILIRUB SERPL-MCNC: 0.8 MG/DL (ref 0.2–1.3)
BUN SERPL-MCNC: 16 MG/DL (ref 7–30)
CALCIUM SERPL-MCNC: 8.3 MG/DL (ref 8.5–10.1)
CHLORIDE BLD-SCNC: 104 MMOL/L (ref 94–109)
CO2 SERPL-SCNC: 26 MMOL/L (ref 20–32)
CREAT SERPL-MCNC: 0.98 MG/DL (ref 0.66–1.25)
ERYTHROCYTE [DISTWIDTH] IN BLOOD BY AUTOMATED COUNT: 11.9 % (ref 10–15)
GFR SERPL CREATININE-BSD FRML MDRD: >90 ML/MIN/1.73M2
GLUCOSE BLD-MCNC: 123 MG/DL (ref 70–99)
GLUCOSE BLDC GLUCOMTR-MCNC: 129 MG/DL (ref 70–99)
GLUCOSE BLDC GLUCOMTR-MCNC: 154 MG/DL (ref 70–99)
HCT VFR BLD AUTO: 42.1 % (ref 40–53)
HGB BLD-MCNC: 13.9 G/DL (ref 13.3–17.7)
INR PPP: 0.95 (ref 0.85–1.15)
MAGNESIUM SERPL-MCNC: 2.2 MG/DL (ref 1.6–2.3)
MCH RBC QN AUTO: 29.6 PG (ref 26.5–33)
MCHC RBC AUTO-ENTMCNC: 33 G/DL (ref 31.5–36.5)
MCV RBC AUTO: 90 FL (ref 78–100)
PHOSPHATE SERPL-MCNC: 3.4 MG/DL (ref 2.5–4.5)
PLATELET # BLD AUTO: 241 10E3/UL (ref 150–450)
POTASSIUM BLD-SCNC: 3.4 MMOL/L (ref 3.4–5.3)
PROT SERPL-MCNC: 7 G/DL (ref 6.8–8.8)
RBC # BLD AUTO: 4.69 10E6/UL (ref 4.4–5.9)
SMA IGG SER-ACNC: 22 UNITS
SMOOTH MUSCLE IGG TITR SER: ABNORMAL {TITER}
SODIUM SERPL-SCNC: 135 MMOL/L (ref 133–144)
WBC # BLD AUTO: 5.7 10E3/UL (ref 4–11)

## 2021-11-03 PROCEDURE — 250N000013 HC RX MED GY IP 250 OP 250 PS 637: Performed by: STUDENT IN AN ORGANIZED HEALTH CARE EDUCATION/TRAINING PROGRAM

## 2021-11-03 PROCEDURE — 99238 HOSP IP/OBS DSCHRG MGMT 30/<: CPT | Mod: GC | Performed by: STUDENT IN AN ORGANIZED HEALTH CARE EDUCATION/TRAINING PROGRAM

## 2021-11-03 PROCEDURE — 83735 ASSAY OF MAGNESIUM: CPT | Performed by: STUDENT IN AN ORGANIZED HEALTH CARE EDUCATION/TRAINING PROGRAM

## 2021-11-03 PROCEDURE — 82040 ASSAY OF SERUM ALBUMIN: CPT | Performed by: STUDENT IN AN ORGANIZED HEALTH CARE EDUCATION/TRAINING PROGRAM

## 2021-11-03 PROCEDURE — 36415 COLL VENOUS BLD VENIPUNCTURE: CPT | Performed by: STUDENT IN AN ORGANIZED HEALTH CARE EDUCATION/TRAINING PROGRAM

## 2021-11-03 PROCEDURE — 85610 PROTHROMBIN TIME: CPT | Performed by: STUDENT IN AN ORGANIZED HEALTH CARE EDUCATION/TRAINING PROGRAM

## 2021-11-03 PROCEDURE — 85027 COMPLETE CBC AUTOMATED: CPT | Performed by: STUDENT IN AN ORGANIZED HEALTH CARE EDUCATION/TRAINING PROGRAM

## 2021-11-03 PROCEDURE — 84100 ASSAY OF PHOSPHORUS: CPT | Performed by: STUDENT IN AN ORGANIZED HEALTH CARE EDUCATION/TRAINING PROGRAM

## 2021-11-03 RX ORDER — POTASSIUM CHLORIDE 750 MG/1
40 TABLET, EXTENDED RELEASE ORAL ONCE
Status: COMPLETED | OUTPATIENT
Start: 2021-11-03 | End: 2021-11-03

## 2021-11-03 RX ADMIN — Medication 1 TABLET: at 08:19

## 2021-11-03 RX ADMIN — THIAMINE HCL TAB 100 MG 100 MG: 100 TAB at 08:19

## 2021-11-03 RX ADMIN — FOLIC ACID 1 MG: 1 TABLET ORAL at 08:19

## 2021-11-03 RX ADMIN — GABAPENTIN 300 MG: 300 CAPSULE ORAL at 08:19

## 2021-11-03 RX ADMIN — POTASSIUM CHLORIDE 40 MEQ: 750 TABLET, EXTENDED RELEASE ORAL at 08:19

## 2021-11-03 ASSESSMENT — ACTIVITIES OF DAILY LIVING (ADL)
ADLS_ACUITY_SCORE: 5

## 2021-11-03 NOTE — PLAN OF CARE
Pt A&O, independent in the room.  Discharge paperwork dicussed and signed.  Pt verbalized understanding.  No questions or concerns.  Called security to ensure all belongings were delivered here; all pt belongings at bedside. PIV removed.  Pt left by foot with all belongings.

## 2021-11-03 NOTE — PLAN OF CARE
A&O VSS on RA. PIV SL. Up independently to bathroom. Reg diet, good appetite.CIWA 0. Denies pain. Calls to make needs known.

## 2021-11-03 NOTE — DISCHARGE SUMMARY
[facility]  Discharge Summary - Medicine & Pediatrics       Date of Admission:  11/1/2021  Date of Discharge:  11/3/2021  Discharging Provider: Dr. Chiang   Discharge Service: Sravan Mackenzie    Discharge Diagnoses   Hepatocellular injury  Alcoholic hepatitis  Alcohol use disorder  Alcohol withdrawal  Depression  Anxiety    Follow-ups Needed After Discharge   Follow-up Appointments     Adult [facility] Follow-up and recommended labs and tests      Follow up with primary care provider, [facility], within 7 days to   follow up on your liver function tests with CMP    Follow up with outpatient alcohol treatment    Appointments on [place] and/or [facility] (with [facility] or [facility]   provider or service). Call [phone] if you haven't heard regarding   these appointments within 7 days of discharge.          CMP in 1 week    Discharge Disposition   Discharged to home  Condition at discharge: Stable    Hospital Course   Xavier Odell is a 36 year old male admitted on 11/1/2021. He has a history of alcohol use disorder, history of GI bleed (2019), depression, and anxiety who was transferred from [facility] [place] for worsening liver function tests.     # Hepatocellular injury pattern  # Alcohol withdrawal  # Alcohol use disorder  Suspect alcoholic hepatitis. , . T Bili 1.4, ALP WNL. Denies tylenol use, OTC herbal supplements, IVDU/illicit drug use or new medications in the past month. Utox negative on admission. COVID negative. Denies any abdominal pain. RUQ US in 5/2019 in care everywhere demonstrates hepatomegaly with hepatic steatosis. No clear e/o overt cirrhosis, however, question of L hepatic love surface nodularity is seen at that time. Abdominal exam benign today. Tylenol level added onto 10/31 labs was <2. However, repeat hepatic panel today notable for ALT 1307, AST 1500 and T bili 1.8. INR 1.31. No s/sx of hepatic encephalopathy, A&Ox3 on exam w/o asterixis. No  history of heart failure. VSS stable w/ stable BP.  Worsening hepatitis- unlikely to be solely from alcohol. Patient denies ingestion of tylenol (last took years ago). Denies any other illicit drug use. GI hepatology consulted on transfer to [place]. Patient was given full doses of N-acetylcysteine. Had frequent MELD lab checks, patient's LFT elevation significantly improved with NAC administration. On the day of discharge, ALT/AST improved to 459/174.   - follow up with PCP within 1 week with CMP to ensure LFTs not worsening     # Depression  # Anxiety  On PTA Cymbalta 60mg daily, Gabapentin 300mg TID, Atarax 25-50mg at bedtime, Abilify 2mg daily, Mirtazepine 15mg at bedtime. He reports that he has been on these medications for awhile, Most recent new medication was abilify and this was started ~2 months ago. No SI/HI. Endorses hopelessness. Held abilify, cymbalta, mirtazapine initially with concern for hepatotoxicity, but with improving LFTs, resumed above meds at discharge.     # Hypokalemia  K 3.2, requiring K supplementation. Improved without requiring further supplementation.     Consultations This Hospital Stay   GI HEPATOLOGY ADULT IP CONSULT  PHYSICAL THERAPY ADULT IP CONSULT  OCCUPATIONAL THERAPY ADULT IP CONSULT  CHEMICAL DEPENDENCY IP CONSULT  VASCULAR ACCESS CARE ADULT IP CONSULT    Code Status   Full Code       The patient was discussed with Dr. Chiang.    Birgit Lee MD  [facility] 4 Service  [facility] [address] [address]  Phone: [phone]  ______________________________________________________________________    Physical Exam   Vital Signs: Temp: 98.6  F (37  C) Temp src: Oral BP: (!) 142/79 Pulse: 101   Resp: 16 SpO2: 99 % O2 Device: None (Room air)    Weight: 229 lbs 6.4 oz  General Appearance: obese young appearing male resting comfortably in bed, in NAD  HEENT: NCAT, EOMI, MMM  Respiratory: CTAB, no wheezes or crackles, on RA  Cardiovascular: RRR, no  m/r/g  GI: soft, obese, nontender, active BS  Skin: no lesions on exposed skin  Neurologic: alert, oriented to self, location, time. Non focal  Psychiatric: appropriate      Primary Care Physician   [facility]    Discharge Orders      Comprehensive metabolic panel     Reason for your hospital stay    You were hospitalized for alcohol withdrawal and worsening hepatitis. You received N-acetylcysteine and your liver functions have improved. You will follow up with outpatient treatment for your alcohol use disorder. We did not start or stop any of your home meds. You should follow up with PCP within 1 week with CMP to follow up on your liver function tests.     Activity    Your activity upon discharge: activity as tolerated     Adult [facility] Follow-up and recommended labs and tests    Follow up with primary care provider, [facility], within 7 days to follow up on your liver function tests with CMP    Follow up with outpatient alcohol treatment    Appointments on [place] and/or [facility] (with [facility] or [facility] provider or service). Call [phone] if you haven't heard regarding these appointments within 7 days of discharge.     Diet    Follow this diet upon discharge: Orders Placed This Encounter      Combination Diet Regular Diet Adult       Significant Results and Procedures   Most Recent 3 CBC's:Recent Labs   Lab Test 11/03/21  0701 11/02/21  1728 11/02/21  0612   WBC 5.7 7.2 6.3   HGB 13.9 14.9 15.2   MCV 90 90 90    271 242     Most Recent 3 BMP's:Recent Labs   Lab Test 11/03/21  0701 11/03/21  0648 11/03/21  0157 11/02/21 2008 11/02/21  1728 11/02/21  1233 11/02/21  1143 11/02/21  0757 11/02/21  0612     --   --   --  136  --   --   --  134   POTASSIUM 3.4  --   --   --  3.8  --  3.4   < > 3.2*   CHLORIDE 104  --   --   --  104  --   --   --  101   CO2 26  --   --   --  27  --   --   --  24   BUN 16  --   --   --  17  --   --   --  15   CR 0.98  --   --   --  1.11  --   --   --  1.00    ANIONGAP 5  --   --   --  5  --   --   --  9   NEHA 8.3*  --   --   --  8.5  --   --   --  8.3*   * 129* 154*   < > 106*   < >  --    < > 150*    < > = values in this interval not displayed.     Most Recent 2 LFT's:Recent Labs   Lab Test 11/03/21  0701 11/02/21  1728   * 302*   * 628*   ALKPHOS 76 81   BILITOTAL 0.8 0.9     Most Recent 3 INR's:Recent Labs   Lab Test 11/03/21  0701 11/02/21  1728 11/02/21  0612   INR 0.95 1.00 1.12   ,   Results for orders placed or performed during the hospital encounter of 10/31/21   US Abdomen Complete w Doppler Complete    Narrative    EXAMINATION: US ABDOMEN COMPLETE WITH DOPPLER COMPLETE  11/1/2021  11:48 AM      CLINICAL HISTORY: elevated LFTs, evaluate for underlying liver and  biliary tree    COMPARISON: None        FINDINGS:  The right lobe of the liver measures 20 cm in craniocaudal dimension.  The liver is diffusely echogenic and difficult to sonographically  penetrate, limiting evaluation for mass. There is an area of fatty  sparing along the gallbladder fossa. There is no intrahepatic or  extrahepatic biliary ductal dilatation. The common bile duct measures  4 mm. The gallbladder is normal, without gallstones, wall thickening,  or pericholecystic fluid.    Dampened waveforms in the hepatic veins and portal veins. Hepatic  arterial, hepatic venous and portal venous waveforms are usual in  direction as documented by both color and spectral Doppler evaluation.  The visualized upper abdominal aorta and inferior vena cava are  normal.    The spleen measures maximally 11.0 cm and is normal in appearance. The  visualized portions of the pancreas are normal in echogenicity.    The visualized upper abdominal aorta and inferior vena cava are  normal.      The kidneys are normal in position and echogenicity. The right kidney  measures 12.4 cm and the left kidney measures 12.5 cm. There is no  significant urinary tract dilation.       Impression    IMPRESSION:    Hepatomegaly with diffusely increased hepatic echogenicity most  suspicious for steatosis. Patent Doppler evaluation of the liver, with  dampened hepatic venous and portal venous waveforms.    JHONY CASTELLON MD         SYSTEM ID:  [id number]       Discharge Medications   Discharge Medication List as of 11/3/2021 10:44 AM      CONTINUE these medications which have NOT CHANGED    Details   albuterol (PROAIR HFA/PROVENTIL HFA/VENTOLIN HFA) 108 (90 Base) MCG/ACT inhaler Inhale 2 puffs into the lungs every 6 hours as needed , HistoricalPharmacy may dispense brand covered by insurance (Proair, or proventil or ventolin or generic albuterol inhaler)      ARIPiprazole (ABILIFY) 2 MG tablet Take 1 tablet (2 mg) by mouth daily, Disp-30 tablet, R-0, E-Prescribe      B Complex Vitamins (VITAMIN B COMPLEX PO) Take by mouth daily -Take 1 tablet of unknown dose by mouth every morning, Historical      DULoxetine (CYMBALTA) 60 MG capsule Take 1 capsule (60 mg) by mouth daily, Disp-30 capsule, R-0, E-Prescribe      folic acid (FOLVITE) 1 MG tablet Take 1 tablet (1 mg) by mouth daily, Transitional      gabapentin (NEURONTIN) 300 MG capsule Take 300 mg by mouth 3 times daily, Historical      hydrOXYzine (ATARAX) 25 MG tablet Take 25-50 mg by mouth At Bedtime, Historical      mirtazapine (REMERON) 15 MG tablet Take 1 tablet (15 mg) by mouth At Bedtime, Disp-30 tablet, R-0, E-Prescribe      multivitamin w/minerals (THERA-VIT-M) tablet Take 1 tablet by mouth daily, Transitional      naltrexone (DEPADE/REVIA) 50 MG tablet Take 50 mg by mouth daily, Historical      thiamine (B-1) 100 MG tablet Take 1 tablet (100 mg) by mouth daily, Transitional      melatonin 3 MG tablet Take 3 mg by mouth nightly as needed for sleep, Historical      !! nicotine polacrilex (NICORETTE) 4 MG gum Place 1 each (4 mg) inside cheek every hour as needed for smoking cessation, Disp-220 each, R-1, E-Prescribelp delivery cinnamon flavor      !! nicotine polacrilex  (NICORETTE) 4 MG gum Place 1 each (4 mg) inside cheek every hour as needed for smoking cessation, Disp-220 each, R-1, E-PrescribeCinnamon flavor. LP delivery please       !! - Potential duplicate medications found. Please discuss with provider.      STOP taking these medications       EPINEPHrine (ANY BX GENERIC EQUIV) 0.3 MG/0.3ML injection 2-pack Comments:   Reason for Stopping:             Allergies   Allergies   Allergen Reactions     Banana      Mild throat irritation per pt     Chicken Allergy      Anaphalxis

## 2021-11-03 NOTE — PLAN OF CARE
Time: 2043-8206     Vitals: /82 (BP Location: Left arm, Patient Position: Sitting)   Pulse 99   Temp 98.8  F (37.1  C) (Oral)   Resp 16   Wt 104.1 kg (229 lb 6.4 oz)   SpO2 97%   BMI 32.92 kg/m      Pain: Denies pain  Activity: Independent  Neuro: A&Ox4. CIWA Score of 4 and 2 due to mild anxiety and slight temors. Pt stated anxiety is due to thinking of things that need to be done when discharged from hospital. No other changes noted in neuro assessment.   Respiratory: WDL on RA. Denies SOB, but has inhaler at bedside incase of asthma flare-up.   Cardiac: Intermittently tachycardic  GI/: Last BM 11/3. Ambulating to bathroom and voiding spontaniously   Endocrine:  and 129, no insulin given this AM due to order parameters not being met.   Diet:  Regular  Lines: R PIV SL     New changes this shift: Pt reported that he had a bloody nose this AM around 0630. Stated he has gotten nose bleeds in the past, but does not get them frequently. Will continue to monitor. Otherwise no other acute changes overnight.     Plan: To discharge once hepatitis improved.

## 2021-11-04 ENCOUNTER — PATIENT OUTREACH (OUTPATIENT)
Dept: CARE COORDINATION | Facility: CLINIC | Age: 36
End: 2021-11-04

## 2021-11-04 DIAGNOSIS — Z71.89 OTHER SPECIFIED COUNSELING: ICD-10-CM

## 2021-11-04 NOTE — PROGRESS NOTES
Clinic Care Coordination Contact  [facility]: Post-Discharge Note  SITUATION                                                      Admission:    Admission Date: 11/01/21   Reason for Admission: Hepatocellular injuryAlcoholic hepatitisAlcohol use disorderAlcohol withdrawalDepressionAnxiety  Discharge:   Discharge Date: 11/03/21  Discharge Diagnosis: Hepatocellular injuryAlcoholic hepatitisAlcohol use disorderAlcohol withdrawalDepressionAnxiety    BACKGROUND                                                      Xavier Odell is a 36 year old male admitted on 11/1/2021. He has a history of alcohol use disorder, history of GI bleed (2019), depression, and anxiety who was transferred from [facility] for worsening liver function tests.     # Hepatocellular injury pattern  # Alcohol withdrawal  # Alcohol use disorder  Suspect alcoholic hepatitis. , . T Bili 1.4, ALP WNL. Denies tylenol use, OTC herbal supplements, IVDU/illicit drug use or new medications in the past month. Utox negative on admission. COVID negative. Denies any abdominal pain. RUQ US in 5/2019 in care everywhere demonstrates hepatomegaly with hepatic steatosis. No clear e/o overt cirrhosis, however, question of L hepatic love surface nodularity is seen at that time. Abdominal exam benign today. Tylenol level added onto 10/31 labs was <2. However, repeat hepatic panel today notable for ALT 1307, AST 1500 and T bili 1.8. INR 1.31. No s/sx of hepatic encephalopathy, A&Ox3 on exam w/o asterixis. No history of heart failure. VSS stable w/ stable BP.  Worsening hepatitis- unlikely to be solely from alcohol. Patient denies ingestion of tylenol (last took years ago). Denies any other illicit drug use. GI hepatology consulted on transfer to [place]. Patient was given full doses of N-acetylcysteine. Had frequent MELD lab checks, patient's LFT elevation significantly improved with NAC administration. On the day of discharge, ALT/AST improved to  459/174.   - follow up with PCP within 1 week with CMP to ensure LFTs not worsening       ASSESSMENT      Enrollment  Primary Care Care Coordination Status: Not a Candidate    Discharge Assessment  How are you doing now that you are home?: Pt shares that he is doing well, and currently doing laundry. Pt shares that he has been in contact with [facility] and is expecting a call back from them. Pt feels that he has all needed resources at this time and Thanks SW for the call.  How are your symptoms? (Red Flag symptoms escalate to triage hotline per guidelines): Improved  Do you feel your condition is stable enough to be safe at home until your provider visit?: Yes  Does the patient have their discharge instructions? : Yes  Does the patient have questions regarding their discharge instructions? : No  Were you started on any new medications or were there changes to any of your previous medications? : No  Does the patient have all of their medications?: Yes  Do you have questions regarding any of your medications? : No  Do you have all of your needed medical supplies or equipment (DME)?  (i.e. oxygen tank, CPAP, cane, etc.): Yes  Discharge follow-up appointment scheduled within 14 calendar days? : Yes  Discharge Follow Up Appointment Date: 11/11/21  Discharge Follow Up Appointment Scheduled with?: Primary Care Provider    Post-op ([facility] CTA Only)  If the patient had a surgery or procedure, do they have any questions for a nurse?: No    Post-op (Clinicians Only)  Did the patient have surgery or a procedure: No  Fever: No  Chills: No        PLAN                                                      Outpatient Plan:  Follow up with primary care provider, [facility], within 7 days to follow up on your liver function tests with CMP     Follow up with outpatient alcohol treatment     Appointments on [place] and/or [facility] (with [facility] or [facility] provider or service). Call [phone] if you haven't heard regarding these  appointments within 7 days of discharge    No future appointments.      For any urgent concerns, please contact our 24 hour nurse triage line: [phone] ([phone])         JAIME Ayers

## 2021-11-11 ENCOUNTER — LAB REQUISITION (OUTPATIENT)
Dept: LAB | Facility: CLINIC | Age: 36
End: 2021-11-11
Payer: COMMERCIAL

## 2021-11-11 DIAGNOSIS — F10.21 ALCOHOL DEPENDENCE, IN REMISSION (H): ICD-10-CM

## 2021-11-11 LAB
ALBUMIN SERPL-MCNC: 3.5 G/DL (ref 3.4–5)
ALP SERPL-CCNC: 64 U/L (ref 40–150)
ALT SERPL W P-5'-P-CCNC: 174 U/L (ref 0–70)
ANION GAP SERPL CALCULATED.3IONS-SCNC: 5 MMOL/L (ref 3–14)
AST SERPL W P-5'-P-CCNC: 67 U/L (ref 0–45)
BASOPHILS # BLD AUTO: 0.1 10E3/UL (ref 0–0.2)
BASOPHILS NFR BLD AUTO: 1 %
BILIRUB SERPL-MCNC: 0.3 MG/DL (ref 0.2–1.3)
BUN SERPL-MCNC: 14 MG/DL (ref 7–30)
CALCIUM SERPL-MCNC: 9.4 MG/DL (ref 8.5–10.1)
CHLORIDE BLD-SCNC: 109 MMOL/L (ref 94–109)
CO2 SERPL-SCNC: 23 MMOL/L (ref 20–32)
CREAT SERPL-MCNC: 0.86 MG/DL (ref 0.66–1.25)
EOSINOPHIL # BLD AUTO: 0.2 10E3/UL (ref 0–0.7)
EOSINOPHIL NFR BLD AUTO: 3 %
ERYTHROCYTE [DISTWIDTH] IN BLOOD BY AUTOMATED COUNT: 12.7 % (ref 10–15)
GFR SERPL CREATININE-BSD FRML MDRD: >90 ML/MIN/1.73M2
GLUCOSE BLD-MCNC: 107 MG/DL (ref 70–99)
HCT VFR BLD AUTO: 47.1 % (ref 40–53)
HGB BLD-MCNC: 15.1 G/DL (ref 13.3–17.7)
IMM GRANULOCYTES # BLD: 0 10E3/UL
IMM GRANULOCYTES NFR BLD: 1 %
INR PPP: 0.93 (ref 0.85–1.15)
LYMPHOCYTES # BLD AUTO: 1.7 10E3/UL (ref 0.8–5.3)
LYMPHOCYTES NFR BLD AUTO: 21 %
MCH RBC QN AUTO: 29.8 PG (ref 26.5–33)
MCHC RBC AUTO-ENTMCNC: 32.1 G/DL (ref 31.5–36.5)
MCV RBC AUTO: 93 FL (ref 78–100)
MONOCYTES # BLD AUTO: 0.6 10E3/UL (ref 0–1.3)
MONOCYTES NFR BLD AUTO: 8 %
NEUTROPHILS # BLD AUTO: 5.4 10E3/UL (ref 1.6–8.3)
NEUTROPHILS NFR BLD AUTO: 66 %
NRBC # BLD AUTO: 0 10E3/UL
NRBC BLD AUTO-RTO: 0 /100
PLATELET # BLD AUTO: 363 10E3/UL (ref 150–450)
POTASSIUM BLD-SCNC: 4.6 MMOL/L (ref 3.4–5.3)
PROT SERPL-MCNC: 8.4 G/DL (ref 6.8–8.8)
RBC # BLD AUTO: 5.06 10E6/UL (ref 4.4–5.9)
SODIUM SERPL-SCNC: 137 MMOL/L (ref 133–144)
WBC # BLD AUTO: 8 10E3/UL (ref 4–11)

## 2021-11-11 PROCEDURE — 85025 COMPLETE CBC W/AUTO DIFF WBC: CPT | Mod: ORL | Performed by: NURSE PRACTITIONER

## 2021-11-11 PROCEDURE — 80053 COMPREHEN METABOLIC PANEL: CPT | Mod: ORL | Performed by: NURSE PRACTITIONER

## 2021-11-11 PROCEDURE — 85610 PROTHROMBIN TIME: CPT | Mod: ORL | Performed by: NURSE PRACTITIONER

## 2021-12-09 ENCOUNTER — LAB REQUISITION (OUTPATIENT)
Dept: LAB | Facility: CLINIC | Age: 36
End: 2021-12-09
Payer: COMMERCIAL

## 2021-12-09 DIAGNOSIS — F32.9 MAJOR DEPRESSIVE DISORDER, SINGLE EPISODE, UNSPECIFIED: ICD-10-CM

## 2021-12-09 LAB
ALBUMIN SERPL-MCNC: 3.8 G/DL (ref 3.4–5)
ALP SERPL-CCNC: 73 U/L (ref 40–150)
ALT SERPL W P-5'-P-CCNC: 41 U/L (ref 0–70)
AST SERPL W P-5'-P-CCNC: 23 U/L (ref 0–45)
BILIRUB DIRECT SERPL-MCNC: <0.1 MG/DL (ref 0–0.2)
BILIRUB SERPL-MCNC: 0.4 MG/DL (ref 0.2–1.3)
PROT SERPL-MCNC: 8.3 G/DL (ref 6.8–8.8)

## 2021-12-09 PROCEDURE — 80076 HEPATIC FUNCTION PANEL: CPT | Mod: ORL | Performed by: NURSE PRACTITIONER

## 2022-01-02 ENCOUNTER — HEALTH MAINTENANCE LETTER (OUTPATIENT)
Age: 37
End: 2022-01-02

## 2022-08-13 ENCOUNTER — HEALTH MAINTENANCE LETTER (OUTPATIENT)
Age: 37
End: 2022-08-13

## 2022-10-30 ENCOUNTER — HEALTH MAINTENANCE LETTER (OUTPATIENT)
Age: 37
End: 2022-10-30

## 2023-07-02 ENCOUNTER — HOSPITAL ENCOUNTER (INPATIENT)
Age: 38
End: 2023-07-02
Payer: COMMERCIAL

## 2023-07-02 ENCOUNTER — TELEPHONE (OUTPATIENT)
Dept: BEHAVIORAL HEALTH | Facility: CLINIC | Age: 38
End: 2023-07-02

## 2023-07-02 ENCOUNTER — HOSPITAL ENCOUNTER (INPATIENT)
Facility: CLINIC | Age: 38
LOS: 5 days | Discharge: PSYCHIATRIC OR SUBSTANCE USE FACILITY | DRG: 897 | End: 2023-07-07
Attending: FAMILY MEDICINE | Admitting: PSYCHIATRY & NEUROLOGY
Payer: COMMERCIAL

## 2023-07-02 DIAGNOSIS — F10.939 ALCOHOL WITHDRAWAL SYNDROME, WITH UNSPECIFIED COMPLICATION (H): Primary | ICD-10-CM

## 2023-07-02 DIAGNOSIS — F10.239 ALCOHOL DEPENDENCE WITH WITHDRAWAL WITH COMPLICATION (H): ICD-10-CM

## 2023-07-02 LAB
ALBUMIN SERPL BCG-MCNC: 4.3 G/DL (ref 3.5–5.2)
ALCOHOL BREATH TEST: 0.09 (ref 0–0.01)
ALP SERPL-CCNC: 88 U/L (ref 40–129)
ALT SERPL W P-5'-P-CCNC: 93 U/L (ref 0–70)
AMPHETAMINES UR QL SCN: NORMAL
ANION GAP SERPL CALCULATED.3IONS-SCNC: 25 MMOL/L (ref 7–15)
AST SERPL W P-5'-P-CCNC: 148 U/L (ref 0–45)
BARBITURATES UR QL SCN: NORMAL
BASOPHILS # BLD MANUAL: 0.1 10E3/UL (ref 0–0.2)
BASOPHILS NFR BLD MANUAL: 1 %
BENZODIAZ UR QL SCN: NORMAL
BILIRUB SERPL-MCNC: 0.6 MG/DL
BUN SERPL-MCNC: 11.9 MG/DL (ref 6–20)
BZE UR QL SCN: NORMAL
CALCIUM SERPL-MCNC: 8.8 MG/DL (ref 8.6–10)
CANNABINOIDS UR QL SCN: NORMAL
CHLORIDE SERPL-SCNC: 92 MMOL/L (ref 98–107)
CREAT SERPL-MCNC: 0.85 MG/DL (ref 0.67–1.17)
DEPRECATED HCO3 PLAS-SCNC: 16 MMOL/L (ref 22–29)
EOSINOPHIL # BLD MANUAL: 0 10E3/UL (ref 0–0.7)
EOSINOPHIL NFR BLD MANUAL: 0 %
ERYTHROCYTE [DISTWIDTH] IN BLOOD BY AUTOMATED COUNT: 12.2 % (ref 10–15)
GFR SERPL CREATININE-BSD FRML MDRD: >90 ML/MIN/1.73M2
GLUCOSE SERPL-MCNC: 215 MG/DL (ref 70–99)
HCT VFR BLD AUTO: 47.5 % (ref 40–53)
HGB BLD-MCNC: 16 G/DL (ref 13.3–17.7)
HOLD SPECIMEN: NORMAL
LIPASE SERPL-CCNC: 36 U/L (ref 13–60)
LYMPHOCYTES # BLD MANUAL: 0.6 10E3/UL (ref 0.8–5.3)
LYMPHOCYTES NFR BLD MANUAL: 6 %
MAGNESIUM SERPL-MCNC: 1.7 MG/DL (ref 1.7–2.3)
MCH RBC QN AUTO: 29.5 PG (ref 26.5–33)
MCHC RBC AUTO-ENTMCNC: 33.7 G/DL (ref 31.5–36.5)
MCV RBC AUTO: 88 FL (ref 78–100)
MONOCYTES # BLD MANUAL: 0.2 10E3/UL (ref 0–1.3)
MONOCYTES NFR BLD MANUAL: 2 %
NEUTROPHILS # BLD MANUAL: 9.6 10E3/UL (ref 1.6–8.3)
NEUTROPHILS NFR BLD MANUAL: 91 %
OPIATES UR QL SCN: NORMAL
PLAT MORPH BLD: ABNORMAL
PLATELET # BLD AUTO: 317 10E3/UL (ref 150–450)
POTASSIUM SERPL-SCNC: 4 MMOL/L (ref 3.4–5.3)
PROT SERPL-MCNC: 7.7 G/DL (ref 6.4–8.3)
RBC # BLD AUTO: 5.42 10E6/UL (ref 4.4–5.9)
RBC MORPH BLD: ABNORMAL
SODIUM SERPL-SCNC: 133 MMOL/L (ref 136–145)
WBC # BLD AUTO: 10.5 10E3/UL (ref 4–11)

## 2023-07-02 PROCEDURE — 250N000011 HC RX IP 250 OP 636: Mod: JZ | Performed by: FAMILY MEDICINE

## 2023-07-02 PROCEDURE — 83735 ASSAY OF MAGNESIUM: CPT | Performed by: FAMILY MEDICINE

## 2023-07-02 PROCEDURE — 83690 ASSAY OF LIPASE: CPT | Performed by: FAMILY MEDICINE

## 2023-07-02 PROCEDURE — 80307 DRUG TEST PRSMV CHEM ANLYZR: CPT | Performed by: FAMILY MEDICINE

## 2023-07-02 PROCEDURE — 36415 COLL VENOUS BLD VENIPUNCTURE: CPT | Performed by: FAMILY MEDICINE

## 2023-07-02 PROCEDURE — 99285 EMERGENCY DEPT VISIT HI MDM: CPT | Performed by: FAMILY MEDICINE

## 2023-07-02 PROCEDURE — 85027 COMPLETE CBC AUTOMATED: CPT | Performed by: FAMILY MEDICINE

## 2023-07-02 PROCEDURE — 128N000004 HC R&B CD ADULT

## 2023-07-02 PROCEDURE — 250N000013 HC RX MED GY IP 250 OP 250 PS 637: Performed by: PSYCHIATRY & NEUROLOGY

## 2023-07-02 PROCEDURE — 96361 HYDRATE IV INFUSION ADD-ON: CPT | Performed by: FAMILY MEDICINE

## 2023-07-02 PROCEDURE — 85007 BL SMEAR W/DIFF WBC COUNT: CPT | Performed by: FAMILY MEDICINE

## 2023-07-02 PROCEDURE — 96374 THER/PROPH/DIAG INJ IV PUSH: CPT | Performed by: FAMILY MEDICINE

## 2023-07-02 PROCEDURE — 258N000003 HC RX IP 258 OP 636: Performed by: FAMILY MEDICINE

## 2023-07-02 PROCEDURE — 80053 COMPREHEN METABOLIC PANEL: CPT | Performed by: FAMILY MEDICINE

## 2023-07-02 PROCEDURE — HZ2ZZZZ DETOXIFICATION SERVICES FOR SUBSTANCE ABUSE TREATMENT: ICD-10-PCS | Performed by: FAMILY MEDICINE

## 2023-07-02 PROCEDURE — 250N000013 HC RX MED GY IP 250 OP 250 PS 637: Performed by: EMERGENCY MEDICINE

## 2023-07-02 PROCEDURE — 99285 EMERGENCY DEPT VISIT HI MDM: CPT | Mod: 25 | Performed by: FAMILY MEDICINE

## 2023-07-02 RX ORDER — FOLIC ACID 1 MG/1
1 TABLET ORAL DAILY
Status: DISCONTINUED | OUTPATIENT
Start: 2023-07-02 | End: 2023-07-07 | Stop reason: HOSPADM

## 2023-07-02 RX ORDER — MIRTAZAPINE 15 MG/1
15 TABLET, FILM COATED ORAL AT BEDTIME
Status: DISCONTINUED | OUTPATIENT
Start: 2023-07-02 | End: 2023-07-07 | Stop reason: HOSPADM

## 2023-07-02 RX ORDER — DIAZEPAM 5 MG
5-20 TABLET ORAL EVERY 30 MIN PRN
Status: DISCONTINUED | OUTPATIENT
Start: 2023-07-02 | End: 2023-07-07 | Stop reason: HOSPADM

## 2023-07-02 RX ORDER — LOPERAMIDE HCL 2 MG
2 CAPSULE ORAL 4 TIMES DAILY PRN
Status: DISCONTINUED | OUTPATIENT
Start: 2023-07-02 | End: 2023-07-07 | Stop reason: HOSPADM

## 2023-07-02 RX ORDER — TRAZODONE HYDROCHLORIDE 50 MG/1
50 TABLET, FILM COATED ORAL
Status: DISCONTINUED | OUTPATIENT
Start: 2023-07-02 | End: 2023-07-07 | Stop reason: HOSPADM

## 2023-07-02 RX ORDER — ONDANSETRON 2 MG/ML
4 INJECTION INTRAMUSCULAR; INTRAVENOUS EVERY 30 MIN PRN
Status: DISCONTINUED | OUTPATIENT
Start: 2023-07-02 | End: 2023-07-03

## 2023-07-02 RX ORDER — HYDROXYZINE HYDROCHLORIDE 25 MG/1
25 TABLET, FILM COATED ORAL EVERY 4 HOURS PRN
Status: DISCONTINUED | OUTPATIENT
Start: 2023-07-02 | End: 2023-07-03

## 2023-07-02 RX ORDER — AMOXICILLIN 250 MG
1 CAPSULE ORAL 2 TIMES DAILY PRN
Status: DISCONTINUED | OUTPATIENT
Start: 2023-07-02 | End: 2023-07-07 | Stop reason: HOSPADM

## 2023-07-02 RX ORDER — MAGNESIUM HYDROXIDE/ALUMINUM HYDROXICE/SIMETHICONE 120; 1200; 1200 MG/30ML; MG/30ML; MG/30ML
30 SUSPENSION ORAL EVERY 4 HOURS PRN
Status: DISCONTINUED | OUTPATIENT
Start: 2023-07-02 | End: 2023-07-07 | Stop reason: HOSPADM

## 2023-07-02 RX ORDER — ONDANSETRON 4 MG/1
4 TABLET, ORALLY DISINTEGRATING ORAL EVERY 6 HOURS PRN
Status: DISCONTINUED | OUTPATIENT
Start: 2023-07-02 | End: 2023-07-07 | Stop reason: HOSPADM

## 2023-07-02 RX ORDER — DIAZEPAM 5 MG
5-20 TABLET ORAL EVERY 30 MIN PRN
Status: DISCONTINUED | OUTPATIENT
Start: 2023-07-02 | End: 2023-07-02

## 2023-07-02 RX ORDER — CLONIDINE HYDROCHLORIDE 0.1 MG/1
0.1 TABLET ORAL EVERY 6 HOURS PRN
Status: DISCONTINUED | OUTPATIENT
Start: 2023-07-02 | End: 2023-07-07 | Stop reason: HOSPADM

## 2023-07-02 RX ORDER — GABAPENTIN 300 MG/1
300 CAPSULE ORAL 3 TIMES DAILY
Status: DISCONTINUED | OUTPATIENT
Start: 2023-07-03 | End: 2023-07-07 | Stop reason: HOSPADM

## 2023-07-02 RX ORDER — ACETAMINOPHEN 325 MG/1
650 TABLET ORAL EVERY 4 HOURS PRN
Status: DISCONTINUED | OUTPATIENT
Start: 2023-07-02 | End: 2023-07-07 | Stop reason: HOSPADM

## 2023-07-02 RX ORDER — MULTIPLE VITAMINS W/ MINERALS TAB 9MG-400MCG
1 TAB ORAL DAILY
Status: DISCONTINUED | OUTPATIENT
Start: 2023-07-02 | End: 2023-07-07 | Stop reason: HOSPADM

## 2023-07-02 RX ORDER — ALBUTEROL SULFATE 90 UG/1
2 AEROSOL, METERED RESPIRATORY (INHALATION) EVERY 6 HOURS PRN
Status: DISCONTINUED | OUTPATIENT
Start: 2023-07-02 | End: 2023-07-07 | Stop reason: HOSPADM

## 2023-07-02 RX ADMIN — DIAZEPAM 5 MG: 5 TABLET ORAL at 11:36

## 2023-07-02 RX ADMIN — CLONIDINE HYDROCHLORIDE 0.1 MG: 0.1 TABLET ORAL at 18:40

## 2023-07-02 RX ADMIN — MIRTAZAPINE 15 MG: 15 TABLET, FILM COATED ORAL at 20:26

## 2023-07-02 RX ADMIN — DIAZEPAM 10 MG: 5 TABLET ORAL at 15:55

## 2023-07-02 RX ADMIN — NICOTINE POLACRILEX 4 MG: 4 GUM, CHEWING BUCCAL at 18:40

## 2023-07-02 RX ADMIN — SODIUM CHLORIDE 1000 ML: 9 INJECTION, SOLUTION INTRAVENOUS at 12:52

## 2023-07-02 RX ADMIN — ONDANSETRON 4 MG: 2 INJECTION INTRAMUSCULAR; INTRAVENOUS at 11:28

## 2023-07-02 RX ADMIN — DIAZEPAM 10 MG: 5 TABLET ORAL at 10:03

## 2023-07-02 RX ADMIN — ALBUTEROL SULFATE 2 PUFF: 90 AEROSOL, METERED RESPIRATORY (INHALATION) at 20:06

## 2023-07-02 RX ADMIN — DIAZEPAM 10 MG: 5 TABLET ORAL at 17:59

## 2023-07-02 RX ADMIN — SODIUM CHLORIDE 1000 ML: 9 INJECTION, SOLUTION INTRAVENOUS at 11:28

## 2023-07-02 RX ADMIN — DIAZEPAM 10 MG: 5 TABLET ORAL at 20:13

## 2023-07-02 RX ADMIN — DIAZEPAM 10 MG: 5 TABLET ORAL at 12:52

## 2023-07-02 RX ADMIN — DIAZEPAM 10 MG: 5 TABLET ORAL at 10:43

## 2023-07-02 ASSESSMENT — VISUAL ACUITY: OU: 1

## 2023-07-02 ASSESSMENT — ACTIVITIES OF DAILY LIVING (ADL)
ADLS_ACUITY_SCORE: 40
ORAL_HYGIENE: INDEPENDENT
ADLS_ACUITY_SCORE: 37
DRESS: INDEPENDENT
ADLS_ACUITY_SCORE: 37
HYGIENE/GROOMING: INDEPENDENT
ADLS_ACUITY_SCORE: 37
ADLS_ACUITY_SCORE: 37
ADLS_ACUITY_SCORE: 40
ADLS_ACUITY_SCORE: 40

## 2023-07-02 ASSESSMENT — LIFESTYLE VARIABLES
AUDIT-C TOTAL SCORE: 12
SKIP TO QUESTIONS 9-10: 0

## 2023-07-02 NOTE — PHARMACY-ADMISSION MEDICATION HISTORY
Pharmacy Intern Admission Medication History    Admission medication history is complete. The information provided in this note is only as accurate as the sources available at the time of the update.    Medication reconciliation/reorder completed by provider prior to medication history? No    Information Source(s): Patient and dispense report via in-person    Pertinent Information: Dispense report only showed gabapentin (1/11/23) and mirtazapine (12/10/22) as being filled in the last year. Patient reports not taking meds for about a year.  While discussing his previous medications, patient was interested in restarting some of his past medications: Gabapentin, Mirtazapine, and Naltrexone.   Patient reported needing his inhaler and that he had not taken it for a long time.    Changes made to PTA medication list:    Added: None    Deleted: all per patient and validated by dispense report  o aripiprazole 2 mg tablet, take 1 tablet PO once daily. (patient reported having undesired side effects)  o Vitamin B-1 100 mg tablet, take 1 tablet PO once daily   o Duloxetine 60 mg capsule, take 1 capsule PO once daily  o Folic acid, take 1 tablet PO once daily  o Gabapentin 300 mg capsule, take 1 capsule PO TID  o Hydroxyzine 25 mg tablet, take 25-50 mg PO HS  o Melatonin 3 mg tablet, take 1 tablet PO HS PRN sleep  o Mirtazapine 15 mg tablet, take 1 tablet PO HS  o multivitamin w/ minerals, take 1 tablet PO once daily  o Naltrexone 50 mg tablet, take 1 tablet PO once daily  o Nicotine gum 4 mg, place 1 gum inside cheek every hour PRN smoking cessation    Changed: None    Medication Affordability: did not discuss  Allergies reviewed with patient and updates made in EHR: yes     Medication History Completed By: Terry Abbasi 7/2/2023 3:37 PM    Prior to Admission medications    Medication Sig Last Dose Taking? Auth Provider Long Term End Date   albuterol (PROAIR HFA/PROVENTIL HFA/VENTOLIN HFA) 108 (90 Base) MCG/ACT inhaler Inhale 2  puffs into the lungs every 6 hours as needed  More than a month at unknown Yes Unknown, Entered By History Yes

## 2023-07-02 NOTE — ED NOTES
ED to Behavioral Floor Handoff    SITUATION  Xavier Odell is a 38 year old male who speaks English and lives in a home unknown The patient arrived in the ED by private car from emergency room with a complaint of Alcohol Problem  .The patient's current symptoms started/worsened 2 week(s) ago and during this time the symptoms have increased.   In the ED, pt was diagnosed with   Final diagnoses:   Alcohol dependence with withdrawal with complication (H)        Initial vitals were: BP: 126/84  Pulse: (!) 124  Temp: 98.6  F (37  C)  Resp: 16  SpO2: 97 %   --------  Is the patient diabetic? No   If yes, last blood glucose? --     If yes, was this treated in the ED? --  --------  Is the patient inebriated (ETOH) Yes or Impaired on other substances? No  MSSA done? Yes  Last MSSA score: --    Were withdrawal symptoms treated? Yes  Does the patient have a seizure history? No. If yes, date of most recent seizure--  --------  Is the patient patient experiencing suicidal ideation? denies current or recent suicidal ideation     Homicidal ideation? denies current or recent homicidal ideation or behaviors.    Self-injurious behavior/urges? denies current or recent self injurious behavior or ideation.  ------  Was pt aggressive in the ED No  Was a code called No  Is the pt now cooperative? Yes  -------  Meds given in ED:   Medications   diazepam (VALIUM) tablet 5-20 mg (10 mg Oral $Given 7/2/23 1555)   ondansetron (ZOFRAN) injection 4 mg (4 mg Intravenous $Given 7/2/23 1128)   0.9% sodium chloride BOLUS (0 mLs Intravenous Stopped 7/2/23 1242)   0.9% sodium chloride BOLUS (0 mLs Intravenous Stopped 7/2/23 1439)      Family present during ED course? No  Family currently present? No    BACKGROUND  Does the patient have a cognitive impairment or developmental disability? No  Allergies:   Allergies   Allergen Reactions    Banana      Mild throat irritation per pt    Chicken Allergy      Anaphalxis    Peanut (Diagnostic)      Pt  reports does not have allergy to this.  Patient today, 3/6/2021, patient confirms no serious aversion to peanuts.  Therefore, no removal of peanut products from garcia.  Anaphalxis  Anaphalxis   .   Social demographics are   Social History     Socioeconomic History    Marital status: Single     Spouse name: None    Number of children: None    Years of education: None    Highest education level: None   Tobacco Use    Smoking status: Never    Smokeless tobacco: Never   Substance and Sexual Activity    Alcohol use: Yes     Comment: Drinking cooking wine and mouthwash    Drug use: Not Currently        ASSESSMENT  Labs results   Labs Ordered and Resulted from Time of ED Arrival to Time of ED Departure   COMPREHENSIVE METABOLIC PANEL - Abnormal       Result Value    Sodium 133 (*)     Potassium 4.0      Chloride 92 (*)     Carbon Dioxide (CO2) 16 (*)     Anion Gap 25 (*)     Urea Nitrogen 11.9      Creatinine 0.85      Calcium 8.8      Glucose 215 (*)     Alkaline Phosphatase 88       (*)     ALT 93 (*)     Protein Total 7.7      Albumin 4.3      Bilirubin Total 0.6      GFR Estimate >90     DIFFERENTIAL - Abnormal    % Neutrophils 91      % Lymphocytes 6      % Monocytes 2      % Eosinophils 0      % Basophils 1      Absolute Neutrophils 9.6 (*)     Absolute Lymphocytes 0.6 (*)     Absolute Monocytes 0.2      Absolute Eosinophils 0.0      Absolute Basophils 0.1      RBC Morphology Confirmed RBC Indices      Platelet Assessment        Value: Automated Count Confirmed. Platelet morphology is normal.   ALCOHOL BREATH TEST POCT - Abnormal    Alcohol Breath Test 0.087 (*)    LIPASE - Normal    Lipase 36     MAGNESIUM - Normal    Magnesium 1.7     CBC WITH PLATELETS AND DIFFERENTIAL - Normal    WBC Count 10.5      RBC Count 5.42      Hemoglobin 16.0      Hematocrit 47.5      MCV 88      MCH 29.5      MCHC 33.7      RDW 12.2      Platelet Count 317     DRUG ABUSE SCREEN 1 URINE (ED) - Normal    Amphetamines Urine Screen  Negative      Barbituates Urine Screen Negative      Benzodiazepine Urine Screen Negative      Cannabinoids Urine Screen Negative      Cocaine Urine Screen Negative      Opiates Urine Screen Negative        Imaging Studies: No results found for this or any previous visit (from the past 24 hour(s)).   Most recent vital signs /82   Pulse 119   Temp 98.2  F (36.8  C) (Oral)   Resp 16   SpO2 100%    Abnormal labs/tests/findings requiring intervention:---   Pain control: good  Nausea control: good    RECOMMENDATION  Are any infection precautions needed (MRSA, VRE, etc.)? No If yes, what infection? --  ---  Does the patient have mobility issues? independently. If yes, what device does the pt use? ---  ---  Is patient on 72 hour hold or commitment? No If on 72 hour hold, have hold and rights been given to patient? N/A  Are admitting orders written if after 10 p.m. ?N/A  Tasks needing to be completed:---     Jeff Freeman RN   [facility]--    [phone] [facility] [facility]   [phone] [facility]

## 2023-07-02 NOTE — ED TRIAGE NOTES
Pt seeking detox for alcohol 1 week binge 1.75 daily for last day. Last drink last night sometime. Pt denies seizures but reports HA. Alert/oriented in triage.      Triage Assessment     Row Name 07/02/23 0944       Triage Assessment (Adult)    Airway WDL WDL       Respiratory WDL    Respiratory WDL WDL       Skin Circulation/Temperature WDL    Skin Circulation/Temperature WDL X;temperature  diaphoretic    Skin Temperature warm       Cardiac WDL    Cardiac WDL X;rhythm    Pulse Rate & Regularity tachycardic       Peripheral/Neurovascular WDL    Peripheral Neurovascular WDL WDL       Cognitive/Neuro/Behavioral WDL    Cognitive/Neuro/Behavioral WDL WDL

## 2023-07-02 NOTE — PROGRESS NOTES
07/02/23 1729   Patient Belongings   Did you bring any home meds/supplements to the hospital?  No   Patient Belongings locker   Patient Belongings Put in Hospital Secure Location (Security or Locker, etc.) cell phone/electronics;keys   Belongings Search Yes   Clothing Search Yes   Second Staff Vignesh and Dinesh RN   Comment Items searched, all belongings in med room bin. Clothes stayed with patient     Med Room Bin:   Keys, phone , cell-phone     Security: [id number]  Passport     A               Admission:  I am responsible for any personal items that are not sent to the safe or pharmacy.  [facility] is not responsible for loss, theft or damage of any property in my possession.    Signature:  _________________________________ Date: _______  Time: _____                                              Staff Signature:  ____________________________ Date: ________  Time: _____      2nd Staff person, if patient is unable/unwilling to sign:    Signature: ________________________________ Date: ________  Time: _____     Discharge:  [facility] has returned all of my personal belongings:    Signature: _________________________________ Date: ________  Time: _____                                          Staff Signature:  ____________________________ Date: ________  Time: _____

## 2023-07-02 NOTE — TELEPHONE ENCOUNTER
S: [facility] , Provider haseeb Crawford student in the ED,  calling at 11:30 am with clinical on a 38 year old/Male presenting for alcohol detox.     B: Pt presents for ETOH detox.   Currently reports drinking about 5 seltzers a day for about 2 months but over the past week drinking 1-1.75 liters per day.  Patient reports last use was at about midnight.  Pt breath: 0.087  Pt  endorses hx of DT's 2-3 yrs ago.  Pt  denies hx of seizures. Last seizure: N/A  Pt endorsing the following symptoms of withdrawal: Tremulous, sweaty, headache, sensitive to light, nausea  MSSA Score: she will ask the RN to call w/ this. He received Valium in the ED. Per RN, MSSA score is 10.     Pt denies acute mental health or medical concerns.   Pt denies other drug use: None Amount/frequency: N/A    Does Pt have a detox care plan in [facility]? no  Does pt present with specific needs, assistive devices, or exclusionary criteria? None  Is the patient ambulating, eating and drinking in the ED? Yes, yes, yes    A: Pt meets criteria to be presented for IP detox admission. Patient is voluntary    COVID Symptoms: No  If yes, COVID test required   Utox: Ordered, not yet collected  CMP: Abnormalities: sodium 133, chloride 92, carbon dioxide 16, anion gap 25, glucose 215, , ALT 93  CBC: In process; WNL  HCG: N/A     R: Patient cleared and ready for behavioral bed placement: Yes    Pt is meeting criteria for presentation to 3A/CD     #3a west/straight cd/ [facility]; unit informed at 11:58 am, er informed at 12:04 pm                  aron

## 2023-07-02 NOTE — ED PROVIDER NOTES
"ED Provider Note  [facility]      History     Chief Complaint   Patient presents with     Alcohol Problem     HPI  Xavier Odell is a 38 year old male with a history of alcohol dependence and pancreatitis in 2018 presenting to the ED on 7/2/2023 for alcohol withdrawal symptoms. He typically drinks 5 seltzers daily but reports drinking 1-1.75L of hard alcohol daily for the past week. He was in [place] and returned on Thursday evening (6/29/2023). His last alcohol consumption was 00:00. He is nauseous (vomited x1 yesterday), tremulous, diaphoretic, and has a headache with some visual hallucinations of bright lights/\"floaters.\" No history of A/V hallucinations when he is not in withdrawal, does have a history of DTs 2-3 years ago.       Past Medical History  Past Medical History:   Diagnosis Date     GI bleed      Substance abuse (H)      Uncomplicated asthma      Past Surgical History:   Procedure Laterality Date     ORTHOPEDIC SURGERY       albuterol (PROAIR HFA/PROVENTIL HFA/VENTOLIN HFA) 108 (90 Base) MCG/ACT inhaler  ARIPiprazole (ABILIFY) 2 MG tablet  B Complex Vitamins (VITAMIN B COMPLEX PO)  DULoxetine (CYMBALTA) 60 MG capsule  folic acid (FOLVITE) 1 MG tablet  gabapentin (NEURONTIN) 300 MG capsule  hydrOXYzine (ATARAX) 25 MG tablet  melatonin 3 MG tablet  mirtazapine (REMERON) 15 MG tablet  multivitamin w/minerals (THERA-VIT-M) tablet  naltrexone (DEPADE/REVIA) 50 MG tablet  nicotine polacrilex (NICORETTE) 4 MG gum  nicotine polacrilex (NICORETTE) 4 MG gum  thiamine (B-1) 100 MG tablet       Allergies   Allergen Reactions     Banana      Mild throat irritation per pt     Chicken Allergy      Anaphalxis     Peanut (Diagnostic)      Pt reports does not have allergy to this.  Patient today, 3/6/2021, patient confirms no serious aversion to peanuts.  Therefore, no removal of peanut products from garcia.  Anaphalxis  Anaphalxis     Family History  Family History   Problem Relation Age of " Onset     Depression Mother      Depression Father      Substance Abuse Father      Social History   Social History     Tobacco Use     Smoking status: Never     Smokeless tobacco: Never   Substance Use Topics     Alcohol use: Yes     Comment: Drinking cooking wine and mouthwash     Drug use: Not Currently         A medically appropriate review of systems was performed with pertinent positives and negatives noted in the HPI, and all other systems negative.    Physical Exam   BP: 126/84  Pulse: (!) 124  Temp: 98.6  F (37  C)  Resp: 16  SpO2: 97 %  Physical Exam  Constitutional:       Appearance: He is diaphoretic.   HENT:      Head: Normocephalic and atraumatic.      Mouth/Throat:      Mouth: Mucous membranes are dry.   Eyes:      General: Vision grossly intact. Gaze aligned appropriately. No scleral icterus.     Extraocular Movements: Extraocular movements intact.      Conjunctiva/sclera:      Right eye: Right conjunctiva is injected.      Left eye: Left conjunctiva is injected.   Cardiovascular:      Rate and Rhythm: Regular rhythm. Tachycardia present.   Pulmonary:      Effort: Pulmonary effort is normal.      Breath sounds: Normal breath sounds.   Abdominal:      Palpations: Abdomen is soft. There is no shifting dullness or fluid wave.      Tenderness: There is no abdominal tenderness.   Skin:     General: Skin is warm.      Coloration: Skin is not jaundiced.   Neurological:      General: No focal deficit present.      Motor: Tremor present. No seizure activity.   Psychiatric:         Attention and Perception: Attention normal.         Speech: Speech is delayed.           ED Course, Procedures, & Data      Procedures                      Results for orders placed or performed during the hospital encounter of 07/02/23   [place] Draw     Status: None    Narrative    The following orders were created for panel order [place] Draw.  Procedure                               Abnormality         Status                      ---------                               -----------         ------                     Extra Blue Top Tube[[id number]]                              Final result               Extra Red Top Tube[[id number]]                               Final result               Extra Green Top (Lithium...[[id number]]                      Final result               Extra Purple Top Tube[[id number]]                            Final result                 Please view results for these tests on the individual orders.   Extra Blue Top Tube     Status: None   Result Value Ref Range    Hold Specimen JIC    Extra Red Top Tube     Status: None   Result Value Ref Range    Hold Specimen JIC    Extra Green Top (Lithium Heparin) Tube     Status: None   Result Value Ref Range    Hold Specimen JIC    Extra Purple Top Tube     Status: None   Result Value Ref Range    Hold Specimen JIC    Lipase     Status: Normal   Result Value Ref Range    Lipase 36 13 - 60 U/L   Comprehensive metabolic panel     Status: Abnormal   Result Value Ref Range    Sodium 133 (L) 136 - 145 mmol/L    Potassium 4.0 3.4 - 5.3 mmol/L    Chloride 92 (L) 98 - 107 mmol/L    Carbon Dioxide (CO2) 16 (L) 22 - 29 mmol/L    Anion Gap 25 (H) 7 - 15 mmol/L    Urea Nitrogen 11.9 6.0 - 20.0 mg/dL    Creatinine 0.85 0.67 - 1.17 mg/dL    Calcium 8.8 8.6 - 10.0 mg/dL    Glucose 215 (H) 70 - 99 mg/dL    Alkaline Phosphatase 88 40 - 129 U/L     (H) 0 - 45 U/L    ALT 93 (H) 0 - 70 U/L    Protein Total 7.7 6.4 - 8.3 g/dL    Albumin 4.3 3.5 - 5.2 g/dL    Bilirubin Total 0.6 <=1.2 mg/dL    GFR Estimate >90 >60 mL/min/1.73m2   Magnesium     Status: Normal   Result Value Ref Range    Magnesium 1.7 1.7 - 2.3 mg/dL   CBC with platelets and differential     Status: Normal (Preliminary result)   Result Value Ref Range    WBC Count 10.5 4.0 - 11.0 10e3/uL    RBC Count 5.42 4.40 - 5.90 10e6/uL    Hemoglobin 16.0 13.3 - 17.7 g/dL    Hematocrit 47.5 40.0 - 53.0 %    MCV 88 78 - 100 fL    MCH 29.5  26.5 - 33.0 pg    MCHC 33.7 31.5 - 36.5 g/dL    RDW 12.2 10.0 - 15.0 %    Platelet Count 317 150 - 450 10e3/uL   Alcohol breath test POCT     Status: Abnormal   Result Value Ref Range    Alcohol Breath Test 0.087 (A) 0.00 - 0.01   CBC with platelets differential     Status: Normal (In process)    Narrative    The following orders were created for panel order CBC with platelets differential.  Procedure                               Abnormality         Status                     ---------                               -----------         ------                     CBC with platelets and d...[[id number]]  Normal              Preliminary result           Please view results for these tests on the individual orders.     Medications   diazepam (VALIUM) tablet 5-20 mg (5 mg Oral $Given 7/2/23 1136)   0.9% sodium chloride BOLUS (1,000 mLs Intravenous $New Bag 7/2/23 1128)   ondansetron (ZOFRAN) injection 4 mg (4 mg Intravenous $Given 7/2/23 1128)     Labs Ordered and Resulted from Time of ED Arrival to Time of ED Departure   COMPREHENSIVE METABOLIC PANEL - Abnormal       Result Value    Sodium 133 (*)     Potassium 4.0      Chloride 92 (*)     Carbon Dioxide (CO2) 16 (*)     Anion Gap 25 (*)     Urea Nitrogen 11.9      Creatinine 0.85      Calcium 8.8      Glucose 215 (*)     Alkaline Phosphatase 88       (*)     ALT 93 (*)     Protein Total 7.7      Albumin 4.3      Bilirubin Total 0.6      GFR Estimate >90     ALCOHOL BREATH TEST POCT - Abnormal    Alcohol Breath Test 0.087 (*)    LIPASE - Normal    Lipase 36     MAGNESIUM - Normal    Magnesium 1.7     CBC WITH PLATELETS AND DIFFERENTIAL - Normal    WBC Count 10.5      RBC Count 5.42      Hemoglobin 16.0      Hematocrit 47.5      MCV 88      MCH 29.5      MCHC 33.7      RDW 12.2      Platelet Count 317       No orders to display              Assessment & Plan    Xavier Odell is a 38 year old male with a history of alcohol dependence complicated by prior gastric  ulcer with GI bleed (2019) and pancreatitis (2018) presenting to the ED for alcohol withdrawal symptoms of tremor, diaphoresis, headache and nausea after 1 week of heavy alcohol consumption ~1.75L per day. Alcohol breath test was 0.087. He has no history of withdrawal seizures and no present symptoms concerning for seizure, does have a history of DTs 2-3 years ago. Headache is mild and diffuse with no alarm symptoms, there are no focal neurologic symptoms. Given active withdrawal symptoms he received Valium 10 mg, Zofran, and a 1L NS 0.9% bolus. Labs were notable for anion gap metabolic acidosis and mild tranaminitis (, ALT 98), consistent with heavy alcohol consumption and alcohol withdrawal. Lipase was within normal limits. He is able to ambulate and tolerate PO intake, and is medically cleared for transfer to detox.     I have reviewed the nursing notes. I have reviewed the findings, diagnosis, plan and need for follow up with the patient.    New Prescriptions    No medications on file       Final diagnoses:   Alcohol dependence with withdrawal with complication (H)     Yvette Bruce, MS4  --    ED Attending Physician Attestation    I Terry Aponte MD, cared for this patient with the Medical Student. I performed, or re-performed, the physical exam and medical decision-making. I have verified the accuracy of all the medical student findings and documentation above, and have edited as necessary.    Summary of HPI, PE, ED Course   Patient is a 38 year old male evaluated in the emergency department for alcohol dependence, history of complicated alcohol withdrawal, patient seeking assistance with detox.  Presents having relapsed for several months and specifically in the last week drinking up to 1.75 L of hard alcohol per day.  Attempted to detox himself at home unsuccessfully and presents in active alcohol withdrawal.  He has a remote history of DTs but no history of seizures.. Exam and ED course notable for  patient tachycardic and tremulous with elevated MSSA score, normal mental status, no signs of hallucinosis or psychosis, physical exam otherwise unremarkable.  Patient's labs show transaminitis and evidence of anion gap consistent with acute and chronic alcoholism.  He was treated with IV fluids, he received Valium for MSSA protocol.  We will continue to monitor and treat while he is held in the ED, he appears to be an appropriate candidate for voluntary detox admission.  He is managing his own ADLs and able to take p.o. without difficulty.. After the completion of care in the emergency department, the patient was admitted to inpatient.    Critical Care & Procedures  Not applicable.        Medical Decision Making  The patient's presentation was of high complexity (a chronic illness severe exacerbation, progression, or side effect of treatment).    The patient's evaluation involved:  review of external note(s) from 1 sources (Reviewed discharge summary dated November 2021)  ordering and/or review of 3+ test(s) in this encounter (see separate area of note for details)  review of 3+ test result(s) ordered prior to this encounter (Prior labs reviewed for comparison to today)    The patient's management necessitated moderate risk (prescription drug management including medications given in the ED) and high risk (a decision regarding hospitalization).          Terry Aponte MD  Emergency Medicine     Terry Aponte MD  [facility] EMERGENCY DEPARTMENT  7/2/2023     Terry Aponte MD  07/02/23 7402

## 2023-07-02 NOTE — PLAN OF CARE
Problem: Substance Withdrawal  Goal: Substance Withdrawal  Description: Signs and symptoms of listed problems will be absent or manageable.  Outcome: Progressing   Goal Outcome Evaluation:    Plan of Care Reviewed With: patient        DORA Odell is a 38 year old year old male with a chief complaint of Alcohol Problem    S = Situation:   Admit  B  = Background:   Pt admitted for alcohol withdrawal.  Pt reports drinking 1.75 liters of hard liquor daily for the last week.  He had been drinking  5-6 seltzers a day before that time.  Pt reports that he hasn't eaten in a week---no solid food, alcohol only.  Pt has been unable to eat due to nausea and lack of appetite.  Pt contacted his employer and they will pay him while he goes to treatment--so he is going to treatment.  A  =  Assessment:   Vital Signs: BP (!) 179/104 (BP Location: Left arm, Patient Position: Sitting, Cuff Size: Adult Regular)   Pulse 106   Temp 99.1  F (37.3  C) (Oral)   Resp 16   SpO2 99%   Alert and oriented X 3, no SI, no SIB.  Pt is tremulous, diaphoretic and tachycardic.  He is anxious.  Pt has a full range affect, denies depression.  Pt's speech is clear, his thoughts follow and he is cooperative.  Pt says that he has been to treatment X 2 and his longest period of sobriety is 1 day short of 1 year--he relapsed on his sober date.  R =   Request or Recommendation:   Alcohol withdrawal monitoring, Dr Pérez to evaluate, case management and medicine to see

## 2023-07-03 LAB
ANION GAP SERPL CALCULATED.3IONS-SCNC: 12 MMOL/L (ref 7–15)
BUN SERPL-MCNC: 9.1 MG/DL (ref 6–20)
CALCIUM SERPL-MCNC: 8.6 MG/DL (ref 8.6–10)
CHLORIDE SERPL-SCNC: 102 MMOL/L (ref 98–107)
CREAT SERPL-MCNC: 0.98 MG/DL (ref 0.67–1.17)
DEPRECATED HCO3 PLAS-SCNC: 25 MMOL/L (ref 22–29)
GFR SERPL CREATININE-BSD FRML MDRD: >90 ML/MIN/1.73M2
GLUCOSE SERPL-MCNC: 197 MG/DL (ref 70–99)
POTASSIUM SERPL-SCNC: 3.6 MMOL/L (ref 3.4–5.3)
SODIUM SERPL-SCNC: 139 MMOL/L (ref 136–145)

## 2023-07-03 PROCEDURE — 99232 SBSQ HOSP IP/OBS MODERATE 35: CPT | Performed by: PHYSICIAN ASSISTANT

## 2023-07-03 PROCEDURE — H2032 ACTIVITY THERAPY, PER 15 MIN: HCPCS

## 2023-07-03 PROCEDURE — 250N000011 HC RX IP 250 OP 636: Performed by: PSYCHIATRY & NEUROLOGY

## 2023-07-03 PROCEDURE — 99223 1ST HOSP IP/OBS HIGH 75: CPT | Mod: AI | Performed by: PSYCHIATRY & NEUROLOGY

## 2023-07-03 PROCEDURE — 80048 BASIC METABOLIC PNL TOTAL CA: CPT | Performed by: PHYSICIAN ASSISTANT

## 2023-07-03 PROCEDURE — 36415 COLL VENOUS BLD VENIPUNCTURE: CPT | Performed by: PHYSICIAN ASSISTANT

## 2023-07-03 PROCEDURE — 128N000004 HC R&B CD ADULT

## 2023-07-03 PROCEDURE — 250N000013 HC RX MED GY IP 250 OP 250 PS 637: Performed by: EMERGENCY MEDICINE

## 2023-07-03 PROCEDURE — 250N000013 HC RX MED GY IP 250 OP 250 PS 637: Performed by: PSYCHIATRY & NEUROLOGY

## 2023-07-03 RX ORDER — HYDRALAZINE HYDROCHLORIDE 25 MG/1
25 TABLET, FILM COATED ORAL 4 TIMES DAILY PRN
Status: DISCONTINUED | OUTPATIENT
Start: 2023-07-03 | End: 2023-07-07 | Stop reason: HOSPADM

## 2023-07-03 RX ORDER — GABAPENTIN 300 MG/1
300 CAPSULE ORAL 3 TIMES DAILY
Status: DISCONTINUED | OUTPATIENT
Start: 2023-07-03 | End: 2023-07-03

## 2023-07-03 RX ORDER — ALBUTEROL SULFATE 90 UG/1
2 AEROSOL, METERED RESPIRATORY (INHALATION) EVERY 6 HOURS PRN
Status: DISCONTINUED | OUTPATIENT
Start: 2023-07-03 | End: 2023-07-03

## 2023-07-03 RX ORDER — MIRTAZAPINE 15 MG/1
15 TABLET, FILM COATED ORAL AT BEDTIME
Status: DISCONTINUED | OUTPATIENT
Start: 2023-07-03 | End: 2023-07-03

## 2023-07-03 RX ADMIN — FOLIC ACID 1 MG: 1 TABLET ORAL at 08:35

## 2023-07-03 RX ADMIN — THIAMINE HCL TAB 100 MG 100 MG: 100 TAB at 08:35

## 2023-07-03 RX ADMIN — DIAZEPAM 10 MG: 5 TABLET ORAL at 04:45

## 2023-07-03 RX ADMIN — GABAPENTIN 300 MG: 300 CAPSULE ORAL at 20:19

## 2023-07-03 RX ADMIN — MIRTAZAPINE 15 MG: 15 TABLET, FILM COATED ORAL at 20:20

## 2023-07-03 RX ADMIN — DIAZEPAM 10 MG: 5 TABLET ORAL at 11:36

## 2023-07-03 RX ADMIN — DIAZEPAM 5 MG: 5 TABLET ORAL at 16:16

## 2023-07-03 RX ADMIN — GABAPENTIN 300 MG: 300 CAPSULE ORAL at 14:20

## 2023-07-03 RX ADMIN — DIAZEPAM 10 MG: 5 TABLET ORAL at 00:09

## 2023-07-03 RX ADMIN — GABAPENTIN 300 MG: 300 CAPSULE ORAL at 08:35

## 2023-07-03 RX ADMIN — ONDANSETRON 4 MG: 4 TABLET, ORALLY DISINTEGRATING ORAL at 00:09

## 2023-07-03 RX ADMIN — MULTIPLE VITAMINS W/ MINERALS TAB 1 TABLET: TAB at 08:36

## 2023-07-03 RX ADMIN — DIAZEPAM 10 MG: 5 TABLET ORAL at 08:35

## 2023-07-03 ASSESSMENT — ACTIVITIES OF DAILY LIVING (ADL)
ADLS_ACUITY_SCORE: 40
HYGIENE/GROOMING: INDEPENDENT
ADLS_ACUITY_SCORE: 40
ADLS_ACUITY_SCORE: 40
ORAL_HYGIENE: INDEPENDENT
ADLS_ACUITY_SCORE: 40
DRESS: INDEPENDENT

## 2023-07-03 NOTE — H&P
"  identifucation info  Xavier Odell is a 38 year old male     Chief compliant   licha rosario  As per ED note  Patient presents with     Alcohol Problem      HPI  Xavier Odell is a 38 year old male with a history of alcohol dependence and pancreatitis in 2018 presenting to the ED on 7/2/2023 for alcohol withdrawal symptoms. He typically drinks 5 seltzers daily but reports drinking 1-1.75L of hard alcohol daily for the past week. He was in [place] and returned on Thursday evening (6/29/2023). His last alcohol consumption was 00:00. He is nauseous (vomited x1 yesterday), tremulous, diaphoretic, and has a headache with some visual hallucinations of bright lights/\"floaters.\" No history of A/V hallucinations when he is not in withdrawal, does have a history of DTs 2-3 years ago.        As per my interview with the patient  Patient has chronic depression and alcoholism.  He reports he relapsed 2 months ago    He went on a neri and he was drinking 1 to 1.75 L of hard liquor and realized that he needs to come to detox to get help      Withdrawal symptoms include sweaty fatigue nause   Alcohol is his drug of choice.  He has tolerance, withdrawal, progressive use, loss of control.  He has used despite having negative consequences impacting his health.     The patient began to drink alcohol at the age of 16, but it became a problem at 26.  He has no history of seizures.  There is a questionable history of delirium tremens because when he goes through withdrawal he sees blotches.      He does not smoke.  he denied any drug use He denies any gambling.  He denies any suicidal ideation, plan or intent.     PSYCHIATRIC REVIEW OF SYSTEMS:  The patient reports at times he feels depressed, isolated, lack of energy, lack of motivation, poor sleep poor appetite , deneid any suicidal /homicidal ideation .  The patient denied any neurovegetative symptoms other than this.     KARIN:  The patient denied any distractibility, " impulsivity, racing thoughts, mood swings.  The patient denied any auditory or visual hallucinations.      The patient does have history of excessive worry and not able to stop worrying, poor concentration.  The patient denies any symptoms of PTSD.  Denies any eating disorder.  The patient reports that he has ruminative thoughts, but he does not need things in symmetry.  The patient denies symptoms of ADHD.  Denies any borderline personality disorder.     PAST PSYCHIATRIC HISTORY:  The patient was previously given diagnosis of depression and anxiety.  He has never had any civil commitments, never made any suicide attempts.  He was in [facility] twice.  He was an outpatient at [facility].  He was in dual diagnosis program.     PAST PSYCHIATRIC HISTORY:  The patient takes Cymbalta and Remeron.     SOCIAL HISTORY:  He is single.  He works in aviation.     FAMILY HISTORY:  Depression in both sides of the family.  Father has alcoholism.     Medical h/o   A 10-point review of systems is reviewed and is negative except for psychiatric symptoms above.       Allergies reviewed  Blood pressure (!) 147/103, pulse 91, temperature 97.5  F (36.4  C), temperature source Temporal, resp. rate 16, SpO2 99 %.      vitals  Appearance:  awake, alert, appeared as age stated, adequate groomed and slightly unkempt  Attitude:  cooperative  Eye Contact:  good  Mood:  not very good   Affect:  congruent   Speech:  clear, coherent normal rate   Psychomotor Behavior:  no evidence of tardive dyskinesia, dystonia, or tics  Thought Process:  logical, linear and goal oriented  Associations:  no loose associations  Thought Content:  no evidence of psychotic thought and active suicidal ideation present  Denied any active suicidal /homicidation ideation plan intent   Insight:  fair  Judgment:  fair  Oriented to:  time, person, and place  Attention Span and Concentration:  intact  Recent and Remote Memory:  intact  Language:  english with  appropriate syntax and vocabulary  Fund of Knowledge: appropriate  Muscle Strength and Tone: normal  Gait and Station: Normal          Patient has severe exacerbation of chronic alcoholism  ,  been unable to stop using  in the community due to both physical and psychological symptoms.  Continued use will put the patient at risk for medical and/or psychiatric complications.   Diagnosis  Alcohol use disorder severe  Alcohol withdrawal severe  Depression NOS anxiety NOS    Plan  Alcohol use disorder severe alcohol withdrawal severe  Patient detox of alcohol using [facility] protocol on Valium patient received 10 mg of Valium since since morning and since admission he received 95 mg of Valium  Patient be started on Remeron 15 mg for his depression and gabapentin 3 mg 3 times a day for his anxiety    Patient has hyperglycemia and glucose is 197 report internal medicine consult  Patient has elevated liver enzymes AST is 148 ALT 93 most likely from alcoholism nonviral suspected  Patient has elevated neutrophils 9.6 we will put internal medicine consult    I HAVE REVIEWED LABS WITH PT AND TALKED ABOUT RESULTS WITH PT  I HAVE REVIEWED AND SUMMARIZED OLD RECORDS including his medication reconcilation of his home medications  and PDMP   I HAVE SPOKEN WITH RN ABOUT MEDICATIONS AND withdrawl SCORES  I HAVE SPOKEN WITH CM ABOUT PTS TREATMETN OPTIONS     Discussed in detail about patient's smoking patient was advised to quit patient was told about the impact of smoking.  Patient's willingness to quit was assessed.  I provided methods and skills for cessation including medication management nicotine gum patch.  Patient did not set a quit date.  Patient is interested in quitting .we discussed pharmacotherapy options .patient agreed to take nicotine gum patch lozenge.  We discussed behavioral change techniques when craving nicotine including deep breathing drinking glass of water, taking a walk.  This discussion was about 15 minutes

## 2023-07-03 NOTE — PLAN OF CARE
Goal Outcome Evaluation:         Pt MSSA is 8 and 4. He received 5 mg of valium for his score of 8. He endorses anxiety. He denies SI, HI, AVH, and pain. He ate 100% of dinner. He shared his plan was to detox at home until family told him he had to come to detox. He received resources about managing worry and anxiety. He completed his paperwork. He said his mom will influence where he goes for treatment. He said he needs to be sober so he can keep the job he likes.    BP (!) 147/98   Pulse 98   Temp 98.7  F (37.1  C) (Oral)   Resp 16   SpO2 99%

## 2023-07-03 NOTE — PLAN OF CARE
Goal Outcome Evaluation:    Problem: Substance Withdrawal  Goal: Substance Withdrawal  Description: Signs and symptoms of listed problems will be absent or manageable.  Outcome: Progressing     Patient appeared to sleep for 6.75 hours.    MSSA at midnight was 12; 10mg of valium was administered. Patient was nauseous, tremulous, anxious and sweaty. Zofran 4mg ODT was administered for nausea.    MSSA at 4am was 11; 10mg of valium was administered.    Patient Vitals for the past 24 hrs:   BP Temp Temp src Pulse Resp SpO2   07/03/23 0442 (!) 148/89 97.7  F (36.5  C) Temporal 96 18 97 %   07/03/23 0004 (!) 141/89 98.3  F (36.8  C) Oral 92 18 98 %   07/02/23 2007 (!) 148/92 97.4  F (36.3  C) Temporal (!) 124 16 99 %   07/02/23 1718 (!) 179/104 99.1  F (37.3  C) Oral 106 16 99 %   07/02/23 1545 138/82 98.2  F (36.8  C) Oral 119 -- 100 %   07/02/23 1438 (!) 162/97 98.8  F (37.1  C) Oral 111 16 97 %   07/02/23 1235 (!) 146/86 98.3  F (36.8  C) Oral (!) 121 -- 97 %   07/02/23 1132 114/78 98.5  F (36.9  C) Oral 119 16 94 %   07/02/23 1026 (!) 129/93 97.8  F (36.6  C) Oral (!) 122 16 97 %   07/02/23 0943 126/84 98.6  F (37  C) Oral (!) 124 16 97 %      We'll continue to monitor.

## 2023-07-03 NOTE — PROGRESS NOTES
Behavioral Team Discussion: (7/3/2023)    Continued Stay Criteria/Rationale: Patient admitted for Alcohol Use Disorder.  Plan: The following services will be provided to the patient; psychiatric assessment, medication management, therapeutic milieu, individual and group support, and skills groups.   Participants: 3A Provider: Dr. Santa Pérez MD; 3A RN: Villa Pereira RN; 3A CM's: Jadiel CARTER [facility] .  Summary/Recommendation: Providers will assess today for treatment recommendations, discharge planning, and aftercare plans. Jadiel Traylor [facility] CM will meet with pt for discharge planning.   Medical/Physical: Pt denies any   Precautions:   Behavioral Orders   Procedures     Code 1 - Restrict to Unit     Routine Programming     As clinically indicated     Status 15     Every 15 minutes.     Withdrawal precautions     Rationale for change in precautions or plan: N/A  Progress: No Change.    ASAM Dimension Scale Ratings:  Dimension 1: 3 Client tolerates and ken with withdrawal discomfort poorly. Client has severe intoxication, such that the client endangers self or others, or intoxication has not abated with less intensive levels of services. Client displays severe signs and symptoms; or risk of severe, but manageable withdrawal; or withdrawal worsening despite detox at less intensive level.  Dimension 2: 1 Client tolerates and ken with physical discomfort and is able to get the services that the client needs.  Dimension 3: 2 Client has difficulty with impulse control and lacks coping skills. Client has thoughts of suicide or harm to others without means; however, the thoughts may interfere with participation in some treatment activities. Client has difficulty functioning in significant life areas. Client has moderate symptoms of emotional, behavioral, or cognitive problems. Client is able to participate in most treatment activities.  Dimension 4: 3 Client displays inconsistent compliance, minimal  awareness of either the client's addiction or mental disorder, and is minimally cooperative.  Dimension 5: 3 Client has poor recognition and understanding of relapse and recidivism issues and displays moderately high vulnerability for further substance use or mental health problems. Client has few coping skills and rarely applies coping skills.  Dimension 6: 3 Client is not engaged in structured, meaningful activity and the client's peers, family, significant other, and living environment are unsupportive, or there is significant criminal justice system involvement.
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
## 2023-07-03 NOTE — PROGRESS NOTES
07/03/23 1800   Group Therapy Session   Group Attendance attended group session   Time Session Began 1645   Time Session Ended 1730   Total Time (minutes) 45   Total # Attendees 6   Group Type recreation   Group Topic Covered leisure exploration/use of leisure time   Group Session Detail TR leisure group   Patient Response/Contribution cooperative with task   Patient Participation Detail Pt attended the structured Therapeutic Recreation group, participating in a group activity. Pt participated in group discussion and leisure participation as a healthy outlet to gain self-esteem, manage behaviors, improve social skills, and decrease isolation.  Pt remained focused and engaged throughout group activity.  Pt participated in the group discussion about healthy outlets and participated in the group activity, contributing to the clues and descriptions for the game.

## 2023-07-03 NOTE — PROGRESS NOTES
Triage & Transition Services, [address]     Xavier Odell  July 3, 2023    PLAN: Pt states that his work will be setting up a plan and place for pt to attend treatment     Xavier is currently admitted to [address]. Please see H&P for complete assessment information and therapist Progress Notes for additional clinical details.      worked with Jadiel NAIK regarding patient's care today.     Met with patient to coordinate details of aftercare planning In-person. Patient stated they wanted to discuss treatment options.     Healthcare funding: [facility]. Barriers to care related to funding? None Steps taken toward reducing barriers to care: None    Clinical care team and client have agreed on an aftercare plan that includes the following level of care at discharge: Pt will be set up by his employer.       Referrals offered to patient: None     completed the following coordination steps with referrals/outpatient providers: None    GUMARO WEBBER  Triage & Transition Services - Mental Health and Addiction Service Line  [address] - Adult Inpatient Addiction Psychiatry Unit

## 2023-07-03 NOTE — PLAN OF CARE
Goal Outcome Evaluation:    Problem: Substance Withdrawal  Goal: Substance Withdrawal  Description: Signs and symptoms of listed problems will be absent or manageable.  7/3/2023 0828 by Jessica Davis RN  Outcome: Progressing     Patient continues to be monitored for alcohol withdrawal symptoms. MSSA at 8am was 9; 10mg of valium was administered. Patient was sweaty, tremulous and anxious.  BP (!) 147/103   Pulse 91   Temp 97.5  F (36.4  C) (Temporal)   Resp 16   SpO2 99%     Patient endorsed depression 6/10 and anxiety 7/10. Denied SI./SIB/Hallucinations.    Patient was medication compliant; denied any side effects.     Fair appetite for breakfast.    MSSA at 11:30am was 11; 10mg of valium was administered. Patient continues to be tremulous, anxious, and sweaty.  BP (!) 141/92   Pulse 102   Temp 98.1  F (36.7  C) (Oral)   Resp 16   SpO2 99%     We'll continue to monitor.

## 2023-07-03 NOTE — PROGRESS NOTES
Pt spent the afternoon resting in bed. He reported feeling tired. He has good oral intake. He checked his email to follow up about work covering his treatment. He said he had to request additional information.    BP (!) 141/92   Pulse 102   Temp 98.1  F (36.7  C) (Oral)   Resp 16   SpO2 99%

## 2023-07-04 PROCEDURE — 250N000013 HC RX MED GY IP 250 OP 250 PS 637: Performed by: PSYCHIATRY & NEUROLOGY

## 2023-07-04 PROCEDURE — 99232 SBSQ HOSP IP/OBS MODERATE 35: CPT | Performed by: PSYCHIATRY & NEUROLOGY

## 2023-07-04 PROCEDURE — 128N000004 HC R&B CD ADULT

## 2023-07-04 RX ADMIN — GABAPENTIN 300 MG: 300 CAPSULE ORAL at 08:34

## 2023-07-04 RX ADMIN — NICOTINE POLACRILEX 4 MG: 4 GUM, CHEWING BUCCAL at 19:32

## 2023-07-04 RX ADMIN — MULTIPLE VITAMINS W/ MINERALS TAB 1 TABLET: TAB at 08:33

## 2023-07-04 RX ADMIN — GABAPENTIN 300 MG: 300 CAPSULE ORAL at 20:42

## 2023-07-04 RX ADMIN — GABAPENTIN 300 MG: 300 CAPSULE ORAL at 13:59

## 2023-07-04 RX ADMIN — FOLIC ACID 1 MG: 1 TABLET ORAL at 08:33

## 2023-07-04 RX ADMIN — MIRTAZAPINE 15 MG: 15 TABLET, FILM COATED ORAL at 20:42

## 2023-07-04 RX ADMIN — THIAMINE HCL TAB 100 MG 100 MG: 100 TAB at 08:34

## 2023-07-04 RX ADMIN — NICOTINE POLACRILEX 4 MG: 4 GUM, CHEWING BUCCAL at 21:48

## 2023-07-04 ASSESSMENT — ACTIVITIES OF DAILY LIVING (ADL)
DRESS: INDEPENDENT
ORAL_HYGIENE: INDEPENDENT
ADLS_ACUITY_SCORE: 40
DRESS: STREET CLOTHES
HYGIENE/GROOMING: HANDWASHING;INDEPENDENT
ADLS_ACUITY_SCORE: 40
ORAL_HYGIENE: INDEPENDENT
HYGIENE/GROOMING: INDEPENDENT
ADLS_ACUITY_SCORE: 40
ADLS_ACUITY_SCORE: 40
LAUNDRY: WITH SUPERVISION
ADLS_ACUITY_SCORE: 40
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
## 2023-07-04 NOTE — PLAN OF CARE
Case Management Note:    Met with patient who reports that he would like to go to a 28 Day Residential treatment program, and agreed with additional recommendations to get set up with MI/CD IOP after residential, engage in individual therapy/utilize EAP through work ([employer]), PCP or psychiatrist for medication management. 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 Due to strict regulations for being an  (no psych medications, no MI or CD diagnoses, random drug tests) he is understandably paranoid about work finding out and him getting fired, but was open to reassurance that due to CR42 and HIPPA and being in a job where he doesn't have to be licensed will allow him to receive any MI/CD help he needs in a discrete manner. Would like to be referred to [facility], however during end of assessment he reported that he no longer has a [employer] PM[employer] plan and that he thinks he has been on insurance with [employer] since November 2022. Patient appeared distressed when unable to identify whether or not he definitely has a health insurance plan through them ([employer] utilizes [employer]), writer provided him with phone number for [employer]'s EAP/Customer Service Center and [employer], and writer emailed [facility] Financial Services Department requesting assistance in verifying patient's health insurance.     Patient and therapist completed CD assessment. Although it was determined that health insurance is undetermined and not as originally thought to be [employer] PMAP, writer faxed assessment over and included message that patient is currently determining health insurance through [employer].
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
## 2023-07-04 NOTE — DISCHARGE INSTRUCTIONS
Behavioral Discharge Planning and Instructions  THANK YOU FOR CHOOSING [facility]  3A  [phone]    Summary: You were admitted to [facility] on 7/2/2023 for detoxification from alcohol.  A medical exam was performed that included lab work. You have met with a  and opted to discharge and to participate in [facility] Residential Treatment Program.  Please take care and make your recovery a daily priority, Xavier!  It was a pleasure working with you and the entire treatment team here wishes you the very best in your recovery!     Recommendation:  Discharge home. Attend intake screening at [facility] and then admit to their residential program.    Intake Screening: July 7th, 2023  Admit date: July 9th, 2023     [facility]  Location: [address] [place] [address]  Phone: [phone]  Fax: [phone]      Main Diagnoses:  Per Dr. Santa Pérez MD;  303.90 (F10.20) Alcohol Use Disorder Severe  DX   Alcohol use disorder severe  Alcohol withdrawal severe  Depression NOS anxiety NOS    Major Treatments, Procedures and Findings:  You were treated for alcohol detoxification using valium. You completed a Comprehensive FLO Assessment on  7/4/2023. Please take a copy of your lab work with you to your next primary care provider appointment.    Symptoms to Report:  If you experience more anxiety, confusion, sleeplessness, deep sadness or thoughts of suicide, notify your treatment team or notify your primary care provider. IF ANY OF THE SYMPTOMS YOU ARE EXPERIENCING ARE A MEDICAL EMERGENCY CALL 911 IMMEDIATELY.     Lifestyle Adjustment: Adjust your lifestyle to get enough sleep, relaxation, exercise and good nutrition. Continue to develop healthy coping skills to decrease stress and promote a sober living environment. Do not use mood altering substances including alcohol, illegal drugs or addictive medications other than what is  currently prescribed.     Disposition: Return Home and attend residential treatment at [facility].      Facts about COVID19 at www.cdc.gov/COVID19 and www.[place].gov/covid19    Keeping hands clean is one of the most important steps we can take to avoid getting sick and spreading germs to others.  Please wash your hands frequently and lather with soap for at least 20 seconds!    Follow-up Appointment:       Primary Care  [facility]  [phone]  Appointment set up at  8/4/23 at 1 pm      Psychiatry- Patient declined.    You declined assistance scheduling Therapy follow-up appointment prior to discharge due to participation in your Employee Assistance Program/Therapy through your work.     Recovery apps for your phone to locate current in person and zoom recovery meetings  [place] - meeting kaitlin  AA  - meeting kaitlin  Meeting guide - meeting kaitlin  Quick NA meeting - meeting kaitlin  Rochelle- has various apps    Resources:  Some AA/NA meetings are being held online however most have returned to in-person or a hybrid combination please check online to verify*  Need Support Now? If you or someone you know is struggling or in crisis, help is available. Call or text [employer] or chat [employer]  AA meetings search for them at: https://aa-intergroup.org (worldwide meeting listings)  AA meetings for [place] area can be found online at: https://a[place].org (click local online meetings listings)  NA meetings for [place] area can be found online at: https://www.nam[place].org  (click find a meeting)  AA and NA Sponsors are excellent resources for support and you can find one at any support group meeting.   Alcoholics Anonymous (https://aa.org/): for information 24 hours/day  AA Intergroup service office in [place] (http://www.aastpaul.org/) 751.678.6629  AA Intergroup service office in [facility]: [phone]. (http://www.a[place].org/)  Narcotics Anonymous (www.nam[place].org) (787)  325-5064  https://aafairviewriverside.org/meetings  SMART Recovery - self management for addiction recovery:  www.smartrecovery.org  Pathways ~ A Health Crisis Resource & Support Center:  918.988.2082.  https://prescribetoprevent.org/patient-education/videos/  http://www.harmreduction.org  [facility] 345-598-8161  Support Group:  AA/NA and Sponsor/support.  National [place] on Mental Illness (www.[place].martínez.org): 566.501.9467 or 354-334-2542.  Alcoholics Anonymous (www.alcoholics-anonymous.org): Check your phone book for your local chapter.  Suicide Awareness Voices of Education (SAVE) (www.save.org): 705-907-JUYD (9233)  National Suicide Prevention Line (www.mentalhealthmn.org): 884-952-QMAS (3386)  Mental Health Consumer/Survivor Network of [place] (www.mhcsn.net): 597.973.6916 or 835-493-8565  Mental Health Association of [place] (www.mentalhealth.org): 251.464.1233 or 249-176-8374   Substance Abuse and Mental Health Services (www.samhsa.gov)  [place] Opioid Prevention Coalition: www.opioidcoalition.org    [place] Recovery Connection (MRC)  [facility] connects people seeking recovery to resources that help foster and sustain long-term recovery.  Whether you are seeking resources for treatment, transportation, housing, job training, education, health care or other pathways to recovery, [facility] is a great place to start.  407.180.3151.  www.[place]Gonway.org    Great Pod casts for nutrition and wellness  Listen on Apple Podcasts  Dishing Up Nutrition   [employer]   Nutrition       Understand the connection between what you eat and how you feel. Hosted by licensed nutritionists and dietitians from [employer] we share practical, real-life solutions for healthier living through nutrition.     General Medication Instructions:   See your medication sheet(s) for instructions.   Take all medications as prescribed.  Make no changes unless your primary care provider suggests  them.   Go to all your primary care provider visits.  Be sure to have all your required lab tests. This way, your medicines can be refilled on time.  Do not use any forms of alcohol.    Please Note:  If you have any questions at anytime after you are discharged please call [facility] detox unit 3AW at [phone].  [facility], Behavioral Intake [phone]  Medical Records call [phone]  Outpatient Behavioral Intake call [phone]  LP+ Wait List/Bed Availability call [phone]    Please remember to take all of your behavioral discharge planning and lab paperwork to any follow up appointments, it contains your lab results, diagnosis, medication list and discharge recommendations.      THANK YOU FOR CHOOSING [facility]

## 2023-07-04 NOTE — PLAN OF CARE
Goal Outcome Evaluation:    Problem: Substance Withdrawal  Goal: Substance Withdrawal  Description: Signs and symptoms of listed problems will be absent or manageable.  Outcome: Progressing     Patient appeared to sleep for 7 hours.    MSSA at midnight was 4; patient endorsed mild sweats.  BP (!) 141/94   Pulse 80   Temp 97.3  F (36.3  C) (Oral)   Resp 16   SpO2 98%     We'll continue to monitor.

## 2023-07-04 NOTE — PLAN OF CARE
Goal Outcome Evaluation:      Plan of Care Reviewed With: patient    Overall Patient Progress: improving       Patient scored less than 8 on MSSA or required medication for alcohol withdrawal for 24 hours and is removed from alcohol withdrawal protocol.

## 2023-07-04 NOTE — PLAN OF CARE
Goal Outcome Evaluation:    Plan of Care Reviewed With: patient          Pt MSSA is 3 and 3. He endorses anxiety. He denies SI, HI, AVH, and pain. He ate 100% of meals. He was more visible in the milieu.

## 2023-07-05 PROCEDURE — 93005 ELECTROCARDIOGRAM TRACING: CPT

## 2023-07-05 PROCEDURE — 99232 SBSQ HOSP IP/OBS MODERATE 35: CPT | Performed by: PSYCHIATRY & NEUROLOGY

## 2023-07-05 PROCEDURE — 250N000013 HC RX MED GY IP 250 OP 250 PS 637: Performed by: PHYSICIAN ASSISTANT

## 2023-07-05 PROCEDURE — H2032 ACTIVITY THERAPY, PER 15 MIN: HCPCS

## 2023-07-05 PROCEDURE — 128N000004 HC R&B CD ADULT

## 2023-07-05 PROCEDURE — 250N000013 HC RX MED GY IP 250 OP 250 PS 637: Performed by: PSYCHIATRY & NEUROLOGY

## 2023-07-05 RX ORDER — HYDROXYZINE HYDROCHLORIDE 50 MG/1
50 TABLET, FILM COATED ORAL EVERY 6 HOURS PRN
Status: DISCONTINUED | OUTPATIENT
Start: 2023-07-05 | End: 2023-07-07 | Stop reason: HOSPADM

## 2023-07-05 RX ORDER — MIRTAZAPINE 15 MG/1
15 TABLET, FILM COATED ORAL AT BEDTIME
Qty: 30 TABLET | Refills: 0 | Status: SHIPPED | OUTPATIENT
Start: 2023-07-05

## 2023-07-05 RX ORDER — HYDROXYZINE HYDROCHLORIDE 25 MG/1
25 TABLET, FILM COATED ORAL EVERY 6 HOURS PRN
Status: DISCONTINUED | OUTPATIENT
Start: 2023-07-05 | End: 2023-07-07 | Stop reason: HOSPADM

## 2023-07-05 RX ORDER — GABAPENTIN 300 MG/1
300 CAPSULE ORAL 3 TIMES DAILY
Qty: 90 CAPSULE | Refills: 0 | Status: SHIPPED | OUTPATIENT
Start: 2023-07-05

## 2023-07-05 RX ORDER — ALBUTEROL SULFATE 90 UG/1
2 AEROSOL, METERED RESPIRATORY (INHALATION) EVERY 6 HOURS PRN
Qty: 18 G | Refills: 0 | Status: SHIPPED | OUTPATIENT
Start: 2023-07-05

## 2023-07-05 RX ORDER — LANOLIN ALCOHOL/MO/W.PET/CERES
100 CREAM (GRAM) TOPICAL DAILY
Qty: 30 TABLET | Refills: 0 | Status: SHIPPED | OUTPATIENT
Start: 2023-07-06 | End: 2024-02-20

## 2023-07-05 RX ORDER — MULTIPLE VITAMINS W/ MINERALS TAB 9MG-400MCG
1 TAB ORAL DAILY
Qty: 30 TABLET | Refills: 0 | Status: SHIPPED | OUTPATIENT
Start: 2023-07-06 | End: 2024-02-20

## 2023-07-05 RX ADMIN — NICOTINE POLACRILEX 4 MG: 4 GUM, CHEWING BUCCAL at 13:27

## 2023-07-05 RX ADMIN — NICOTINE POLACRILEX 4 MG: 4 GUM, CHEWING BUCCAL at 21:30

## 2023-07-05 RX ADMIN — HYDROXYZINE HYDROCHLORIDE 25 MG: 25 TABLET ORAL at 17:35

## 2023-07-05 RX ADMIN — GABAPENTIN 300 MG: 300 CAPSULE ORAL at 08:38

## 2023-07-05 RX ADMIN — NICOTINE POLACRILEX 4 MG: 4 GUM, CHEWING BUCCAL at 20:17

## 2023-07-05 RX ADMIN — GABAPENTIN 300 MG: 300 CAPSULE ORAL at 13:27

## 2023-07-05 RX ADMIN — THIAMINE HCL TAB 100 MG 100 MG: 100 TAB at 08:38

## 2023-07-05 RX ADMIN — NICOTINE POLACRILEX 4 MG: 4 GUM, CHEWING BUCCAL at 17:56

## 2023-07-05 RX ADMIN — GABAPENTIN 300 MG: 300 CAPSULE ORAL at 20:17

## 2023-07-05 RX ADMIN — MULTIPLE VITAMINS W/ MINERALS TAB 1 TABLET: TAB at 08:38

## 2023-07-05 RX ADMIN — NICOTINE POLACRILEX 4 MG: 4 GUM, CHEWING BUCCAL at 16:15

## 2023-07-05 RX ADMIN — FOLIC ACID 1 MG: 1 TABLET ORAL at 08:38

## 2023-07-05 RX ADMIN — HYDROXYZINE HYDROCHLORIDE 25 MG: 25 TABLET ORAL at 12:46

## 2023-07-05 RX ADMIN — NICOTINE POLACRILEX 4 MG: 4 GUM, CHEWING BUCCAL at 11:37

## 2023-07-05 RX ADMIN — MIRTAZAPINE 15 MG: 15 TABLET, FILM COATED ORAL at 20:17

## 2023-07-05 ASSESSMENT — ACTIVITIES OF DAILY LIVING (ADL)
ADLS_ACUITY_SCORE: 41
ADLS_ACUITY_SCORE: 51
ORAL_HYGIENE: INDEPENDENT
ADLS_ACUITY_SCORE: 40
ADLS_ACUITY_SCORE: 41
ORAL_HYGIENE: INDEPENDENT
ADLS_ACUITY_SCORE: 40
ADLS_ACUITY_SCORE: 40
DRESS: INDEPENDENT
DRESS: INDEPENDENT
ORAL_HYGIENE: INDEPENDENT
LAUNDRY: WITH SUPERVISION
ADLS_ACUITY_SCORE: 41
HYGIENE/GROOMING: INDEPENDENT
ADLS_ACUITY_SCORE: 40
LAUNDRY: WITH SUPERVISION
ADLS_ACUITY_SCORE: 51
ADLS_ACUITY_SCORE: 40
HYGIENE/GROOMING: WITH ASSISTANCE
HYGIENE/GROOMING: INDEPENDENT
DRESS: INDEPENDENT
ADLS_ACUITY_SCORE: 41
ADLS_ACUITY_SCORE: 40

## 2023-07-05 NOTE — PLAN OF CARE
Goal Outcome Evaluation:    Patient appeared to sleep for 7 hours. No concerns voiced.    We'll continue to monitor.

## 2023-07-05 NOTE — PLAN OF CARE
"Goal Outcome Evaluation:      Plan of Care Reviewed With: patient    Overall Patient Progress: improving    Patient is out of detox for alcohol withdrawal.  He has visible on the unit, social with peers and attended group.  His affect is tense, reports some anxiety related to wanting to \"get out of here soon.\"  He denies depression and reports appetite and sleep are adequate. He is working with [facility] on insurance verification and discharge planning.  Pt denies SI/SIB/HI/AVH.      Medical Concerns: denies.   BP (!) 147/103 (BP Location: Left arm)   Pulse 116   Temp 97.6  F (36.4  C) (Temporal)   Resp 16   SpO2 97%   Pt reports that his heart rate is usually in the 100's, denies any chest discomfort, SOB or any other concerns at this time.  Will continue to monitor.     Appetite: Pt ate 100% of dinner.    Sleep: adequate     PRN's given: nicotine gum       "

## 2023-07-05 NOTE — PLAN OF CARE
Problem: Adult Behavioral Health Plan of Care  Goal: Plan of Care Review  Outcome: Progressing  Flowsheets (Taken 7/5/2023 0800)  Patient Agreement with Plan of Care: agrees   Goal Outcome Evaluation:    Plan of Care Reviewed With: patient        Pt observed with an elevated HR of 115-120's at noon, states he is anxious related to the discharge process and treatment plans, called IM and spoke to Gertrudis KRAMER, to update on HR, was redirected to MD Dr Pérez for anxiety management, pt to receive Hydroxyzine 25mg, currently he is calm, remains asymptomatic, HR rechecked, trending down to 85.  endorsing a good appetite, has an appointment at [facility] 8/4/23, at 1 pm, psych to be set up by CTC, pt denies psych symptoms, pain, SI/HI.

## 2023-07-05 NOTE — PROGRESS NOTES
Current Plan: Patient to discharge directly to [facility] Residential Treatment Program, as well as to schedule psychiatry.       [facility]  Location: [facility] [place] [address], [place] [address]  Phone: [phone]  Fax: [phone]    Insurance: [employer] (confirmed on this date as active by Cara Schwab).     Legal Status: Voluntary.    SUDs Assessment Status: Completed 7/4/2023 by Birgit Lemus LGSW, [facility]    ROIs on file: Venessa Carlson, Mother.    Living Situation: Patient lives at home in own home/apartment. Patient lives with self and reports that housing is stable.    Current Providers and Supports: Patient identified mother, siblings and friends as part of his support system. Providers:    [facility] [phone] [address] [facility] [address]     Patient does not currently have a Primary Care Physician.     Patient is utilizing their Employee Assistance Program for therapy at this time.       Encounter:    This writer received a voicemail from Aracelis Fernando from [facility]. This writer called back and spoke to Aracelis, who stated the patient had informed them of needed information and that the patient's referral was in the second phase of the review process. They stated the patient would be able to go home and be picked up from there and brought to their intake if desired and that there are openings for the patient at this time.     Following this conversation, this writer spoke with Corinne Romitti regarding this and the patient's FMLA. This writer was informed the patient would need to go straight to the program to ensure program participation if the FMLA form was completed by [place]. Pending discussion with patient on plan to go straight to [facility] or to go home in-between.     This writer met with the patient and discussed their insurance having been confirmed as active [facility] insurance. The patient stated they had HSA only and were still confused. This  writer obtained the phone number [phone] for member services of [facility] and reached out to Cara Schwab from the business office again at this time. Cara stated they would reach out to Carmelo Nunez to clarify the patients insurance and whether it is HSA only.     This writer assisted the patient in sitting with them calling their Employee Assistance Program and in attempting to access their email with information regarding their FMLA/short term disability leave. The patient stated at this time their Brother was helping set up their short term disability and they were not looking to participate in FMLA.    The patient stated they will discharge directly to [facility] and is okay with not going home in between.     The patient and this writer then called [facility] and it was stated there were long hold times and this writer provided a call back number. Pending call back to determine whether patient has HSA only.    The patient called to speak with [facility] benefits department and left a message. They are expecting a call back regarding this.     It was determined the patient would have $5,000 in out of pocket (OOP) costs to participate in residential treatment. The patient discussed the pros and cons of doing the inpatient verses an Intensive Outpatient and determined to still do Residential due to their feeling it is necessary at this time.     Consulted with Corinne Romitti, LICSW, on patient s plan of care.     Emelia Louis   and   Triage & Transition Services - Mental Health and Addiction  [facility] - [facility]  Adult Inpatient Addiction Psychiatry Unit
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
## 2023-07-05 NOTE — PROGRESS NOTES
07/05/23 1800   Group Therapy Session   Group Attendance attended group session   Time Session Began 1645   Time Session Ended 1730   Total Time (minutes) 45   Total # Attendees 8   Group Type recreation   Group Topic Covered coping skills/lifestyle management   Group Session Detail healthy coping skills   Patient Response/Contribution cooperative with task   Patient Participation Detail Pt participated in Therapeutic Recreation group with focus on leisure education and acquisition of knowledge and skills. Pt was fully engaged and cooperative in group recreational intervention; leisure inventory. Pt was focused on the written portion for the first part of group and then contributed to the group discussion following. Pt discussed several recreational interests and positive coping skills that are healthy outlets. Pt mood was calm and was appropriate with interactions.

## 2023-07-05 NOTE — PROGRESS NOTES
SPIRITUAL HEALTH SERVICES Progress Note  [facility] ([facility]) 3AW    Saw pt Xavier per self-initiated length of stay visit. Introduced pt to [facility]. Pt declined visit at this time, [facility] remains available upon pt request.    Zeeshan Holley, CPRS, CHP   Intern  Pager [phone]      * [facility] remains available 24/7 for emergent requests/referrals, either by having the switchboard page the on-call  or by entering an ASAP/STAT consult in Epic (this will also page the on-call ). Routine Epic consults receive an initial response within 24 hours.*

## 2023-07-06 PROCEDURE — 128N000004 HC R&B CD ADULT

## 2023-07-06 PROCEDURE — 250N000013 HC RX MED GY IP 250 OP 250 PS 637: Performed by: PSYCHIATRY & NEUROLOGY

## 2023-07-06 PROCEDURE — 99232 SBSQ HOSP IP/OBS MODERATE 35: CPT | Performed by: PSYCHIATRY & NEUROLOGY

## 2023-07-06 RX ADMIN — NICOTINE POLACRILEX 4 MG: 4 GUM, CHEWING BUCCAL at 08:30

## 2023-07-06 RX ADMIN — NICOTINE POLACRILEX 4 MG: 4 GUM, CHEWING BUCCAL at 20:25

## 2023-07-06 RX ADMIN — GABAPENTIN 300 MG: 300 CAPSULE ORAL at 08:35

## 2023-07-06 RX ADMIN — GABAPENTIN 300 MG: 300 CAPSULE ORAL at 13:49

## 2023-07-06 RX ADMIN — GABAPENTIN 300 MG: 300 CAPSULE ORAL at 20:25

## 2023-07-06 RX ADMIN — FOLIC ACID 1 MG: 1 TABLET ORAL at 08:35

## 2023-07-06 RX ADMIN — MULTIPLE VITAMINS W/ MINERALS TAB 1 TABLET: TAB at 08:35

## 2023-07-06 RX ADMIN — NICOTINE POLACRILEX 4 MG: 4 GUM, CHEWING BUCCAL at 18:48

## 2023-07-06 RX ADMIN — THIAMINE HCL TAB 100 MG 100 MG: 100 TAB at 08:35

## 2023-07-06 RX ADMIN — MIRTAZAPINE 15 MG: 15 TABLET, FILM COATED ORAL at 20:25

## 2023-07-06 RX ADMIN — NICOTINE POLACRILEX 4 MG: 4 GUM, CHEWING BUCCAL at 16:11

## 2023-07-06 ASSESSMENT — ACTIVITIES OF DAILY LIVING (ADL)
DRESS: INDEPENDENT
HYGIENE/GROOMING: INDEPENDENT
ADLS_ACUITY_SCORE: 41
LAUNDRY: WITH SUPERVISION
ADLS_ACUITY_SCORE: 41
DRESS: INDEPENDENT
LAUNDRY: WITH SUPERVISION
ADLS_ACUITY_SCORE: 41
ADLS_ACUITY_SCORE: 41
ORAL_HYGIENE: INDEPENDENT
ADLS_ACUITY_SCORE: 41
ORAL_HYGIENE: INDEPENDENT
ADLS_ACUITY_SCORE: 41
HYGIENE/GROOMING: INDEPENDENT

## 2023-07-06 NOTE — PROGRESS NOTES
"Current Plan: Patient to discharge to home with Mom who will support their sobriety, then patient will have intake at [facility] on Sunday 7/9/2023. Following Pre-screening which will take place tomorrow 7/7/2023, they will at that time confirm patient admission for intake.      [facility]  Location: [address] [place] [address]  Phone: [phone]  Fax: [phone]     [facility] (confirmed on this date as active by Cara Schwab).      Legal Status: Voluntary.     SUDs Assessment Status: Completed 7/4/2023 by Birgit Lemus, OMAR, [facility]     ROIs on file: Venessa Carlson, Mother.     Living Situation: Patient lives at home in own home/apartment. Patient lives with self and reports that housing is stable.     Current Providers and Supports: Patient identified mother, siblings and friends as part of his support system. Providers:    [facility] [phone] [address] [facility] [id number]      Patient does not currently have a Primary Care Physician.      Patient is utilizing their Employee Assistance Program for therapy at this time.     Patient declines Psychiatry as [facility] will provide and the patient can keep Psychiatrist following program completion.         Encounter: This writer called [facility] at [phone] and Patricia confirmed the patient's insurance was being reviewed at this time and they could have an intake as soon as the end of this week and as late as mid week next week depending on discharges.     The patient would be able to be billed and there are not scholarships available, but with payment plans that are \"flexible\" they could work with the patient to make payments if they do have the $5,000 Out of Pocket costs (OOP).    This writer received a call back from [facility] who stated the patient would have to pay $2,000 down looking at the bill as it stands now and not accounting for " "the OOP costs the patient would incur at 3A. This writer informed the caller that this would likely be a barrier for the patient to enter their treatment and asked if there was a way they could make a payment plan rather than a payment down. They stated they would need to check with their leadership on this and would call back \"after lunch\" with this information once they determine \"what they can offer\" for the patient's payment plan.    They stated the patient would now be completing the process of financial review and once insurance was figured out/financing for the program, they would then need to speak with the patient and complete a phone screening.      Cely confirmed they can pick the patient up Sunday for intake once the previous steps are completed and patient is accepted into the program. They confirmed if needed they could also  the patient from home if they were not on 3A until Sunday. This writer informed that the patient's plan was to go straight to [facility] and that I would discuss with Dr. Santa Pérez and the patient to confirm the above.     This writer spoke with the patient and Dr. Pérez regarding the information provided by Cely. Dr. Pérez directed this writer to work with GUMARO Toribio, regarding this situation about whether it was appropriate for the patient to go home in between 3A discharge and intake to [facility].    The patient confirmed they are interested in this plan if they're able to pay a smaller amount for down payment or if the payment is able to be billed rather than an at the time of intake. The patient stated if they do need to go home before treatment they would like to go stay with their Mom until Sunday as a way to assist in support maintaining sobriety/recovery. Patient otherwise would like to explore Outpatient Treatment options if the financial piece is a barrier for them to participate in residential.     This writer spoke with Tarah" Jermaine, who stated that if Venessa, patient's Mom confirms they are able to be supportive and help the patient maintain sobriety that this plan could work for the patient to go home prior to attending [facility].     [facility] staff Raina called back and stated the patient would have to pay a down payment of $3,100 at the lowest.     Following conversation with Venessa, patient's Mom (see below), this writer then called [facility] and spoke with Raina, who informed this writer about the Therapy and Psychiatry available with the program. This writer was informed there is a significant Therapy component and frequent Psychiatry appointments for patients, as well as that patients can keep their Psychiatrists following treatment with [facility].     This writer confirmed the patient would like to complete the Pre-screening and that if we can schedule for Sunday that would work well for the patient and their Mom who would be paying the down payment.     Raina confirmed they would call tomorrow for the patient's pre-screening.    This writer informed the patient of all of the above and the patient determined they would like to stick with the Psychiatrist they will have at [facility]. This writer therefore did not schedule Psychiatry for the patient. Patient declines Psychiatry scheduling.     Collateral: Venessa, patient's Mom. Phone: [phone].     This writer called Venessa who stated as long as [facility] has a good therapy program that they would be open to paying for the patient's down payment in full. This writer listened empathetically to Venessa discuss her experience and provided support, as well as explained the patient's process and plan as of yet here in 3A. Venessa stated she has been on a long journey with the patient and that often they were left after treatment programs with no support. This writer explained the plan that the patient would have an established Psychiatrist, Therapist, and would also have the Outpatient program.  This way, when Outpatient ends, the patient would have the constant of their EAP Therapist and their outside Psychiatrist.    Venessa was grateful for this and thanked this writer for our attention to ensuring the patient had long term care that would not lapse after 30 days. They also stated they were grateful Dr. Pérez added medications for the patient, but stated they were concerned Remeron would not be helpful for depression as it hasn't in the past, but stated it did help the patient with sleep. This writer noted Dr. Pérez is very thorough and that she takes the whole patient experience and safety in mind and that if she did put the patient on this, it was likely a good fit but stated she could discuss if desired with Dr. Pérez or a nurse. Venessa stated she would trust this and was glad the patient would have a Psychiatrist to continue to follow-up to ensure the medication was working at this time.     Patient Transportation at discharge: Patient will either get a ride with Mom if she is able or will need a Medical cab requested upon discharge.     Emelia Louis   and   Triage & Transition Services - Mental Health and Addiction  [facility] [place]  Ad[place]patient Addiction Psychiatry Unit

## 2023-07-06 NOTE — PLAN OF CARE
Goal Outcome Evaluation:         Concern: Elevated pulse rate: tachycardia.  Data: Writer noted patient pulse rate has come down extensively since yesterday evening's readings.   Action: Assessments:0800  BP (!) 142/94   Pulse 90   Temp 97.3  F (36.3  C) (Temporal)   Resp 16   SpO2 98% '  1200 BP (!) 144/88 (BP Location: Left arm)   Pulse 93   Temp 97.3  F (36.3  C) (Temporal)   Resp 16   SpO2 98%     Continue to monitor patient. Inform medicine of any new developments.   Response: Improvement in pulse rate.

## 2023-07-06 NOTE — PLAN OF CARE
G  Problem: Alcohol Withdrawal  Goal: Alcohol Withdrawal Symptom Control  Outcome: Progressing  Patient was out of detox before the start of my shift.  At 4 pm assessment writer noticed patient's HR was 115, recheck was even higher 125. DR Pérez was paged and she requested for patient to be further reassessed for delayed alcohol withdrawals and if HR continued trending up to call House officer. IM was paged x 3 and got a call back from Dr. Cynthia Lynch who told writer to encouraged hydration. Patient had a substantial amount of water and recheck after an hour of hydration HR was had gone up to 133. IM paged again an got an order from sona Moore who ordered an EKG stat and told writer to continue to hydrate and  medicate with hydroxyzine for any complains of anxiety. Patient continues to deny all symptoms of alcohol withdrawals endorsing minimal anxiety.  Please  see EKG results in his chart. Staff will continue to monitor and medicate as needed.

## 2023-07-06 NOTE — PLAN OF CARE
Problem: Adult Behavioral Health Plan of Care  Goal: Develops/Participates in Therapeutic [place] to Support Successful Transition  Outcome: Progressing   Goal Outcome Evaluation:  Data: Writer met with the patient this morning. Writer introduced self to name and role. Patient presented at nursing desk. Affect flat. Mood calm. Patient denies any withdrawal symptoms. Pt denies any elevated anxiety. No pain reported. Pt is currently out of detox. Last dose of Valium was 7/3 1616.  Patient denies suicidal ideation. Pt denies SIB and HI.  BP (!) 142/94   Pulse 90   Temp 97.3  F (36.3  C) (Temporal)   Resp 16   SpO2 98%    BP (!) 144/88 (BP Location: Left arm)   Pulse 93   Temp 97.3  F (36.3  C) (Temporal)   Resp 16   SpO2 98%     Action: Development of a therapeutic relationship based on trust. Active listening, supportive presence, validation, assessments, education (valium and withdrawal) medication review and administration. Monitoring blood pressure / pulse  Plan: Patient will be going door to door to [facility] in [place]. Acceptance is pending insurance and availability (expected to be sorted by tomorrow am).      Response: Patient is onboard with the door to door plan.

## 2023-07-06 NOTE — PROGRESS NOTES
Brief cross cover medicine note    Informed by nursing that patient has been having tachycardia.  Heart rate up to 133 in the last few hours.  Per nursing, patient is now outside of the MSSA window, suspected to not be in withdrawal anymore.  Per nursing, patient has endorsed some anxiety in the last few hours, has received treatment with hydroxyzine.  No other significant symptoms including chest pain, shortness of breath.  Other vitals within unremarkable including temperature and O2 sat, mild hypertension noted.      A/P:  -EKG obtained, confirmed sinus tachycardia, rate 106, no ischemic changes or other notable abnormalities  -DDx includes: Dehydration, anxiety, ? withdrawal.  No symptomatology to indicate ACS or infection, will not obtain any lab work at this time.  -Encourage oral intake of fluids  -Treat anxiety with hydroxyzine, psychiatry team to evaluate if escalation of anxiety treatment warranted.       Celia Moore, PA-C  [facility]  7/5/2023 8:00 PM                   Adequate: hears normal conversation without difficulty
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
## 2023-07-07 VITALS
TEMPERATURE: 97.4 F | RESPIRATION RATE: 16 BRPM | OXYGEN SATURATION: 98 % | HEART RATE: 88 BPM | DIASTOLIC BLOOD PRESSURE: 98 MMHG | SYSTOLIC BLOOD PRESSURE: 132 MMHG

## 2023-07-07 PROCEDURE — 250N000013 HC RX MED GY IP 250 OP 250 PS 637: Performed by: PSYCHIATRY & NEUROLOGY

## 2023-07-07 PROCEDURE — 99239 HOSP IP/OBS DSCHRG MGMT >30: CPT | Performed by: PSYCHIATRY & NEUROLOGY

## 2023-07-07 RX ADMIN — GABAPENTIN 300 MG: 300 CAPSULE ORAL at 07:58

## 2023-07-07 RX ADMIN — FOLIC ACID 1 MG: 1 TABLET ORAL at 07:58

## 2023-07-07 RX ADMIN — NICOTINE POLACRILEX 4 MG: 4 GUM, CHEWING BUCCAL at 07:58

## 2023-07-07 RX ADMIN — NICOTINE POLACRILEX 4 MG: 4 GUM, CHEWING BUCCAL at 10:44

## 2023-07-07 RX ADMIN — MULTIPLE VITAMINS W/ MINERALS TAB 1 TABLET: TAB at 07:58

## 2023-07-07 RX ADMIN — THIAMINE HCL TAB 100 MG 100 MG: 100 TAB at 07:58

## 2023-07-07 ASSESSMENT — ACTIVITIES OF DAILY LIVING (ADL)
ADLS_ACUITY_SCORE: 41
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
## 2023-07-07 NOTE — PLAN OF CARE
Problem: Plan of Care - These are the overarching goals to be used throughout the patient stay.    Goal: Optimal Comfort and Wellbeing  Outcome: Progressing     Problem: Sleep Disturbance  Goal: Adequate Sleep/Rest  Outcome: Progressing   Goal Outcome Evaluation:    Pt remains out of detox, and he slept throughout the night. The 15-minute safety checks are in progress. There were no safety concerns.
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
## 2023-07-07 NOTE — PLAN OF CARE
Problem: Suicidal Behavior  Goal: Suicidal Behavior is Absent or Managed  Outcome: Met   Goal Outcome Evaluation:    Data: The patient has completed the withdrawal. Discharge order is in place. Patient denies suicidal ideation. Pt denies thoughts of self-harm and HI. BP (!) 132/98   Pulse 88   Temp 97.4  F (36.3  C) (Temporal)   Resp 16   SpO2 98%   Patient is discharging to his mom's house. Pt will begin treatment Sunday, April 9, 2023 at [facility]. Pt reports his mom will drive him to the facility.   Action: Active listening, supportive presence, validation, assessments, education, medication review and administration.   Response: Patient is ready for discharge. Completion of AVS. Patient will contact his mother to pick him up from the hospital.     Patient participated in the discharge plan. Discharge instructions reviewed with patient including follow-up care plan. Medications:were filled and given to the patient. Reviewed safety plan and outpatient resources. Denies SI and HI. All belongings that were brought into the hospital have been returned to patient. Escorted off the unit at  accompanied by [facility] staff. Discharged to home with follow up services.Sunday, April 9, 2023 at [facility]  via car with mom.  Discharged at 1230 PM

## 2023-07-07 NOTE — PLAN OF CARE
Goal Outcome Evaluation:  ALL GOAL MET  DISCHARGE:         Patient participated in the discharge plan. Discharge instructions reviewed with patient including follow-up care plan. Medications:were filled and given to the patient. Reviewed safety plan and outpatient resources. Denies SI and HI. All belongings that were brought into the hospital have been returned to patient. Escorted off the unit at  accompanied by [facility] staff. Discharged to home with follow up services.Sunday, April 9, 2023 at [facility] in [place]  via car with mom.  Discharged at 1230 PM

## 2023-07-22 NOTE — PLAN OF CARE
"  Problem: Substance Withdrawal  Goal: Substance Withdrawal  Description: Signs and symptoms of listed problems will be absent or manageable.  3/7/2021 2115 by Esmer Bill RN  Outcome: Improving  Flowsheets (Taken 3/7/2021 2115)  Substance Withdrawal Assessed:    mood    anxiety    affect    insight    sleep    speech    suicidality  Substance Withdrawal Present: anxiety    Pt spent shift between his room and the lounge. His affect is flat and blunted, but brightens upon approach. At 1600, he endorsed anxiety. His pulse was 106 at this time. Pt stated that headphones are helpful to him when he has anxiety, which he went and used shortly after checking in.     Pt ate dinner and napped in the afternoon/evening. He requested to use his computer to check some emails regarding work, pt was accepting that unit policy is that pts don't check their phones/emails here. Pt told writer that he has been worried about work because he started a new job, but was a no-show, he is worried that he may lose the job. Pt is also feeling anxious about an upcoming car payment. Pt states \"I'm feeling a lot of guilt and shame that I thought I could drink again after a year of sobriety. Then I spend a month drinking and I'm back in this same situation. I'm also worried that I'll be back and all the people in my support system are going to be off doing their own thing, and I'll be alone.\" Pt states that \"it might be good for me to start seeing a therapist for the first time.\" He stated that he has not enjoyed his experience with AA in the past. Pt requested and received his HS remeron at 2030. Pt did not receive any prns this shift.     MSSA scores this shift: 7,3     Prns received: atenolol 50 mg at 1610       " <-- Click to add NO significant Past Surgical History

## 2023-09-09 ENCOUNTER — HEALTH MAINTENANCE LETTER (OUTPATIENT)
Age: 38
End: 2023-09-09

## 2023-09-12 ENCOUNTER — LAB REQUISITION (OUTPATIENT)
Dept: LAB | Facility: CLINIC | Age: 38
End: 2023-09-12
Payer: COMMERCIAL

## 2023-09-12 DIAGNOSIS — J02.9 ACUTE PHARYNGITIS, UNSPECIFIED: ICD-10-CM

## 2023-09-12 PROCEDURE — 87077 CULTURE AEROBIC IDENTIFY: CPT | Mod: ORL | Performed by: FAMILY MEDICINE

## 2023-09-14 LAB — BACTERIA SPEC CULT: ABNORMAL

## 2023-12-29 NOTE — ED NOTES
Patient reports that he has experienced a lot of loss in the last year and reports increasing depression.    Subjective   Vandana Woods is a 62 y.o. female who presents for complaining of cough shortness of breath wheezing and sinus congestion.    History of present illness:  Vandana is a 62 female with a history of hypothyroidism, tobacco abuse, complaining of sinus congestion and cough for the last week, having difficulty taking deep breaths without resulting cough, there is no fever chills nausea vomiting, cough is dry nonproductive also complains of hoarseness worse towards the end of the day.  Patient tested negative for COVID denies diaphoresis denies fevers or chills.   ROS:  10 systems are pertinent as above    10 systems reviewed pertinent as above  Objective   Virtual visit  There were no vitals taken for this visit.     Physical Exam  Virtual visit    Assessment/Plan     Here for a virtual visit  Acute sinusitis  Progressed to laryngitis  Now with bronchitis  Persistent dry nonproductive cough  Deep breathing with severe cough  I suspect bacterial bronchitis  Would recommend azithromycin as directed  Fluids and rest  Declined tapered Medrol       Hypothyroidism  Continue current medications  Report any changes such as weight gain or weight loss  Continue to exercise regularly  Discussed new dose of Synthroid of 112 mcg  Patient will come in in 4 weeks for a new TSH level    Tobacco abuse  Discussed smoking cessation with patient  Patient is not ready to quit smoking at this time    Post nasal drip sinusitis  Must stop smoking       Problem List Items Addressed This Visit    None  Visit Diagnoses       Acute bronchitis, unspecified organism    -  Primary    Relevant Medications    azithromycin (Zithromax) 250 mg tablet            Desmond Medina MD

## 2024-01-04 LAB
ATRIAL RATE - MUSE: 106 BPM
DIASTOLIC BLOOD PRESSURE - MUSE: NORMAL MMHG
INTERPRETATION ECG - MUSE: NORMAL
P AXIS - MUSE: 63 DEGREES
PR INTERVAL - MUSE: 142 MS
QRS DURATION - MUSE: 100 MS
QT - MUSE: 338 MS
QTC - MUSE: 448 MS
R AXIS - MUSE: 65 DEGREES
SYSTOLIC BLOOD PRESSURE - MUSE: NORMAL MMHG
T AXIS - MUSE: 50 DEGREES
VENTRICULAR RATE- MUSE: 106 BPM

## 2024-02-20 ENCOUNTER — HOSPITAL ENCOUNTER (INPATIENT)
Facility: CLINIC | Age: 39
LOS: 5 days | Discharge: HOME | DRG: 897 | End: 2024-02-25
Attending: EMERGENCY MEDICINE | Admitting: INTERNAL MEDICINE
Payer: COMMERCIAL

## 2024-02-20 DIAGNOSIS — F10.939 ALCOHOL WITHDRAWAL, WITH UNSPECIFIED COMPLICATION (H): ICD-10-CM

## 2024-02-20 DIAGNOSIS — F10.239 ALCOHOL DEPENDENCE WITH WITHDRAWAL WITH COMPLICATION (H): Primary | ICD-10-CM

## 2024-02-20 LAB
ALBUMIN SERPL BCG-MCNC: 4.4 G/DL (ref 3.5–5.2)
ALBUMIN UR-MCNC: 100 MG/DL
ALCOHOL BREATH TEST: 0.13 (ref 0–0.01)
ALP SERPL-CCNC: 87 U/L (ref 40–150)
ALT SERPL W P-5'-P-CCNC: 110 U/L (ref 0–70)
AMPHETAMINES UR QL SCN: NORMAL
ANION GAP SERPL CALCULATED.3IONS-SCNC: 23 MMOL/L (ref 7–15)
ANION GAP SERPL CALCULATED.3IONS-SCNC: 38 MMOL/L (ref 7–15)
APPEARANCE UR: CLEAR
AST SERPL W P-5'-P-CCNC: 194 U/L (ref 0–45)
BACTERIA #/AREA URNS HPF: ABNORMAL /HPF
BARBITURATES UR QL SCN: NORMAL
BASOPHILS # BLD AUTO: 0 10E3/UL (ref 0–0.2)
BASOPHILS NFR BLD AUTO: 0 %
BENZODIAZ UR QL SCN: NORMAL
BILIRUB SERPL-MCNC: 1.4 MG/DL
BILIRUB UR QL STRIP: NEGATIVE
BUN SERPL-MCNC: 10.5 MG/DL (ref 6–20)
BUN SERPL-MCNC: 8.7 MG/DL (ref 6–20)
BZE UR QL SCN: NORMAL
CALCIUM SERPL-MCNC: 8.1 MG/DL (ref 8.6–10)
CALCIUM SERPL-MCNC: 8.6 MG/DL (ref 8.6–10)
CANNABINOIDS UR QL SCN: NORMAL
CHLORIDE SERPL-SCNC: 80 MMOL/L (ref 98–107)
CHLORIDE SERPL-SCNC: 94 MMOL/L (ref 98–107)
COLOR UR AUTO: YELLOW
CREAT SERPL-MCNC: 0.86 MG/DL (ref 0.67–1.17)
CREAT SERPL-MCNC: 0.99 MG/DL (ref 0.67–1.17)
DEPRECATED HCO3 PLAS-SCNC: 12 MMOL/L (ref 22–29)
DEPRECATED HCO3 PLAS-SCNC: 17 MMOL/L (ref 22–29)
EGFRCR SERPLBLD CKD-EPI 2021: >90 ML/MIN/1.73M2
EGFRCR SERPLBLD CKD-EPI 2021: >90 ML/MIN/1.73M2
EOSINOPHIL # BLD AUTO: 0 10E3/UL (ref 0–0.7)
EOSINOPHIL NFR BLD AUTO: 0 %
ERYTHROCYTE [DISTWIDTH] IN BLOOD BY AUTOMATED COUNT: 12.6 % (ref 10–15)
ETHANOL SERPL-MCNC: 0.14 G/DL
FENTANYL UR QL: NORMAL
GLUCOSE BLDC GLUCOMTR-MCNC: 126 MG/DL (ref 70–99)
GLUCOSE SERPL-MCNC: 103 MG/DL (ref 70–99)
GLUCOSE SERPL-MCNC: 118 MG/DL (ref 70–99)
GLUCOSE UR STRIP-MCNC: NEGATIVE MG/DL
HCT VFR BLD AUTO: 43.4 % (ref 40–53)
HGB BLD-MCNC: 14.7 G/DL (ref 13.3–17.7)
HGB UR QL STRIP: ABNORMAL
HOLD SPECIMEN: NORMAL
HYALINE CASTS: 12 /LPF
IMM GRANULOCYTES # BLD: 0 10E3/UL
IMM GRANULOCYTES NFR BLD: 1 %
KETONES UR STRIP-MCNC: >150 MG/DL
LACTATE SERPL-SCNC: 1.1 MMOL/L (ref 0.7–2)
LACTATE SERPL-SCNC: 2.7 MMOL/L (ref 0.7–2)
LACTATE SERPL-SCNC: 5.7 MMOL/L (ref 0.7–2)
LEUKOCYTE ESTERASE UR QL STRIP: NEGATIVE
LYMPHOCYTES # BLD AUTO: 0.6 10E3/UL (ref 0.8–5.3)
LYMPHOCYTES NFR BLD AUTO: 7 %
MAGNESIUM SERPL-MCNC: 1.8 MG/DL (ref 1.7–2.3)
MCH RBC QN AUTO: 27.8 PG (ref 26.5–33)
MCHC RBC AUTO-ENTMCNC: 33.9 G/DL (ref 31.5–36.5)
MCV RBC AUTO: 82 FL (ref 78–100)
MONOCYTES # BLD AUTO: 0.3 10E3/UL (ref 0–1.3)
MONOCYTES NFR BLD AUTO: 3 %
MUCOUS THREADS #/AREA URNS LPF: PRESENT /LPF
NEUTROPHILS # BLD AUTO: 7.6 10E3/UL (ref 1.6–8.3)
NEUTROPHILS NFR BLD AUTO: 89 %
NITRATE UR QL: NEGATIVE
NRBC # BLD AUTO: 0 10E3/UL
NRBC BLD AUTO-RTO: 0 /100
OPIATES UR QL SCN: NORMAL
PCP QUAL URINE (ROCHE): NORMAL
PH UR STRIP: 5.5 [PH] (ref 5–7)
PHOSPHATE SERPL-MCNC: 1.6 MG/DL (ref 2.5–4.5)
PLATELET # BLD AUTO: 151 10E3/UL (ref 150–450)
POTASSIUM SERPL-SCNC: 3.1 MMOL/L (ref 3.4–5.3)
POTASSIUM SERPL-SCNC: 3.3 MMOL/L (ref 3.4–5.3)
PROT SERPL-MCNC: 7.8 G/DL (ref 6.4–8.3)
RBC # BLD AUTO: 5.29 10E6/UL (ref 4.4–5.9)
RBC URINE: 7 /HPF
SODIUM SERPL-SCNC: 130 MMOL/L (ref 135–145)
SODIUM SERPL-SCNC: 134 MMOL/L (ref 135–145)
SP GR UR STRIP: 1.03 (ref 1–1.03)
UROBILINOGEN UR STRIP-MCNC: 2 MG/DL
WBC # BLD AUTO: 8.5 10E3/UL (ref 4–11)
WBC URINE: 2 /HPF

## 2024-02-20 PROCEDURE — 250N000013 HC RX MED GY IP 250 OP 250 PS 637: Performed by: EMERGENCY MEDICINE

## 2024-02-20 PROCEDURE — 83605 ASSAY OF LACTIC ACID: CPT | Performed by: PHYSICIAN ASSISTANT

## 2024-02-20 PROCEDURE — 80053 COMPREHEN METABOLIC PANEL: CPT | Performed by: EMERGENCY MEDICINE

## 2024-02-20 PROCEDURE — 120N000002 HC R&B MED SURG/OB UMMC

## 2024-02-20 PROCEDURE — 82075 ASSAY OF BREATH ETHANOL: CPT | Performed by: EMERGENCY MEDICINE

## 2024-02-20 PROCEDURE — 84100 ASSAY OF PHOSPHORUS: CPT | Performed by: PHYSICIAN ASSISTANT

## 2024-02-20 PROCEDURE — 99223 1ST HOSP IP/OBS HIGH 75: CPT | Mod: AI | Performed by: INTERNAL MEDICINE

## 2024-02-20 PROCEDURE — 96367 TX/PROPH/DG ADDL SEQ IV INF: CPT | Performed by: EMERGENCY MEDICINE

## 2024-02-20 PROCEDURE — 80307 DRUG TEST PRSMV CHEM ANLYZR: CPT | Performed by: EMERGENCY MEDICINE

## 2024-02-20 PROCEDURE — 81001 URINALYSIS AUTO W/SCOPE: CPT | Performed by: EMERGENCY MEDICINE

## 2024-02-20 PROCEDURE — 96375 TX/PRO/DX INJ NEW DRUG ADDON: CPT | Performed by: EMERGENCY MEDICINE

## 2024-02-20 PROCEDURE — 85025 COMPLETE CBC W/AUTO DIFF WBC: CPT | Performed by: EMERGENCY MEDICINE

## 2024-02-20 PROCEDURE — HZ2ZZZZ DETOXIFICATION SERVICES FOR SUBSTANCE ABUSE TREATMENT: ICD-10-PCS | Performed by: PHYSICIAN ASSISTANT

## 2024-02-20 PROCEDURE — 258N000003 HC RX IP 258 OP 636: Performed by: EMERGENCY MEDICINE

## 2024-02-20 PROCEDURE — 250N000009 HC RX 250: Performed by: EMERGENCY MEDICINE

## 2024-02-20 PROCEDURE — 36415 COLL VENOUS BLD VENIPUNCTURE: CPT | Performed by: PHYSICIAN ASSISTANT

## 2024-02-20 PROCEDURE — 96365 THER/PROPH/DIAG IV INF INIT: CPT | Performed by: EMERGENCY MEDICINE

## 2024-02-20 PROCEDURE — 99285 EMERGENCY DEPT VISIT HI MDM: CPT | Mod: 25 | Performed by: EMERGENCY MEDICINE

## 2024-02-20 PROCEDURE — 83735 ASSAY OF MAGNESIUM: CPT | Performed by: EMERGENCY MEDICINE

## 2024-02-20 PROCEDURE — 96366 THER/PROPH/DIAG IV INF ADDON: CPT | Performed by: EMERGENCY MEDICINE

## 2024-02-20 PROCEDURE — 82077 ASSAY SPEC XCP UR&BREATH IA: CPT | Performed by: EMERGENCY MEDICINE

## 2024-02-20 PROCEDURE — 83605 ASSAY OF LACTIC ACID: CPT | Performed by: EMERGENCY MEDICINE

## 2024-02-20 PROCEDURE — 250N000013 HC RX MED GY IP 250 OP 250 PS 637: Performed by: PHYSICIAN ASSISTANT

## 2024-02-20 PROCEDURE — 250N000011 HC RX IP 250 OP 636: Performed by: EMERGENCY MEDICINE

## 2024-02-20 PROCEDURE — 36415 COLL VENOUS BLD VENIPUNCTURE: CPT | Performed by: EMERGENCY MEDICINE

## 2024-02-20 PROCEDURE — 258N000003 HC RX IP 258 OP 636: Performed by: PHYSICIAN ASSISTANT

## 2024-02-20 PROCEDURE — 96361 HYDRATE IV INFUSION ADD-ON: CPT | Performed by: EMERGENCY MEDICINE

## 2024-02-20 PROCEDURE — 99291 CRITICAL CARE FIRST HOUR: CPT | Performed by: EMERGENCY MEDICINE

## 2024-02-20 PROCEDURE — 99223 1ST HOSP IP/OBS HIGH 75: CPT | Mod: FS | Performed by: PHYSICIAN ASSISTANT

## 2024-02-20 RX ORDER — GABAPENTIN 300 MG/1
300 CAPSULE ORAL DAILY
Status: DISCONTINUED | OUTPATIENT
Start: 2024-02-21 | End: 2024-02-21

## 2024-02-20 RX ORDER — DIAZEPAM 5 MG
10 TABLET ORAL EVERY 30 MIN PRN
Status: DISCONTINUED | OUTPATIENT
Start: 2024-02-20 | End: 2024-02-22

## 2024-02-20 RX ORDER — DIAZEPAM 10 MG/2ML
5-10 INJECTION, SOLUTION INTRAMUSCULAR; INTRAVENOUS EVERY 30 MIN PRN
Status: DISCONTINUED | OUTPATIENT
Start: 2024-02-20 | End: 2024-02-22

## 2024-02-20 RX ORDER — MULTIPLE VITAMINS W/ MINERALS TAB 9MG-400MCG
1 TAB ORAL DAILY
Status: DISCONTINUED | OUTPATIENT
Start: 2024-02-21 | End: 2024-02-25 | Stop reason: HOSPADM

## 2024-02-20 RX ORDER — ONDANSETRON 2 MG/ML
4 INJECTION INTRAMUSCULAR; INTRAVENOUS EVERY 6 HOURS PRN
Status: DISCONTINUED | OUTPATIENT
Start: 2024-02-20 | End: 2024-02-25 | Stop reason: HOSPADM

## 2024-02-20 RX ORDER — AMOXICILLIN 250 MG
2 CAPSULE ORAL 2 TIMES DAILY PRN
Status: DISCONTINUED | OUTPATIENT
Start: 2024-02-20 | End: 2024-02-25 | Stop reason: HOSPADM

## 2024-02-20 RX ORDER — POTASSIUM CHLORIDE 7.45 MG/ML
10 INJECTION INTRAVENOUS ONCE
Status: COMPLETED | OUTPATIENT
Start: 2024-02-20 | End: 2024-02-20

## 2024-02-20 RX ORDER — FOLIC ACID 1 MG/1
1 TABLET ORAL DAILY
Status: DISCONTINUED | OUTPATIENT
Start: 2024-02-21 | End: 2024-02-25 | Stop reason: HOSPADM

## 2024-02-20 RX ORDER — MIRTAZAPINE 15 MG/1
15 TABLET, FILM COATED ORAL AT BEDTIME
Status: DISCONTINUED | OUTPATIENT
Start: 2024-02-20 | End: 2024-02-25 | Stop reason: HOSPADM

## 2024-02-20 RX ORDER — HALOPERIDOL 5 MG/ML
2.5-5 INJECTION INTRAMUSCULAR EVERY 6 HOURS PRN
Status: DISCONTINUED | OUTPATIENT
Start: 2024-02-20 | End: 2024-02-25 | Stop reason: HOSPADM

## 2024-02-20 RX ORDER — BUDESONIDE AND FORMOTEROL FUMARATE DIHYDRATE 160; 4.5 UG/1; UG/1
2 AEROSOL RESPIRATORY (INHALATION) 2 TIMES DAILY
COMMUNITY

## 2024-02-20 RX ORDER — POTASSIUM CHLORIDE 750 MG/1
40 TABLET, EXTENDED RELEASE ORAL ONCE
Status: COMPLETED | OUTPATIENT
Start: 2024-02-20 | End: 2024-02-20

## 2024-02-20 RX ORDER — SODIUM CHLORIDE 9 MG/ML
INJECTION, SOLUTION INTRAVENOUS CONTINUOUS
Status: DISCONTINUED | OUTPATIENT
Start: 2024-02-20 | End: 2024-02-22

## 2024-02-20 RX ORDER — ONDANSETRON 2 MG/ML
4 INJECTION INTRAMUSCULAR; INTRAVENOUS ONCE
Status: COMPLETED | OUTPATIENT
Start: 2024-02-20 | End: 2024-02-20

## 2024-02-20 RX ORDER — FLUTICASONE FUROATE AND VILANTEROL 200; 25 UG/1; UG/1
1 POWDER RESPIRATORY (INHALATION) DAILY
Status: DISCONTINUED | OUTPATIENT
Start: 2024-02-21 | End: 2024-02-25 | Stop reason: HOSPADM

## 2024-02-20 RX ORDER — ONDANSETRON 4 MG/1
4 TABLET, ORALLY DISINTEGRATING ORAL EVERY 6 HOURS PRN
Status: DISCONTINUED | OUTPATIENT
Start: 2024-02-20 | End: 2024-02-25 | Stop reason: HOSPADM

## 2024-02-20 RX ORDER — CLONIDINE HYDROCHLORIDE 0.1 MG/1
0.1 TABLET ORAL EVERY 8 HOURS PRN
Status: DISCONTINUED | OUTPATIENT
Start: 2024-02-20 | End: 2024-02-25 | Stop reason: HOSPADM

## 2024-02-20 RX ORDER — PROCHLORPERAZINE 25 MG
25 SUPPOSITORY, RECTAL RECTAL EVERY 12 HOURS PRN
Status: DISCONTINUED | OUTPATIENT
Start: 2024-02-20 | End: 2024-02-25 | Stop reason: HOSPADM

## 2024-02-20 RX ORDER — OLANZAPINE 5 MG/1
5-10 TABLET, ORALLY DISINTEGRATING ORAL EVERY 6 HOURS PRN
Status: DISCONTINUED | OUTPATIENT
Start: 2024-02-20 | End: 2024-02-25 | Stop reason: HOSPADM

## 2024-02-20 RX ORDER — LIDOCAINE 40 MG/G
CREAM TOPICAL
Status: DISCONTINUED | OUTPATIENT
Start: 2024-02-20 | End: 2024-02-25 | Stop reason: HOSPADM

## 2024-02-20 RX ORDER — PANTOPRAZOLE SODIUM 40 MG/1
40 TABLET, DELAYED RELEASE ORAL
Status: DISCONTINUED | OUTPATIENT
Start: 2024-02-21 | End: 2024-02-25 | Stop reason: HOSPADM

## 2024-02-20 RX ORDER — PROCHLORPERAZINE MALEATE 10 MG
10 TABLET ORAL EVERY 6 HOURS PRN
Status: DISCONTINUED | OUTPATIENT
Start: 2024-02-20 | End: 2024-02-25 | Stop reason: HOSPADM

## 2024-02-20 RX ORDER — DIAZEPAM 10 MG/2ML
5 INJECTION, SOLUTION INTRAMUSCULAR; INTRAVENOUS ONCE
Status: COMPLETED | OUTPATIENT
Start: 2024-02-20 | End: 2024-02-20

## 2024-02-20 RX ORDER — ALBUTEROL SULFATE 90 UG/1
2 AEROSOL, METERED RESPIRATORY (INHALATION) EVERY 6 HOURS PRN
Status: DISCONTINUED | OUTPATIENT
Start: 2024-02-20 | End: 2024-02-25 | Stop reason: HOSPADM

## 2024-02-20 RX ORDER — LIDOCAINE 4 G/G
1-2 PATCH TOPICAL
Status: DISCONTINUED | OUTPATIENT
Start: 2024-02-20 | End: 2024-02-25 | Stop reason: HOSPADM

## 2024-02-20 RX ORDER — AMOXICILLIN 250 MG
1 CAPSULE ORAL 2 TIMES DAILY PRN
Status: DISCONTINUED | OUTPATIENT
Start: 2024-02-20 | End: 2024-02-25 | Stop reason: HOSPADM

## 2024-02-20 RX ORDER — DIAZEPAM 5 MG
5-20 TABLET ORAL EVERY 30 MIN PRN
Status: DISCONTINUED | OUTPATIENT
Start: 2024-02-20 | End: 2024-02-20

## 2024-02-20 RX ORDER — FLUMAZENIL 0.1 MG/ML
0.2 INJECTION, SOLUTION INTRAVENOUS
Status: DISCONTINUED | OUTPATIENT
Start: 2024-02-20 | End: 2024-02-25 | Stop reason: HOSPADM

## 2024-02-20 RX ORDER — CALCIUM CARBONATE 500 MG/1
1000 TABLET, CHEWABLE ORAL 4 TIMES DAILY PRN
Status: DISCONTINUED | OUTPATIENT
Start: 2024-02-20 | End: 2024-02-25 | Stop reason: HOSPADM

## 2024-02-20 RX ADMIN — SODIUM CHLORIDE 1000 ML: 9 INJECTION, SOLUTION INTRAVENOUS at 11:02

## 2024-02-20 RX ADMIN — DIAZEPAM 10 MG: 5 TABLET ORAL at 11:10

## 2024-02-20 RX ADMIN — SODIUM CHLORIDE 1000 ML: 9 INJECTION, SOLUTION INTRAVENOUS at 09:24

## 2024-02-20 RX ADMIN — DIAZEPAM 10 MG: 5 TABLET ORAL at 15:09

## 2024-02-20 RX ADMIN — DIAZEPAM 10 MG: 5 TABLET ORAL at 13:57

## 2024-02-20 RX ADMIN — POTASSIUM CHLORIDE 10 MEQ: 7.46 INJECTION, SOLUTION INTRAVENOUS at 10:23

## 2024-02-20 RX ADMIN — LIDOCAINE 2 PATCH: 4 PATCH TOPICAL at 18:35

## 2024-02-20 RX ADMIN — MIRTAZAPINE 15 MG: 15 TABLET, FILM COATED ORAL at 21:38

## 2024-02-20 RX ADMIN — SODIUM CHLORIDE 1000 ML: 9 INJECTION, SOLUTION INTRAVENOUS at 16:36

## 2024-02-20 RX ADMIN — DIAZEPAM 10 MG: 5 TABLET ORAL at 18:20

## 2024-02-20 RX ADMIN — FOLIC ACID: 5 INJECTION, SOLUTION INTRAMUSCULAR; INTRAVENOUS; SUBCUTANEOUS at 12:20

## 2024-02-20 RX ADMIN — DIAZEPAM 5 MG: 10 INJECTION, SOLUTION INTRAMUSCULAR; INTRAVENOUS at 09:27

## 2024-02-20 RX ADMIN — DIAZEPAM 20 MG: 5 TABLET ORAL at 10:19

## 2024-02-20 RX ADMIN — DIAZEPAM 10 MG: 5 TABLET ORAL at 20:10

## 2024-02-20 RX ADMIN — POTASSIUM CHLORIDE 40 MEQ: 750 TABLET, EXTENDED RELEASE ORAL at 20:14

## 2024-02-20 RX ADMIN — DIAZEPAM 10 MG: 5 TABLET ORAL at 16:35

## 2024-02-20 RX ADMIN — DIAZEPAM 10 MG: 5 TABLET ORAL at 12:32

## 2024-02-20 RX ADMIN — ONDANSETRON 4 MG: 2 INJECTION INTRAMUSCULAR; INTRAVENOUS at 12:31

## 2024-02-20 RX ADMIN — SODIUM CHLORIDE: 9 INJECTION, SOLUTION INTRAVENOUS at 21:38

## 2024-02-20 ASSESSMENT — ACTIVITIES OF DAILY LIVING (ADL)
ADLS_ACUITY_SCORE: 39
ADLS_ACUITY_SCORE: 26
ADLS_ACUITY_SCORE: 39

## 2024-02-20 NOTE — ED PROVIDER NOTES
ED PROVIDER NOTE  February 20, 2024  History     Chief Complaint   Patient presents with    Alcohol Problem     Last drink was yesterday, pt denies any history of seizures.       HPI  Xavier Odell is a 39 year old male who has a history significant for alcohol dependence and significant withdrawal.  He arrives to the Emergency Department seeking withdrawal.  Patient reports he had been clinically sober for approximately 6 months.  Over the past several weeks he has began drinking hard seltzers and upwards of 12-20 drinks per day.  His last drink was yesterday.  Overnight he became tremulous and nauseous.  He states he has been seeing things floating.  Overall he states this is subsided some but still having some presence of above.  Patient voiced no congestion on prescribed occasions with illicit substances.  He reports no recent fall.  He reports no active medical problem such as fever, chills, vomiting, diarrhea.  Patient reports no psychiatric concerns such as SI at this time.  Patient reports no other chronic longstanding medical issues.      Past Medical History  Past Medical History:   Diagnosis Date    GI bleed     Substance abuse (H)     Uncomplicated asthma      Past Surgical History:   Procedure Laterality Date    ORTHOPEDIC SURGERY       albuterol (PROAIR HFA/PROVENTIL HFA/VENTOLIN HFA) 108 (90 Base) MCG/ACT inhaler  gabapentin (NEURONTIN) 300 MG capsule  mirtazapine (REMERON) 15 MG tablet  multivitamin w/minerals (THERA-VIT-M) tablet  nicotine polacrilex (NICORETTE) 4 MG gum  thiamine (B-1) 100 MG tablet      Allergies   Allergen Reactions    Banana      Mild throat irritation per pt    Chicken Allergy      Anaphalxis    Peanut (Diagnostic)      Pt reports does not have allergy to this.  Patient today, 3/6/2021, patient confirms no serious aversion to peanuts.  Therefore, no removal of peanut products from garcia.  Anaphalxis  Anaphalxis     Family History  Family History   Problem Relation Age of  "Onset    Depression Mother     Depression Father     Substance Abuse Father      Social History   Social History     Tobacco Use    Smoking status: Never    Smokeless tobacco: Current   Substance Use Topics    Alcohol use: Yes     Comment: seltzers and vodka    Drug use: Not Currently         A medically appropriate review of systems was performed with pertinent positives and negatives noted in the HPI, and all other systems negative.      Physical Exam   BP: (!) 178/82  Pulse: 102  Temp: 99.1  F (37.3  C)  Resp: 16  Height: 175.3 cm (5' 9\")  SpO2: 98 %      Physical Exam  Vitals and nursing note reviewed.   Constitutional:       General: He is in acute distress.      Appearance: Normal appearance. He is not ill-appearing, toxic-appearing or diaphoretic.   HENT:      Head: Normocephalic and atraumatic.      Right Ear: External ear normal.      Left Ear: External ear normal.      Nose: Nose normal. No congestion.      Mouth/Throat:      Mouth: Mucous membranes are moist.      Pharynx: Oropharynx is clear. No oropharyngeal exudate.   Eyes:      Extraocular Movements: Extraocular movements intact.      Conjunctiva/sclera: Conjunctivae normal.      Pupils: Pupils are equal, round, and reactive to light.   Cardiovascular:      Rate and Rhythm: Tachycardia present.      Pulses: Normal pulses.      Heart sounds: Normal heart sounds. No murmur heard.     No friction rub.   Pulmonary:      Effort: Pulmonary effort is normal. No respiratory distress.      Breath sounds: No stridor. No wheezing, rhonchi or rales.   Abdominal:      General: Abdomen is flat. There is no distension.      Tenderness: There is no abdominal tenderness. There is no guarding or rebound.   Musculoskeletal:         General: No deformity or signs of injury. Normal range of motion.      Cervical back: Normal range of motion.   Skin:     General: Skin is warm.      Capillary Refill: Capillary refill takes less than 2 seconds.      Coloration: Skin is not " pale.      Findings: No bruising or erythema.   Neurological:      General: No focal deficit present.      Mental Status: He is alert and oriented to person, place, and time.      Cranial Nerves: No cranial nerve deficit.      Motor: Tremor present. No weakness.   Psychiatric:         Mood and Affect: Mood is anxious.         Behavior: Behavior normal.         ED Course        Procedures         Results for orders placed or performed during the hospital encounter of 02/20/24 (from the past 24 hour(s))   Alcohol breath test POCT   Result Value Ref Range    Alcohol Breath Test 0.13 (A) 0.00 - 0.01   CBC with platelets differential    Narrative    The following orders were created for panel order CBC with platelets differential.  Procedure                               Abnormality         Status                     ---------                               -----------         ------                     CBC with platelets and d...[[id number]]  Abnormal            Final result                 Please view results for these tests on the individual orders.   Comprehensive metabolic panel   Result Value Ref Range    Sodium 130 (L) 135 - 145 mmol/L    Potassium 3.3 (L) 3.4 - 5.3 mmol/L    Carbon Dioxide (CO2) 12 (L) 22 - 29 mmol/L    Anion Gap 38 (H) 7 - 15 mmol/L    Urea Nitrogen 10.5 6.0 - 20.0 mg/dL    Creatinine 0.99 0.67 - 1.17 mg/dL    GFR Estimate >90 >60 mL/min/1.73m2    Calcium 8.6 8.6 - 10.0 mg/dL    Chloride 80 (L) 98 - 107 mmol/L    Glucose 103 (H) 70 - 99 mg/dL    Alkaline Phosphatase 87 40 - 150 U/L     (H) 0 - 45 U/L     (H) 0 - 70 U/L    Protein Total 7.8 6.4 - 8.3 g/dL    Albumin 4.4 3.5 - 5.2 g/dL    Bilirubin Total 1.4 (H) <=1.2 mg/dL   Ethyl Alcohol Level   Result Value Ref Range    Alcohol ethyl 0.14 (H) <=0.01 g/dL   Magnesium   Result Value Ref Range    Magnesium 1.8 1.7 - 2.3 mg/dL   CBC with platelets and differential   Result Value Ref Range    WBC Count 8.5 4.0 - 11.0 10e3/uL    RBC Count  5.29 4.40 - 5.90 10e6/uL    Hemoglobin 14.7 13.3 - 17.7 g/dL    Hematocrit 43.4 40.0 - 53.0 %    MCV 82 78 - 100 fL    MCH 27.8 26.5 - 33.0 pg    MCHC 33.9 31.5 - 36.5 g/dL    RDW 12.6 10.0 - 15.0 %    Platelet Count 151 150 - 450 10e3/uL    % Neutrophils 89 %    % Lymphocytes 7 %    % Monocytes 3 %    % Eosinophils 0 %    % Basophils 0 %    % Immature Granulocytes 1 %    NRBCs per 100 WBC 0 <1 /100    Absolute Neutrophils 7.6 1.6 - 8.3 10e3/uL    Absolute Lymphocytes 0.6 (L) 0.8 - 5.3 10e3/uL    Absolute Monocytes 0.3 0.0 - 1.3 10e3/uL    Absolute Eosinophils 0.0 0.0 - 0.7 10e3/uL    Absolute Basophils 0.0 0.0 - 0.2 10e3/uL    Absolute Immature Granulocytes 0.0 <=0.4 10e3/uL    Absolute NRBCs 0.0 10e3/uL   Lactic acid whole blood   Result Value Ref Range    Lactic Acid 5.7 (HH) 0.7 - 2.0 mmol/L   Extra Tube    Narrative    The following orders were created for panel order Extra Tube.  Procedure                               Abnormality         Status                     ---------                               -----------         ------                     Extra Red Top Tube[[id number]]                               Final result                 Please view results for these tests on the individual orders.   Extra Red Top Tube   Result Value Ref Range    Hold Specimen [facility]    Urine Drug Screen    Narrative    The following orders were created for panel order Urine Drug Screen.  Procedure                               Abnormality         Status                     ---------                               -----------         ------                     Urine Drug Screen Panel[[id number]]      Normal              Final result                 Please view results for these tests on the individual orders.   UA with Microscopic reflex to Culture    Specimen: Urine, Midstream   Result Value Ref Range    Color Urine Yellow Colorless, Straw, Light Yellow, Yellow    Appearance Urine Clear Clear    Glucose Urine Negative Negative  mg/dL    Bilirubin Urine Negative Negative    Ketones Urine >150 (A) Negative mg/dL    Specific Gravity Urine 1.027 1.003 - 1.035    Blood Urine Trace (A) Negative    pH Urine 5.5 5.0 - 7.0    Protein Albumin Urine 100 (A) Negative mg/dL    Urobilinogen Urine 2.0 Normal, 2.0 mg/dL    Nitrite Urine Negative Negative    Leukocyte Esterase Urine Negative Negative    Bacteria Urine Few (A) None Seen /HPF    Mucus Urine Present (A) None Seen /LPF    RBC Urine 7 (H) <=2 /HPF    WBC Urine 2 <=5 /HPF    Hyaline Casts Urine 12 (H) <=2 /LPF    Narrative    Urine Culture not indicated   Urine Drug Screen Panel   Result Value Ref Range    Amphetamines Urine Screen Negative Screen Negative    Barbituates Urine Screen Negative Screen Negative    Benzodiazepine Urine Screen Negative Screen Negative    Cannabinoids Urine Screen Negative Screen Negative    Cocaine Urine Screen Negative Screen Negative    Fentanyl Qual Urine Screen Negative Screen Negative    Opiates Urine Screen Negative Screen Negative    PCP Urine Screen Negative Screen Negative   Lactic acid whole blood   Result Value Ref Range    Lactic Acid 2.7 (H) 0.7 - 2.0 mmol/L     Medications   diazepam (VALIUM) tablet 5-20 mg (10 mg Oral $Given 2/20/24 1232)   sodium chloride 0.9% BOLUS 1,000 mL (0 mLs Intravenous Hold 2/20/24 1220)   sodium chloride 0.9% BOLUS 1,000 mL (0 mLs Intravenous Stopped 2/20/24 1229)   diazepam (VALIUM) injection 5 mg (5 mg Intravenous $Given 2/20/24 0927)   sodium chloride 0.9% BOLUS 1,000 mL (0 mLs Intravenous Stopped 2/20/24 1102)   sodium chloride 0.9 % 1,000 mL with Infuvite Adult 10 mL, thiamine 100 mg, folic acid 1 mg infusion ( Intravenous $New Bag 2/20/24 1220)   potassium chloride 10 mEq in 100 mL sterile water infusion (0 mEq Intravenous Stopped 2/20/24 1229)   ondansetron (ZOFRAN) injection 4 mg (4 mg Intravenous $Given 2/20/24 1231)             Critical care was performed.   Critical Care Addendum  My initial assessment, based on my  review of prehospital provider report, review of nursing observations, review of vital signs, focused history, and physical exam, established a high suspicion that Xavier Odell has  severe alcohol withdrawal , which requires immediate intervention, and therefore he is critically ill.     After the initial assessment, the care team initiated multiple lab tests, initiated IV fluid administration, and initiated medication therapy with valium  to provide stabilization care. Due to the critical nature of this patient, I reassessed nursing observations, vital signs, physical exam, review of cardiac rhythm monitor, mental status, neurologic status, and respiratory status multiple times prior to his disposition.     Time also spent performing documentation, discussion with family to obtain medical information for decision making, reviewing test results, discussion with consultants, and coordination of care.     Critical care time (excluding teaching time and procedures): 45 minutes.     Assessments & Plan (with Medical Decision Making)     Xavier Odell is a 39 year old male who has a history significant for alcohol dependence and significant withdrawal.  He arrives to the Emergency Department seeking withdrawal.  On arrival patient ambulated.  Presently afebrile.  Patient does have modest elevation of blood pressure with mild tachycardia.  GCS currently 15.  He is oriented but expresses some concern for visual hallucination.  Per RN patient scoring significantly high on MSSA protocol concerning for acute alcohol withdrawal.  Will plan for IV access with initiation of fluids.  I would plan to send laboratory studies to include CMP along with magnesium and CBC.  We will plan to place on MSSA protocol and initiate IV Valium transitioning to oral as tolerated.    Initial lactic acid above 5.  Continued IV fluid hydration with 2 L bolus followed by banana bag given to patient.  No marked electrolyte abnormality.  Patient  placed on MSSA protocol as per above.  Despite 55 mg of Valium patient continues to have severe alcohol withdrawal with elevated blood pressure and tachycardia.  Will continue with aggressive detoxification and resuscitation.  I do not believe the patient is appropriate for detox at this time and require acute hospitalization and inpatient management.    I have reviewed the nursing notes.    I have reviewed the findings, diagnosis, plan and need for follow up with the patient.    New Prescriptions    No medications on file       Final diagnoses:   Alcohol withdrawal, with unspecified complication (H)       JEEVAN CRUZ MD    2/20/2024   [facility] EMERGENCY DEPARTMENT     Jeevan Cruz MD  02/20/24 1301

## 2024-02-20 NOTE — ED TRIAGE NOTES
Triage Assessment (Adult)       Row Name 02/20/24 0846          Triage Assessment    Airway WDL WDL        Respiratory WDL    Respiratory WDL WDL        Skin Circulation/Temperature WDL    Skin Circulation/Temperature WDL WDL        Cardiac WDL    Cardiac WDL WDL        Peripheral/Neurovascular WDL    Peripheral Neurovascular WDL WDL        Cognitive/Neuro/Behavioral WDL    Cognitive/Neuro/Behavioral WDL WDL

## 2024-02-20 NOTE — H&P
[facility]    History and Physical - Hospitalist Service, GOLD TEAM        Date of Admission:  2/20/2024    Assessment & Plan      Xavier Odell is a 39 year old male admitted on 2/20/2024. He has a history of alcohol use disorder with hx of severe withdrawal, asthma who presented to the ED with alcohol withdrawal.     Alcohol use disorder   Alcohol withdrawal   Hx of significant withdrawals. Patient reports he was clinically sober for about 6 months then over the last several weeks began drinking again. Now drinking upwards of 12-20 drinks per day. Last drink 2/19/24 evening. Notes overnight he became tremulous and nauseous. Denies hx of withdrawal seizures. Notes visual hallucinations (seeing spots) last evening and this AM. Presented to the ED seeking assistance with alcohol withdrawal. Initial LA was >5, improved to 2.7 with IVF. Alcohol breath test was 0.13 in the ED. UTox negative.   -Admit to medicine   -CIWA   -Valium PRN per CIWA protocol   -CD consult   -Antiemetics PRN   -Multivitamin, folic acid, thiamine   -Continue PTA gabapentin 300 mg daily     Transaminitis   Elevated bilirubin   , , Tbili 1.4. Suspect secondary to alcohol hepatitis. Patient denies abdominal pain or vomiting. Notes some nausea today associated withdrawal.   -Repeat CMP in AM    -Encourage alcohol cessation     Anion gap metabolic acidosis   Lactic acidosis   Initial LA 5.7, improved to 2.7 with IVF. Anion gap 38 with CO2 12. Urine ketones >150. Received 2L IVF with NaCl and 1L banana bag in the ED. Metabolic acidosis likely multifactorial with lactic acidosis, alcohol associated ketoacidosis.   -Trend LA every 6 hours until normalized   -Continue mIVF with NaCl   -Repeat BMP at 1800 and in AM     Hypertension   BP significantly elevated upon arrival, improved with treatment of withdrawal and IVF.   -Continue to monitor   -Clonidine 0.1 mg PRN for BP > 170/100      Tachycardia   -110's in the ED. Improving with IVF and treatment of withdrawal. Likely secondary to alcohol withdrawal.   -Treatment of withdrawal as above   -If no improvement in tachycardia with stabilization of withdrawal obtain EKG     Hyponatremia   Na 130 in the ED. Likely component to dehydration in the setting of heavy alcohol use and poor PO intake. Received 3L IVF in the ED.    -Repeat BMP     Hypokalemia   K 3.3 in the ED. Likely secondary to poor PO intake. Received potassium chloride 10 mEq in the ED.  Repeat K in the ED 3.1.   -Replace with PO potassium supplement 40 mEq in the ED   -Replacement per protocol once on the medical floor     Asthma   Stable. PTA on albuterol PRN and Symbicort daily. No recent asthma exacerbations.   -Continue albuterol PRN   -Formulary sub for Symbicort with Breo Ellipta     Left lower back pain   Subacute left lower back pain, point specific pain. UA in the ED without evidence of infection. No hx of kidney stones. Noted to have 7 RBC in urine. No CVA tenderness on exam. Exam and history is not consistent with kidney stone however given mild hematuria, could consider. Likely musculoskeletal in nature.   -Trial of lidocaine patch   -Consider repeat UA if no improvement in pain with lidocaine patch vs CT to assess for kidney stone    Dark stool   Patient reports one episode of dark stools this AM. Denies hx of melena, hematochezia or hematemesis. Hgb 14.7 in the ED. Given heavy drinking hx concern for gastritis vs possible gastric ulcer.   -Repeat hgb in AM   -continue to monitor for dark stools   -Start Protonix     Proteinuria: Noted on UA, will need repeat UA with PCP in 2-4 weeks   Depression: Continue PTA Mertazapine   Nicotine use disorder: nicotine replacement with Nicorette gum PRN         Diet:  ADAT   DVT Prophylaxis: Pneumatic Compression Devices  Carrillo Catheter: Not present  Lines: None     Cardiac Monitoring: None  Code Status:  Full Code      Clinically Significant Risk Factors Present on Admission        # Hypokalemia: Lowest K = 3.3 mmol/L in last 2 days, will replace as needed      # Anion Gap Metabolic Acidosis: Highest Anion Gap = 38 mmol/L in last 2 days, will monitor and treat as appropriate                      Disposition Plan      Expected Discharge Date: 02/22/2024                The patient's care was discussed with the Attending Physician, Dr. Morrissey, Bedside Nurse, and Patient.    FARZANA Berkowitz  Hospitalist Service, [facility]  Securely message with ADVIZE (more info)  Text page via [facility] Paging/Directory   See signed in provider for up to date coverage information    ______________________________________________________________________    Chief Complaint   Alcohol withdrawal     History is obtained from the patient and review of medical records.     History of Present Illness   Xavier Odell is a 39 year old male who has a history of alcohol use disorder with hx of severe withdrawal, asthma who presented to the ED with alcohol withdrawal. He reports he was sober X about 6 months after hospitalization at [facility] 7/2023. Over the last several weeks he has started drinking again upward of 15-20 drinks per day. Notes withdrawal symptoms of nausea and tremor starting overnight. Reports he has not had any food intake for about 4 days due to heavy drinking. Denies hx of withdrawal seizures. Report hallucinations with withdrawal, notes seeing spots last evening and this AM. Currently, without any hallucinations. Notes nausea and tremor today. Denies vomiting. Reports he had a soft dark stool today. Denies any hx of dark stools or GI bleed. Denies any hematemesis. He also notes left lower back pain which is worse with palpation. Notes pain is causing him to have difficulty with sleeping. Reports pain has been present for several weeks and had not progressed or gotten any  "better. Denies fevers, chills, abdominal pain, dysuria, hematuria, hx of kidney stones, numbness, tingling.       Past Medical History    Past Medical History:   Diagnosis Date    GI bleed     Substance abuse (H)     Uncomplicated asthma        Past Surgical History   Past Surgical History:   Procedure Laterality Date    ORTHOPEDIC SURGERY         Prior to Admission Medications   Prior to Admission Medications   Prescriptions Last Dose Informant Patient Reported? Taking?   SYMBICORT 160-4.5 MCG/ACT Inhaler Past Week  Yes Yes   Sig: Inhale 2 puffs into the lungs 2 times daily   albuterol (PROAIR HFA/PROVENTIL HFA/VENTOLIN HFA) 108 (90 Base) MCG/ACT inhaler Past Month  No Yes   Sig: Inhale 2 puffs into the lungs every 6 hours as needed   gabapentin (NEURONTIN) 300 MG capsule 2/19/2024 at am  No Yes   Sig: Take 1 capsule (300 mg) by mouth 3 times daily   Patient taking differently: Take 300 mg by mouth daily   mirtazapine (REMERON) 15 MG tablet 2/19/2024 at pm  No Yes   Sig: Take 1 tablet (15 mg) by mouth At Bedtime   nicotine polacrilex (NICORETTE) 4 MG gum   No No   Sig: Place 1 each (4 mg) inside cheek every hour as needed for smoking cessation      Facility-Administered Medications: None        Social History   I have reviewed this patient's social history and updated it with pertinent information if needed.  Social History     Tobacco Use    Smoking status: Never    Smokeless tobacco: Current   Substance Use Topics    Alcohol use: Yes     Comment: seltzers and vodka    Drug use: Not Currently        Physical Exam     Blood pressure (!) 143/88, pulse 106, temperature 99  F (37.2  C), temperature source Oral, resp. rate 16, height 1.753 m (5' 9\"), SpO2 92%.  GENERAL: Alert and oriented x 3. NAD.   HEENT: Anicteric erythema. PERRL. Mucous membranes moist and without lesions.   CV: RRR. S1, S2. No murmurs appreciated.   RESPIRATORY: Effort normal on RA. Lungs CTAB with no wheezing, rales, rhonchi.   GI: Abdomen soft, " obese and non distended, bowel sounds present. No tenderness, rebound, guarding.   MUSCULOSKELETAL: No joint swelling or tenderness. Moves all extremities. Point specific tenderness of left lower back lateral to spine, no erythema. No CVA tenderness.   NEUROLOGICAL: No focal deficits. Strength 5/5 bilaterally in upper and lower extremities.   EXTREMITIES: No peripheral edema. Intact bilateral pedal pulses. No calf tenderness.   SKIN: No jaundice. No rashes.       Medical Decision Making       75 MINUTES SPENT BY ME on the date of service doing chart review, history, exam, documentation & further activities per the note.      Data     I have personally reviewed the following data over the past 24 hrs:    8.5  \   14.7   / 151     130 (L) 80 (L) 10.5 /  103 (H)   3.3 (L) 12 (L) 0.99 \     ALT: 110 (H) AST: 194 (H) AP: 87 TBILI: 1.4 (H)   ALB: 4.4 TOT PROTEIN: 7.8 LIPASE: N/A     Procal: N/A CRP: N/A Lactic Acid: 2.7 (H)

## 2024-02-20 NOTE — MEDICATION SCRIBE - ADMISSION MEDICATION HISTORY
Medication Scribe Admission Medication History    Admission medication history is complete. The information provided in this note is only as accurate as the sources available at the time of the update.    Information Source(s): Patient and CareEverywhere/SureScripts via in-person    Pertinent Information: Pt taking gabapentin daily not TID.     Changes made to PTA medication list:  Added:   Symbicort   Deleted:   Thiamine   Multivitamin   Changed:    Gabapentin 300 mg TID to 300 mg Daily     Allergies reviewed with patient and updates made in EHR: yes    Medication History Completed By: Raquel Aguilera 2/20/2024 2:13 PM    PTA Med List   Medication Sig Last Dose    albuterol (PROAIR HFA/PROVENTIL HFA/VENTOLIN HFA) 108 (90 Base) MCG/ACT inhaler Inhale 2 puffs into the lungs every 6 hours as needed Past Month    gabapentin (NEURONTIN) 300 MG capsule Take 1 capsule (300 mg) by mouth 3 times daily (Patient taking differently: Take 300 mg by mouth daily) 2/19/2024 at am    mirtazapine (REMERON) 15 MG tablet Take 1 tablet (15 mg) by mouth At Bedtime 2/19/2024 at pm    SYMBICORT 160-4.5 MCG/ACT Inhaler Inhale 2 puffs into the lungs 2 times daily Past Week

## 2024-02-21 LAB
ALBUMIN SERPL BCG-MCNC: 3.6 G/DL (ref 3.5–5.2)
ALP SERPL-CCNC: 69 U/L (ref 40–150)
ALT SERPL W P-5'-P-CCNC: 84 U/L (ref 0–70)
ANION GAP SERPL CALCULATED.3IONS-SCNC: 17 MMOL/L (ref 7–15)
AST SERPL W P-5'-P-CCNC: 138 U/L (ref 0–45)
BASOPHILS # BLD MANUAL: 0 10E3/UL (ref 0–0.2)
BASOPHILS NFR BLD MANUAL: 0 %
BILIRUB SERPL-MCNC: 1 MG/DL
BUN SERPL-MCNC: 7.3 MG/DL (ref 6–20)
CALCIUM SERPL-MCNC: 8.4 MG/DL (ref 8.6–10)
CHLORIDE SERPL-SCNC: 98 MMOL/L (ref 98–107)
CREAT SERPL-MCNC: 0.95 MG/DL (ref 0.67–1.17)
DEPRECATED HCO3 PLAS-SCNC: 22 MMOL/L (ref 22–29)
EGFRCR SERPLBLD CKD-EPI 2021: >90 ML/MIN/1.73M2
EOSINOPHIL # BLD MANUAL: 0 10E3/UL (ref 0–0.7)
EOSINOPHIL NFR BLD MANUAL: 1 %
ERYTHROCYTE [DISTWIDTH] IN BLOOD BY AUTOMATED COUNT: 13.2 % (ref 10–15)
GLUCOSE SERPL-MCNC: 83 MG/DL (ref 70–99)
HCT VFR BLD AUTO: 37.3 % (ref 40–53)
HGB BLD-MCNC: 12.4 G/DL (ref 13.3–17.7)
LYMPHOCYTES # BLD MANUAL: 0.2 10E3/UL (ref 0.8–5.3)
LYMPHOCYTES NFR BLD MANUAL: 6 %
MAGNESIUM SERPL-MCNC: 2.1 MG/DL (ref 1.7–2.3)
MCH RBC QN AUTO: 28.1 PG (ref 26.5–33)
MCHC RBC AUTO-ENTMCNC: 33.2 G/DL (ref 31.5–36.5)
MCV RBC AUTO: 85 FL (ref 78–100)
MONOCYTES # BLD MANUAL: 0.1 10E3/UL (ref 0–1.3)
MONOCYTES NFR BLD MANUAL: 3 %
NEUTROPHILS # BLD MANUAL: 3.5 10E3/UL (ref 1.6–8.3)
NEUTROPHILS NFR BLD MANUAL: 90 %
PLAT MORPH BLD: ABNORMAL
PLATELET # BLD AUTO: 96 10E3/UL (ref 150–450)
POTASSIUM SERPL-SCNC: 3.7 MMOL/L (ref 3.4–5.3)
PROT SERPL-MCNC: 6.3 G/DL (ref 6.4–8.3)
RBC # BLD AUTO: 4.41 10E6/UL (ref 4.4–5.9)
RBC MORPH BLD: ABNORMAL
SODIUM SERPL-SCNC: 137 MMOL/L (ref 135–145)
WBC # BLD AUTO: 3.9 10E3/UL (ref 4–11)

## 2024-02-21 PROCEDURE — 250N000013 HC RX MED GY IP 250 OP 250 PS 637: Performed by: PHYSICIAN ASSISTANT

## 2024-02-21 PROCEDURE — 250N000011 HC RX IP 250 OP 636: Performed by: STUDENT IN AN ORGANIZED HEALTH CARE EDUCATION/TRAINING PROGRAM

## 2024-02-21 PROCEDURE — 36415 COLL VENOUS BLD VENIPUNCTURE: CPT | Performed by: PHYSICIAN ASSISTANT

## 2024-02-21 PROCEDURE — 83735 ASSAY OF MAGNESIUM: CPT | Performed by: INTERNAL MEDICINE

## 2024-02-21 PROCEDURE — 120N000002 HC R&B MED SURG/OB UMMC

## 2024-02-21 PROCEDURE — 99232 SBSQ HOSP IP/OBS MODERATE 35: CPT | Performed by: INTERNAL MEDICINE

## 2024-02-21 PROCEDURE — 80053 COMPREHEN METABOLIC PANEL: CPT | Performed by: PHYSICIAN ASSISTANT

## 2024-02-21 PROCEDURE — 258N000003 HC RX IP 258 OP 636: Performed by: PHYSICIAN ASSISTANT

## 2024-02-21 PROCEDURE — 250N000013 HC RX MED GY IP 250 OP 250 PS 637: Performed by: INTERNAL MEDICINE

## 2024-02-21 PROCEDURE — 250N000013 HC RX MED GY IP 250 OP 250 PS 637: Performed by: STUDENT IN AN ORGANIZED HEALTH CARE EDUCATION/TRAINING PROGRAM

## 2024-02-21 PROCEDURE — 85014 HEMATOCRIT: CPT | Performed by: PHYSICIAN ASSISTANT

## 2024-02-21 PROCEDURE — 85007 BL SMEAR W/DIFF WBC COUNT: CPT | Performed by: PHYSICIAN ASSISTANT

## 2024-02-21 RX ORDER — GABAPENTIN 300 MG/1
300 CAPSULE ORAL 3 TIMES DAILY
Status: DISCONTINUED | OUTPATIENT
Start: 2024-02-21 | End: 2024-02-23

## 2024-02-21 RX ORDER — KETOROLAC TROMETHAMINE 15 MG/ML
15 INJECTION, SOLUTION INTRAMUSCULAR; INTRAVENOUS EVERY 6 HOURS PRN
Status: DISCONTINUED | OUTPATIENT
Start: 2024-02-21 | End: 2024-02-25 | Stop reason: HOSPADM

## 2024-02-21 RX ORDER — ACETAMINOPHEN 325 MG/1
650 TABLET ORAL EVERY 8 HOURS PRN
Status: DISCONTINUED | OUTPATIENT
Start: 2024-02-21 | End: 2024-02-25 | Stop reason: HOSPADM

## 2024-02-21 RX ADMIN — LIDOCAINE 2 PATCH: 4 PATCH TOPICAL at 21:38

## 2024-02-21 RX ADMIN — FOLIC ACID 1 MG: 1 TABLET ORAL at 08:07

## 2024-02-21 RX ADMIN — SODIUM CHLORIDE: 9 INJECTION, SOLUTION INTRAVENOUS at 17:06

## 2024-02-21 RX ADMIN — KETOROLAC TROMETHAMINE 15 MG: 15 INJECTION, SOLUTION INTRAMUSCULAR; INTRAVENOUS at 11:59

## 2024-02-21 RX ADMIN — ACETAMINOPHEN 650 MG: 325 TABLET, FILM COATED ORAL at 07:01

## 2024-02-21 RX ADMIN — GABAPENTIN 300 MG: 300 CAPSULE ORAL at 21:38

## 2024-02-21 RX ADMIN — DIAZEPAM 10 MG: 5 TABLET ORAL at 10:54

## 2024-02-21 RX ADMIN — PANTOPRAZOLE SODIUM 40 MG: 40 TABLET, DELAYED RELEASE ORAL at 08:07

## 2024-02-21 RX ADMIN — MIRTAZAPINE 15 MG: 15 TABLET, FILM COATED ORAL at 21:38

## 2024-02-21 RX ADMIN — FLUTICASONE FUROATE AND VILANTEROL TRIFENATATE 1 PUFF: 200; 25 POWDER RESPIRATORY (INHALATION) at 08:09

## 2024-02-21 RX ADMIN — THIAMINE HCL TAB 100 MG 100 MG: 100 TAB at 08:07

## 2024-02-21 RX ADMIN — Medication 1 TABLET: at 08:07

## 2024-02-21 RX ADMIN — GABAPENTIN 300 MG: 300 CAPSULE ORAL at 13:19

## 2024-02-21 RX ADMIN — KETOROLAC TROMETHAMINE 15 MG: 15 INJECTION, SOLUTION INTRAMUSCULAR; INTRAVENOUS at 23:43

## 2024-02-21 RX ADMIN — SODIUM CHLORIDE: 9 INJECTION, SOLUTION INTRAVENOUS at 08:05

## 2024-02-21 RX ADMIN — KETOROLAC TROMETHAMINE 15 MG: 15 INJECTION, SOLUTION INTRAMUSCULAR; INTRAVENOUS at 01:42

## 2024-02-21 RX ADMIN — GABAPENTIN 300 MG: 300 CAPSULE ORAL at 08:07

## 2024-02-21 ASSESSMENT — ACTIVITIES OF DAILY LIVING (ADL)
ADLS_ACUITY_SCORE: 26
ADLS_ACUITY_SCORE: 27
ADLS_ACUITY_SCORE: 26
ADLS_ACUITY_SCORE: 27
ADLS_ACUITY_SCORE: 26
ADLS_ACUITY_SCORE: 26
ADLS_ACUITY_SCORE: 27
ADLS_ACUITY_SCORE: 27
ADLS_ACUITY_SCORE: 26
ADLS_ACUITY_SCORE: 27

## 2024-02-21 NOTE — UTILIZATION REVIEW
"  Admission Status; Secondary Review Determination         Under the authority of the Utilization Management Committee, the utilization review process indicated a secondary review on the above patient.  The review outcome is based on review of the medical records, discussions with staff, and applying clinical experience noted on the date of the review.        (xxx)      Inpatient Status Appropriate - This patient's medical care is consistent with medical management for inpatient care and reasonable inpatient medical practice.      () Observation Status Appropriate - This patient does not meet hospital inpatient criteria and is placed in observation status. If this patient's primary payer is Medicare and was admitted as an inpatient, Condition Code 44 should be used and patient status changed to \"observation\".   () Admission Status NOT Appropriate - This patient's medical care is not consistent with medical management for Inpatient or Observation Status.          RATIONALE FOR DETERMINATION     Xavier Odell is a 39 year old male admitted on 2/20/2024. He has a history of alcohol use disorder with hx of severe withdrawal, asthma who was admitted on 2/20/2024 for alcohol withdrawal.  Patient reports he was sober for about 6 months then over the last several weeks began drinking again. Now drinking upwards of 12-20 drinks per day. Last drink 2/19/24 evening. He is tremulous and nauseous.  Notes visual hallucinations (seeing spots).  Initial LA was >5, improved to 2.7 with IVF. Alcohol breath test was 0.13 in the ED. UTox negative.  His CIWA scores have been 21->17->14->11 and he has received 9 doses of valium.  IP status is appropriate.    The severity of illness, intensity of service provided, expected LOS and risk for adverse outcome make the care complex, high risk and appropriate for hospital admission.        The information on this document is developed by the utilization review team in order for the business " office to ensure compliance.  This only denotes the appropriateness of proper admission status and does not reflect the quality of care rendered.         The definitions of Inpatient Status and Observation Status used in making the determination above are those provided in the CMS Coverage Manual, Chapter 1 and Chapter 6, section 70.4.      Sincerely,     Mine Aguiar MD  Physician Advisor   Utilization Review/ Case Management  [facility].

## 2024-02-21 NOTE — PROGRESS NOTES
CLINICAL NUTRITION SERVICES - ASSESSMENT NOTE     Nutrition Prescription    RECOMMENDATIONS FOR MDs/PROVIDERS TO ORDER:  None    Malnutrition Status:    Patient does not meet two of the established criteria necessary for diagnosing malnutrition but is at risk for malnutrition    Recommendations already ordered by Registered Dietitian (RD):  None    Future/Additional Recommendations:  None     REASON FOR ASSESSMENT  Xavier Odell is a 39 year old male assessed by the dietitian for positive Admission Nutrition Risk Screen    Reason for admission: alcohol withdrawal   PMH: alcohol use disorder with hx of severe withdrawal, asthma     NUTRITION HISTORY  Pt reports consuming fastfoods daily d/[place] and those are the only open restaurants. Pt had finished [employer]UB[employer] breakfast sandwich brought in by his brother. Pt had been sober for 7-8 months and started drinking EtOH for the past 1-2 weeks--consuming vodka and 20 seltzer drinks daily. Pt states for the past 5 days pta he had only been drinking EtOH and not eating. Pt would like to lose weight, discussed limiting high sodium fastfoods and taking prepared meals to work. Also discussed recommendation for MVI, folate, and thiamine supplementation for persons who drink EtOH regularly.  Pt mentions he has not had a BM x 5 days and a daily BM is normal. Advised pt to speak with RN to get medication to help him with this as they are ordered for PRN.    CURRENT NUTRITION ORDERS  Diet: Clear Liquid, ADAT  Intake/Tolerance: good, see above  NaCl IVF at 100 mL/hr    LABS  Labs reviewed   02/20/24 18:28   Sodium 134 (L)   Potassium 3.1 (L)   Chloride 94 (L)   Carbon Dioxide (CO2) 17 (L)   Urea Nitrogen 8.7   Creatinine 0.86   GFR Estimate >90   Calcium 8.1 (L)   Anion Gap 23 (H)   Glucose 118 (H)   Lactic Acid 1.1     MEDICATIONS  Medications reviewed  Folate, MVI, B1 supplementation on MAR    Current Facility-Administered Medications   Medication     "acetaminophen (TYLENOL) tablet 650 mg    albuterol (PROVENTIL HFA/VENTOLIN HFA) inhaler    calcium carbonate (TUMS) chewable tablet 1,000 mg    cloNIDine (CATAPRES) tablet 0.1 mg    diazepam (VALIUM) tablet 10 mg    Or    diazepam (VALIUM) injection 5-10 mg    flumazenil (ROMAZICON) injection 0.2 mg    fluticasone-vilanterol (BREO ELLIPTA) 200-25 MCG/ACT inhaler 1 puff    folic acid (FOLVITE) tablet 1 mg    gabapentin (NEURONTIN) capsule 300 mg    OLANZapine zydis (zyPREXA) ODT tab 5-10 mg    Or    haloperidol lactate (HALDOL) injection 2.5-5 mg    ketorolac (TORADOL) injection 15 mg    Lidocaine (LIDOCARE) 4 % Patch 1-2 patch    lidocaine (LMX4) cream    lidocaine 1 % 0.1-1 mL    melatonin tablet 5 mg    mirtazapine (REMERON) tablet 15 mg    multivitamin w/minerals (THERA-VIT-M) tablet 1 tablet    nicotine polacrilex (NICORETTE) gum 4 mg    ondansetron (ZOFRAN ODT) ODT tab 4 mg    Or    ondansetron (ZOFRAN) injection 4 mg    pantoprazole (PROTONIX) EC tablet 40 mg    prochlorperazine (COMPAZINE) injection 10 mg    Or    prochlorperazine (COMPAZINE) tablet 10 mg    Or    prochlorperazine (COMPAZINE) suppository 25 mg    senna-docusate (SENOKOT-S/PERICOLACE) 8.6-50 MG per tablet 1 tablet    Or    senna-docusate (SENOKOT-S/PERICOLACE) 8.6-50 MG per tablet 2 tablet    sodium chloride (PF) 0.9% PF flush 3 mL    sodium chloride (PF) 0.9% PF flush 3 mL    sodium chloride 0.9 % infusion    thiamine (B-1) tablet 100 mg     Facility-Administered Medications Ordered in Other Encounters   Medication    Self Administer Medications: Behavioral Services       ANTHROPOMETRICS  Height: 175.3 cm (5' 9.016\")  Most Recent Weight: 104.1 kg (229 lb 8 oz)    IBW: 72.7 kg  Body mass index is 33.88 kg/m .  BMI: Obesity Grade I BMI 30-34.9  Weight History: Pt confirms 10/31/21 wt reading is erroneous. No significant wt loss noted. Pt feels he has lost weight since his pants are loose around the waist. Pt does not weigh himself.   Wt " Readings from Last 15 Encounters:   02/20/24 104.1 kg (229 lb 8 oz)   11/01/21 104.1 kg (229 lb 6.4 oz)   10/31/21 127 kg (280 lb)   09/15/21 104.3 kg (230 lb)   08/05/21 99.8 kg (220 lb)   06/09/21 108.9 kg (240 lb)   03/06/21 108.4 kg (239 lb)   02/17/20 106.8 kg (235 lb 8 oz)   01/24/20 99.8 kg (220 lb)   08/12/19 95.9 kg (211 lb 6.4 oz)   Per Care Everywhere:  09/12/2023 105.2 kg (232 lb)     Dosing Weight: IBW for protein, Actual Body Weight for kcals 72.7 kg/104.1 kg    ASSESSED NUTRITION NEEDS  Estimated Energy Needs: 1460 - 1770 kcals/day (14 - 17 kcals/kg)  Justification: Obese  Estimated Protein Needs: 109 - 145 grams protein/day (1.5 - 2 grams of pro/kg)  Justification: Obesity guidelines  Estimated Fluid Needs: 1460 - 1770  mL/day (1 mL/kcal)   Justification: Maintenance    PHYSICAL FINDINGS  See malnutrition section below.  No abnormal nutrition-related physical findings observed.   Yo: 17  GI: Last BM PTA  Bowel regimen: PRN  GI concerns reported Constipation      MALNUTRITION  % Intake: </= 50% for >/= 5 days (severe)  % Weight Loss: Weight loss does not meet criteria  Subcutaneous Fat Loss: None observed  Muscle Loss: None observed  Fluid Accumulation/Edema: None noted  Malnutrition Diagnosis: Patient does not meet two of the established criteria necessary for diagnosing malnutrition but is at risk for malnutrition    NUTRITION DIAGNOSIS  Predicted inadequate oral intake related to choosing EtOH over food as evidenced by pt self report      INTERVENTIONS  Implementation    Multivitamin/mineral supplement therapy     Goals  Patient to consume % of nutritionally adequate meal trays TID, or the equivalent with supplements/snacks.     Monitoring/Evaluation  Progress toward goals will be monitored and evaluated per protocol.  Venessa Gao RDN [facility] 5B/10[address] ICU RD pager: [phone]   On Call Pager (weekends only): [phone]

## 2024-02-21 NOTE — PLAN OF CARE
"  ADMISSION to [facility]:    Xavier Odell was admitted from ED for Alcohol Withdrawal. Pt transported to [address] [place] [address] via ED Transporter at 2100.  2 RN skin assessment Completed.  Yessica and Mitali. No Skin issues. L PIV SL  Result of skin assessment and interventions/actions: Linen placed in bed  Height, and weight completed: yes  Patient belongings & admission documents: see Flowsheets  MDRO education added to care plan:  Yes. Adult Fall Injury Risk    Blood pressure (!) 152/92, pulse 100, temperature 99.7  F (37.6  C), temperature source Oral, resp. rate 18, height 1.753 m (5' 9.02\"), weight 104.1 kg (229 lb 8 oz), SpO2 95%.     Goal Outcome Evaluation:  Pt A&O x4. Able to make needs known. Pt denied SOB, chest pain, Nausea, Numbness, or tingling. Pt SBA and ambulated to bathroom. Voiding adequately. No bm this shift. Reported pain 8/10 on Left lower back. Paged provider and PRN Toradol 1ml and Tylenol 650mg ordered.  Pain med given. Ciwal score 5. Plan of care continues         "

## 2024-02-21 NOTE — PROGRESS NOTES
Met with patient to discuss possible treatment options and to possibly complete an assessment. Patient appeared very asleep and was snoring very loudly.  Attempted to wake via verbal however patient didn't awaken.  Will try again tomorrow morning.    Will Fernandes [facility] on 2/21/2024 at 2:10 PM

## 2024-02-21 NOTE — PROGRESS NOTES
"BP (!) 126/90 (BP Location: Right arm)   Pulse 88   Temp 98.9  F (37.2  C) (Oral)   Resp 18   Ht 1.753 m (5' 9.02\")   Wt 104.1 kg (229 lb 8 oz)   SpO2 96%   BMI 33.88 kg/m         Pt is AO x 4 on RA. SBA able to make needs known. Denies chest pain.. CI WA done. RN manage protocol done. LP IV infusing NS. C?o about headache but refuses PRN tylenol. LBM 2/21 per patient report   Continue with POC,  "

## 2024-02-21 NOTE — PROGRESS NOTES
[facility]    Medicine Progress Note - Hospitalist Service, GOLD TEAM 18    Date of Admission:  2/20/2024    Assessment & Plan      Xavier Odell is a 39 year old male admitted on 2/20/2024. He has a history of alcohol use disorder with hx of severe withdrawal, asthma who presented to the ED with alcohol withdrawal.     #EtOH use disorder   #EtOH withdrawal   Hx of significant withdrawals. Patient reports he was clinically sober for about 6 months then over the last several weeks began drinking again. Now drinking upwards of 12-20 drinks per day. Last drink 2/19/24 evening. Notes overnight he became tremulous and nauseous. Denies hx of withdrawal seizures. Notes visual hallucinations (seeing spots) last evening and this AM. Presented to the ED seeking assistance with alcohol withdrawal. Initial LA was >5, improved to 2.7 with IVF. Alcohol breath test was 0.13 in the ED. UTox negative.   - Admit to medicine   - CIWA   - Valium PRN per [place] protocol   - CD consult   - Antiemetics PRN   - Multivitamin, folic acid, thiamine   - Continue PTA gabapentin 300 mg daily     #Transaminitis   #Elevated bilirubin   , , Tbili 1.4. Suspect secondary to alcohol hepatitis. Patient denies abdominal pain or vomiting. Notes some nausea today associated withdrawal.   - Repeat CMP in AM    - Encourage alcohol cessation     #Anion gap metabolic acidosis   #Lactic acidosis   Initial LA 5.7, improved to 2.7 with IVF. Anion gap 38 with CO2 12. Urine ketones >150. Received 2L IVF with NaCl and 1L banana bag in the ED. Metabolic acidosis likely multifactorial with lactic acidosis, alcohol associated ketoacidosis.   - Trend LA every 6 hours until normalized   - Continue mIVF with NaCl   - Repeat BMP at 1800 and in AM     #Hypertension   BP significantly elevated upon arrival, improved with treatment of withdrawal and IVF.   - Continue to monitor   - Clonidine 0.1 mg PRN for BP >  170/100     #Tachycardia   -110's in the ED. Improving with IVF and treatment of withdrawal. Likely secondary to alcohol withdrawal.   - Treatment of withdrawal as above   - If no improvement in tachycardia with stabilization of withdrawal obtain EKG     #Hyponatremia   Na 130 in the ED. Likely component to dehydration in the setting of heavy alcohol use and poor PO intake. Received 3L IVF in the ED.    - Repeat BMP     #Hypokalemia   K 3.3 in the ED. Likely secondary to poor PO intake. Received potassium chloride 10 mEq in the ED.  Repeat K in the ED 3.1.   - Replace with PO potassium supplement 40 mEq in the ED   - Replacement per protocol once on the medical floor     #Asthma   Stable. PTA on albuterol PRN and Symbicort daily. No recent asthma exacerbations.   - Continue albuterol PRN   - Formulary sub for Symbicort with Breo Ellipta     #Left lower back pain   Subacute left lower back pain, point specific pain. UA in the ED without evidence of infection. No hx of kidney stones. Noted to have 7 RBC in urine. No CVA tenderness on exam. Exam and history is not consistent with kidney stone however given mild hematuria, could consider. Likely musculoskeletal in nature.   - Trial of lidocaine patch   - Consider repeat UA if no improvement in pain with lidocaine patch vs CT to assess for kidney stone    #Dark stool   Patient reports one episode of dark stools this AM. Denies hx of melena, hematochezia or hematemesis. Hgb 14.7 in the ED. Given heavy drinking hx concern for gastritis vs possible gastric ulcer.   - Repeat hgb in AM   - Continue to monitor for dark stools   - Start Protonix     #Low back pain  - Increase gabapentin to 300 mg PO TID  - Consider muscle relaxant    #Proteinuria: Noted on UA, will need repeat UA with PCP in 2-4 weeks   #Depression: Continue PTA Mertazapine   #Nicotine use disorder: Nicotine replacement with Nicorette gum PRN           Diet: Advance Diet as Tolerated: Clear Liquid Diet   "  DVT Prophylaxis: Pneumatic Compression Devices  Carrillo Catheter: Not present  Lines: None     Cardiac Monitoring: None  Code Status:  Full resuscitation status    Clinically Significant Risk Factors Present on Admission        # Hypokalemia: Lowest K = 3.1 mmol/L in last 2 days, will replace as needed   # Hypocalcemia: Lowest Ca = 8.1 mg/dL in last 2 days, will monitor and replace as appropriate    # Anion Gap Metabolic Acidosis: Highest Anion Gap = 38 mmol/L in last 2 days, will monitor and treat as appropriate    # Thrombocytopenia: Lowest platelets = 96 in last 2 days, will monitor for bleeding        # Obesity: Estimated body mass index is 33.88 kg/m  as calculated from the following:    Height as of this encounter: 1.753 m (5' 9.02\").    Weight as of this encounter: 104.1 kg (229 lb 8 oz).              Disposition Plan      Expected Discharge Date: 02/22/2024      Destination: home              Devyn Canas DO, S  Hospitalist Service, GOLD TEAM 18  [facility]  Securely message with [employer] (more info)  Text page via [facility] Paging/Directory   See signed in provider for up to date coverage information  ______________________________________________________________________    Interval History   Patient is anxious regarding disposition planning.  Patient demonstrating persistent moderate withdrawal symptoms.  Patient denies history of withdrawal seizures.  Patient reports wanting to get back to work.  Patient interested in chemical dependency consultation.    Physical Exam   Vital Signs: Temp: 99.6  F (37.6  C) Temp src: Oral BP: (!) 144/97 Pulse: 94   Resp: 17 SpO2: 98 % O2 Device: None (Room air)    Weight: 229 lbs 7.98 oz    GENERAL: Alert and oriented x 3; no acute distress; well-nourished.  HEENT: Normocephalic; atraumatic; PERRLA; MMM.  CV: RRR; normal S1, S2; no rubs, murmurs, or gallops.  RESP: Lung fields clear to aucultation B/L; no wheezing or " crepitations.  GI: Abdomen is soft, nontender, nondistended; no organomegaly; normal bowel sounds.  : Deferred genital examination.   MSK: No clubbing, cyanosis, or edema.  DERM: Skin is intact; no rash, lesions, or skin breakdown.  NEURO: No focal deficits appreciated; moderate tremor.  PSYCH: No active hallucinations; affect, insight appear within normal limits.    Medical Decision Making       45 MINUTES SPENT BY ME on the date of service doing chart review, history, exam, documentation & further activities per the note.      Data     I have personally reviewed the following data over the past 24 hrs:    3.9 (L)  \   12.4 (L)   / 96 (L)     137 98 7.3 /  83   3.7 22 0.95 \     ALT: 84 (H) AST: 138 (H) AP: 69 TBILI: 1.0   ALB: 3.6 TOT PROTEIN: 6.3 (L) LIPASE: N/A     Procal: N/A CRP: N/A Lactic Acid: 1.1         Imaging results reviewed over the past 24 hrs:   No results found for this or any previous visit (from the past 24 hour(s)).

## 2024-02-22 LAB
ALBUMIN SERPL BCG-MCNC: 3.5 G/DL (ref 3.5–5.2)
ALP SERPL-CCNC: 73 U/L (ref 40–150)
ALT SERPL W P-5'-P-CCNC: 102 U/L (ref 0–70)
ANION GAP SERPL CALCULATED.3IONS-SCNC: 10 MMOL/L (ref 7–15)
AST SERPL W P-5'-P-CCNC: 143 U/L (ref 0–45)
BASOPHILS # BLD AUTO: 0 10E3/UL (ref 0–0.2)
BASOPHILS NFR BLD AUTO: 0 %
BILIRUB SERPL-MCNC: 1.1 MG/DL
BUN SERPL-MCNC: 3 MG/DL (ref 6–20)
CALCIUM SERPL-MCNC: 8.7 MG/DL (ref 8.6–10)
CHLORIDE SERPL-SCNC: 100 MMOL/L (ref 98–107)
CREAT SERPL-MCNC: 0.78 MG/DL (ref 0.67–1.17)
DEPRECATED HCO3 PLAS-SCNC: 28 MMOL/L (ref 22–29)
EGFRCR SERPLBLD CKD-EPI 2021: >90 ML/MIN/1.73M2
EOSINOPHIL # BLD AUTO: 0.1 10E3/UL (ref 0–0.7)
EOSINOPHIL NFR BLD AUTO: 3 %
ERYTHROCYTE [DISTWIDTH] IN BLOOD BY AUTOMATED COUNT: 13.2 % (ref 10–15)
GLUCOSE SERPL-MCNC: 131 MG/DL (ref 70–99)
HCT VFR BLD AUTO: 38.8 % (ref 40–53)
HGB BLD-MCNC: 12.8 G/DL (ref 13.3–17.7)
IMM GRANULOCYTES # BLD: 0 10E3/UL
IMM GRANULOCYTES NFR BLD: 1 %
LYMPHOCYTES # BLD AUTO: 0.6 10E3/UL (ref 0.8–5.3)
LYMPHOCYTES NFR BLD AUTO: 18 %
MAGNESIUM SERPL-MCNC: 1.5 MG/DL (ref 1.7–2.3)
MAGNESIUM SERPL-MCNC: 3.3 MG/DL (ref 1.7–2.3)
MCH RBC QN AUTO: 27.4 PG (ref 26.5–33)
MCHC RBC AUTO-ENTMCNC: 33 G/DL (ref 31.5–36.5)
MCV RBC AUTO: 83 FL (ref 78–100)
MONOCYTES # BLD AUTO: 0.1 10E3/UL (ref 0–1.3)
MONOCYTES NFR BLD AUTO: 4 %
NEUTROPHILS # BLD AUTO: 2.6 10E3/UL (ref 1.6–8.3)
NEUTROPHILS NFR BLD AUTO: 74 %
NRBC # BLD AUTO: 0 10E3/UL
NRBC BLD AUTO-RTO: 0 /100
PLATELET # BLD AUTO: 81 10E3/UL (ref 150–450)
POTASSIUM SERPL-SCNC: 3.1 MMOL/L (ref 3.4–5.3)
POTASSIUM SERPL-SCNC: 3.4 MMOL/L (ref 3.4–5.3)
PROT SERPL-MCNC: 6.4 G/DL (ref 6.4–8.3)
RBC # BLD AUTO: 4.67 10E6/UL (ref 4.4–5.9)
SODIUM SERPL-SCNC: 138 MMOL/L (ref 135–145)
WBC # BLD AUTO: 3.4 10E3/UL (ref 4–11)

## 2024-02-22 PROCEDURE — 250N000011 HC RX IP 250 OP 636: Performed by: STUDENT IN AN ORGANIZED HEALTH CARE EDUCATION/TRAINING PROGRAM

## 2024-02-22 PROCEDURE — 85025 COMPLETE CBC W/AUTO DIFF WBC: CPT | Performed by: INTERNAL MEDICINE

## 2024-02-22 PROCEDURE — 258N000003 HC RX IP 258 OP 636: Performed by: PHYSICIAN ASSISTANT

## 2024-02-22 PROCEDURE — 250N000011 HC RX IP 250 OP 636: Performed by: INTERNAL MEDICINE

## 2024-02-22 PROCEDURE — 99232 SBSQ HOSP IP/OBS MODERATE 35: CPT | Performed by: INTERNAL MEDICINE

## 2024-02-22 PROCEDURE — 80053 COMPREHEN METABOLIC PANEL: CPT | Performed by: INTERNAL MEDICINE

## 2024-02-22 PROCEDURE — 120N000002 HC R&B MED SURG/OB UMMC

## 2024-02-22 PROCEDURE — 84132 ASSAY OF SERUM POTASSIUM: CPT | Performed by: INTERNAL MEDICINE

## 2024-02-22 PROCEDURE — 36415 COLL VENOUS BLD VENIPUNCTURE: CPT | Performed by: INTERNAL MEDICINE

## 2024-02-22 PROCEDURE — 250N000013 HC RX MED GY IP 250 OP 250 PS 637: Performed by: PHYSICIAN ASSISTANT

## 2024-02-22 PROCEDURE — 83735 ASSAY OF MAGNESIUM: CPT | Performed by: INTERNAL MEDICINE

## 2024-02-22 PROCEDURE — 250N000013 HC RX MED GY IP 250 OP 250 PS 637: Performed by: STUDENT IN AN ORGANIZED HEALTH CARE EDUCATION/TRAINING PROGRAM

## 2024-02-22 PROCEDURE — 250N000013 HC RX MED GY IP 250 OP 250 PS 637: Performed by: INTERNAL MEDICINE

## 2024-02-22 RX ORDER — DIAZEPAM 5 MG
5 TABLET ORAL EVERY 30 MIN PRN
Status: DISCONTINUED | OUTPATIENT
Start: 2024-02-22 | End: 2024-02-24

## 2024-02-22 RX ORDER — POTASSIUM CHLORIDE 1500 MG/1
40 TABLET, EXTENDED RELEASE ORAL ONCE
Status: COMPLETED | OUTPATIENT
Start: 2024-02-22 | End: 2024-02-22

## 2024-02-22 RX ORDER — DIAZEPAM 10 MG/2ML
5-10 INJECTION, SOLUTION INTRAMUSCULAR; INTRAVENOUS EVERY 30 MIN PRN
Status: DISCONTINUED | OUTPATIENT
Start: 2024-02-22 | End: 2024-02-22

## 2024-02-22 RX ORDER — MAGNESIUM SULFATE HEPTAHYDRATE 40 MG/ML
4 INJECTION, SOLUTION INTRAVENOUS ONCE
Status: COMPLETED | OUTPATIENT
Start: 2024-02-22 | End: 2024-02-22

## 2024-02-22 RX ADMIN — LIDOCAINE 2 PATCH: 4 PATCH TOPICAL at 21:16

## 2024-02-22 RX ADMIN — GABAPENTIN 300 MG: 300 CAPSULE ORAL at 08:08

## 2024-02-22 RX ADMIN — GABAPENTIN 300 MG: 300 CAPSULE ORAL at 13:41

## 2024-02-22 RX ADMIN — ACETAMINOPHEN 650 MG: 325 TABLET, FILM COATED ORAL at 08:08

## 2024-02-22 RX ADMIN — POTASSIUM CHLORIDE 40 MEQ: 1500 TABLET, EXTENDED RELEASE ORAL at 13:42

## 2024-02-22 RX ADMIN — MAGNESIUM SULFATE HEPTAHYDRATE 4 G: 40 INJECTION, SOLUTION INTRAVENOUS at 15:30

## 2024-02-22 RX ADMIN — MIRTAZAPINE 15 MG: 15 TABLET, FILM COATED ORAL at 21:16

## 2024-02-22 RX ADMIN — Medication 1 TABLET: at 08:08

## 2024-02-22 RX ADMIN — FLUTICASONE FUROATE AND VILANTEROL TRIFENATATE 1 PUFF: 200; 25 POWDER RESPIRATORY (INHALATION) at 09:39

## 2024-02-22 RX ADMIN — SODIUM CHLORIDE: 9 INJECTION, SOLUTION INTRAVENOUS at 03:03

## 2024-02-22 RX ADMIN — NICOTINE POLACRILEX 4 MG: 4 GUM, CHEWING BUCCAL at 15:29

## 2024-02-22 RX ADMIN — KETOROLAC TROMETHAMINE 15 MG: 15 INJECTION, SOLUTION INTRAMUSCULAR; INTRAVENOUS at 13:41

## 2024-02-22 RX ADMIN — GABAPENTIN 300 MG: 300 CAPSULE ORAL at 21:15

## 2024-02-22 RX ADMIN — KETOROLAC TROMETHAMINE 15 MG: 15 INJECTION, SOLUTION INTRAMUSCULAR; INTRAVENOUS at 21:15

## 2024-02-22 RX ADMIN — FOLIC ACID 1 MG: 1 TABLET ORAL at 08:08

## 2024-02-22 RX ADMIN — PANTOPRAZOLE SODIUM 40 MG: 40 TABLET, DELAYED RELEASE ORAL at 08:08

## 2024-02-22 RX ADMIN — THIAMINE HCL TAB 100 MG 100 MG: 100 TAB at 08:08

## 2024-02-22 ASSESSMENT — ACTIVITIES OF DAILY LIVING (ADL)
ADLS_ACUITY_SCORE: 27

## 2024-02-22 NOTE — DISCHARGE INSTRUCTIONS
If patient decides in the future he would be interested in treatment patient can go to [facility] 2[place]2 miles from his house and request an assessment for treatment.     [facility]   [phone]   [place] miles  [email or web address]   [address], [place] [address]

## 2024-02-22 NOTE — PLAN OF CARE
"Blood pressure (!) 144/92, pulse 87, temperature 99  F (37.2  C), temperature source Oral, resp. rate 18, height 1.753 m (5' 9.02\"), weight 104.1 kg (229 lb 8 oz), SpO2 98%.     Goal Outcome Evaluation: 1900-0730  Pt A&O x4 on RA.  PT denied SOB or CP. Pt reported headache, Nausea, and Toradol. Prn Toradol administered for pain. Pt SBA and ambulated x 2 to bathroom. R PIV infusing 100ml/ hr Ciwal score 5. Plan of care continues.                       "

## 2024-02-22 NOTE — PROGRESS NOTES
[facility]    Medicine Progress Note - Hospitalist Service, GOLD TEAM 18    Date of Admission:  2/20/2024    Assessment & Plan   Xavier Odell is a 39 year old male admitted on 2/20/2024. He has a history of alcohol use disorder with hx of severe withdrawal, asthma who presented to the ED with alcohol withdrawal.     #EtOH use disorder   #EtOH withdrawal   Hx of significant withdrawals. Patient reports he was clinically sober for about 6 months then over the last several weeks began drinking again. Now drinking upwards of 12-20 drinks per day. Last drink 2/19/24 evening. Notes overnight he became tremulous and nauseous. Denies hx of withdrawal seizures. Notes visual hallucinations (seeing spots) last evening and this AM. Presented to the ED seeking assistance with alcohol withdrawal. Initial LA was >5, improved to 2.7 with IVF. Alcohol breath test was 0.13 in the ED. UTox negative.   - Admit to medicine   - CIWA   - Valium PRN per [place] protocol   - CD consult   - Antiemetics PRN   - Multivitamin, folic acid, thiamine   - Continue PTA gabapentin 300 mg daily     #Transaminitis   #Elevated bilirubin   , , Tbili 1.4. Suspect secondary to alcohol hepatitis. Patient denies abdominal pain or vomiting. Notes some nausea today associated withdrawal.   - Repeat CMP in AM    - Encourage alcohol cessation     #Anion gap metabolic acidosis   #Lactic acidosis   Initial LA 5.7, improved to 2.7 with IVF. Anion gap 38 with CO2 12. Urine ketones >150. Received 2L IVF with NaCl and 1L banana bag in the ED. Metabolic acidosis likely multifactorial with lactic acidosis, alcohol associated ketoacidosis.   - Trend LA every 6 hours until normalized   - Continue mIVF with NaCl   - Repeat BMP at 1800 and in AM     #Hypertension   BP significantly elevated upon arrival, improved with treatment of withdrawal and IVF.   - Continue to monitor   - Clonidine 0.1 mg PRN for BP >  170/100     #Tachycardia   -110's in the ED. Improving with IVF and treatment of withdrawal. Likely secondary to alcohol withdrawal.   - Treatment of withdrawal as above   - If no improvement in tachycardia with stabilization of withdrawal obtain EKG     #Hyponatremia   Na 130 in the ED. Likely component to dehydration in the setting of heavy alcohol use and poor PO intake. Received 3L IVF in the ED.    - Repeat BMP     #Hypokalemia   K 3.3 in the ED. Likely secondary to poor PO intake. Received potassium chloride 10 mEq in the ED.  Repeat K in the ED 3.1.   - Replace with PO potassium supplement 40 mEq in the ED   - Replacement per protocol once on the medical floor     #Asthma   Stable. PTA on albuterol PRN and Symbicort daily. No recent asthma exacerbations.   - Continue albuterol PRN   - Formulary sub for Symbicort with Breo Ellipta     #Left lower back pain   Subacute left lower back pain, point specific pain. UA in the ED without evidence of infection. No hx of kidney stones. Noted to have 7 RBC in urine. No CVA tenderness on exam. Exam and history is not consistent with kidney stone however given mild hematuria, could consider. Likely musculoskeletal in nature.   - Trial of lidocaine patch   - Consider repeat UA if no improvement in pain with lidocaine patch vs CT to assess for kidney stone    #Dark stool   Patient reports one episode of dark stools this AM. Denies hx of melena, hematochezia or hematemesis. Hgb 14.7 in the ED. Given heavy drinking hx concern for gastritis vs possible gastric ulcer.   - Repeat hgb in AM   - Continue to monitor for dark stools   - Start Protonix     #Low back pain  - Increase gabapentin to 300 mg PO TID  - Consider muscle relaxant    #Proteinuria: Noted on UA, will need repeat UA with PCP in 2-4 weeks   #Depression: Continue PTA Mertazapine   #Nicotine use disorder: Nicotine replacement with Nicorette gum PRN           Diet: Advance Diet as Tolerated: Regular Diet Adult   "  DVT Prophylaxis: Pneumatic Compression Devices  Carrillo Catheter: Not present  Lines: None     Cardiac Monitoring: None  Code Status:  Full resuscitation status    Clinically Significant Risk Factors        # Hypokalemia: Lowest K = 3.1 mmol/L in last 2 days, will replace as needed   # Hypocalcemia: Lowest Ca = 8.1 mg/dL in last 2 days, will monitor and replace as appropriate   # Hypomagnesemia: Lowest Mg = 1.5 mg/dL in last 2 days, will replace as needed  # Anion Gap Metabolic Acidosis: Highest Anion Gap = 23 mmol/L in last 2 days, will monitor and treat as appropriate    # Thrombocytopenia: Lowest platelets = 81 in last 2 days, will monitor for bleeding          # Obesity: Estimated body mass index is 33.88 kg/m  as calculated from the following:    Height as of this encounter: 1.753 m (5' 9.02\").    Weight as of this encounter: 104.1 kg (229 lb 8 oz)., PRESENT ON ADMISSION            Disposition Plan     Expected Discharge Date: 02/22/2024      Destination: home            Possible discharge tomorrow.    Devyn Canas DO, S  Hospitalist Service, GOLD TEAM 18  [facility]  Securely message with [employer] (more info)  Text page via [facility] Paging/Directory   See signed in provider for up to date coverage information  ______________________________________________________________________    Interval History   Patient resting comfortably upon entering room.  Per documentation, patient has no interest in CD.  Will work to down-titrate Valium for CIWA.  Will discontinue IVF.  Possible discharge tomorrow.    Physical Exam   Vital Signs: Temp: 98.6  F (37  C) Temp src: Oral BP: (!) 145/99 Pulse: 112   Resp: 16 SpO2: 98 % O2 Device: None (Room air)    Weight: 229 lbs 7.98 oz    GENERAL: Alert and oriented x 3; no acute distress; well-nourished.  HEENT: Normocephalic; atraumatic; PERRLA; MMM.  CV: RRR; normal S1, S2; no rubs, murmurs, or gallops.  RESP: Lung fields clear to " aucultation B/L; no wheezing or crepitations.  GI: Abdomen is soft, nontender, nondistended; no organomegaly; normal bowel sounds.  : Deferred genital examination.   MSK: No clubbing, cyanosis, or edema.  DERM: Skin is intact; no rash, lesions, or skin breakdown.  NEURO: No focal deficits appreciated; strength & sensorium are grossly intact.  PSYCH: No active hallucinations; affect, insight appear within normal limits.    Medical Decision Making       45 MINUTES SPENT BY ME on the date of service doing chart review, history, exam, documentation & further activities per the note.      Data     I have personally reviewed the following data over the past 24 hrs:    3.4 (L)  \   12.8 (L)   / 81 (L)     138 100 3.0 (L) /  131 (H)   3.4 28 0.78 \     ALT: 102 (H) AST: 143 (H) AP: 73 TBILI: 1.1   ALB: 3.5 TOT PROTEIN: 6.4 LIPASE: N/A       Imaging results reviewed over the past 24 hrs:   No results found for this or any previous visit (from the past 24 hour(s)).

## 2024-02-22 NOTE — CONSULTS
Met with patient to discuss possible treatment options and to possibly complete an assessment. Patient states he isn't interested in any level of treatment at this time.  Patient was open to resources for possible treatment in the future and that will be added to his AVS.  Patient was informed that if he changes his mind about treatment while in the hospital staff to let staff know.  Staff could update his old assessment and make additional referrals real quickly.     Will Fernandes [facility] on 2/22/2024 at 2:15 PM

## 2024-02-22 NOTE — PROGRESS NOTES
Pt is alert&ox4, call light within reach. Able to make needs known. Pt calm and cooperative. Denies chest pain, and SOB. Pt denies N/T.     SBA/independent.     NS infusing 100     Potassium and mag replacement done.    Pt on regular diet- tolerating food well    On Ciwa Protocol. Score 6 and 1    Pain managed with Toradol and tylenol. Pt was given Nicotine gum.     P: continue with plan of care

## 2024-02-23 ENCOUNTER — APPOINTMENT (OUTPATIENT)
Dept: PHYSICAL THERAPY | Facility: CLINIC | Age: 39
DRG: 897 | End: 2024-02-23
Attending: INTERNAL MEDICINE
Payer: COMMERCIAL

## 2024-02-23 LAB
ALBUMIN SERPL BCG-MCNC: 3.5 G/DL (ref 3.5–5.2)
ALP SERPL-CCNC: 81 U/L (ref 40–150)
ALT SERPL W P-5'-P-CCNC: 140 U/L (ref 0–70)
ANION GAP SERPL CALCULATED.3IONS-SCNC: 8 MMOL/L (ref 7–15)
AST SERPL W P-5'-P-CCNC: 180 U/L (ref 0–45)
BASOPHILS # BLD AUTO: 0 10E3/UL (ref 0–0.2)
BASOPHILS NFR BLD AUTO: 0 %
BILIRUB SERPL-MCNC: 0.9 MG/DL
BUN SERPL-MCNC: 2.2 MG/DL (ref 6–20)
CALCIUM SERPL-MCNC: 8.5 MG/DL (ref 8.6–10)
CHLORIDE SERPL-SCNC: 102 MMOL/L (ref 98–107)
CREAT SERPL-MCNC: 0.63 MG/DL (ref 0.67–1.17)
DEPRECATED HCO3 PLAS-SCNC: 28 MMOL/L (ref 22–29)
EGFRCR SERPLBLD CKD-EPI 2021: >90 ML/MIN/1.73M2
EOSINOPHIL # BLD AUTO: 0.2 10E3/UL (ref 0–0.7)
EOSINOPHIL NFR BLD AUTO: 5 %
ERYTHROCYTE [DISTWIDTH] IN BLOOD BY AUTOMATED COUNT: 13.5 % (ref 10–15)
GLUCOSE SERPL-MCNC: 110 MG/DL (ref 70–99)
HCT VFR BLD AUTO: 41.8 % (ref 40–53)
HGB BLD-MCNC: 13.9 G/DL (ref 13.3–17.7)
IMM GRANULOCYTES # BLD: 0 10E3/UL
IMM GRANULOCYTES NFR BLD: 0 %
LYMPHOCYTES # BLD AUTO: 0.7 10E3/UL (ref 0.8–5.3)
LYMPHOCYTES NFR BLD AUTO: 20 %
MAGNESIUM SERPL-MCNC: 2.9 MG/DL (ref 1.7–2.3)
MCH RBC QN AUTO: 27.7 PG (ref 26.5–33)
MCHC RBC AUTO-ENTMCNC: 33.3 G/DL (ref 31.5–36.5)
MCV RBC AUTO: 83 FL (ref 78–100)
MONOCYTES # BLD AUTO: 0.2 10E3/UL (ref 0–1.3)
MONOCYTES NFR BLD AUTO: 5 %
NEUTROPHILS # BLD AUTO: 2.4 10E3/UL (ref 1.6–8.3)
NEUTROPHILS NFR BLD AUTO: 70 %
NRBC # BLD AUTO: 0 10E3/UL
NRBC BLD AUTO-RTO: 0 /100
PLATELET # BLD AUTO: 100 10E3/UL (ref 150–450)
POTASSIUM SERPL-SCNC: 3.1 MMOL/L (ref 3.4–5.3)
POTASSIUM SERPL-SCNC: 3.2 MMOL/L (ref 3.4–5.3)
POTASSIUM SERPL-SCNC: 3.7 MMOL/L (ref 3.4–5.3)
PROT SERPL-MCNC: 6.6 G/DL (ref 6.4–8.3)
RBC # BLD AUTO: 5.02 10E6/UL (ref 4.4–5.9)
SODIUM SERPL-SCNC: 138 MMOL/L (ref 135–145)
WBC # BLD AUTO: 3.4 10E3/UL (ref 4–11)

## 2024-02-23 PROCEDURE — 250N000013 HC RX MED GY IP 250 OP 250 PS 637: Performed by: STUDENT IN AN ORGANIZED HEALTH CARE EDUCATION/TRAINING PROGRAM

## 2024-02-23 PROCEDURE — 97161 PT EVAL LOW COMPLEX 20 MIN: CPT | Mod: GP | Performed by: PHYSICAL THERAPIST

## 2024-02-23 PROCEDURE — 85025 COMPLETE CBC W/AUTO DIFF WBC: CPT | Performed by: INTERNAL MEDICINE

## 2024-02-23 PROCEDURE — 84132 ASSAY OF SERUM POTASSIUM: CPT | Performed by: INTERNAL MEDICINE

## 2024-02-23 PROCEDURE — 80053 COMPREHEN METABOLIC PANEL: CPT | Performed by: INTERNAL MEDICINE

## 2024-02-23 PROCEDURE — 99232 SBSQ HOSP IP/OBS MODERATE 35: CPT | Performed by: INTERNAL MEDICINE

## 2024-02-23 PROCEDURE — 97530 THERAPEUTIC ACTIVITIES: CPT | Mod: GP | Performed by: PHYSICAL THERAPIST

## 2024-02-23 PROCEDURE — 83735 ASSAY OF MAGNESIUM: CPT | Performed by: INTERNAL MEDICINE

## 2024-02-23 PROCEDURE — 250N000011 HC RX IP 250 OP 636: Performed by: PHYSICIAN ASSISTANT

## 2024-02-23 PROCEDURE — 250N000013 HC RX MED GY IP 250 OP 250 PS 637: Performed by: INTERNAL MEDICINE

## 2024-02-23 PROCEDURE — 120N000002 HC R&B MED SURG/OB UMMC

## 2024-02-23 PROCEDURE — 250N000011 HC RX IP 250 OP 636: Performed by: INTERNAL MEDICINE

## 2024-02-23 PROCEDURE — 36415 COLL VENOUS BLD VENIPUNCTURE: CPT | Performed by: INTERNAL MEDICINE

## 2024-02-23 PROCEDURE — 250N000013 HC RX MED GY IP 250 OP 250 PS 637: Performed by: PHYSICIAN ASSISTANT

## 2024-02-23 RX ORDER — THIAMINE HYDROCHLORIDE 100 MG/ML
100 INJECTION, SOLUTION INTRAMUSCULAR; INTRAVENOUS DAILY
Status: DISCONTINUED | OUTPATIENT
Start: 2024-02-23 | End: 2024-02-25 | Stop reason: HOSPADM

## 2024-02-23 RX ORDER — GABAPENTIN 100 MG/1
100 CAPSULE ORAL 3 TIMES DAILY
Status: DISCONTINUED | OUTPATIENT
Start: 2024-02-23 | End: 2024-02-24

## 2024-02-23 RX ORDER — SODIUM CHLORIDE AND POTASSIUM CHLORIDE 150; 900 MG/100ML; MG/100ML
INJECTION, SOLUTION INTRAVENOUS CONTINUOUS
Status: DISCONTINUED | OUTPATIENT
Start: 2024-02-23 | End: 2024-02-25 | Stop reason: HOSPADM

## 2024-02-23 RX ORDER — POTASSIUM CHLORIDE 1500 MG/1
40 TABLET, EXTENDED RELEASE ORAL ONCE
Status: COMPLETED | OUTPATIENT
Start: 2024-02-23 | End: 2024-02-23

## 2024-02-23 RX ADMIN — PANTOPRAZOLE SODIUM 40 MG: 40 TABLET, DELAYED RELEASE ORAL at 08:43

## 2024-02-23 RX ADMIN — ONDANSETRON 4 MG: 4 TABLET, ORALLY DISINTEGRATING ORAL at 09:01

## 2024-02-23 RX ADMIN — DIAZEPAM 5 MG: 5 TABLET ORAL at 17:52

## 2024-02-23 RX ADMIN — POTASSIUM CHLORIDE AND SODIUM CHLORIDE: 900; 150 INJECTION, SOLUTION INTRAVENOUS at 18:42

## 2024-02-23 RX ADMIN — MIRTAZAPINE 15 MG: 15 TABLET, FILM COATED ORAL at 21:06

## 2024-02-23 RX ADMIN — POTASSIUM CHLORIDE 40 MEQ: 1500 TABLET, EXTENDED RELEASE ORAL at 10:16

## 2024-02-23 RX ADMIN — Medication 1 TABLET: at 08:43

## 2024-02-23 RX ADMIN — DIAZEPAM 5 MG: 5 TABLET ORAL at 17:59

## 2024-02-23 RX ADMIN — FLUTICASONE FUROATE AND VILANTEROL TRIFENATATE 1 PUFF: 200; 25 POWDER RESPIRATORY (INHALATION) at 08:58

## 2024-02-23 RX ADMIN — GABAPENTIN 100 MG: 100 CAPSULE ORAL at 20:51

## 2024-02-23 RX ADMIN — CLONIDINE HYDROCHLORIDE 0.1 MG: 0.1 TABLET ORAL at 10:16

## 2024-02-23 RX ADMIN — GABAPENTIN 300 MG: 300 CAPSULE ORAL at 13:41

## 2024-02-23 RX ADMIN — FOLIC ACID 1 MG: 1 TABLET ORAL at 08:43

## 2024-02-23 RX ADMIN — THIAMINE HCL TAB 100 MG 100 MG: 100 TAB at 08:44

## 2024-02-23 RX ADMIN — DIAZEPAM 5 MG: 5 TABLET ORAL at 17:24

## 2024-02-23 RX ADMIN — DIAZEPAM 5 MG: 5 TABLET ORAL at 20:50

## 2024-02-23 RX ADMIN — GABAPENTIN 300 MG: 300 CAPSULE ORAL at 08:43

## 2024-02-23 RX ADMIN — THIAMINE HYDROCHLORIDE 100 MG: 100 INJECTION, SOLUTION INTRAMUSCULAR; INTRAVENOUS at 17:25

## 2024-02-23 RX ADMIN — DIAZEPAM 5 MG: 5 TABLET ORAL at 15:00

## 2024-02-23 RX ADMIN — ONDANSETRON 4 MG: 4 TABLET, ORALLY DISINTEGRATING ORAL at 16:02

## 2024-02-23 RX ADMIN — LIDOCAINE 2 PATCH: 4 PATCH TOPICAL at 20:51

## 2024-02-23 RX ADMIN — DIAZEPAM 5 MG: 5 TABLET ORAL at 16:40

## 2024-02-23 RX ADMIN — DIAZEPAM 5 MG: 5 TABLET ORAL at 16:02

## 2024-02-23 RX ADMIN — DIAZEPAM 5 MG: 5 TABLET ORAL at 10:32

## 2024-02-23 RX ADMIN — ACETAMINOPHEN 650 MG: 325 TABLET, FILM COATED ORAL at 15:00

## 2024-02-23 RX ADMIN — POTASSIUM CHLORIDE 40 MEQ: 1500 TABLET, EXTENDED RELEASE ORAL at 17:54

## 2024-02-23 ASSESSMENT — ACTIVITIES OF DAILY LIVING (ADL)
ADLS_ACUITY_SCORE: 27
ADLS_ACUITY_SCORE: 23
ADLS_ACUITY_SCORE: 22
ADLS_ACUITY_SCORE: 22
ADLS_ACUITY_SCORE: 23
ADLS_ACUITY_SCORE: 22
ADLS_ACUITY_SCORE: 22
ADLS_ACUITY_SCORE: 23
ADLS_ACUITY_SCORE: 23
ADLS_ACUITY_SCORE: 22
ADLS_ACUITY_SCORE: 22
ADLS_ACUITY_SCORE: 27
ADLS_ACUITY_SCORE: 23
ADLS_ACUITY_SCORE: 22
ADLS_ACUITY_SCORE: 23
ADLS_ACUITY_SCORE: 22
ADLS_ACUITY_SCORE: 23
ADLS_ACUITY_SCORE: 27
ADLS_ACUITY_SCORE: 22

## 2024-02-23 NOTE — PROGRESS NOTES
[facility]    Medicine Progress Note - Hospitalist Service, GOLD TEAM 18    Date of Admission:  2/20/2024    Assessment & Plan   Xavier Odell is a 39 year old male admitted on 2/20/2024. He has a history of alcohol use disorder with hx of severe withdrawal, asthma who presented to the ED with alcohol withdrawal.     #EtOH use disorder   #EtOH withdrawal   Hx of significant withdrawals. Patient reports he was clinically sober for about 6 months then over the last several weeks began drinking again. Now drinking upwards of 12-20 drinks per day. Last drink 2/19/24 evening. Notes overnight he became tremulous and nauseous. Denies hx of withdrawal seizures. Notes visual hallucinations (seeing spots) last evening and this AM. Presented to the ED seeking assistance with alcohol withdrawal. Initial LA was >5, improved to 2.7 with IVF. Alcohol breath test was 0.13 in the ED. UTox negative.   - Admit to medicine   - CIWA   - Valium PRN per [place] protocol   - CD consult   - Antiemetics PRN   - Multivitamin, folic acid, thiamine   - Continue PTA gabapentin 300 mg daily     #Transaminitis   #Elevated bilirubin   , , Tbili 1.4. Suspect secondary to alcohol hepatitis. Patient denies abdominal pain or vomiting. Notes some nausea today associated withdrawal.   - Repeat CMP in AM    - Encourage alcohol cessation     #Anion gap metabolic acidosis   #Lactic acidosis   Initial LA 5.7, improved to 2.7 with IVF. Anion gap 38 with CO2 12. Urine ketones >150. Received 2L IVF with NaCl and 1L banana bag in the ED. Metabolic acidosis likely multifactorial with lactic acidosis, alcohol associated ketoacidosis.   - Trend LA every 6 hours until normalized   - Continue mIVF with NaCl   - Repeat BMP at 1800 and in AM     #Hypertension   BP significantly elevated upon arrival, improved with treatment of withdrawal and IVF.   - Continue to monitor   - Clonidine 0.1 mg PRN for BP >  170/100     #Tachycardia   -110's in the ED. Improving with IVF and treatment of withdrawal. Likely secondary to alcohol withdrawal.   - Treatment of withdrawal as above   - If no improvement in tachycardia with stabilization of withdrawal obtain EKG     #Hyponatremia   Na 130 in the ED. Likely component to dehydration in the setting of heavy alcohol use and poor PO intake. Received 3L IVF in the ED.    - Repeat BMP     #Hypokalemia   K 3.3 in the ED. Likely secondary to poor PO intake. Received potassium chloride 10 mEq in the ED.  Repeat K in the ED 3.1.   - Replace with PO potassium supplement 40 mEq in the ED   - Replacement per protocol once on the medical floor     #Asthma   Stable. PTA on albuterol PRN and Symbicort daily. No recent asthma exacerbations.   - Continue albuterol PRN   - Formulary sub for Symbicort with Breo Ellipta     #Left lower back pain   Subacute left lower back pain, point specific pain. UA in the ED without evidence of infection. No hx of kidney stones. Noted to have 7 RBC in urine. No CVA tenderness on exam. Exam and history is not consistent with kidney stone however given mild hematuria, could consider. Likely musculoskeletal in nature.   - Trial of lidocaine patch   - Consider repeat UA if no improvement in pain with lidocaine patch vs CT to assess for kidney stone    #Dark stool   Patient reports one episode of dark stools this AM. Denies hx of melena, hematochezia or hematemesis. Hgb 14.7 in the ED. Given heavy drinking hx concern for gastritis vs possible gastric ulcer.   - Repeat hgb in AM   - Continue to monitor for dark stools   - Start Protonix     #Low back pain  - Increase gabapentin to 300 mg PO TID  - Consider muscle relaxant    #Proteinuria: Noted on UA, will need repeat UA with PCP in 2-4 weeks   #Depression: Continue PTA Mertazapine   #Nicotine use disorder: Nicotine replacement with Nicorette gum PRN           Diet: Advance Diet as Tolerated: Regular Diet Adult   "  DVT Prophylaxis: Pneumatic Compression Devices  Carrillo Catheter: Not present  Lines: None     Cardiac Monitoring: None  Code Status:  Full resuscitation status    Clinically Significant Risk Factors        # Hypokalemia: Lowest K = 3.1 mmol/L in last 2 days, will replace as needed     # Hypomagnesemia: Lowest Mg = 1.5 mg/dL in last 2 days, will replace as needed     # Thrombocytopenia: Lowest platelets = 81 in last 2 days, will monitor for bleeding          # Obesity: Estimated body mass index is 33.29 kg/m  as calculated from the following:    Height as of this encounter: 1.753 m (5' 9.02\").    Weight as of this encounter: 102.3 kg (225 lb 8 oz)., PRESENT ON ADMISSION            Disposition Plan      Expected Discharge Date: 02/23/2024      Destination: home              Devyn Canas DO S  Hospitalist Service, GOLD TEAM 18  [facility]  Securely message with [employer] (more info)  Text page via [facility] Paging/Directory   See signed in provider for up to date coverage information  ______________________________________________________________________    Interval History   Patient reports nausea and paresthesias while sitting up.  Patient reports dizziness with ambulation.  Patient negative for orthostatic hypotension.  Consider discharge tomorrow.    Physical Exam   Vital Signs: Temp: 98.6  F (37  C) Temp src: Oral BP: 124/80 Pulse: 91   Resp: 18 SpO2: 93 % O2 Device: None (Room air)    Weight: 225 lbs 8 oz    GENERAL: Alert and oriented x 3; no acute distress; well-nourished.  HEENT: Normocephalic; atraumatic; PERRLA; MMM.  CV: RRR; normal S1, S2; no rubs, murmurs, or gallops.  RESP: Lung fields clear to aucultation B/L; no wheezing or crepitations.  GI: Abdomen is soft, nontender, nondistended; no organomegaly; normal bowel sounds.  : Deferred genital examination.   MSK: No clubbing, cyanosis, or edema.  DERM: Skin is intact; no rash, lesions, or skin breakdown.  NEURO: " No focal deficits appreciated; strength & sensorium are grossly intact.  PSYCH: No active hallucinations; affect, insight appear within normal limits.    Medical Decision Making       45 MINUTES SPENT BY ME on the date of service doing chart review, history, exam, documentation & further activities per the note.      Data     I have personally reviewed the following data over the past 24 hrs:    3.4 (L)  \   13.9   / 100 (L)     138 102 2.2 (L) /  110 (H)   3.2 (L) 28 0.63 (L) \     ALT: 140 (H) AST: 180 (H) AP: 81 TBILI: 0.9   ALB: 3.5 TOT PROTEIN: 6.6 LIPASE: N/A       Imaging results reviewed over the past 24 hrs:   No results found for this or any previous visit (from the past 24 hour(s)).

## 2024-02-23 NOTE — PLAN OF CARE
A&O X 3, Denies SOB and CP. Reported dizziness and n/v upon standing, resolves when sitting. Encouraged patient to call nurse prior to getting up and to change positions slowly. Scoring low on Ciwa assessment. Up to the bathroom, voiding without difficulty. Continue with POC.

## 2024-02-23 NOTE — PROGRESS NOTES
02/23/24 1428   Appointment Info   Signing Clinician's Name / Credentials (PT) Amber Riddle, PT       Present no   Language English   Living Environment   People in Home alone   Current Living Arrangements apartment   Home Accessibility stairs to enter home   Number of Stairs, Main Entrance 10   Stair Railings, Main Entrance other (see comments)  (has at least 1 railing)   Living Environment Comments Pt reported having 1 flight of steps to access apartment   Self-Care   Usual Activity Tolerance good   Current Activity Tolerance fair   Regular Exercise No   Equipment Currently Used at Home none   Fall history within last six months no   Activity/Exercise/Self-Care Comment Pt reported being independent with all ADLs.   General Information   Onset of Illness/Injury or Date of Surgery 02/22/24   Referring Physician Devyn Canas,    Patient/Family Therapy Goals Statement (PT) Pt would like to be able to lose weight.   Pertinent History of Current Problem (include personal factors and/or comorbidities that impact the POC) Pt is a 39 year old male admitted on 2/20/2024. He has a history of alcohol use disorder with hx of severe withdrawal, asthma who presented to the ED with alcohol withdrawal.   Existing Precautions/Restrictions fall   Heart Disease Risk Factors Overweight   General Observations Pt supine in bed at start of PT session, agreeable to PT session.   Cognition   Affect/Mental Status (Cognition) WNL   Orientation Status (Cognition) other (see comments)  (Not formally tested)   Follows Commands (Cognition) WNL   Pain Assessment   Patient Currently in Pain Yes, see Vital Sign flowsheet   Integumentary/Edema   Integumentary/Edema no deficits were identifed   Posture    Posture Forward head position;Protracted shoulders   Range of Motion (ROM)   Range of Motion ROM is WNL   Strength (Manual Muscle Testing)   Strength (Manual Muscle Testing) strength is WFL   Bed Mobility   Comment, (Bed  Mobility) Supine to/from sitting with HOB flat and SBA x 1.  Pt unable to tolerate sitting at EOB longer then 1-2 minutes due to dizziness.   Transfers   Comment, (Transfers) Not assessed secondary to dizziness when sitting at EOB.   Gait/Stairs (Locomotion)   Comment, (Gait/Stairs) Not assessed secondary to dizziness.   Balance   Balance Comments Pt demonstrated good sitting balance at EOB, but needs supervision secondary to dizziness.   Sensory Examination   Sensory Perception patient reports no sensory changes   Clinical Impression   Criteria for Skilled Therapeutic Intervention Yes, treatment indicated   PT Diagnosis (PT) Impaired functional mobility.   Influenced by the following impairments ETOH withdrawal   Functional limitations due to impairments Impaired transfers and gait.   Clinical Presentation (PT Evaluation Complexity) stable   Clinical Presentation Rationale Per clincial judgement   Clinical Decision Making (Complexity) low complexity   Planned Therapy Interventions (PT) bed mobility training;gait training;transfer training   Risk & Benefits of therapy have been explained evaluation/treatment results reviewed;care plan/treatment goals reviewed;risks/benefits reviewed;patient   PT Total Evaluation Time   PT Eval, Low Complexity Minutes (35465) 15   Physical Therapy Goals   PT Frequency Daily   PT Predicted Duration/Target Date for Goal Attainment 03/01/24   PT Goals Bed Mobility;Transfers;Gait   PT: Bed Mobility Independent;Supine to/from sit   PT: Transfers Independent;Sit to/from stand;Bed to/from chair;Assistive device   PT: Gait Independent;Greater than 200 feet   Therapeutic Activity   Therapeutic Activities: dynamic activities to improve functional performance Minutes (86381) 10   Symptoms Noted During/After Treatment Dizziness   Treatment Detail/Skilled Intervention Pt supine in bed at start of PT session, agreeable to PT session.  Pt completed an additional supine to/from sitting with HOB flat  independently.  Pt is able to scoot towards EOB independently.  Pt tolerated sitting at EOB longer then during eval approximately 3-4 minutes before needing to lie down.  Pt reported having nausea, dizziness, and feeling sweaty.  Pt does not have any significant change in BP with position changes.  Pt unable to stand due to dizziness.  Pt supine in bed at end of PT session with all needs met.   PT Discharge Planning   PT Plan Transfers and gait, monitor BP   PT Discharge Recommendation (DC Rec) home   PT Rationale for DC Rec Anticipate pt will be able to discharge to home once dizziness improves.   PT Brief overview of current status Pt is independent with bed mobility, no other  mobility observed due to dizziness when sitting at EOB.   Total Session Time   Timed Code Treatment Minutes 10   Total Session Time (sum of timed and untimed services) 25

## 2024-02-23 NOTE — PLAN OF CARE
Goal Outcome Evaluation:  Pt A&O x4 on RA.  PT denied SOB or CP. Pt reported headache and pain on LL back. Scheduled Lidocaine patch on left lower back. Prn Toradol administered for pain rating 5-7/10. Pt ambulated to bathroom SBA. Voiding adequately. R PIV SL. Plan of care continues.

## 2024-02-23 NOTE — PLAN OF CARE
Goal Outcome Evaluation:    Patient alert/oriented x4  Room air  Assist 1 with walker/gait belt  Voids in urinal at bedside  C/o mild headache. Tylenol given    Anticipated discharge today was delayed by symptoms of nausea, pins and needle sensation while sitting up. Assessed by PT this afternoon, they recommend assist 1 with walker and gait belt. Will reassess tomorrow.  Patient educated on the importance of calling for assistance before sitting up and ambulating.Also encouraged fluid intake.      CIWA: gave 5mg Valium for CIWA score of 18 x2 this shift    Call light within reach, able to make needs known.

## 2024-02-24 ENCOUNTER — APPOINTMENT (OUTPATIENT)
Dept: PHYSICAL THERAPY | Facility: CLINIC | Age: 39
DRG: 897 | End: 2024-02-24
Payer: COMMERCIAL

## 2024-02-24 ENCOUNTER — APPOINTMENT (OUTPATIENT)
Dept: ULTRASOUND IMAGING | Facility: CLINIC | Age: 39
DRG: 897 | End: 2024-02-24
Attending: INTERNAL MEDICINE
Payer: COMMERCIAL

## 2024-02-24 LAB
ALBUMIN SERPL BCG-MCNC: 3.4 G/DL (ref 3.5–5.2)
ALP SERPL-CCNC: 82 U/L (ref 40–150)
ALT SERPL W P-5'-P-CCNC: 154 U/L (ref 0–70)
ANION GAP SERPL CALCULATED.3IONS-SCNC: 9 MMOL/L (ref 7–15)
AST SERPL W P-5'-P-CCNC: 174 U/L (ref 0–45)
BASOPHILS # BLD AUTO: 0 10E3/UL (ref 0–0.2)
BASOPHILS NFR BLD AUTO: 1 %
BILIRUB SERPL-MCNC: 0.5 MG/DL
BUN SERPL-MCNC: 4.3 MG/DL (ref 6–20)
CALCIUM SERPL-MCNC: 8.4 MG/DL (ref 8.6–10)
CHLORIDE SERPL-SCNC: 107 MMOL/L (ref 98–107)
CREAT SERPL-MCNC: 0.67 MG/DL (ref 0.67–1.17)
DEPRECATED HCO3 PLAS-SCNC: 25 MMOL/L (ref 22–29)
EGFRCR SERPLBLD CKD-EPI 2021: >90 ML/MIN/1.73M2
EOSINOPHIL # BLD AUTO: 0.2 10E3/UL (ref 0–0.7)
EOSINOPHIL NFR BLD AUTO: 5 %
ERYTHROCYTE [DISTWIDTH] IN BLOOD BY AUTOMATED COUNT: 13.9 % (ref 10–15)
GLUCOSE SERPL-MCNC: 134 MG/DL (ref 70–99)
HCT VFR BLD AUTO: 40.6 % (ref 40–53)
HGB BLD-MCNC: 13.4 G/DL (ref 13.3–17.7)
IMM GRANULOCYTES # BLD: 0 10E3/UL
IMM GRANULOCYTES NFR BLD: 1 %
LYMPHOCYTES # BLD AUTO: 1.1 10E3/UL (ref 0.8–5.3)
LYMPHOCYTES NFR BLD AUTO: 27 %
MAGNESIUM SERPL-MCNC: 1.7 MG/DL (ref 1.7–2.3)
MCH RBC QN AUTO: 28.3 PG (ref 26.5–33)
MCHC RBC AUTO-ENTMCNC: 33 G/DL (ref 31.5–36.5)
MCV RBC AUTO: 86 FL (ref 78–100)
MONOCYTES # BLD AUTO: 0.3 10E3/UL (ref 0–1.3)
MONOCYTES NFR BLD AUTO: 6 %
NEUTROPHILS # BLD AUTO: 2.5 10E3/UL (ref 1.6–8.3)
NEUTROPHILS NFR BLD AUTO: 60 %
NRBC # BLD AUTO: 0 10E3/UL
NRBC BLD AUTO-RTO: 0 /100
PLATELET # BLD AUTO: 133 10E3/UL (ref 150–450)
POTASSIUM SERPL-SCNC: 3.9 MMOL/L (ref 3.4–5.3)
PROT SERPL-MCNC: 6.2 G/DL (ref 6.4–8.3)
RBC # BLD AUTO: 4.74 10E6/UL (ref 4.4–5.9)
SODIUM SERPL-SCNC: 141 MMOL/L (ref 135–145)
WBC # BLD AUTO: 4.1 10E3/UL (ref 4–11)

## 2024-02-24 PROCEDURE — 250N000013 HC RX MED GY IP 250 OP 250 PS 637: Performed by: PHYSICIAN ASSISTANT

## 2024-02-24 PROCEDURE — 250N000011 HC RX IP 250 OP 636: Performed by: INTERNAL MEDICINE

## 2024-02-24 PROCEDURE — 99233 SBSQ HOSP IP/OBS HIGH 50: CPT | Performed by: INTERNAL MEDICINE

## 2024-02-24 PROCEDURE — 97530 THERAPEUTIC ACTIVITIES: CPT | Mod: GP

## 2024-02-24 PROCEDURE — 36415 COLL VENOUS BLD VENIPUNCTURE: CPT | Performed by: INTERNAL MEDICINE

## 2024-02-24 PROCEDURE — 82040 ASSAY OF SERUM ALBUMIN: CPT | Performed by: INTERNAL MEDICINE

## 2024-02-24 PROCEDURE — 83735 ASSAY OF MAGNESIUM: CPT | Performed by: INTERNAL MEDICINE

## 2024-02-24 PROCEDURE — 85025 COMPLETE CBC W/AUTO DIFF WBC: CPT | Performed by: INTERNAL MEDICINE

## 2024-02-24 PROCEDURE — 120N000002 HC R&B MED SURG/OB UMMC

## 2024-02-24 PROCEDURE — 76705 ECHO EXAM OF ABDOMEN: CPT

## 2024-02-24 PROCEDURE — 76705 ECHO EXAM OF ABDOMEN: CPT | Mod: 26 | Performed by: RADIOLOGY

## 2024-02-24 PROCEDURE — 250N000011 HC RX IP 250 OP 636: Performed by: STUDENT IN AN ORGANIZED HEALTH CARE EDUCATION/TRAINING PROGRAM

## 2024-02-24 PROCEDURE — 97110 THERAPEUTIC EXERCISES: CPT | Mod: GP

## 2024-02-24 PROCEDURE — 250N000013 HC RX MED GY IP 250 OP 250 PS 637: Performed by: INTERNAL MEDICINE

## 2024-02-24 PROCEDURE — 250N000011 HC RX IP 250 OP 636: Performed by: PHYSICIAN ASSISTANT

## 2024-02-24 RX ADMIN — ONDANSETRON 4 MG: 4 TABLET, ORALLY DISINTEGRATING ORAL at 16:40

## 2024-02-24 RX ADMIN — Medication 5 MG: at 00:20

## 2024-02-24 RX ADMIN — KETOROLAC TROMETHAMINE 15 MG: 15 INJECTION, SOLUTION INTRAMUSCULAR; INTRAVENOUS at 12:28

## 2024-02-24 RX ADMIN — THIAMINE HYDROCHLORIDE 100 MG: 100 INJECTION, SOLUTION INTRAMUSCULAR; INTRAVENOUS at 08:52

## 2024-02-24 RX ADMIN — Medication 1 TABLET: at 08:53

## 2024-02-24 RX ADMIN — POTASSIUM CHLORIDE AND SODIUM CHLORIDE: 900; 150 INJECTION, SOLUTION INTRAVENOUS at 19:45

## 2024-02-24 RX ADMIN — PANTOPRAZOLE SODIUM 40 MG: 40 TABLET, DELAYED RELEASE ORAL at 08:52

## 2024-02-24 RX ADMIN — POTASSIUM CHLORIDE AND SODIUM CHLORIDE: 900; 150 INJECTION, SOLUTION INTRAVENOUS at 06:32

## 2024-02-24 RX ADMIN — LIDOCAINE 2 PATCH: 4 PATCH TOPICAL at 19:49

## 2024-02-24 RX ADMIN — GABAPENTIN 100 MG: 100 CAPSULE ORAL at 08:52

## 2024-02-24 RX ADMIN — DIAZEPAM 5 MG: 5 TABLET ORAL at 00:20

## 2024-02-24 RX ADMIN — MIRTAZAPINE 15 MG: 15 TABLET, FILM COATED ORAL at 22:57

## 2024-02-24 RX ADMIN — FLUTICASONE FUROATE AND VILANTEROL TRIFENATATE 1 PUFF: 200; 25 POWDER RESPIRATORY (INHALATION) at 08:57

## 2024-02-24 RX ADMIN — FOLIC ACID 1 MG: 1 TABLET ORAL at 08:52

## 2024-02-24 RX ADMIN — KETOROLAC TROMETHAMINE 15 MG: 15 INJECTION, SOLUTION INTRAMUSCULAR; INTRAVENOUS at 00:20

## 2024-02-24 ASSESSMENT — ACTIVITIES OF DAILY LIVING (ADL)
ADLS_ACUITY_SCORE: 23

## 2024-02-24 NOTE — PROGRESS NOTES
Pt. A&O x4 able to make needs known. Pt. Was given valium  once during night shift. Pt. On CIWA checks. Pt. Was also given melatonin for sleep during the night.That appeared to be effective. Pt. Is Ax1 W/walker did not get out of bed this shift. Using urinal at bedside. Pt. Is on Na/K+ Cont' fluids 100 ml/hr. Pt. Complained about back pain and Headache. Was offered Tylenol but pt. Declined. Vitals appeared stable refer to Flowsheet q4. Pt. Will not discharge today due to unsteady gait. Continue POC.

## 2024-02-24 NOTE — PLAN OF CARE
Pt here for acute alcohol withdrawal.     Goal Outcome Evaluation:      Plan of Care Reviewed With: patient    Overall Patient Progress: declining    Outcome Evaluation: Withdrawing    Pt CIWA has ranged from 16 at start of shift to 8 toward end. Received valium several times, zofran x1. Experiencing nausea, headache, anxiety, pins and needles sensation. Denies auditory and visual hallucinations. Feels dizzy and like room spinning when he attempts to sit up or get up. Was able to eat. Pleasant.     On K and Mg protocol, K was replaced. VS q4h. L PIV infusing with NS + 20 Kcl at 100 ml/hr.

## 2024-02-24 NOTE — PROGRESS NOTES
[facility]    Medicine Progress Note - Hospitalist Service, GOLD TEAM 18    Date of Admission:  2/20/2024    Assessment & Plan   Xavier Odell is a 39 year old male admitted on 2/20/2024. He has a history of alcohol use disorder with hx of severe withdrawal, asthma who presented to the ED with alcohol withdrawal.     #EtOH use disorder   #EtOH withdrawal   Hx of significant withdrawals. Patient reports he was clinically sober for about 6 months then over the last several weeks began drinking again. Now drinking upwards of 12-20 drinks per day. Last drink 2/19/24 evening. Notes overnight he became tremulous and nauseous. Denies hx of withdrawal seizures. Notes visual hallucinations (seeing spots) last evening and this AM. Presented to the ED seeking assistance with alcohol withdrawal. Initial LA was >5, improved to 2.7 with IVF. Alcohol breath test was 0.13 in the ED. UTox negative.   - Admit to medicine   - CIWA   - Valium PRN per [place] protocol   - CD consult   - Antiemetics PRN   - Multivitamin, folic acid, thiamine   - Continue PTA gabapentin 300 mg daily     #Transaminitis   #Elevated bilirubin   , , Tbili 1.4. Suspect secondary to alcohol hepatitis. Patient denies abdominal pain or vomiting. Notes some nausea today associated withdrawal.   - Repeat CMP in AM    - Encourage alcohol cessation     #Anion gap metabolic acidosis   #Lactic acidosis   Initial LA 5.7, improved to 2.7 with IVF. Anion gap 38 with CO2 12. Urine ketones >150. Received 2L IVF with NaCl and 1L banana bag in the ED. Metabolic acidosis likely multifactorial with lactic acidosis, alcohol associated ketoacidosis.   - Trend LA every 6 hours until normalized   - Continue mIVF with NaCl   - Repeat BMP at 1800 and in AM     #Hypertension   BP significantly elevated upon arrival, improved with treatment of withdrawal and IVF.   - Continue to monitor   - Clonidine 0.1 mg PRN for BP >  170/100     #Tachycardia   -110's in the ED. Improving with IVF and treatment of withdrawal. Likely secondary to alcohol withdrawal.   - Treatment of withdrawal as above   - If no improvement in tachycardia with stabilization of withdrawal obtain EKG     #Hyponatremia   Na 130 in the ED. Likely component to dehydration in the setting of heavy alcohol use and poor PO intake. Received 3L IVF in the ED.    - Repeat BMP     #Hypokalemia   K 3.3 in the ED. Likely secondary to poor PO intake. Received potassium chloride 10 mEq in the ED.  Repeat K in the ED 3.1.   - Replace with PO potassium supplement 40 mEq in the ED   - Replacement per protocol once on the medical floor     #Asthma   Stable. PTA on albuterol PRN and Symbicort daily. No recent asthma exacerbations.   - Continue albuterol PRN   - Formulary sub for Symbicort with Breo Ellipta     #Left lower back pain   Subacute left lower back pain, point specific pain. UA in the ED without evidence of infection. No hx of kidney stones. Noted to have 7 RBC in urine. No CVA tenderness on exam. Exam and history is not consistent with kidney stone however given mild hematuria, could consider. Likely musculoskeletal in nature.   - Trial of lidocaine patch   - Consider repeat UA if no improvement in pain with lidocaine patch vs CT to assess for kidney stone    #Dark stool   Patient reports one episode of dark stools this AM. Denies hx of melena, hematochezia or hematemesis. Hgb 14.7 in the ED. Given heavy drinking hx concern for gastritis vs possible gastric ulcer.   - Repeat hgb in AM   - Continue to monitor for dark stools   - Start Protonix     #Low back pain  - Increase gabapentin to 300 mg PO TID  - Consider muscle relaxant    #Proteinuria: Noted on UA, will need repeat UA with PCP in 2-4 weeks   #Depression: Continue PTA Mertazapine   #Nicotine use disorder: Nicotine replacement with Nicorette gum PRN           Diet: Advance Diet as Tolerated: Regular Diet Adult   "  DVT Prophylaxis: Pneumatic Compression Devices and Ambulate every shift  Carrillo Catheter: Not present  Lines: None     Cardiac Monitoring: None  Code Status:  Full resuscitation status    Clinically Significant Risk Factors        # Hypokalemia: Lowest K = 3.1 mmol/L in last 2 days, will replace as needed   # Hypocalcemia: Lowest Ca = 8.4 mg/dL in last 2 days, will monitor and replace as appropriate     # Hypoalbuminemia: Lowest albumin = 3.4 g/dL at 2/24/2024  5:58 AM, will monitor as appropriate   # Thrombocytopenia: Lowest platelets = 100 in last 2 days, will monitor for bleeding          # Obesity: Estimated body mass index is 33.91 kg/m  as calculated from the following:    Height as of this encounter: 1.753 m (5' 9.02\").    Weight as of this encounter: 104.2 kg (229 lb 11.2 oz)., PRESENT ON ADMISSION            Disposition Plan        Patient remains unstable with sitting and ambulation.    Dveyn Canas DO, S  Hospitalist Service, GOLD TEAM [address] [place] [address]  Securely message with [employer] info)  Text page via [facility] Paging/Directory   See signed in provider for up to date coverage information  ______________________________________________________________________    Interval History   Patient reports persistent dizziness upon sitting.  Patient was not able to tolerate physical therapy yesterday.  Care discussed with nursing staff at length.  Will continue aggressive vitamin supplementation and IV hydration.  Hopeful for improvement tomorrow; consider discharge    Physical Exam   Vital Signs: Temp: 97.9  F (36.6  C) Temp src: Oral BP: (!) 129/97 Pulse: 72   Resp: 16 SpO2: 93 % O2 Device: None (Room air)    Weight: 229 lbs 11.2 oz    GENERAL: Alert and oriented x 3; no acute distress; well-nourished.  HEENT: Normocephalic; atraumatic; PERRLA; MMM.  CV: RRR; normal S1, S2; no rubs, murmurs, or gallops.  RESP: Lung fields clear to aucultation B/L; no wheezing or " crepitations.  GI: Abdomen is soft, nontender, nondistended; no organomegaly; normal bowel sounds.  : Deferred genital examination.   MSK: No clubbing, cyanosis, or edema.  DERM: Skin is intact; no rash, lesions, or skin breakdown.  NEURO: No focal deficits appreciated; strength & sensorium are grossly intact.  PSYCH: No active hallucinations; affect, insight appear within normal limits.    Medical Decision Making       55 MINUTES SPENT BY ME on the date of service doing chart review, history, exam, documentation & further activities per the note.      Data     I have personally reviewed the following data over the past 24 hrs:    4.1  \   13.4   / 133 (L)     141 107 4.3 (L) /  134 (H)   3.9 25 0.67 \     ALT: 154 (H) AST: 174 (H) AP: 82 TBILI: 0.5   ALB: 3.4 (L) TOT PROTEIN: 6.2 (L) LIPASE: N/A       Imaging results reviewed over the past 24 hrs:   No results found for this or any previous visit (from the past 24 hour(s)).

## 2024-02-24 NOTE — PLAN OF CARE
Goal Outcome Evaluation:          Pt A&Ox4. States pain in back, gave Toradol. Pt states relief. Pt supposed to discharge, but states feels dizzy. Continue to monitor today. CIWA scored, no valium given.

## 2024-02-25 ENCOUNTER — APPOINTMENT (OUTPATIENT)
Dept: PHYSICAL THERAPY | Facility: CLINIC | Age: 39
DRG: 897 | End: 2024-02-25
Payer: COMMERCIAL

## 2024-02-25 VITALS
BODY MASS INDEX: 33.92 KG/M2 | HEART RATE: 96 BPM | HEIGHT: 69 IN | RESPIRATION RATE: 16 BRPM | SYSTOLIC BLOOD PRESSURE: 163 MMHG | TEMPERATURE: 98.7 F | WEIGHT: 229 LBS | DIASTOLIC BLOOD PRESSURE: 112 MMHG | OXYGEN SATURATION: 97 %

## 2024-02-25 LAB
HOLD SPECIMEN: NORMAL
MAGNESIUM SERPL-MCNC: 1.5 MG/DL (ref 1.7–2.3)
POTASSIUM SERPL-SCNC: 4 MMOL/L (ref 3.4–5.3)

## 2024-02-25 PROCEDURE — 84132 ASSAY OF SERUM POTASSIUM: CPT | Performed by: INTERNAL MEDICINE

## 2024-02-25 PROCEDURE — 250N000013 HC RX MED GY IP 250 OP 250 PS 637: Performed by: PHYSICIAN ASSISTANT

## 2024-02-25 PROCEDURE — 83735 ASSAY OF MAGNESIUM: CPT | Performed by: INTERNAL MEDICINE

## 2024-02-25 PROCEDURE — 250N000011 HC RX IP 250 OP 636: Performed by: INTERNAL MEDICINE

## 2024-02-25 PROCEDURE — 97116 GAIT TRAINING THERAPY: CPT | Mod: GP

## 2024-02-25 PROCEDURE — 36415 COLL VENOUS BLD VENIPUNCTURE: CPT | Performed by: INTERNAL MEDICINE

## 2024-02-25 PROCEDURE — 250N000011 HC RX IP 250 OP 636: Performed by: STUDENT IN AN ORGANIZED HEALTH CARE EDUCATION/TRAINING PROGRAM

## 2024-02-25 PROCEDURE — 99239 HOSP IP/OBS DSCHRG MGMT >30: CPT | Performed by: INTERNAL MEDICINE

## 2024-02-25 RX ORDER — LANOLIN ALCOHOL/MO/W.PET/CERES
100 CREAM (GRAM) TOPICAL DAILY
Qty: 30 TABLET | Refills: 0 | Status: SHIPPED | OUTPATIENT
Start: 2024-02-25 | End: 2024-03-26

## 2024-02-25 RX ORDER — PANTOPRAZOLE SODIUM 40 MG/1
40 TABLET, DELAYED RELEASE ORAL
Qty: 30 TABLET | Refills: 0 | Status: SHIPPED | OUTPATIENT
Start: 2024-02-26 | End: 2024-03-27

## 2024-02-25 RX ORDER — FOLIC ACID 1 MG/1
1 TABLET ORAL DAILY
Qty: 30 TABLET | Refills: 0 | Status: SHIPPED | OUTPATIENT
Start: 2024-02-26 | End: 2024-03-27

## 2024-02-25 RX ORDER — MULTIPLE VITAMINS W/ MINERALS TAB 9MG-400MCG
1 TAB ORAL DAILY
Qty: 30 TABLET | Refills: 0 | Status: SHIPPED | OUTPATIENT
Start: 2024-02-26 | End: 2024-03-27

## 2024-02-25 RX ADMIN — Medication 1 TABLET: at 08:32

## 2024-02-25 RX ADMIN — PANTOPRAZOLE SODIUM 40 MG: 40 TABLET, DELAYED RELEASE ORAL at 08:33

## 2024-02-25 RX ADMIN — THIAMINE HYDROCHLORIDE 100 MG: 100 INJECTION, SOLUTION INTRAMUSCULAR; INTRAVENOUS at 08:32

## 2024-02-25 RX ADMIN — FOLIC ACID 1 MG: 1 TABLET ORAL at 08:33

## 2024-02-25 RX ADMIN — CLONIDINE HYDROCHLORIDE 0.1 MG: 0.1 TABLET ORAL at 04:41

## 2024-02-25 RX ADMIN — POTASSIUM CHLORIDE AND SODIUM CHLORIDE: 900; 150 INJECTION, SOLUTION INTRAVENOUS at 08:33

## 2024-02-25 RX ADMIN — KETOROLAC TROMETHAMINE 15 MG: 15 INJECTION, SOLUTION INTRAMUSCULAR; INTRAVENOUS at 08:32

## 2024-02-25 RX ADMIN — FLUTICASONE FUROATE AND VILANTEROL TRIFENATATE 1 PUFF: 200; 25 POWDER RESPIRATORY (INHALATION) at 08:32

## 2024-02-25 ASSESSMENT — ACTIVITIES OF DAILY LIVING (ADL)
ADLS_ACUITY_SCORE: 23

## 2024-02-25 NOTE — PROGRESS NOTES
Pt. A&O x4 Pt. On Cont fluid 100 ml/hr pt. On CIWA checks  Valium discontinued. Pt. Refused Tylenol Pt. Was given PRN for elevated B/P . Pt.states that he's steady on his feet and denied any symptoms of unsteady gait. Pt. Was walking with Therapy in the hallway. Possible discharge today back home .Orders in place medication sent to discharge pharmacy. Continue POC.

## 2024-02-25 NOTE — DISCHARGE SUMMARY
"[facility]  Hospitalist Discharge Summary      Date of Admission:  2/20/2024  Date of Discharge:  2/25/2024  Discharging Provider: Devyn Cansa DO  Discharge Service: Hospitalist Service, GOLD TEAM 18    Discharge Diagnoses   Alcohol use disorder  Alcohol withdrawal  Transaminitis  Elevated bilirubin  Anion gap metabolic acidosis  Lactic acidosis  Hypertension  Tachycardia  Hyponatremia  Hypokalemia  Asthma  Left lower back pain  Dark stool  Low back pain  Proteinuria  Depression  Nicotine use disorder    Clinically Significant Risk Factors     # Obesity: Estimated body mass index is 33.8 kg/m  as calculated from the following:    Height as of this encounter: 1.753 m (5' 9.02\").    Weight as of this encounter: 103.9 kg (229 lb).       Follow-ups Needed After Discharge   Follow-up Appointments     Adult [facility] Follow-up and recommended labs and tests      Please follow-up with primary care provider at patient's earliest   convenience.  Please strongly consider chemical dependency treatment options.    Appointments on [place] and/or [facility] (with [facility] or [facility]   provider or service). Call [phone] if you haven't heard regarding   these appointments within 7 days of discharge.        Primary care provider, please evaluate serum electrolytes at visit.    Unresulted Labs Ordered in the Past 30 Days of this Admission       No orders found from 1/21/2024 to 2/21/2024.          Discharge Disposition   Discharged to home  Condition at discharge: Stable    Hospital Course   Xavier Odell is a 39 year old male admitted on 2/20/2024. He has a history of alcohol use disorder with hx of severe withdrawal, asthma who presented to the ED with alcohol withdrawal.     #EtOH use disorder   #EtOH withdrawal   Hx of significant withdrawals. Patient reports he was clinically sober for about 6 months then over the last several weeks began drinking again. Now drinking upwards " of 12-20 drinks per day. Last drink 2/19/24 evening. Notes overnight he became tremulous and nauseous. Denies hx of withdrawal seizures. Notes visual hallucinations (seeing spots) last evening and this AM. Presented to the ED seeking assistance with alcohol withdrawal. Initial LA was >5, improved to 2.7 with IVF. Alcohol breath test was 0.13 in the ED. UTox negative.   - Admit to medicine   - CIWA   - Valium PRN per CIWA protocol   - CD consult   - Antiemetics PRN   - Multivitamin, folic acid, thiamine   - Continue PTA gabapentin 300 mg daily     #Transaminitis   #Elevated bilirubin   , , Tbili 1.4. Suspect secondary to alcohol hepatitis. Patient denies abdominal pain or vomiting. Notes some nausea today associated withdrawal.   - Repeat CMP in AM    - Encourage alcohol cessation     #Anion gap metabolic acidosis   #Lactic acidosis   Initial LA 5.7, improved to 2.7 with IVF. Anion gap 38 with CO2 12. Urine ketones >150. Received 2L IVF with NaCl and 1L banana bag in the ED. Metabolic acidosis likely multifactorial with lactic acidosis, alcohol associated ketoacidosis.   - Trend LA every 6 hours until normalized   - Continue mIVF with NaCl   - Repeat BMP at 1800 and in AM     #Hypertension   BP significantly elevated upon arrival, improved with treatment of withdrawal and IVF.   - Continue to monitor   - Clonidine 0.1 mg PRN for BP > 170/100     #Tachycardia   -110's in the ED. Improving with IVF and treatment of withdrawal. Likely secondary to alcohol withdrawal.   - Treatment of withdrawal as above   - If no improvement in tachycardia with stabilization of withdrawal obtain EKG     #Hyponatremia   Na 130 in the ED. Likely component to dehydration in the setting of heavy alcohol use and poor PO intake. Received 3L IVF in the ED.    - Repeat BMP     #Hypokalemia   K 3.3 in the ED. Likely secondary to poor PO intake. Received potassium chloride 10 mEq in the ED.  Repeat K in the ED 3.1.   - Replace  with PO potassium supplement 40 mEq in the ED   - Replacement per protocol once on the medical floor     #Asthma   Stable. PTA on albuterol PRN and Symbicort daily. No recent asthma exacerbations.   - Continue albuterol PRN   - Formulary sub for Symbicort with Breo Ellipta     #Left lower back pain   Subacute left lower back pain, point specific pain. UA in the ED without evidence of infection. No hx of kidney stones. Noted to have 7 RBC in urine. No CVA tenderness on exam. Exam and history is not consistent with kidney stone however given mild hematuria, could consider. Likely musculoskeletal in nature.   - Trial of lidocaine patch   - Consider repeat UA if no improvement in pain with lidocaine patch vs CT to assess for kidney stone    #Dark stool   Patient reports one episode of dark stools this AM. Denies hx of melena, hematochezia or hematemesis. Hgb 14.7 in the ED. Given heavy drinking hx concern for gastritis vs possible gastric ulcer.   - Repeat hgb in AM   - Continue to monitor for dark stools   - Start Protonix     #Low back pain  - Increase gabapentin to 300 mg PO TID  - Consider muscle relaxant    #Proteinuria: Noted on UA, will need repeat UA with PCP in 2-4 weeks   #Depression: Continue PTA Mertazapine   #Nicotine use disorder: Nicotine replacement with Nicorette gum PRN     Consultations This Hospital Stay   CHEMICAL DEPENDENCY IP CONSULT  PHYSICAL THERAPY ADULT IP CONSULT    Code Status   Prior    Time Spent on this Encounter   IDevyn DO, personally saw the patient today and spent greater than 30 minutes discharging this patient.       Devyn Canas DO, [facility] MED SURG  [address] [place] [id number]  Phone: [phone]  Fax: [phone]  ______________________________________________________________________       Primary Care Physician   Clinic [facility]    Discharge Orders      Reason for your hospital stay    Patient was admitted to the hospital for  medical management of alcohol withdrawal syndrome.     Activity    Your activity upon discharge: activity as tolerated     Adult [facility] Follow-up and recommended labs and tests    Please follow-up with primary care provider at patient's earliest convenience.  Please strongly consider chemical dependency treatment options.    Appointments on [place] and/or [facility] (with [facility] or [facility] provider or service). Call [phone] if you haven't heard regarding these appointments within 7 days of discharge.     Diet    Follow this diet upon discharge: Orders Placed This Encounter      Advance Diet as Tolerated: Regular Diet Adult       Significant Results and Procedures   Results for orders placed or performed during the hospital encounter of 02/20/24   US Abdomen Limited    Narrative    EXAMINATION: Limited Abdominal Ultrasound, 2/24/2024 5:52 PM     COMPARISON: Ultrasound 11/1/2021    History: Increasing LFTs in the setting of EtOH abuse. During  examination, patient complaining of left back pain.    FINDINGS:  Pancreas: Pancreas partially visualized. Visualized portions of the  head and body of the pancreas are unremarkable.     Liver: The liver measures approximately 22.3 cm with increased  echogenicity with coarsening of the hepatic parenchyma. No evidence of  a focal hepatic mass. The main portal vein is patent with antegrade  flow.    Gallbladder: The gallbladder is nondistended. Gallbladder wall  measures up to 0.4 cm. No cholelithiasis or pericholecystic fluid.  Sonographic Cruz sign is negative.    Bile Ducts: Both the intra- and extrahepatic biliary system are of  normal caliber.  The common bile duct measures 3 mm in diameter.    Right Kidney: Measures 13.1 cm in length with normal parenchymal  echogenicity and cortical thickness. No hydronephrosis.    Additional targeted views of the left kidney were performed with a few  tiny shadowing punctate echogenic foci seen in the renal parenchyma.  No  hydronephrosis.    Fluid: None in the visualized abdomen.      Impression    IMPRESSION:   1. Hepatomegaly. Increased echogenicity and coarsening of the hepatic  parenchyma, favored represent sequelae of intrinsic parenchymal  disease including hepatic steatosis. No suspicious focal hepatic mass.  2. Gallbladder wall measures up to 0.4 cm. These findings likely do  not represent sequelae of acute cholecystitis in the absence of  additional secondary signs, such significant gallbladder distention,  cholelithiasis, pericholecystic fluid or positive sonographic Cruz's  sign.   3. A few punctate scattered echogenic foci are seen within the left  kidney without hydronephrosis, may represent nonobstructing calculi.    I have personally reviewed the examination and initial interpretation  and I agree with the findings.    CHIP NO MD         SYSTEM ID:  [id number]       Discharge Medications   Current Discharge Medication List        START taking these medications    Details   folic acid (FOLVITE) 1 MG tablet Take 1 tablet (1 mg) by mouth daily for 30 days  Qty: 30 tablet, Refills: 0    Associated Diagnoses: Alcohol dependence with withdrawal with complication (H)      multivitamin w/minerals (THERA-VIT-M) tablet Take 1 tablet by mouth daily for 30 days  Qty: 30 tablet, Refills: 0    Associated Diagnoses: Alcohol dependence with withdrawal with complication (H)      pantoprazole (PROTONIX) 40 MG EC tablet Take 1 tablet (40 mg) by mouth every morning (before breakfast) for 30 days  Qty: 30 tablet, Refills: 0    Associated Diagnoses: Alcohol dependence with withdrawal with complication (H)      thiamine (B-1) 100 MG tablet Take 1 tablet (100 mg) by mouth daily for 30 days  Qty: 30 tablet, Refills: 0    Associated Diagnoses: Alcohol dependence with withdrawal with complication (H)           CONTINUE these medications which have NOT CHANGED    Details   albuterol (PROAIR HFA/PROVENTIL HFA/VENTOLIN HFA) 108 (90 Base)  MCG/ACT inhaler Inhale 2 puffs into the lungs every 6 hours as needed  Qty: 18 g, Refills: 0    Comments: Pharmacy may dispense brand covered by insurance (Proair, or proventil or ventolin or generic albuterol inhaler)  Associated Diagnoses: Alcohol withdrawal syndrome, with unspecified complication (H)      gabapentin (NEURONTIN) 300 MG capsule Take 1 capsule (300 mg) by mouth 3 times daily  Qty: 90 capsule, Refills: 0    Associated Diagnoses: Alcohol withdrawal syndrome, with unspecified complication (H)      mirtazapine (REMERON) 15 MG tablet Take 1 tablet (15 mg) by mouth At Bedtime  Qty: 30 tablet, Refills: 0    Associated Diagnoses: Alcohol withdrawal syndrome, with unspecified complication (H)      SYMBICORT 160-4.5 MCG/ACT Inhaler Inhale 2 puffs into the lungs 2 times daily      nicotine polacrilex (NICORETTE) 4 MG gum Place 1 each (4 mg) inside cheek every hour as needed for smoking cessation  Qty: 30 each, Refills: 1    Associated Diagnoses: Alcohol withdrawal syndrome, with unspecified complication (H)           Allergies   Allergies   Allergen Reactions    Banana      Mild throat irritation per pt    Chicken Allergy      Anaphalxis    Peanut (Diagnostic)      Pt reports does not have allergy to this.  Patient today, 3/6/2021, patient confirms no serious aversion to peanuts.  Therefore, no removal of peanut products from garcia.  Anaphalxis  Anaphalxis

## 2024-02-25 NOTE — PLAN OF CARE
Physical Therapy Discharge Summary    Reason for therapy discharge:    Discharged to home.  All goals and outcomes met, no further needs identified.    Progress towards therapy goal(s). See goals on Care Plan in [facility] electronic health record for goal details.  Goals met    Therapy recommendation(s):    No further therapy is recommended.    Goal Outcome Evaluation:

## 2024-02-25 NOTE — PROVIDER NOTIFICATION
Pt scored 14 on CIWA. Pt endorses headache, pins and needles, nausea, has tremor with hands extended. Pt also asked about gabapentin, which he had been told would be increased to 300 mg TID, had been receiving 100 mg daily here but it had been discontinued.     Messaged Missael via [employer], responded shortly after he has been very weak and he should not be withdrawing at this time as it's been 5 days.     Pt has had withdrawal in the past and still feels these are withdrawal symptoms and that he is not being taken seriously.

## 2024-02-25 NOTE — PLAN OF CARE
Goal Outcome Evaluation:         Pt A&Ox4. Pt states not dizzy anymore, steady gait. Pt states pain, gave Toradol. Pt cleared for discharge. Went over paperwork, answered questions,gave meds. Pt left unit.

## 2024-02-25 NOTE — PLAN OF CARE
Patient here for alcohol withdrawal    Goal Outcome Evaluation:      Plan of Care Reviewed With: patient    Overall Patient Progress: improving    Outcome Evaluation: Lower CIWA scale without meds      VS: Temp: 97.9  F (36.6  C) Temp src: Oral BP: (!) 142/100 Pulse: 82   Resp: 16 SpO2: 98 % O2 Device: None (Room air)      O2: RA   Output: 1300 per urinal   Last BM: 2/22   Activity: Ax1 GBW, still too dizzy to move much but did get on and off stretcher for ultrasound   Skin: No issues   Pain: Mild to moderate in back, headache   Neuro: A&O x4   Dressing: NA   Diet: Regular   LDA: 2 left PIVs with L hand infusing NS +20 Kcl at 100 mL/hr   Equipment: Walker, IV pole   Plan: TBD   Additional Info: Had abdominal ultrasound today.   CIWA positive but meds discontinued, see provider notification note.

## 2024-03-13 ENCOUNTER — LAB REQUISITION (OUTPATIENT)
Dept: LAB | Facility: CLINIC | Age: 39
End: 2024-03-13
Payer: COMMERCIAL

## 2024-03-13 DIAGNOSIS — F10.21 ALCOHOL DEPENDENCE, IN REMISSION (H): ICD-10-CM

## 2024-03-13 DIAGNOSIS — E66.9 OBESITY, UNSPECIFIED: ICD-10-CM

## 2024-03-13 PROCEDURE — 80053 COMPREHEN METABOLIC PANEL: CPT | Mod: ORL | Performed by: INTERNAL MEDICINE

## 2024-03-13 PROCEDURE — 80061 LIPID PANEL: CPT | Mod: ORL | Performed by: INTERNAL MEDICINE

## 2024-03-13 PROCEDURE — 84443 ASSAY THYROID STIM HORMONE: CPT | Mod: ORL | Performed by: INTERNAL MEDICINE

## 2024-03-13 PROCEDURE — 84439 ASSAY OF FREE THYROXINE: CPT | Mod: ORL | Performed by: INTERNAL MEDICINE

## 2024-03-13 PROCEDURE — 87389 HIV-1 AG W/HIV-1&-2 AB AG IA: CPT | Mod: ORL | Performed by: INTERNAL MEDICINE

## 2024-03-14 LAB
ALBUMIN SERPL BCG-MCNC: 4.2 G/DL (ref 3.5–5.2)
ALP SERPL-CCNC: 92 U/L (ref 40–150)
ALT SERPL W P-5'-P-CCNC: 172 U/L (ref 0–70)
ANION GAP SERPL CALCULATED.3IONS-SCNC: 12 MMOL/L (ref 7–15)
AST SERPL W P-5'-P-CCNC: 85 U/L (ref 0–45)
BILIRUB SERPL-MCNC: 0.3 MG/DL
BUN SERPL-MCNC: 12.6 MG/DL (ref 6–20)
CALCIUM SERPL-MCNC: 9.1 MG/DL (ref 8.6–10)
CHLORIDE SERPL-SCNC: 104 MMOL/L (ref 98–107)
CHOLEST SERPL-MCNC: 174 MG/DL
CREAT SERPL-MCNC: 0.94 MG/DL (ref 0.67–1.17)
DEPRECATED HCO3 PLAS-SCNC: 22 MMOL/L (ref 22–29)
EGFRCR SERPLBLD CKD-EPI 2021: >90 ML/MIN/1.73M2
FASTING STATUS PATIENT QL REPORTED: NO
GLUCOSE SERPL-MCNC: 97 MG/DL (ref 70–99)
HDLC SERPL-MCNC: 44 MG/DL
HIV 1+2 AB+HIV1 P24 AG SERPL QL IA: NONREACTIVE
LDLC SERPL CALC-MCNC: 105 MG/DL
NONHDLC SERPL-MCNC: 130 MG/DL
POTASSIUM SERPL-SCNC: 4.3 MMOL/L (ref 3.4–5.3)
PROT SERPL-MCNC: 7.6 G/DL (ref 6.4–8.3)
SODIUM SERPL-SCNC: 138 MMOL/L (ref 135–145)
T4 FREE SERPL-MCNC: 1.42 NG/DL (ref 0.9–1.7)
TRIGL SERPL-MCNC: 123 MG/DL
TSH SERPL DL<=0.005 MIU/L-ACNC: 7.59 UIU/ML (ref 0.3–4.2)

## 2024-04-03 NOTE — PLAN OF CARE
Problem: Alcohol Withdrawal  Goal: Alcohol Withdrawal Symptom Control  Outcome: Progressing      Patient is currently out of detox but scored on the MSSA for Benzo withdrawal.. He is schedule phenobarbital taper. He stayed out in the milieu socializing with peers.   Patient ate good dinner , medication compliant. No other concerns noted or reported.    BP (!) 142/96   Pulse 117   Temp 97.2  F (36.2  C) (Temporal)   Resp 16   SpO2 95%        Yes

## 2024-06-07 ENCOUNTER — LAB REQUISITION (OUTPATIENT)
Dept: LAB | Facility: CLINIC | Age: 39
End: 2024-06-07
Payer: COMMERCIAL

## 2024-06-07 DIAGNOSIS — F10.21 ALCOHOL DEPENDENCE, IN REMISSION (H): ICD-10-CM

## 2024-06-07 DIAGNOSIS — R79.89 OTHER SPECIFIED ABNORMAL FINDINGS OF BLOOD CHEMISTRY: ICD-10-CM

## 2024-06-07 PROCEDURE — 86381 MITOCHONDRIAL ANTIBODY EACH: CPT | Mod: ORL | Performed by: INTERNAL MEDICINE

## 2024-06-07 PROCEDURE — 80053 COMPREHEN METABOLIC PANEL: CPT | Mod: ORL | Performed by: INTERNAL MEDICINE

## 2024-06-07 PROCEDURE — 86706 HEP B SURFACE ANTIBODY: CPT | Mod: ORL | Performed by: INTERNAL MEDICINE

## 2024-06-07 PROCEDURE — 87340 HEPATITIS B SURFACE AG IA: CPT | Mod: ORL | Performed by: INTERNAL MEDICINE

## 2024-06-07 PROCEDURE — 82728 ASSAY OF FERRITIN: CPT | Mod: ORL | Performed by: INTERNAL MEDICINE

## 2024-06-07 PROCEDURE — 83550 IRON BINDING TEST: CPT | Mod: ORL | Performed by: INTERNAL MEDICINE

## 2024-06-07 PROCEDURE — 86704 HEP B CORE ANTIBODY TOTAL: CPT | Mod: ORL | Performed by: INTERNAL MEDICINE

## 2024-06-07 PROCEDURE — 86038 ANTINUCLEAR ANTIBODIES: CPT | Mod: ORL | Performed by: INTERNAL MEDICINE

## 2024-06-07 PROCEDURE — 83540 ASSAY OF IRON: CPT | Mod: ORL | Performed by: INTERNAL MEDICINE

## 2024-06-08 LAB
ALBUMIN SERPL BCG-MCNC: 4.5 G/DL (ref 3.5–5.2)
ALP SERPL-CCNC: 75 U/L (ref 40–150)
ALT SERPL W P-5'-P-CCNC: 42 U/L (ref 0–70)
ANION GAP SERPL CALCULATED.3IONS-SCNC: 12 MMOL/L (ref 7–15)
AST SERPL W P-5'-P-CCNC: 27 U/L (ref 0–45)
BILIRUB SERPL-MCNC: 0.5 MG/DL
BUN SERPL-MCNC: 10.8 MG/DL (ref 6–20)
CALCIUM SERPL-MCNC: 9.1 MG/DL (ref 8.6–10)
CHLORIDE SERPL-SCNC: 103 MMOL/L (ref 98–107)
CREAT SERPL-MCNC: 0.92 MG/DL (ref 0.67–1.17)
DEPRECATED HCO3 PLAS-SCNC: 21 MMOL/L (ref 22–29)
EGFRCR SERPLBLD CKD-EPI 2021: >90 ML/MIN/1.73M2
FERRITIN SERPL-MCNC: 243 NG/ML (ref 31–409)
GLUCOSE SERPL-MCNC: 114 MG/DL (ref 70–99)
HBV CORE AB SERPL QL IA: NONREACTIVE
HBV SURFACE AB SERPL IA-ACNC: 154 M[IU]/ML
HBV SURFACE AB SERPL IA-ACNC: REACTIVE M[IU]/ML
HBV SURFACE AG SERPL QL IA: NONREACTIVE
IRON BINDING CAPACITY (ROCHE): 335 UG/DL (ref 240–430)
IRON SATN MFR SERPL: 26 % (ref 15–46)
IRON SERPL-MCNC: 86 UG/DL (ref 61–157)
POTASSIUM SERPL-SCNC: 4.2 MMOL/L (ref 3.4–5.3)
PROT SERPL-MCNC: 7.6 G/DL (ref 6.4–8.3)
SODIUM SERPL-SCNC: 136 MMOL/L (ref 135–145)

## 2024-06-10 LAB — ANA SER QL IF: NEGATIVE

## 2024-06-11 LAB — MITOCHONDRIA M2 IGG SER-ACNC: 1.4 U/ML

## 2024-07-19 NOTE — GROUP NOTE
Group Therapy Documentation    PATIENT'S NAME: Xavier Odell  MRN:   [id number]  :   1985  ACCT. NUMBER: [id number]  DATE OF SERVICE: 10/01/21  START TIME: 12:30 PM  END TIME:  2:30 PM  FACILITATOR(S): Jimbo Ulloa LADC; Raven Lynne  TOPIC: BEH Group Therapy  Number of patients attending the group:  6  Group Length:  2 Hours    Group Therapy Type: Recovery strategies    Summary of Group / Topics Discussed:    Recovery Principles      Group Attendance:  Attended group session    Patient's response to the group topic/interactions:  cooperative with task    Patient appeared to be Attentive.        Client specific details:  Xavier attended PM group. Group topics included adversity, inner strength, and the power of support network.       New script for oxycodone 5mg tablet every 6 hours as needed for pain sent to pharmacy due to patient vomiting with liquid. OARRS checked prior to sending new prescription to pharmacy. Tylenol pill form also sent, as well as zofran 4mg every 8 hours as needed for nausea and vomiting.

## 2024-11-02 ENCOUNTER — HEALTH MAINTENANCE LETTER (OUTPATIENT)
Age: 39
End: 2024-11-02

## 2024-12-10 ENCOUNTER — LAB REQUISITION (OUTPATIENT)
Dept: LAB | Facility: CLINIC | Age: 39
End: 2024-12-10
Payer: COMMERCIAL

## 2024-12-10 DIAGNOSIS — E03.8 OTHER SPECIFIED HYPOTHYROIDISM: ICD-10-CM

## 2024-12-10 PROCEDURE — 84439 ASSAY OF FREE THYROXINE: CPT | Mod: ORL | Performed by: NURSE PRACTITIONER

## 2024-12-10 PROCEDURE — 84443 ASSAY THYROID STIM HORMONE: CPT | Mod: ORL | Performed by: NURSE PRACTITIONER

## 2024-12-11 LAB
T4 FREE SERPL-MCNC: 1.14 NG/DL (ref 0.9–1.7)
TSH SERPL DL<=0.005 MIU/L-ACNC: 4.94 UIU/ML (ref 0.3–4.2)